# Patient Record
Sex: FEMALE | Race: WHITE | NOT HISPANIC OR LATINO
[De-identification: names, ages, dates, MRNs, and addresses within clinical notes are randomized per-mention and may not be internally consistent; named-entity substitution may affect disease eponyms.]

---

## 2018-07-02 ENCOUNTER — APPOINTMENT (OUTPATIENT)
Dept: PULMONOLOGY | Facility: CLINIC | Age: 83
End: 2018-07-02
Payer: MEDICARE

## 2018-07-02 VITALS
SYSTOLIC BLOOD PRESSURE: 115 MMHG | OXYGEN SATURATION: 100 % | BODY MASS INDEX: 24.84 KG/M2 | DIASTOLIC BLOOD PRESSURE: 66 MMHG | RESPIRATION RATE: 16 BRPM | WEIGHT: 135 LBS | TEMPERATURE: 98.5 F | HEART RATE: 85 BPM | HEIGHT: 62 IN

## 2018-07-02 PROCEDURE — 99213 OFFICE O/P EST LOW 20 MIN: CPT | Mod: 25

## 2018-07-02 PROCEDURE — 71046 X-RAY EXAM CHEST 2 VIEWS: CPT

## 2018-07-02 RX ORDER — SULFAMETHOXAZOLE AND TRIMETHOPRIM 800; 160 MG/1; MG/1
800-160 TABLET ORAL
Qty: 6 | Refills: 0 | Status: DISCONTINUED | COMMUNITY
Start: 2018-04-03

## 2018-07-02 RX ORDER — ROSUVASTATIN CALCIUM 5 MG/1
5 TABLET, FILM COATED ORAL
Refills: 0 | Status: ACTIVE | COMMUNITY

## 2018-07-02 RX ORDER — AZITHROMYCIN 250 MG/1
250 TABLET, FILM COATED ORAL
Qty: 6 | Refills: 0 | Status: DISCONTINUED | COMMUNITY
Start: 2018-06-02

## 2018-07-02 RX ORDER — CHROMIUM 200 MCG
1000 TABLET ORAL
Refills: 0 | Status: ACTIVE | COMMUNITY

## 2018-07-02 RX ORDER — CEFUROXIME AXETIL 500 MG/1
500 TABLET ORAL
Qty: 20 | Refills: 0 | Status: DISCONTINUED | COMMUNITY
Start: 2018-06-04

## 2018-07-02 RX ORDER — ECONAZOLE NITRATE 10 MG/G
1 CREAM TOPICAL
Qty: 85 | Refills: 0 | Status: ACTIVE | COMMUNITY
Start: 2018-02-14

## 2018-07-02 RX ORDER — ASPIRIN 81 MG/1
81 TABLET, COATED ORAL
Refills: 0 | Status: ACTIVE | COMMUNITY

## 2018-07-31 ENCOUNTER — MOBILE ON CALL (OUTPATIENT)
Age: 83
End: 2018-07-31

## 2019-05-08 ENCOUNTER — TRANSCRIPTION ENCOUNTER (OUTPATIENT)
Age: 84
End: 2019-05-08

## 2019-05-09 ENCOUNTER — OUTPATIENT (OUTPATIENT)
Dept: OUTPATIENT SERVICES | Facility: HOSPITAL | Age: 84
LOS: 1 days | End: 2019-05-09
Payer: MEDICARE

## 2019-05-09 ENCOUNTER — RESULT REVIEW (OUTPATIENT)
Age: 84
End: 2019-05-09

## 2019-05-09 VITALS
RESPIRATION RATE: 14 BRPM | HEART RATE: 60 BPM | HEIGHT: 62 IN | WEIGHT: 136.25 LBS | DIASTOLIC BLOOD PRESSURE: 60 MMHG | TEMPERATURE: 98 F | SYSTOLIC BLOOD PRESSURE: 148 MMHG | OXYGEN SATURATION: 100 %

## 2019-05-09 VITALS
RESPIRATION RATE: 16 BRPM | OXYGEN SATURATION: 99 % | SYSTOLIC BLOOD PRESSURE: 119 MMHG | HEART RATE: 68 BPM | DIASTOLIC BLOOD PRESSURE: 56 MMHG

## 2019-05-09 DIAGNOSIS — Z98.49 CATARACT EXTRACTION STATUS, UNSPECIFIED EYE: Chronic | ICD-10-CM

## 2019-05-09 DIAGNOSIS — T85.22XA DISPLACEMENT OF INTRAOCULAR LENS, INITIAL ENCOUNTER: ICD-10-CM

## 2019-05-09 PROCEDURE — V2632: CPT

## 2019-05-09 PROCEDURE — 67039 LASER TREATMENT OF RETINA: CPT | Mod: LT

## 2019-05-09 PROCEDURE — C1889: CPT

## 2019-05-09 PROCEDURE — 88300 SURGICAL PATH GROSS: CPT

## 2019-05-09 PROCEDURE — 88300 SURGICAL PATH GROSS: CPT | Mod: 26

## 2019-05-09 PROCEDURE — 66986 EXCHANGE LENS PROSTHESIS: CPT | Mod: LT

## 2019-05-09 NOTE — ASU DISCHARGE PLAN (ADULT/PEDIATRIC) - CARE PROVIDER_API CALL
Kojo Gifford)  Ophthalmology  [address] [place] [address], [facility] [address]  [place] [address]  Phone: [phone]  Fax: [phone]  Follow Up Time:

## 2019-05-09 NOTE — ASU DISCHARGE PLAN (ADULT/PEDIATRIC) - NURSING INSTRUCTIONS
Do not rub the eye. Tylenol or extra strength tylenol if needed for discomfort, avoid   Advil, Motrin, Aleve and aspirin to minimize bleeding. Appointment on 5/10/19 @ 9am

## 2019-05-09 NOTE — ASU DISCHARGE PLAN (ADULT/PEDIATRIC) - CALL YOUR DOCTOR IF YOU HAVE ANY OF THE FOLLOWING:
Nausea and vomiting that does not stop/Fever greater than (need to indicate Fahrenheit or Celsius)/Swelling that gets worse/Bleeding that does not stop/Pain not relieved by Medications

## 2019-05-09 NOTE — ASU DISCHARGE PLAN (ADULT/PEDIATRIC) - PATIENT EDUCATION MATERIALS PROVIED
Other (specify)/Implant card (specify)/Intraocular lens implant (IOL) , Eye shield instructions and eye kit given to patient

## 2019-05-09 NOTE — ASU DISCHARGE PLAN (ADULT/PEDIATRIC) - PROCEDURE
left eye pars plana vitrectomy, removal of posterior intraocular lens, endolaser, insertion of sutured IOL

## 2020-05-12 ENCOUNTER — APPOINTMENT (OUTPATIENT)
Dept: PULMONOLOGY | Facility: CLINIC | Age: 85
End: 2020-05-12
Payer: MEDICARE

## 2020-05-12 PROBLEM — K21.9 GASTRO-ESOPHAGEAL REFLUX DISEASE WITHOUT ESOPHAGITIS: Chronic | Status: ACTIVE | Noted: 2019-05-09

## 2020-05-12 PROBLEM — R00.2 PALPITATIONS: Chronic | Status: ACTIVE | Noted: 2019-05-09

## 2020-05-12 PROCEDURE — 99441: CPT | Mod: CR

## 2020-05-15 ENCOUNTER — APPOINTMENT (OUTPATIENT)
Dept: PULMONOLOGY | Facility: CLINIC | Age: 85
End: 2020-05-15
Payer: MEDICARE

## 2020-05-15 ENCOUNTER — LABORATORY RESULT (OUTPATIENT)
Age: 85
End: 2020-05-15

## 2020-05-15 VITALS
OXYGEN SATURATION: 99 % | TEMPERATURE: 97.9 F | HEART RATE: 73 BPM | DIASTOLIC BLOOD PRESSURE: 65 MMHG | WEIGHT: 140 LBS | SYSTOLIC BLOOD PRESSURE: 132 MMHG | HEIGHT: 62 IN | BODY MASS INDEX: 25.76 KG/M2

## 2020-05-15 PROCEDURE — 36415 COLL VENOUS BLD VENIPUNCTURE: CPT

## 2020-05-15 PROCEDURE — 94010 BREATHING CAPACITY TEST: CPT

## 2020-05-15 PROCEDURE — 99214 OFFICE O/P EST MOD 30 MIN: CPT | Mod: 25

## 2020-05-15 RX ORDER — AZITHROMYCIN 250 MG/1
250 TABLET, FILM COATED ORAL DAILY
Qty: 10 | Refills: 0 | Status: DISCONTINUED | COMMUNITY
Start: 2018-07-02 | End: 2020-05-15

## 2020-05-15 RX ORDER — CEFUROXIME AXETIL 250 MG/1
250 TABLET ORAL
Qty: 14 | Refills: 0 | Status: DISCONTINUED | COMMUNITY
Start: 2019-12-16

## 2020-05-15 NOTE — HISTORY OF PRESENT ILLNESS
[TextBox_4] : Notes BARNES has had in past.\par Has recurred. Had improved now recurred with BARNES 25 feet.\par \par Some nasal congestion.\par No cough\par No wheezing\par No CP \par Does feel tachycardia with exertion.

## 2020-05-15 NOTE — DISCUSSION/SUMMARY
[FreeTextEntry1] : Exertional dyspnea of unclear etiology.  She has had this symptom in the past which has resolved.\par Has seen cardiology and will follow-up.\par Cannot exclude component of airways disease in light of decrement in flow rates with mild airflow limitation.\par Abnormal chest radiograph without significant change.

## 2020-05-15 NOTE — ASSESSMENT
[FreeTextEntry1] : Trial of Brio Ellipta and instructed in proper use.\par \par Patient will call or follow-up in 1 to 2 weeks time to ascertain response to therapy.\par \par We will follow-up with cardiology.\par \par Labs drawn in office today\par

## 2020-05-15 NOTE — PROCEDURE
[FreeTextEntry1] : 05/15/2020\par Pulmonary function testing\par These data demonstrate a mild obstructive ventilatory deficit. \par Mild decrease in flow rates compared to March 23, 2018

## 2020-05-18 LAB
25(OH)D3 SERPL-MCNC: 35.6 NG/ML
ALBUMIN SERPL ELPH-MCNC: 4.6 G/DL
ALP BLD-CCNC: 52 U/L
ALT SERPL-CCNC: 17 U/L
ANION GAP SERPL CALC-SCNC: 13 MMOL/L
AST SERPL-CCNC: 18 U/L
BASOPHILS # BLD AUTO: 0.02 K/UL
BASOPHILS NFR BLD AUTO: 0.4 %
BILIRUB DIRECT SERPL-MCNC: 0.1 MG/DL
BILIRUB INDIRECT SERPL-MCNC: 0.3 MG/DL
BILIRUB SERPL-MCNC: 0.4 MG/DL
BUN SERPL-MCNC: 22 MG/DL
CALCIUM SERPL-MCNC: 9.2 MG/DL
CHLORIDE SERPL-SCNC: 97 MMOL/L
CHOLEST SERPL-MCNC: 172 MG/DL
CHOLEST/HDLC SERPL: 2.8 RATIO
CO2 SERPL-SCNC: 25 MMOL/L
CREAT SERPL-MCNC: 0.74 MG/DL
DEPRECATED D DIMER PPP IA-ACNC: 183 NG/ML DDU
EOSINOPHIL # BLD AUTO: 0.04 K/UL
EOSINOPHIL NFR BLD AUTO: 0.8 %
GLUCOSE SERPL-MCNC: 99 MG/DL
HCT VFR BLD CALC: 34.5 %
HDLC SERPL-MCNC: 61 MG/DL
HGB BLD-MCNC: 11.2 G/DL
IMM GRANULOCYTES NFR BLD AUTO: 1.1 %
LDLC SERPL CALC-MCNC: 52 MG/DL
LYMPHOCYTES # BLD AUTO: 1.53 K/UL
LYMPHOCYTES NFR BLD AUTO: 28.7 %
MAN DIFF?: NORMAL
MCHC RBC-ENTMCNC: 31.6 PG
MCHC RBC-ENTMCNC: 32.5 GM/DL
MCV RBC AUTO: 97.5 FL
MONOCYTES # BLD AUTO: 0.62 K/UL
MONOCYTES NFR BLD AUTO: 11.6 %
NEUTROPHILS # BLD AUTO: 3.06 K/UL
NEUTROPHILS NFR BLD AUTO: 57.4 %
PLATELET # BLD AUTO: 158 K/UL
POTASSIUM SERPL-SCNC: 4.5 MMOL/L
PROT SERPL-MCNC: 7 G/DL
RBC # BLD: 3.54 M/UL
RBC # FLD: 14.2 %
SODIUM SERPL-SCNC: 134 MMOL/L
T3 SERPL-MCNC: 78 NG/DL
T3RU NFR SERPL: 1 TBI
T4 FREE SERPL-MCNC: 1.2 NG/DL
T4 SERPL-MCNC: 5.3 UG/DL
TRIGL SERPL-MCNC: 293 MG/DL
TSH SERPL-ACNC: 1.99 UIU/ML
WBC # FLD AUTO: 5.33 K/UL

## 2020-07-24 ENCOUNTER — APPOINTMENT (OUTPATIENT)
Dept: OPHTHALMOLOGY | Facility: CLINIC | Age: 85
End: 2020-07-24
Payer: MEDICARE

## 2020-07-24 ENCOUNTER — NON-APPOINTMENT (OUTPATIENT)
Age: 85
End: 2020-07-24

## 2020-07-24 PROCEDURE — 92025 CPTRIZED CORNEAL TOPOGRAPHY: CPT

## 2020-07-24 PROCEDURE — 92004 COMPRE OPH EXAM NEW PT 1/>: CPT

## 2020-07-24 PROCEDURE — 92286 ANT SGM IMG I&R SPECLR MIC: CPT

## 2020-09-09 ENCOUNTER — APPOINTMENT (OUTPATIENT)
Dept: OPHTHALMOLOGY | Facility: CLINIC | Age: 85
End: 2020-09-09

## 2020-12-14 ENCOUNTER — NON-APPOINTMENT (OUTPATIENT)
Age: 85
End: 2020-12-14

## 2020-12-14 ENCOUNTER — APPOINTMENT (OUTPATIENT)
Dept: OPHTHALMOLOGY | Facility: CLINIC | Age: 85
End: 2020-12-14
Payer: MEDICARE

## 2020-12-14 PROCEDURE — 92014 COMPRE OPH EXAM EST PT 1/>: CPT

## 2021-01-20 ENCOUNTER — NON-APPOINTMENT (OUTPATIENT)
Age: 86
End: 2021-01-20

## 2021-01-20 ENCOUNTER — APPOINTMENT (OUTPATIENT)
Dept: OPHTHALMOLOGY | Facility: CLINIC | Age: 86
End: 2021-01-20
Payer: MEDICARE

## 2021-01-20 PROCEDURE — 92025 CPTRIZED CORNEAL TOPOGRAPHY: CPT

## 2021-01-20 PROCEDURE — 92012 INTRM OPH EXAM EST PATIENT: CPT

## 2021-03-02 ENCOUNTER — OUTPATIENT (OUTPATIENT)
Dept: OUTPATIENT SERVICES | Facility: HOSPITAL | Age: 86
LOS: 1 days | End: 2021-03-02
Payer: MEDICARE

## 2021-03-02 DIAGNOSIS — Z98.49 CATARACT EXTRACTION STATUS, UNSPECIFIED EYE: Chronic | ICD-10-CM

## 2021-03-02 DIAGNOSIS — Z20.828 CONTACT WITH AND (SUSPECTED) EXPOSURE TO OTHER VIRAL COMMUNICABLE DISEASES: ICD-10-CM

## 2021-03-02 LAB — SARS-COV-2 RNA SPEC QL NAA+PROBE: SIGNIFICANT CHANGE UP

## 2021-03-02 PROCEDURE — U0005: CPT

## 2021-03-02 PROCEDURE — U0003: CPT

## 2021-03-03 ENCOUNTER — TRANSCRIPTION ENCOUNTER (OUTPATIENT)
Age: 86
End: 2021-03-03

## 2021-03-04 ENCOUNTER — APPOINTMENT (OUTPATIENT)
Dept: OPHTHALMOLOGY | Facility: HOSPITAL | Age: 86
End: 2021-03-04
Payer: MEDICARE

## 2021-03-04 ENCOUNTER — OUTPATIENT (OUTPATIENT)
Dept: OUTPATIENT SERVICES | Facility: HOSPITAL | Age: 86
LOS: 1 days | End: 2021-03-04
Payer: MEDICARE

## 2021-03-04 ENCOUNTER — RESULT REVIEW (OUTPATIENT)
Age: 86
End: 2021-03-04

## 2021-03-04 VITALS
SYSTOLIC BLOOD PRESSURE: 138 MMHG | HEIGHT: 62 IN | RESPIRATION RATE: 13 BRPM | WEIGHT: 136.69 LBS | DIASTOLIC BLOOD PRESSURE: 66 MMHG | OXYGEN SATURATION: 99 % | HEART RATE: 75 BPM | TEMPERATURE: 98 F

## 2021-03-04 VITALS
OXYGEN SATURATION: 98 % | HEART RATE: 72 BPM | RESPIRATION RATE: 14 BRPM | SYSTOLIC BLOOD PRESSURE: 120 MMHG | DIASTOLIC BLOOD PRESSURE: 60 MMHG

## 2021-03-04 DIAGNOSIS — H18.11 BULLOUS KERATOPATHY, RIGHT EYE: ICD-10-CM

## 2021-03-04 DIAGNOSIS — Z98.49 CATARACT EXTRACTION STATUS, UNSPECIFIED EYE: Chronic | ICD-10-CM

## 2021-03-04 PROCEDURE — 88312 SPECIAL STAINS GROUP 1: CPT | Mod: 26

## 2021-03-04 PROCEDURE — 88312 SPECIAL STAINS GROUP 1: CPT

## 2021-03-04 PROCEDURE — 88304 TISSUE EXAM BY PATHOLOGIST: CPT | Mod: 26

## 2021-03-04 PROCEDURE — 65756 CORNEAL TRNSPL ENDOTHELIAL: CPT | Mod: RT

## 2021-03-04 PROCEDURE — 88304 TISSUE EXAM BY PATHOLOGIST: CPT

## 2021-03-04 PROCEDURE — 87070 CULTURE OTHR SPECIMN AEROBIC: CPT

## 2021-03-04 PROCEDURE — V2785: CPT

## 2021-03-04 NOTE — ASU DISCHARGE PLAN (ADULT/PEDIATRIC) - CARE PROVIDER_API CALL
Stacy France (MD)  Ophthalmology  [address] [place] [address] [address]  [place] [address]  Phone: [phone]  Fax: [phone]  Scheduled Appointment: 03/05/2021

## 2021-03-05 ENCOUNTER — NON-APPOINTMENT (OUTPATIENT)
Age: 86
End: 2021-03-05

## 2021-03-05 ENCOUNTER — APPOINTMENT (OUTPATIENT)
Dept: OPHTHALMOLOGY | Facility: CLINIC | Age: 86
End: 2021-03-05
Payer: MEDICARE

## 2021-03-05 PROCEDURE — 99024 POSTOP FOLLOW-UP VISIT: CPT

## 2021-03-08 ENCOUNTER — NON-APPOINTMENT (OUTPATIENT)
Age: 86
End: 2021-03-08

## 2021-03-08 ENCOUNTER — APPOINTMENT (OUTPATIENT)
Dept: OPHTHALMOLOGY | Facility: CLINIC | Age: 86
End: 2021-03-08
Payer: MEDICARE

## 2021-03-08 PROCEDURE — 99024 POSTOP FOLLOW-UP VISIT: CPT

## 2021-03-09 LAB — SURGICAL PATHOLOGY STUDY: SIGNIFICANT CHANGE UP

## 2021-03-17 ENCOUNTER — NON-APPOINTMENT (OUTPATIENT)
Age: 86
End: 2021-03-17

## 2021-03-17 ENCOUNTER — APPOINTMENT (OUTPATIENT)
Dept: OPHTHALMOLOGY | Facility: CLINIC | Age: 86
End: 2021-03-17
Payer: MEDICARE

## 2021-03-17 PROCEDURE — 99024 POSTOP FOLLOW-UP VISIT: CPT

## 2021-03-25 LAB
CULTURE RESULTS: SIGNIFICANT CHANGE UP
SPECIMEN SOURCE: SIGNIFICANT CHANGE UP

## 2021-04-07 ENCOUNTER — NON-APPOINTMENT (OUTPATIENT)
Age: 86
End: 2021-04-07

## 2021-04-07 ENCOUNTER — APPOINTMENT (OUTPATIENT)
Dept: OPHTHALMOLOGY | Facility: CLINIC | Age: 86
End: 2021-04-07
Payer: MEDICARE

## 2021-04-07 PROCEDURE — 99024 POSTOP FOLLOW-UP VISIT: CPT

## 2021-05-12 ENCOUNTER — APPOINTMENT (OUTPATIENT)
Dept: OPHTHALMOLOGY | Facility: CLINIC | Age: 86
End: 2021-05-12
Payer: MEDICARE

## 2021-05-12 ENCOUNTER — NON-APPOINTMENT (OUTPATIENT)
Age: 86
End: 2021-05-12

## 2021-05-12 PROCEDURE — 99024 POSTOP FOLLOW-UP VISIT: CPT

## 2021-05-25 ENCOUNTER — APPOINTMENT (OUTPATIENT)
Dept: PULMONOLOGY | Facility: CLINIC | Age: 86
End: 2021-05-25
Payer: MEDICARE

## 2021-05-25 VITALS
OXYGEN SATURATION: 97 % | DIASTOLIC BLOOD PRESSURE: 52 MMHG | TEMPERATURE: 97.5 F | HEART RATE: 82 BPM | SYSTOLIC BLOOD PRESSURE: 120 MMHG

## 2021-05-25 DIAGNOSIS — R93.89 ABNORMAL FINDINGS ON DIAGNOSTIC IMAGING OF OTHER SPECIFIED BODY STRUCTURES: ICD-10-CM

## 2021-05-25 PROCEDURE — 99214 OFFICE O/P EST MOD 30 MIN: CPT | Mod: 25

## 2021-05-25 PROCEDURE — 94010 BREATHING CAPACITY TEST: CPT

## 2021-05-25 PROCEDURE — 71046 X-RAY EXAM CHEST 2 VIEWS: CPT

## 2021-05-25 RX ORDER — METOPROLOL TARTRATE 50 MG/1
50 TABLET, FILM COATED ORAL
Refills: 0 | Status: DISCONTINUED | COMMUNITY
End: 2021-05-25

## 2021-05-25 RX ORDER — FLUTICASONE FUROATE AND VILANTEROL TRIFENATATE 200; 25 UG/1; UG/1
200-25 POWDER RESPIRATORY (INHALATION) DAILY
Qty: 1 | Refills: 5 | Status: DISCONTINUED | COMMUNITY
Start: 2020-05-15 | End: 2021-05-25

## 2021-05-25 RX ORDER — METOPROLOL TARTRATE 25 MG/1
25 TABLET, FILM COATED ORAL
Refills: 0 | Status: ACTIVE | COMMUNITY

## 2021-05-25 NOTE — HISTORY OF PRESENT ILLNESS
[TextBox_4] : Feels some increase in BARNES.\par Occasional cough feels chokes on saliva. Dry cough.\par No wheezing\par Sometimes coughs after drinking. \par Gets a little tickle after drinking\par Positive occ. GERD. Off meds for reflux for few years.\par \par BARNES 1/2 flight stairs. \par \par Vaccinated COVID.\par \par Issues with sight.

## 2021-05-25 NOTE — PROCEDURE
[FreeTextEntry1] : Last CT 2018\par \par 05/25/2021\par Pulmonary function testing\par Borderline mild ventilatory impairment in a obstructive pattern.\par PFT attached relatively stable function.\par

## 2021-05-25 NOTE — PHYSICAL EXAM
[No Acute Distress] : no acute distress [Supple] : supple [No JVD] : no jvd [Normal S1, S2] : normal s1, s2 [No Murmurs] : no murmurs [Clear to Auscultation Bilaterally] : clear to auscultation bilaterally [Normal to Percussion] : normal to percussion [No Abnormalities] : no abnormalities [Benign] : benign [No HSM] : no hsm [No Clubbing] : no clubbing [No Cyanosis] : no cyanosis [No Edema] : no edema [No Focal Deficits] : no focal deficits [Oriented x3] : oriented x3 [Normal Oropharynx] : normal oropharynx

## 2021-05-25 NOTE — CONSULT LETTER
[Dear  ___] : Dear ~YAEL, [Courtesy Letter:] : I had the pleasure of seeing your patient, [unfilled], in my office today. [Consult Closing:] : Thank you very much for allowing me to participate in the care of this patient.  If you have any questions, please do not hesitate to contact me. [Sincerely,] : Sincerely, [FreeTextEntry2] : Eugene Quinteros MD [FreeTextEntry3] : Eugene Ramos MD FCCP\par

## 2021-05-25 NOTE — DISCUSSION/SUMMARY
[FreeTextEntry1] : Radiographic abnormalities highly likely infectious inflammatory in origin.\par New density in the left lower lobe may be superimposed shadows however underlying abnormality should be excluded.\par Exertional dyspnea.  Likely related to age musculoskeletal problems and conditioning.  No evidence of significant intrinsic pulmonary disease.

## 2021-06-23 ENCOUNTER — APPOINTMENT (OUTPATIENT)
Dept: OPHTHALMOLOGY | Facility: CLINIC | Age: 86
End: 2021-06-23
Payer: MEDICARE

## 2021-06-23 ENCOUNTER — NON-APPOINTMENT (OUTPATIENT)
Age: 86
End: 2021-06-23

## 2021-06-23 PROCEDURE — 92012 INTRM OPH EXAM EST PATIENT: CPT

## 2021-07-26 ENCOUNTER — APPOINTMENT (OUTPATIENT)
Dept: OPHTHALMOLOGY | Facility: CLINIC | Age: 86
End: 2021-07-26
Payer: MEDICARE

## 2021-07-26 ENCOUNTER — NON-APPOINTMENT (OUTPATIENT)
Age: 86
End: 2021-07-26

## 2021-07-26 PROCEDURE — 92012 INTRM OPH EXAM EST PATIENT: CPT

## 2021-09-13 ENCOUNTER — APPOINTMENT (OUTPATIENT)
Dept: OPHTHALMOLOGY | Facility: CLINIC | Age: 86
End: 2021-09-13
Payer: MEDICARE

## 2021-09-13 ENCOUNTER — NON-APPOINTMENT (OUTPATIENT)
Age: 86
End: 2021-09-13

## 2021-09-13 PROCEDURE — 92012 INTRM OPH EXAM EST PATIENT: CPT

## 2021-12-15 ENCOUNTER — NON-APPOINTMENT (OUTPATIENT)
Age: 86
End: 2021-12-15

## 2021-12-15 ENCOUNTER — APPOINTMENT (OUTPATIENT)
Dept: OPHTHALMOLOGY | Facility: CLINIC | Age: 86
End: 2021-12-15
Payer: MEDICARE

## 2021-12-15 PROCEDURE — 92012 INTRM OPH EXAM EST PATIENT: CPT

## 2022-07-19 ENCOUNTER — NON-APPOINTMENT (OUTPATIENT)
Age: 87
End: 2022-07-19

## 2022-07-20 ENCOUNTER — APPOINTMENT (OUTPATIENT)
Dept: OPHTHALMOLOGY | Facility: CLINIC | Age: 87
End: 2022-07-20

## 2022-07-20 PROCEDURE — 92012 INTRM OPH EXAM EST PATIENT: CPT

## 2022-11-08 ENCOUNTER — APPOINTMENT (OUTPATIENT)
Dept: PULMONOLOGY | Facility: CLINIC | Age: 87
End: 2022-11-08

## 2022-11-17 ENCOUNTER — APPOINTMENT (OUTPATIENT)
Dept: PODIATRY | Facility: CLINIC | Age: 87
End: 2022-11-17

## 2022-11-17 DIAGNOSIS — Z82.49 FAMILY HISTORY OF ISCHEMIC HEART DISEASE AND OTHER DISEASES OF THE CIRCULATORY SYSTEM: ICD-10-CM

## 2022-11-17 DIAGNOSIS — Z83.3 FAMILY HISTORY OF DIABETES MELLITUS: ICD-10-CM

## 2022-11-17 DIAGNOSIS — Z87.39 PERSONAL HISTORY OF OTHER DISEASES OF THE MUSCULOSKELETAL SYSTEM AND CONNECTIVE TISSUE: ICD-10-CM

## 2022-11-17 DIAGNOSIS — L60.0 INGROWING NAIL: ICD-10-CM

## 2022-11-17 DIAGNOSIS — Z87.891 PERSONAL HISTORY OF NICOTINE DEPENDENCE: ICD-10-CM

## 2022-11-17 DIAGNOSIS — B35.1 TINEA UNGUIUM: ICD-10-CM

## 2022-11-17 DIAGNOSIS — Z86.69 PERSONAL HISTORY OF OTHER DISEASES OF THE NERVOUS SYSTEM AND SENSE ORGANS: ICD-10-CM

## 2022-11-17 DIAGNOSIS — K31.9 DISEASE OF STOMACH AND DUODENUM, UNSPECIFIED: ICD-10-CM

## 2022-11-17 RX ORDER — METHENAMINE HIPPURATE 1 G/1
1 TABLET ORAL
Qty: 180 | Refills: 0 | Status: ACTIVE | COMMUNITY
Start: 2022-05-03

## 2022-11-17 RX ORDER — IBUPROFEN 600 MG/1
600 TABLET, FILM COATED ORAL
Qty: 35 | Refills: 0 | Status: ACTIVE | COMMUNITY
Start: 2022-11-14

## 2022-11-17 RX ORDER — TRAMADOL HYDROCHLORIDE 50 MG/1
50 TABLET, COATED ORAL
Qty: 60 | Refills: 0 | Status: ACTIVE | COMMUNITY
Start: 2022-08-05

## 2022-11-17 RX ORDER — BRIMONIDINE TARTRATE, TIMOLOL MALEATE 2; 5 MG/ML; MG/ML
0.2-0.5 SOLUTION/ DROPS OPHTHALMIC
Qty: 10 | Refills: 0 | Status: ACTIVE | COMMUNITY
Start: 2022-02-04

## 2022-11-17 RX ORDER — PANTOPRAZOLE SODIUM 40 MG/10ML
40 INJECTION, POWDER, FOR SOLUTION INTRAVENOUS
Refills: 0 | Status: ACTIVE | COMMUNITY

## 2022-11-17 RX ORDER — METOPROLOL SUCCINATE AND HYDROCHLOROTHIAZIDE 12.5; 1 MG/1; MG/1
100-12.5 TABLET ORAL
Refills: 0 | Status: ACTIVE | COMMUNITY

## 2022-11-17 RX ORDER — TIMOLOL MALEATE 5 MG/ML
0.5 SOLUTION OPHTHALMIC
Refills: 0 | Status: ACTIVE | COMMUNITY

## 2023-02-08 ENCOUNTER — APPOINTMENT (OUTPATIENT)
Dept: OPHTHALMOLOGY | Facility: CLINIC | Age: 88
End: 2023-02-08
Payer: MEDICARE

## 2023-02-08 ENCOUNTER — NON-APPOINTMENT (OUTPATIENT)
Age: 88
End: 2023-02-08

## 2023-02-08 PROCEDURE — 92014 COMPRE OPH EXAM EST PT 1/>: CPT

## 2023-02-13 ENCOUNTER — APPOINTMENT (OUTPATIENT)
Dept: OPHTHALMOLOGY | Facility: CLINIC | Age: 88
End: 2023-02-13

## 2023-04-17 ENCOUNTER — APPOINTMENT (OUTPATIENT)
Dept: OPHTHALMOLOGY | Facility: CLINIC | Age: 88
End: 2023-04-17

## 2023-08-09 ENCOUNTER — NON-APPOINTMENT (OUTPATIENT)
Age: 88
End: 2023-08-09

## 2023-08-09 ENCOUNTER — APPOINTMENT (OUTPATIENT)
Dept: OPHTHALMOLOGY | Facility: CLINIC | Age: 88
End: 2023-08-09
Payer: MEDICARE

## 2023-08-09 PROCEDURE — 92012 INTRM OPH EXAM EST PATIENT: CPT

## 2023-11-13 ENCOUNTER — APPOINTMENT (OUTPATIENT)
Dept: PULMONOLOGY | Facility: CLINIC | Age: 88
End: 2023-11-13
Payer: MEDICARE

## 2023-11-13 VITALS
SYSTOLIC BLOOD PRESSURE: 164 MMHG | HEART RATE: 126 BPM | BODY MASS INDEX: 24.69 KG/M2 | RESPIRATION RATE: 16 BRPM | WEIGHT: 135 LBS | OXYGEN SATURATION: 99 % | DIASTOLIC BLOOD PRESSURE: 84 MMHG

## 2023-11-13 VITALS — SYSTOLIC BLOOD PRESSURE: 138 MMHG | DIASTOLIC BLOOD PRESSURE: 70 MMHG

## 2023-11-13 DIAGNOSIS — R05.9 COUGH, UNSPECIFIED: ICD-10-CM

## 2023-11-13 LAB — POCT - HEMOGLOBIN (HGB), QUANTITATIVE, TRANSCUTANEOUS: 10.1

## 2023-11-13 PROCEDURE — 88738 HGB QUANT TRANSCUTANEOUS: CPT

## 2023-11-13 PROCEDURE — ZZZZZ: CPT

## 2023-11-13 PROCEDURE — 94727 GAS DIL/WSHOT DETER LNG VOL: CPT

## 2023-11-13 PROCEDURE — 94618 PULMONARY STRESS TESTING: CPT

## 2023-11-13 PROCEDURE — 94010 BREATHING CAPACITY TEST: CPT

## 2023-11-13 PROCEDURE — 99214 OFFICE O/P EST MOD 30 MIN: CPT | Mod: 25

## 2023-11-13 PROCEDURE — 94729 DIFFUSING CAPACITY: CPT

## 2023-11-13 RX ORDER — ALPRAZOLAM 0.25 MG/1
0.25 TABLET ORAL
Qty: 30 | Refills: 0 | Status: ACTIVE | COMMUNITY
Start: 2023-10-31

## 2023-11-27 ENCOUNTER — INPATIENT (INPATIENT)
Facility: HOSPITAL | Age: 88
LOS: 8 days | Discharge: HOME HEALTH | DRG: 838 | End: 2023-12-06
Attending: INTERNAL MEDICINE | Admitting: INTERNAL MEDICINE
Payer: MEDICARE

## 2023-11-27 VITALS
HEART RATE: 116 BPM | WEIGHT: 133.82 LBS | RESPIRATION RATE: 17 BRPM | SYSTOLIC BLOOD PRESSURE: 166 MMHG | DIASTOLIC BLOOD PRESSURE: 85 MMHG | TEMPERATURE: 98 F | HEIGHT: 62.2 IN | OXYGEN SATURATION: 99 %

## 2023-11-27 DIAGNOSIS — D46.9 MYELODYSPLASTIC SYNDROME, UNSPECIFIED: ICD-10-CM

## 2023-11-27 DIAGNOSIS — Z94.7 CORNEAL TRANSPLANT STATUS: ICD-10-CM

## 2023-11-27 DIAGNOSIS — E78.5 HYPERLIPIDEMIA, UNSPECIFIED: ICD-10-CM

## 2023-11-27 DIAGNOSIS — Z98.49 CATARACT EXTRACTION STATUS, UNSPECIFIED EYE: Chronic | ICD-10-CM

## 2023-11-27 DIAGNOSIS — I10 ESSENTIAL (PRIMARY) HYPERTENSION: ICD-10-CM

## 2023-11-27 DIAGNOSIS — N39.0 URINARY TRACT INFECTION, SITE NOT SPECIFIED: ICD-10-CM

## 2023-11-27 LAB
ALBUMIN SERPL ELPH-MCNC: 4.4 G/DL — SIGNIFICANT CHANGE UP (ref 3.3–5)
ALBUMIN SERPL ELPH-MCNC: 4.4 G/DL — SIGNIFICANT CHANGE UP (ref 3.3–5)
ALP SERPL-CCNC: 58 U/L — SIGNIFICANT CHANGE UP (ref 40–120)
ALP SERPL-CCNC: 58 U/L — SIGNIFICANT CHANGE UP (ref 40–120)
ALT FLD-CCNC: 17 U/L — SIGNIFICANT CHANGE UP (ref 10–45)
ALT FLD-CCNC: 17 U/L — SIGNIFICANT CHANGE UP (ref 10–45)
ANION GAP SERPL CALC-SCNC: 13 MMOL/L — SIGNIFICANT CHANGE UP (ref 5–17)
ANION GAP SERPL CALC-SCNC: 13 MMOL/L — SIGNIFICANT CHANGE UP (ref 5–17)
APTT BLD: 29.5 SEC — SIGNIFICANT CHANGE UP (ref 24.5–35.6)
APTT BLD: 29.5 SEC — SIGNIFICANT CHANGE UP (ref 24.5–35.6)
AST SERPL-CCNC: 21 U/L — SIGNIFICANT CHANGE UP (ref 10–40)
AST SERPL-CCNC: 21 U/L — SIGNIFICANT CHANGE UP (ref 10–40)
BILIRUB SERPL-MCNC: 0.6 MG/DL — SIGNIFICANT CHANGE UP (ref 0.2–1.2)
BILIRUB SERPL-MCNC: 0.6 MG/DL — SIGNIFICANT CHANGE UP (ref 0.2–1.2)
BUN SERPL-MCNC: 20 MG/DL — SIGNIFICANT CHANGE UP (ref 7–23)
BUN SERPL-MCNC: 20 MG/DL — SIGNIFICANT CHANGE UP (ref 7–23)
CALCIUM SERPL-MCNC: 10 MG/DL — SIGNIFICANT CHANGE UP (ref 8.4–10.5)
CALCIUM SERPL-MCNC: 10 MG/DL — SIGNIFICANT CHANGE UP (ref 8.4–10.5)
CHLORIDE SERPL-SCNC: 99 MMOL/L — SIGNIFICANT CHANGE UP (ref 96–108)
CHLORIDE SERPL-SCNC: 99 MMOL/L — SIGNIFICANT CHANGE UP (ref 96–108)
CO2 SERPL-SCNC: 25 MMOL/L — SIGNIFICANT CHANGE UP (ref 22–31)
CO2 SERPL-SCNC: 25 MMOL/L — SIGNIFICANT CHANGE UP (ref 22–31)
CREAT SERPL-MCNC: 0.97 MG/DL — SIGNIFICANT CHANGE UP (ref 0.5–1.3)
CREAT SERPL-MCNC: 0.97 MG/DL — SIGNIFICANT CHANGE UP (ref 0.5–1.3)
EGFR: 55 ML/MIN/1.73M2 — LOW
EGFR: 55 ML/MIN/1.73M2 — LOW
GLUCOSE SERPL-MCNC: 98 MG/DL — SIGNIFICANT CHANGE UP (ref 70–99)
GLUCOSE SERPL-MCNC: 98 MG/DL — SIGNIFICANT CHANGE UP (ref 70–99)
HCT VFR BLD CALC: 35.2 % — SIGNIFICANT CHANGE UP (ref 34.5–45)
HCT VFR BLD CALC: 35.2 % — SIGNIFICANT CHANGE UP (ref 34.5–45)
HGB BLD-MCNC: 9.9 G/DL — LOW (ref 11.5–15.5)
HGB BLD-MCNC: 9.9 G/DL — LOW (ref 11.5–15.5)
INR BLD: 1.02 RATIO — SIGNIFICANT CHANGE UP (ref 0.85–1.18)
INR BLD: 1.02 RATIO — SIGNIFICANT CHANGE UP (ref 0.85–1.18)
LDH SERPL L TO P-CCNC: 358 U/L — HIGH (ref 50–242)
LDH SERPL L TO P-CCNC: 358 U/L — HIGH (ref 50–242)
MAGNESIUM SERPL-MCNC: 2.1 MG/DL — SIGNIFICANT CHANGE UP (ref 1.6–2.6)
MAGNESIUM SERPL-MCNC: 2.1 MG/DL — SIGNIFICANT CHANGE UP (ref 1.6–2.6)
MCHC RBC-ENTMCNC: 28.1 GM/DL — LOW (ref 32–36)
MCHC RBC-ENTMCNC: 28.1 GM/DL — LOW (ref 32–36)
MCHC RBC-ENTMCNC: 30.1 PG — SIGNIFICANT CHANGE UP (ref 27–34)
MCHC RBC-ENTMCNC: 30.1 PG — SIGNIFICANT CHANGE UP (ref 27–34)
MCV RBC AUTO: 107 FL — HIGH (ref 80–100)
MCV RBC AUTO: 107 FL — HIGH (ref 80–100)
NRBC # BLD: 1 /100 WBCS — HIGH (ref 0–0)
NRBC # BLD: 1 /100 WBCS — HIGH (ref 0–0)
PHOSPHATE SERPL-MCNC: 4.1 MG/DL — SIGNIFICANT CHANGE UP (ref 2.5–4.5)
PHOSPHATE SERPL-MCNC: 4.1 MG/DL — SIGNIFICANT CHANGE UP (ref 2.5–4.5)
PLATELET # BLD AUTO: 106 K/UL — LOW (ref 150–400)
PLATELET # BLD AUTO: 106 K/UL — LOW (ref 150–400)
POTASSIUM SERPL-MCNC: 3.8 MMOL/L — SIGNIFICANT CHANGE UP (ref 3.5–5.3)
POTASSIUM SERPL-MCNC: 3.8 MMOL/L — SIGNIFICANT CHANGE UP (ref 3.5–5.3)
POTASSIUM SERPL-SCNC: 3.8 MMOL/L — SIGNIFICANT CHANGE UP (ref 3.5–5.3)
POTASSIUM SERPL-SCNC: 3.8 MMOL/L — SIGNIFICANT CHANGE UP (ref 3.5–5.3)
PROT SERPL-MCNC: 8.5 G/DL — HIGH (ref 6–8.3)
PROT SERPL-MCNC: 8.5 G/DL — HIGH (ref 6–8.3)
PROTHROM AB SERPL-ACNC: 11.2 SEC — SIGNIFICANT CHANGE UP (ref 9.5–13)
PROTHROM AB SERPL-ACNC: 11.2 SEC — SIGNIFICANT CHANGE UP (ref 9.5–13)
RBC # BLD: 3.29 M/UL — LOW (ref 3.8–5.2)
RBC # BLD: 3.29 M/UL — LOW (ref 3.8–5.2)
RBC # FLD: 18.6 % — HIGH (ref 10.3–14.5)
RBC # FLD: 18.6 % — HIGH (ref 10.3–14.5)
SODIUM SERPL-SCNC: 137 MMOL/L — SIGNIFICANT CHANGE UP (ref 135–145)
SODIUM SERPL-SCNC: 137 MMOL/L — SIGNIFICANT CHANGE UP (ref 135–145)
URATE SERPL-MCNC: 3.8 MG/DL — SIGNIFICANT CHANGE UP (ref 2.5–7)
URATE SERPL-MCNC: 3.8 MG/DL — SIGNIFICANT CHANGE UP (ref 2.5–7)
WBC # BLD: 2.09 K/UL — LOW (ref 3.8–10.5)
WBC # BLD: 2.09 K/UL — LOW (ref 3.8–10.5)
WBC # FLD AUTO: 2.09 K/UL — LOW (ref 3.8–10.5)
WBC # FLD AUTO: 2.09 K/UL — LOW (ref 3.8–10.5)

## 2023-11-27 PROCEDURE — 99222 1ST HOSP IP/OBS MODERATE 55: CPT

## 2023-11-27 RX ORDER — FLUCONAZOLE 150 MG/1
200 TABLET ORAL DAILY
Refills: 0 | Status: DISCONTINUED | OUTPATIENT
Start: 2023-11-27 | End: 2023-12-06

## 2023-11-27 RX ORDER — ONDANSETRON 8 MG/1
16 TABLET, FILM COATED ORAL DAILY
Refills: 0 | Status: COMPLETED | OUTPATIENT
Start: 2023-11-27 | End: 2023-12-03

## 2023-11-27 RX ORDER — LOSARTAN POTASSIUM 100 MG/1
50 TABLET, FILM COATED ORAL DAILY
Refills: 0 | Status: DISCONTINUED | OUTPATIENT
Start: 2023-11-27 | End: 2023-12-06

## 2023-11-27 RX ORDER — ACYCLOVIR SODIUM 500 MG
200 VIAL (EA) INTRAVENOUS
Refills: 0 | Status: DISCONTINUED | OUTPATIENT
Start: 2023-11-27 | End: 2023-12-06

## 2023-11-27 RX ORDER — ATORVASTATIN CALCIUM 80 MG/1
40 TABLET, FILM COATED ORAL AT BEDTIME
Refills: 0 | Status: DISCONTINUED | OUTPATIENT
Start: 2023-11-27 | End: 2023-12-06

## 2023-11-27 RX ORDER — LOTEPREDNOL ETABONATE 2 MG/ML
1 SUSPENSION/ DROPS OPHTHALMIC AT BEDTIME
Refills: 0 | Status: DISCONTINUED | OUTPATIENT
Start: 2023-11-27 | End: 2023-12-06

## 2023-11-27 RX ORDER — ROSUVASTATIN CALCIUM 5 MG/1
1 TABLET ORAL
Refills: 0 | DISCHARGE

## 2023-11-27 RX ORDER — SODIUM CHLORIDE 9 MG/ML
1000 INJECTION INTRAMUSCULAR; INTRAVENOUS; SUBCUTANEOUS
Refills: 0 | Status: DISCONTINUED | OUTPATIENT
Start: 2023-11-27 | End: 2023-12-06

## 2023-11-27 RX ORDER — VENETOCLAX 100 MG/1
50 TABLET, FILM COATED ORAL ONCE
Refills: 0 | Status: COMPLETED | OUTPATIENT
Start: 2023-11-29 | End: 2023-11-30

## 2023-11-27 RX ORDER — HEPARIN SODIUM 5000 [USP'U]/ML
5000 INJECTION INTRAVENOUS; SUBCUTANEOUS EVERY 12 HOURS
Refills: 0 | Status: DISCONTINUED | OUTPATIENT
Start: 2023-11-27 | End: 2023-11-27

## 2023-11-27 RX ORDER — LOTEPREDNOL ETABONATE 2 MG/ML
1 SUSPENSION/ DROPS OPHTHALMIC
Refills: 0 | DISCHARGE

## 2023-11-27 RX ORDER — PANTOPRAZOLE SODIUM 20 MG/1
1 TABLET, DELAYED RELEASE ORAL
Refills: 0 | DISCHARGE

## 2023-11-27 RX ORDER — ALPRAZOLAM 0.25 MG
0.12 TABLET ORAL DAILY
Refills: 0 | Status: DISCONTINUED | OUTPATIENT
Start: 2023-11-27 | End: 2023-12-03

## 2023-11-27 RX ORDER — ALLOPURINOL 300 MG
300 TABLET ORAL DAILY
Refills: 0 | Status: DISCONTINUED | OUTPATIENT
Start: 2023-11-27 | End: 2023-12-06

## 2023-11-27 RX ORDER — SODIUM CHLORIDE 9 MG/ML
1000 INJECTION, SOLUTION INTRAVENOUS
Refills: 0 | Status: DISCONTINUED | OUTPATIENT
Start: 2023-11-27 | End: 2023-11-27

## 2023-11-27 RX ORDER — AZACITIDINE FOR 100 MG/1
120 INJECTION, POWDER, LYOPHILIZED, FOR SOLUTION INTRAVENOUS; SUBCUTANEOUS DAILY
Refills: 0 | Status: COMPLETED | OUTPATIENT
Start: 2023-11-27 | End: 2023-12-03

## 2023-11-27 RX ORDER — VENETOCLAX 100 MG/1
10 TABLET, FILM COATED ORAL ONCE
Refills: 0 | Status: COMPLETED | OUTPATIENT
Start: 2023-11-27 | End: 2023-11-27

## 2023-11-27 RX ORDER — VENETOCLAX 100 MG/1
20 TABLET, FILM COATED ORAL ONCE
Refills: 0 | Status: COMPLETED | OUTPATIENT
Start: 2023-11-28 | End: 2023-11-28

## 2023-11-27 RX ADMIN — Medication 1 TABLET(S): at 21:13

## 2023-11-27 RX ADMIN — ATORVASTATIN CALCIUM 40 MILLIGRAM(S): 80 TABLET, FILM COATED ORAL at 21:19

## 2023-11-27 RX ADMIN — VENETOCLAX 10 MILLIGRAM(S): 100 TABLET, FILM COATED ORAL at 17:38

## 2023-11-27 RX ADMIN — FLUCONAZOLE 200 MILLIGRAM(S): 150 TABLET ORAL at 17:38

## 2023-11-27 RX ADMIN — AZACITIDINE FOR 120 MILLIGRAM(S): 100 INJECTION, POWDER, LYOPHILIZED, FOR SOLUTION INTRAVENOUS; SUBCUTANEOUS at 15:29

## 2023-11-27 RX ADMIN — Medication 200 MILLIGRAM(S): at 17:38

## 2023-11-27 RX ADMIN — ONDANSETRON 116 MILLIGRAM(S): 8 TABLET, FILM COATED ORAL at 14:36

## 2023-11-27 RX ADMIN — HEPARIN SODIUM 5000 UNIT(S): 5000 INJECTION INTRAVENOUS; SUBCUTANEOUS at 17:40

## 2023-11-27 RX ADMIN — Medication 300 MILLIGRAM(S): at 17:38

## 2023-11-27 NOTE — H&P ADULT - HISTORY OF PRESENT ILLNESS
89 yr pleasant female with PMH of MDS recently diagnosed, right corneal transplant, labile HTN, HLD admitted electively for scheduled inpatient chemo today. The patient is asymptomatic and denies any nausea, vomiting or any systemic symptoms. Ne recent colds or viral infections. No dysuria no hematuria. Of note the patient does have h/o chronic UTIs and tends to get septic off prophylactic antibiotics.

## 2023-11-27 NOTE — PHYSICAL THERAPY INITIAL EVALUATION ADULT - ADL SKILLS, REHAB EVAL
Topical Retinoid counseling:  Patient advised to apply a pea-sized amount only at bedtime and wait 30 minutes after washing their face before applying.  If too drying, patient may add a non-comedogenic moisturizer. The patient verbalized understanding of the proper use and possible adverse effects of retinoids.  All of the patient's questions and concerns were addressed. Doxycycline Counseling:  Patient counseled regarding possible photosensitivity and increased risk for sunburn.  Patient instructed to avoid sunlight, if possible.  When exposed to sunlight, patients should wear protective clothing, sunglasses, and sunscreen.  The patient was instructed to call the office immediately if the following severe adverse effects occur:  hearing changes, easy bruising/bleeding, severe headache, or vision changes.  The patient verbalized understanding of the proper use and possible adverse effects of doxycycline.  All of the patient's questions and concerns were addressed. Use Enhanced Medication Counseling?: No Minocycline Pregnancy And Lactation Text: This medication is Pregnancy Category D and not consider safe during pregnancy. It is also excreted in breast milk. Birth Control Pills Counseling: Birth Control Pill Counseling: I discussed with the patient the potential side effects of OCPs including but not limited to increased risk of stroke, heart attack, thrombophlebitis, deep venous thrombosis, hepatic adenomas, breast changes, GI upset, headaches, and depression.  The patient verbalized understanding of the proper use and possible adverse effects of OCPs. All of the patient's questions and concerns were addressed. Topical Clindamycin Pregnancy And Lactation Text: This medication is Pregnancy Category B and is considered safe during pregnancy. It is unknown if it is excreted in breast milk. Isotretinoin Pregnancy And Lactation Text: This medication is Pregnancy Category X and is considered extremely dangerous during pregnancy. It is unknown if it is excreted in breast milk. Topical Retinoid Pregnancy And Lactation Text: This medication is Pregnancy Category C. It is unknown if this medication is excreted in breast milk. Tetracycline Counseling: Patient counseled regarding possible photosensitivity and increased risk for sunburn.  Patient instructed to avoid sunlight, if possible.  When exposed to sunlight, patients should wear protective clothing, sunglasses, and sunscreen.  The patient was instructed to call the office immediately if the following severe adverse effects occur:  hearing changes, easy bruising/bleeding, severe headache, or vision changes.  The patient verbalized understanding of the proper use and possible adverse effects of tetracycline.  All of the patient's questions and concerns were addressed. Patient understands to avoid pregnancy while on therapy due to potential birth defects. Sarecycline Counseling: Patient advised regarding possible photosensitivity and discoloration of the teeth, skin, lips, tongue and gums.  Patient instructed to avoid sunlight, if possible.  When exposed to sunlight, patients should wear protective clothing, sunglasses, and sunscreen.  The patient was instructed to call the office immediately if the following severe adverse effects occur:  hearing changes, easy bruising/bleeding, severe headache, or vision changes.  The patient verbalized understanding of the proper use and possible adverse effects of sarecycline.  All of the patient's questions and concerns were addressed. Topical Sulfur Applications Counseling: Topical Sulfur Counseling: Patient counseled that this medication may cause skin irritation or allergic reactions.  In the event of skin irritation, the patient was advised to reduce the amount of the drug applied or use it less frequently.   The patient verbalized understanding of the proper use and possible adverse effects of topical sulfur application.  All of the patient's questions and concerns were addressed. Azithromycin Counseling:  I discussed with the patient the risks of azithromycin including but not limited to GI upset, allergic reaction, drug rash, diarrhea, and yeast infections. Doxycycline Pregnancy And Lactation Text: This medication is Pregnancy Category D and not consider safe during pregnancy. It is also excreted in breast milk but is considered safe for shorter treatment courses. Birth Control Pills Pregnancy And Lactation Text: This medication should be avoided if pregnant and for the first 30 days post-partum. Detail Level: Zone High Dose Vitamin A Counseling: Side effects reviewed, pt to contact office should one occur. Tazorac Counseling:  Patient advised that medication is irritating and drying.  Patient may need to apply sparingly and wash off after an hour before eventually leaving it on overnight.  The patient verbalized understanding of the proper use and possible adverse effects of tazorac.  All of the patient's questions and concerns were addressed. Erythromycin Counseling:  I discussed with the patient the risks of erythromycin including but not limited to GI upset, allergic reaction, drug rash, diarrhea, increase in liver enzymes, and yeast infections. Benzoyl Peroxide Counseling: Patient counseled that medicine may cause skin irritation and bleach clothing.  In the event of skin irritation, the patient was advised to reduce the amount of the drug applied or use it less frequently.   The patient verbalized understanding of the proper use and possible adverse effects of benzoyl peroxide.  All of the patient's questions and concerns were addressed. Topical Sulfur Applications Pregnancy And Lactation Text: This medication is Pregnancy Category C and has an unknown safety profile during pregnancy. It is unknown if this topical medication is excreted in breast milk. Azithromycin Pregnancy And Lactation Text: This medication is considered safe during pregnancy and is also secreted in breast milk. High Dose Vitamin A Pregnancy And Lactation Text: High dose vitamin A therapy is contraindicated during pregnancy and breast feeding. Erythromycin Pregnancy And Lactation Text: This medication is Pregnancy Category B and is considered safe during pregnancy. It is also excreted in breast milk. Dapsone Counseling: I discussed with the patient the risks of dapsone including but not limited to hemolytic anemia, agranulocytosis, rashes, methemoglobinemia, kidney failure, peripheral neuropathy, headaches, GI upset, and liver toxicity.  Patients who start dapsone require monitoring including baseline LFTs and weekly CBCs for the first month, then every month thereafter.  The patient verbalized understanding of the proper use and possible adverse effects of dapsone.  All of the patient's questions and concerns were addressed. independent Spironolactone Counseling: Patient advised regarding risks of diarrhea, abdominal pain, hyperkalemia, birth defects (for female patients), liver toxicity and renal toxicity. The patient may need blood work to monitor liver and kidney function and potassium levels while on therapy. The patient verbalized understanding of the proper use and possible adverse effects of spironolactone.  All of the patient's questions and concerns were addressed. Bactrim Counseling:  I discussed with the patient the risks of sulfa antibiotics including but not limited to GI upset, allergic reaction, drug rash, diarrhea, dizziness, photosensitivity, and yeast infections.  Rarely, more serious reactions can occur including but not limited to aplastic anemia, agranulocytosis, methemoglobinemia, blood dyscrasias, liver or kidney failure, lung infiltrates or desquamative/blistering drug rashes. Tazorac Pregnancy And Lactation Text: This medication is not safe during pregnancy. It is unknown if this medication is excreted in breast milk. Minocycline Counseling: Patient advised regarding possible photosensitivity and discoloration of the teeth, skin, lips, tongue and gums.  Patient instructed to avoid sunlight, if possible.  When exposed to sunlight, patients should wear protective clothing, sunglasses, and sunscreen.  The patient was instructed to call the office immediately if the following severe adverse effects occur:  hearing changes, easy bruising/bleeding, severe headache, or vision changes.  The patient verbalized understanding of the proper use and possible adverse effects of minocycline.  All of the patient's questions and concerns were addressed. Benzoyl Peroxide Pregnancy And Lactation Text: This medication is Pregnancy Category C. It is unknown if benzoyl peroxide is excreted in breast milk. Dapsone Pregnancy And Lactation Text: This medication is Pregnancy Category C and is not considered safe during pregnancy or breast feeding. Spironolactone Pregnancy And Lactation Text: This medication can cause feminization of the male fetus and should be avoided during pregnancy. The active metabolite is also found in breast milk. Bactrim Pregnancy And Lactation Text: This medication is Pregnancy Category D and is known to cause fetal risk.  It is also excreted in breast milk. Topical Clindamycin Counseling: Patient counseled that this medication may cause skin irritation or allergic reactions.  In the event of skin irritation, the patient was advised to reduce the amount of the drug applied or use it less frequently.   The patient verbalized understanding of the proper use and possible adverse effects of clindamycin.  All of the patient's questions and concerns were addressed. Isotretinoin Counseling: Patient should get monthly blood tests, not donate blood, not drive at night if vision affected, not share medication, and not undergo elective surgery for 6 months after tx completed. Side effects reviewed, pt to contact office should one occur.

## 2023-11-27 NOTE — H&P ADULT - NSHPADDITIONALINFOADULT_GEN_ALL_CORE
discussed with patient in detail, expresses understanding of treatment plans.  discussed with patient's family at bedside in detail  discussed with son over the phone in detail  76 minutes spent on total encounter. The necessity of the time spent during the encounter on this date of service was due to:     detailed physical exam, obtaining and reviewing history, physical examination, explaining the diagnosis, prognosis and treatment plan with the patient/family/caregiver. I also have spent the time ordering studies and testing, interpreting results, medicine reconciliation, and documentation as above

## 2023-11-27 NOTE — H&P ADULT - NSHPPHYSICALEXAM_GEN_ALL_CORE
PHYSICAL EXAM: vital signs noted on Sunrise  in no apparent distress  HEENT: RADHA EOMI  Neck: Supple, no JVD, no thyromegaly  Lungs: no wheeze, no crackles  CVS: S1 S2 no M/R/G  Abdomen: no tenderness, no organomegaly, BS present  Neuro: AO x 3 no focal weakness, no sensory abnormalities  Psych: appropriate affect  Skin: warm, dry  Ext: no cyanosis or clubbing, no edema  Msk: no joint swelling or deformities  Back: no CVA tenderness, no kyphosis/scoliosis

## 2023-11-27 NOTE — CONSULT NOTE ADULT - SUBJECTIVE AND OBJECTIVE BOX
Full note to follow  Patient with frequent UTI, with improvement over the last 2 months but not entirely clear how much of the improvement is related to topical estrogen therapy, pessary being mpre effective in addressing functiona/anatomic abnormalities, or antibiotics.  Patient presently asymptomatic.  I see no role to alter Augementin 250-125 QD at this point in time  Will need to remain congnizant of increased risk of resistant mario if she were to develop fever/infection in the setting of chemotherapy.  If empiric therapy needed vancomycin and cefepime would be reasonable.  Thank you for the courtesy of this referral.  Thaddeus Geronimo MD  Attending Physician  [facility]  Division of Infectious Diseases  [phone]  ==========  [facility]  Division of Infectious Diseases  [phone]    FUNMI ROMERO  89y, Female  [id number]    HPI--      PMH/PSH--  Hypertension    Hyperlipidemia    Palpitations    GERD (gastroesophageal reflux disease)    Chronic UTI    Post corneal transplant    No significant past surgical history    S/P cataract surgery        Allergies--  No Known Allergies      Medications--  Antibiotics: amoxicillin  250 milliGRAM(s)/clavulanate 1 Tablet(s) Oral at bedtime    Immunologic:   Other: allopurinol  ALPRAZolam PRN  atorvastatin  heparin   Injectable  losartan  loteprednol 0.5% Ophthalmic Suspension  sodium chloride 0.9%.    Antimicrobials last 90 days per EMR: MEDICATIONS  (STANDING):        Social History--  EtOH: denies   Tobacco: denies   Drug Use: denies     Family/Marital History--  No pertinent family history in first degree relatives          Travel/Environmental/Occupational History:      Review of Systems:  A >=10-point review of systems was obtained.     Pertinent positives and negatives--  Constitutional: No fevers. No Chills. No Rigors.   Eyes:  ENMT:  Cardiovascular: No chest pain. No palpitations.  Respiratory: No shortness of breath. No cough.  Gastrointestinal: No nausea or vomiting. No diarrhea or constipation.   Genitourinary:  Musculoskeletal:  Skin:  Neurologic:  Psychiatric: Pleasant. Appropriate affect.  Endocrine:  Heme/Lymphatic:  Allergy/Immunologic:    Review of systems otherwise negative except as previously noted.    Physical Exam--  Vital Signs: T(F): 97.5 (11-27-23 @ 09:21), Max: 97.5 (11-27-23 @ 09:21)  HR: 116 (11-27-23 @ 09:21)  BP: 166/85 (11-27-23 @ 09:21)  RR: 17 (11-27-23 @ 09:21)  SpO2: 99% (11-27-23 @ 09:21)  Wt(kg): --  General: Nontoxic-appearing Female in no acute distress.  HEENT: AT/NC. PERRL. EOMI. Anicteric. Conjunctiva pink and moist. Oropharynx clear. Dentition fair.  Neck: Not rigid. No sense of mass.  Nodes: None palpable.  Lungs: Clear bilaterally without rales, wheezing or rhonchi  Heart: Regular rate and rhythm. No Murmur. No rub. No gallop. No palpable thrill.  Abdomen: Bowel sounds present and normoactive. Soft. Nondistended. Nontender. No sense of mass. No organomegaly.  Back: No spinal tenderness. No costovertebral angle tenderness.   Extremities: No cyanosis or clubbing. No edema.   Skin: Warm. Dry. Good turgor. No rash. No vasculitic stigmata.  Psychiatric: Appropriate affect and mood for situation.         Laboratory & Imaging Data--  CBC                        9.9    2.09  )-----------( 106      ( 27 Nov 2023 10:17 )             35.2       Chemistries  11-27    137  |  99  |  20  ----------------------------<  98  3.8   |  25  |  0.97    Ca    10.0      27 Nov 2023 10:17    TPro  8.5<H>  /  Alb  4.4  /  TBili  0.6  /  DBili  x   /  AST  21  /  ALT  17  /  AlkPhos  58  11-27      Culture Data       Full note to follow  Patient with frequent UTI, with improvement over the last 2 months but not entirely clear how much of the improvement is related to topical estrogen therapy, pessary being mpre effective in addressing functional/anatomic abnormalities, or antibiotics.  Patient presently asymptomatic.  I see no role to alter Augmentin 250-125 QD at this point in time  Will need to remain cognizant of increased risk of resistant mario if she were to develop fever/infection in the setting of chemotherapy.  If empiric therapy needed vancomycin and cefepime would be reasonable.  Thank you for the courtesy of this referral.  Thaddeus Geronimo MD  Attending Physician  [facility]  Division of Infectious Diseases  [phone]  ==========  [facility]  Division of Infectious Diseases  [phone]    FUNMI ROMERO  89y, Female  [id number]    HPI--  89F with PMH HTN, GERD, corneal transplant, frequent UTI is now admitted for chemotherapy for recently diagnosed MDS.    Patient is on chronic suppressive Augmentin for several months. Patient has also been placed on topical estrogen therapy and had her pessary replaced. For the past 2 months she has not had any UTI's.    Patient without any urinary symptoms whatsoever at this point in time.       PMH/PSH--  Hypertension    Hyperlipidemia    Palpitations    GERD (gastroesophageal reflux disease)    Chronic UTI    Post corneal transplant    No significant past surgical history    S/P cataract surgery        Allergies--  No Known Allergies      Medications--  Antibiotics: amoxicillin  250 milliGRAM(s)/clavulanate 1 Tablet(s) Oral at bedtime    Immunologic:   Other: allopurinol  ALPRAZolam PRN  atorvastatin  heparin   Injectable  losartan  loteprednol 0.5% Ophthalmic Suspension  sodium chloride 0.9%.    Antimicrobials last 90 days per EMR: MEDICATIONS  (STANDING):        Social History--  EtOH: denies   Tobacco: denies   Drug Use: denies     Family/Marital History--  No pertinent family history in first degree relatives          Review of Systems:  A >=10-point review of systems was obtained.   Review of systems otherwise negative except as previously noted.    Physical Exam--  Vital Signs: T(F): 97.5 (11-27-23 @ 09:21), Max: 97.5 (11-27-23 @ 09:21)  HR: 116 (11-27-23 @ 09:21)  BP: 166/85 (11-27-23 @ 09:21)  RR: 17 (11-27-23 @ 09:21)  SpO2: 99% (11-27-23 @ 09:21)  Wt(kg): --  General: Nontoxic-appearing Female in no acute distress.  HEENT: AT/NC. Anicteric. Conjunctiva pink and moist. Oropharynx clear. Dentition fair.  Neck: Not rigid. No sense of mass.  Nodes: None palpable.  Lungs: Clear bilaterally without rales, wheezing or rhonchi  Heart: Regular rate and rhythm.  Abdomen: Bowel sounds present and normoactive. Soft. Nondistended. Nontender.   Back: No spinal tenderness. No costovertebral angle tenderness.   Extremities: No cyanosis or clubbing. No edema.   Skin: Warm. Dry. Good turgor. No rash. No vasculitic stigmata.  Psychiatric: Appropriate affect and mood for situation.         Laboratory & Imaging Data--  CBC                        9.9    2.09  )-----------( 106      ( 27 Nov 2023 10:17 )             35.2       Chemistries  11-27    137  |  99  |  20  ----------------------------<  98  3.8   |  25  |  0.97    Ca    10.0      27 Nov 2023 10:17    TPro  8.5<H>  /  Alb  4.4  /  TBili  0.6  /  DBili  x   /  AST  21  /  ALT  17  /  AlkPhos  58  11-27      Culture Data  None

## 2023-11-27 NOTE — PATIENT PROFILE ADULT - OVER THE PAST TWO WEEKS, HAVE YOU FELT LITTLE INTEREST OR PLEASURE IN DOING THINGS?
Patient to ER for c/o L leg pain. Denies injury. Pain is from ankle, radiate to knee. Was seen this morning for same complaint. No pain meds today.    no

## 2023-11-27 NOTE — PHYSICAL THERAPY INITIAL EVALUATION ADULT - STANDING BALANCE: STATIC
Virtual Telephone Check-In    Jerrell Marcano verbally consents to a Virtual/Telephone Check-In visit on 06/30/20. Patient has been referred to the [facility] website at [email or web address] to review the yearly Consent to Treat document.     Patient und
normal balance

## 2023-11-27 NOTE — H&P ADULT - NSICDXPASTMEDICALHX_GEN_ALL_CORE_FT
PAST MEDICAL HISTORY:  Chronic UTI     GERD (gastroesophageal reflux disease)     Hyperlipidemia     Palpitations     Post corneal transplant

## 2023-11-27 NOTE — CONSULT NOTE ADULT - SUBJECTIVE AND OBJECTIVE BOX
Patient is a 89y old  Female who presents with a chief complaint of elective chemotherapy (27 Nov 2023 13:23)      HPI:  89 yr pleasant female with PMH of MDS recently diagnosed, right corneal transplant, labile HTN, HLD admitted electively for scheduled inpatient chemo today. The patient is asymptomatic and denies any nausea, vomiting or any systemic symptoms. Ne recent colds or viral infections. No dysuria no hematuria. Of note the patient does have h/o chronic UTIs and tends to get septic off prophylactic antibiotics.  (27 Nov 2023 09:19)    Pt follows with Dr. 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 started allopurinol 3 days ago.    Feels well. no f/c, no uri sx.  has had fatigue and some dyspnea with extended activity which has been stable. 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    Hyperlipidemia      Palpitations      GERD (gastroesophageal reflux disease)      Chronic UTI      Post corneal transplant      S/P cataract surgery          SOCIAL HISTORY:  Smoking - Non smoker   Alcohol -no  Drugs - No drug use    FAMILY HISTORY:  No pertinent family history in first degree relatives        MEDICATIONS  (STANDING):  allopurinol 300 milliGRAM(s) Oral daily  amoxicillin  250 milliGRAM(s)/clavulanate 1 Tablet(s) Oral at bedtime  atorvastatin 40 milliGRAM(s) Oral at bedtime  azaCITIDine IVPB (eMAR) 120 milliGRAM(s) IV Intermittent daily  heparin   Injectable 5000 Unit(s) SubCutaneous every 12 hours  losartan 50 milliGRAM(s) Oral daily  loteprednol 0.5% Ophthalmic Suspension 1 Drop(s) Right EYE at bedtime  ondansetron  IVPB 16 milliGRAM(s) IV Intermittent daily  sodium chloride 0.9%. 1000 milliLiter(s) (50 mL/Hr) IV Continuous <Continuous>  venetoclax 10 milliGRAM(s) Oral once    MEDICATIONS  (PRN):  ALPRAZolam 0.125 milliGRAM(s) Oral daily PRN for anxiety      Allergies    No Known Allergies    Intolerances    ROS  gen- no f/c, appetite/energy stable  heent- no sore throat, no epistaxis/gingival bleed  cv- no chest pain  resp- no dyspnea, no cough  gi- no n/v/abd pain, no melena/brbpr  gu- no hematuria, has pessary  musculosk- no back pain  skin- no rash  neuro- chronic neuropathy  ROS otherwise reviewed and stable    Vital Signs Last 24 Hrs  T(C): 36.4 (27 Nov 2023 13:10), Max: 36.4 (27 Nov 2023 09:21)  T(F): 97.5 (27 Nov 2023 13:10), Max: 97.5 (27 Nov 2023 09:21)  HR: 97 (27 Nov 2023 13:10) (97 - 116)  BP: 150/91 (27 Nov 2023 13:10) (150/91 - 166/85)  BP(mean): --  RR: 18 (27 Nov 2023 13:10) (17 - 18)  SpO2: 99% (27 Nov 2023 13:10) (99% - 99%)    Parameters below as of 27 Nov 2023 13:10  Patient On (Oxygen Delivery Method): room air        PHYSICAL EXAM  General: adult in NAD  HEENT: clear oropharynx, anicteric sclera, pink conjunctiva  Neck: supple  CV: normal S1/S2  Lungs: clear to auscultation, no wheezes, no rales  Abdomen: soft non-tender non-distended, no hepatosplenomegaly, positive bowel sounds  Ext: no clubbing cyanosis or edema  Skin: no rashes and no petechiae  Lymph Nodes: No LAD in neck  Neuro: alert and oriented X 3, no focal deficits    LABS:                          9.9    2.09  )-----------( 106      ( 27 Nov 2023 10:17 )             35.2         Mean Cell Volume : 107.0 fl  Mean Cell Hemoglobin : 30.1 pg  Mean Cell Hemoglobin Concentration : 28.1 gm/dL  Auto Neutrophil # : x  Auto Lymphocyte # : x  Auto Monocyte # : x  Auto Eosinophil # : x  Auto Basophil # : x  Auto Neutrophil % : x  Auto Lymphocyte % : x  Auto Monocyte % : x  Auto Eosinophil % : x  Auto Basophil % : x      Serial CBC's  11-27 @ 10:17  Hct-35.2 / Hgb-9.9 / Plat-106 / RBC-3.29 / WBC-2.09      11-27    137  |  99  |  20  ----------------------------<  98  3.8   |  25  |  0.97    Ca    10.0      27 Nov 2023 10:17  Phos  4.1     11-27  Mg     2.1     11-27    TPro  8.5<H>  /  Alb  4.4  /  TBili  0.6  /  DBili  x   /  AST  21  /  ALT  17  /  AlkPhos  58  11-27      PT/INR - ( 27 Nov 2023 10:17 )   PT: 11.2 sec;   INR: 1.02 ratio         PTT - ( 27 Nov 2023 10:17 )  PTT:29.5 sec            11-27 @ 10:17    ldh1 --  haptoglobin --  CHARY--  uric acid3.8        Radiology:

## 2023-11-27 NOTE — CONSULT NOTE ADULT - SUBJECTIVE AND OBJECTIVE BOX
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 Patient requiring access for chemotherapy, IR consulted for DL SOLO PICC placement.     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 Patient requiring access for chemotherapy, IR consulted for DL SOLO PICC placement.     -Will plan for DL SOLO PICC placement tomorrow 11/28  -Please place IR procedure order under LAINA Castaneda  -STAT am labs  -Hold a/c x 24-48hrs  -Maintain active T&S  -Above d/w primary team  -Please contact IR at [phone] with any questions/ concerns regarding above.

## 2023-11-27 NOTE — PATIENT PROFILE ADULT - FALL HARM RISK - HARM RISK INTERVENTIONS

## 2023-11-27 NOTE — PHYSICAL THERAPY INITIAL EVALUATION ADULT - PERTINENT HX OF CURRENT PROBLEM, REHAB EVAL
PMH of MDS recently diagnosed, right corneal transplant, labile HTN, HLD admitted electively for scheduled inpatient chemo today. Frequent UTI, with improvement over the last 2 months but not entirely clear how much of the improvement is related to topical estrogen therapy. TESTS: no tests recorded

## 2023-11-27 NOTE — H&P ADULT - NSHPSOCIALHISTORY_GEN_ALL_CORE
non smoker  no IVDA  no ETOH abuse   lives with family non smoker  no IVDA  no ETOH abuse   lives with helper/aide

## 2023-11-27 NOTE — H&P ADULT - ASSESSMENT
89 yr pleasant female with PMH of MDS recently diagnosed, right corneal transplant, labile HTN, HLD admitted electively for scheduled inpatient chemo today

## 2023-11-28 DIAGNOSIS — Z79.2 LONG TERM (CURRENT) USE OF ANTIBIOTICS: ICD-10-CM

## 2023-11-28 LAB
A1C WITH ESTIMATED AVERAGE GLUCOSE RESULT: 5.3 % — SIGNIFICANT CHANGE UP (ref 4–5.6)
A1C WITH ESTIMATED AVERAGE GLUCOSE RESULT: 5.3 % — SIGNIFICANT CHANGE UP (ref 4–5.6)
ALBUMIN SERPL ELPH-MCNC: 3.8 G/DL — SIGNIFICANT CHANGE UP (ref 3.3–5)
ALBUMIN SERPL ELPH-MCNC: 3.8 G/DL — SIGNIFICANT CHANGE UP (ref 3.3–5)
ALP SERPL-CCNC: 56 U/L — SIGNIFICANT CHANGE UP (ref 40–120)
ALP SERPL-CCNC: 56 U/L — SIGNIFICANT CHANGE UP (ref 40–120)
ALT FLD-CCNC: 14 U/L — SIGNIFICANT CHANGE UP (ref 10–45)
ALT FLD-CCNC: 14 U/L — SIGNIFICANT CHANGE UP (ref 10–45)
ANION GAP SERPL CALC-SCNC: 12 MMOL/L — SIGNIFICANT CHANGE UP (ref 5–17)
ANION GAP SERPL CALC-SCNC: 12 MMOL/L — SIGNIFICANT CHANGE UP (ref 5–17)
ANISOCYTOSIS BLD QL: SIGNIFICANT CHANGE UP
ANISOCYTOSIS BLD QL: SIGNIFICANT CHANGE UP
AST SERPL-CCNC: 20 U/L — SIGNIFICANT CHANGE UP (ref 10–40)
AST SERPL-CCNC: 20 U/L — SIGNIFICANT CHANGE UP (ref 10–40)
BASOPHILS # BLD AUTO: 0.03 K/UL — SIGNIFICANT CHANGE UP (ref 0–0.2)
BASOPHILS # BLD AUTO: 0.03 K/UL — SIGNIFICANT CHANGE UP (ref 0–0.2)
BASOPHILS NFR BLD AUTO: 1.7 % — SIGNIFICANT CHANGE UP (ref 0–2)
BASOPHILS NFR BLD AUTO: 1.7 % — SIGNIFICANT CHANGE UP (ref 0–2)
BILIRUB SERPL-MCNC: 0.6 MG/DL — SIGNIFICANT CHANGE UP (ref 0.2–1.2)
BILIRUB SERPL-MCNC: 0.6 MG/DL — SIGNIFICANT CHANGE UP (ref 0.2–1.2)
BLD GP AB SCN SERPL QL: NEGATIVE — SIGNIFICANT CHANGE UP
BLD GP AB SCN SERPL QL: NEGATIVE — SIGNIFICANT CHANGE UP
BUN SERPL-MCNC: 15 MG/DL — SIGNIFICANT CHANGE UP (ref 7–23)
BUN SERPL-MCNC: 15 MG/DL — SIGNIFICANT CHANGE UP (ref 7–23)
CALCIUM SERPL-MCNC: 9.4 MG/DL — SIGNIFICANT CHANGE UP (ref 8.4–10.5)
CALCIUM SERPL-MCNC: 9.4 MG/DL — SIGNIFICANT CHANGE UP (ref 8.4–10.5)
CHLORIDE SERPL-SCNC: 105 MMOL/L — SIGNIFICANT CHANGE UP (ref 96–108)
CHLORIDE SERPL-SCNC: 105 MMOL/L — SIGNIFICANT CHANGE UP (ref 96–108)
CO2 SERPL-SCNC: 23 MMOL/L — SIGNIFICANT CHANGE UP (ref 22–31)
CO2 SERPL-SCNC: 23 MMOL/L — SIGNIFICANT CHANGE UP (ref 22–31)
CREAT SERPL-MCNC: 0.9 MG/DL — SIGNIFICANT CHANGE UP (ref 0.5–1.3)
CREAT SERPL-MCNC: 0.9 MG/DL — SIGNIFICANT CHANGE UP (ref 0.5–1.3)
EGFR: 60 ML/MIN/1.73M2 — SIGNIFICANT CHANGE UP
EGFR: 60 ML/MIN/1.73M2 — SIGNIFICANT CHANGE UP
EOSINOPHIL # BLD AUTO: 0.01 K/UL — SIGNIFICANT CHANGE UP (ref 0–0.5)
EOSINOPHIL # BLD AUTO: 0.01 K/UL — SIGNIFICANT CHANGE UP (ref 0–0.5)
EOSINOPHIL NFR BLD AUTO: 0.9 % — SIGNIFICANT CHANGE UP (ref 0–6)
EOSINOPHIL NFR BLD AUTO: 0.9 % — SIGNIFICANT CHANGE UP (ref 0–6)
ESTIMATED AVERAGE GLUCOSE: 105 MG/DL — SIGNIFICANT CHANGE UP (ref 68–114)
ESTIMATED AVERAGE GLUCOSE: 105 MG/DL — SIGNIFICANT CHANGE UP (ref 68–114)
GIANT PLATELETS BLD QL SMEAR: PRESENT — SIGNIFICANT CHANGE UP
GIANT PLATELETS BLD QL SMEAR: PRESENT — SIGNIFICANT CHANGE UP
GLUCOSE SERPL-MCNC: 102 MG/DL — HIGH (ref 70–99)
GLUCOSE SERPL-MCNC: 102 MG/DL — HIGH (ref 70–99)
HCT VFR BLD CALC: 32.3 % — LOW (ref 34.5–45)
HCT VFR BLD CALC: 32.3 % — LOW (ref 34.5–45)
HGB BLD-MCNC: 9.3 G/DL — LOW (ref 11.5–15.5)
HGB BLD-MCNC: 9.3 G/DL — LOW (ref 11.5–15.5)
LDH SERPL L TO P-CCNC: 269 U/L — HIGH (ref 50–242)
LDH SERPL L TO P-CCNC: 269 U/L — HIGH (ref 50–242)
LYMPHOCYTES # BLD AUTO: 0.69 K/UL — LOW (ref 1–3.3)
LYMPHOCYTES # BLD AUTO: 0.69 K/UL — LOW (ref 1–3.3)
LYMPHOCYTES # BLD AUTO: 43 % — SIGNIFICANT CHANGE UP (ref 13–44)
LYMPHOCYTES # BLD AUTO: 43 % — SIGNIFICANT CHANGE UP (ref 13–44)
MACROCYTES BLD QL: SIGNIFICANT CHANGE UP
MACROCYTES BLD QL: SIGNIFICANT CHANGE UP
MAGNESIUM SERPL-MCNC: 1.9 MG/DL — SIGNIFICANT CHANGE UP (ref 1.6–2.6)
MAGNESIUM SERPL-MCNC: 1.9 MG/DL — SIGNIFICANT CHANGE UP (ref 1.6–2.6)
MANUAL SMEAR VERIFICATION: SIGNIFICANT CHANGE UP
MANUAL SMEAR VERIFICATION: SIGNIFICANT CHANGE UP
MCHC RBC-ENTMCNC: 28.8 GM/DL — LOW (ref 32–36)
MCHC RBC-ENTMCNC: 28.8 GM/DL — LOW (ref 32–36)
MCHC RBC-ENTMCNC: 30.8 PG — SIGNIFICANT CHANGE UP (ref 27–34)
MCHC RBC-ENTMCNC: 30.8 PG — SIGNIFICANT CHANGE UP (ref 27–34)
MCV RBC AUTO: 107 FL — HIGH (ref 80–100)
MCV RBC AUTO: 107 FL — HIGH (ref 80–100)
MONOCYTES # BLD AUTO: 0 K/UL — SIGNIFICANT CHANGE UP (ref 0–0.9)
MONOCYTES # BLD AUTO: 0 K/UL — SIGNIFICANT CHANGE UP (ref 0–0.9)
MONOCYTES NFR BLD AUTO: 0 % — LOW (ref 2–14)
MONOCYTES NFR BLD AUTO: 0 % — LOW (ref 2–14)
NEUTROPHILS # BLD AUTO: 0.84 K/UL — LOW (ref 1.8–7.4)
NEUTROPHILS # BLD AUTO: 0.84 K/UL — LOW (ref 1.8–7.4)
NEUTROPHILS NFR BLD AUTO: 52.6 % — SIGNIFICANT CHANGE UP (ref 43–77)
NEUTROPHILS NFR BLD AUTO: 52.6 % — SIGNIFICANT CHANGE UP (ref 43–77)
NRBC # BLD: 1 /100 — HIGH (ref 0–0)
NRBC # BLD: 1 /100 — HIGH (ref 0–0)
PHOSPHATE SERPL-MCNC: 3.8 MG/DL — SIGNIFICANT CHANGE UP (ref 2.5–4.5)
PHOSPHATE SERPL-MCNC: 3.8 MG/DL — SIGNIFICANT CHANGE UP (ref 2.5–4.5)
PLAT MORPH BLD: ABNORMAL
PLAT MORPH BLD: ABNORMAL
PLATELET # BLD AUTO: 98 K/UL — LOW (ref 150–400)
PLATELET # BLD AUTO: 98 K/UL — LOW (ref 150–400)
POIKILOCYTOSIS BLD QL AUTO: SIGNIFICANT CHANGE UP
POIKILOCYTOSIS BLD QL AUTO: SIGNIFICANT CHANGE UP
POLYCHROMASIA BLD QL SMEAR: SLIGHT — SIGNIFICANT CHANGE UP
POLYCHROMASIA BLD QL SMEAR: SLIGHT — SIGNIFICANT CHANGE UP
POTASSIUM SERPL-MCNC: 4.1 MMOL/L — SIGNIFICANT CHANGE UP (ref 3.5–5.3)
POTASSIUM SERPL-MCNC: 4.1 MMOL/L — SIGNIFICANT CHANGE UP (ref 3.5–5.3)
POTASSIUM SERPL-SCNC: 4.1 MMOL/L — SIGNIFICANT CHANGE UP (ref 3.5–5.3)
POTASSIUM SERPL-SCNC: 4.1 MMOL/L — SIGNIFICANT CHANGE UP (ref 3.5–5.3)
PROT SERPL-MCNC: 7.1 G/DL — SIGNIFICANT CHANGE UP (ref 6–8.3)
PROT SERPL-MCNC: 7.1 G/DL — SIGNIFICANT CHANGE UP (ref 6–8.3)
RBC # BLD: 3.02 M/UL — LOW (ref 3.8–5.2)
RBC # BLD: 3.02 M/UL — LOW (ref 3.8–5.2)
RBC # FLD: 18.4 % — HIGH (ref 10.3–14.5)
RBC # FLD: 18.4 % — HIGH (ref 10.3–14.5)
RBC BLD AUTO: SIGNIFICANT CHANGE UP
RBC BLD AUTO: SIGNIFICANT CHANGE UP
RH IG SCN BLD-IMP: POSITIVE — SIGNIFICANT CHANGE UP
RH IG SCN BLD-IMP: POSITIVE — SIGNIFICANT CHANGE UP
SCHISTOCYTES BLD QL AUTO: SLIGHT — SIGNIFICANT CHANGE UP
SCHISTOCYTES BLD QL AUTO: SLIGHT — SIGNIFICANT CHANGE UP
SODIUM SERPL-SCNC: 140 MMOL/L — SIGNIFICANT CHANGE UP (ref 135–145)
SODIUM SERPL-SCNC: 140 MMOL/L — SIGNIFICANT CHANGE UP (ref 135–145)
SPHEROCYTES BLD QL SMEAR: SLIGHT — SIGNIFICANT CHANGE UP
SPHEROCYTES BLD QL SMEAR: SLIGHT — SIGNIFICANT CHANGE UP
STOMATOCYTES BLD QL SMEAR: SIGNIFICANT CHANGE UP
STOMATOCYTES BLD QL SMEAR: SIGNIFICANT CHANGE UP
TSH SERPL-MCNC: 2.05 UIU/ML — SIGNIFICANT CHANGE UP (ref 0.27–4.2)
TSH SERPL-MCNC: 2.05 UIU/ML — SIGNIFICANT CHANGE UP (ref 0.27–4.2)
URATE SERPL-MCNC: 3.8 MG/DL — SIGNIFICANT CHANGE UP (ref 2.5–7)
URATE SERPL-MCNC: 3.8 MG/DL — SIGNIFICANT CHANGE UP (ref 2.5–7)
VARIANT LYMPHS # BLD: 1.8 % — SIGNIFICANT CHANGE UP (ref 0–6)
VARIANT LYMPHS # BLD: 1.8 % — SIGNIFICANT CHANGE UP (ref 0–6)
VIT B12 SERPL-MCNC: 857 PG/ML — SIGNIFICANT CHANGE UP (ref 232–1245)
VIT B12 SERPL-MCNC: 857 PG/ML — SIGNIFICANT CHANGE UP (ref 232–1245)
WBC # BLD: 1.6 K/UL — LOW (ref 3.8–10.5)
WBC # BLD: 1.6 K/UL — LOW (ref 3.8–10.5)
WBC # FLD AUTO: 1.6 K/UL — LOW (ref 3.8–10.5)
WBC # FLD AUTO: 1.6 K/UL — LOW (ref 3.8–10.5)

## 2023-11-28 PROCEDURE — 36573 INSJ PICC RS&I 5 YR+: CPT | Mod: RT

## 2023-11-28 PROCEDURE — 99232 SBSQ HOSP IP/OBS MODERATE 35: CPT

## 2023-11-28 PROCEDURE — 36573 INSJ PICC RS&I 5 YR+: CPT

## 2023-11-28 RX ORDER — SODIUM CHLORIDE 9 MG/ML
10 INJECTION INTRAMUSCULAR; INTRAVENOUS; SUBCUTANEOUS
Refills: 0 | Status: DISCONTINUED | OUTPATIENT
Start: 2023-11-28 | End: 2023-12-06

## 2023-11-28 RX ORDER — SENNA PLUS 8.6 MG/1
2 TABLET ORAL AT BEDTIME
Refills: 0 | Status: DISCONTINUED | OUTPATIENT
Start: 2023-11-28 | End: 2023-12-06

## 2023-11-28 RX ORDER — CHLORHEXIDINE GLUCONATE 213 G/1000ML
1 SOLUTION TOPICAL
Refills: 0 | Status: DISCONTINUED | OUTPATIENT
Start: 2023-11-28 | End: 2023-12-06

## 2023-11-28 RX ORDER — LOTEPREDNOL ETABONATE 2 MG/ML
1 SUSPENSION/ DROPS OPHTHALMIC DAILY
Refills: 0 | Status: DISCONTINUED | OUTPATIENT
Start: 2023-11-28 | End: 2023-12-06

## 2023-11-28 RX ADMIN — VENETOCLAX 20 MILLIGRAM(S): 100 TABLET, FILM COATED ORAL at 18:09

## 2023-11-28 RX ADMIN — Medication 200 MILLIGRAM(S): at 05:48

## 2023-11-28 RX ADMIN — Medication 0.12 MILLIGRAM(S): at 21:07

## 2023-11-28 RX ADMIN — AZACITIDINE FOR 120 MILLIGRAM(S): 100 INJECTION, POWDER, LYOPHILIZED, FOR SOLUTION INTRAVENOUS; SUBCUTANEOUS at 15:46

## 2023-11-28 RX ADMIN — Medication 1 TABLET(S): at 21:08

## 2023-11-28 RX ADMIN — ATORVASTATIN CALCIUM 40 MILLIGRAM(S): 80 TABLET, FILM COATED ORAL at 21:08

## 2023-11-28 RX ADMIN — ONDANSETRON 116 MILLIGRAM(S): 8 TABLET, FILM COATED ORAL at 14:47

## 2023-11-28 RX ADMIN — Medication 300 MILLIGRAM(S): at 11:27

## 2023-11-28 RX ADMIN — SODIUM CHLORIDE 50 MILLILITER(S): 9 INJECTION INTRAMUSCULAR; INTRAVENOUS; SUBCUTANEOUS at 20:08

## 2023-11-28 RX ADMIN — SENNA PLUS 2 TABLET(S): 8.6 TABLET ORAL at 23:23

## 2023-11-28 RX ADMIN — Medication 200 MILLIGRAM(S): at 18:09

## 2023-11-28 RX ADMIN — CHLORHEXIDINE GLUCONATE 1 APPLICATION(S): 213 SOLUTION TOPICAL at 11:28

## 2023-11-28 RX ADMIN — LOTEPREDNOL ETABONATE 1 DROP(S): 2 SUSPENSION/ DROPS OPHTHALMIC at 23:00

## 2023-11-28 RX ADMIN — LOTEPREDNOL ETABONATE 1 DROP(S): 2 SUSPENSION/ DROPS OPHTHALMIC at 12:35

## 2023-11-28 RX ADMIN — LOSARTAN POTASSIUM 50 MILLIGRAM(S): 100 TABLET, FILM COATED ORAL at 05:48

## 2023-11-28 RX ADMIN — FLUCONAZOLE 200 MILLIGRAM(S): 150 TABLET ORAL at 11:27

## 2023-11-28 NOTE — PROCEDURE NOTE - NSPROCDETAILS_GEN_ALL_CORE
Fluoroscopy/location identified, draped/prepped, sterile technique used/sterile dressing applied/sterile technique, catheter placed/supine position/ultrasound guidance

## 2023-11-28 NOTE — PROGRESS NOTE ADULT - SUBJECTIVE AND OBJECTIVE BOX
HPI:  89 yr pleasant female with PMH of MDS recently diagnosed, right corneal transplant, labile HTN, HLD admitted electively for scheduled inpatient chemo . The patient is asymptomatic and denies any nausea, vomiting or any systemic symptoms. Ne recent colds or viral infections. No dysuria no hematuria. Of note the patient does have h/o chronic UTIs and tends to get septic off prophylactic antibiotics.    PICC line for chemo was inserted 11/28/23  PAST MEDICAL & SURGICAL HISTORY:  Hyperlipidemia      Palpitations      GERD (gastroesophageal reflux disease)      Chronic UTI      Post corneal transplant      S/P cataract surgery        Allergies    No Known Allergies    Intolerances      Social History:  non smoker  no IVDA  no ETOH abuse   lives with helper/aide (27 Nov 2023 09:19)    Medications:  acyclovir   Oral Tab/Cap 200 milliGRAM(s) Oral two times a day  allopurinol 300 milliGRAM(s) Oral daily  ALPRAZolam 0.125 milliGRAM(s) Oral daily PRN for anxiety  amoxicillin  250 milliGRAM(s)/clavulanate 1 Tablet(s) Oral at bedtime  atorvastatin 40 milliGRAM(s) Oral at bedtime  azaCITIDine IVPB (eMAR) 120 milliGRAM(s) IV Intermittent daily  chlorhexidine 4% Liquid 1 Application(s) Topical <User Schedule>  fluconAZOLE   Tablet 200 milliGRAM(s) Oral daily  losartan 50 milliGRAM(s) Oral daily  loteprednol 0.5% Ophthalmic Suspension 1 Drop(s) Right EYE at bedtime  loteprednol 0.5% Ophthalmic Suspension 1 Drop(s) Right EYE daily  ondansetron  IVPB 16 milliGRAM(s) IV Intermittent daily  sodium chloride 0.9% lock flush 10 milliLiter(s) IV Push every 1 hour PRN Pre/post blood products, medications, blood draw, and to maintain line patency  sodium chloride 0.9%. 1000 milliLiter(s) IV Continuous <Continuous>  venetoclax 20 milliGRAM(s) Oral once    Labs:  CBC Full  -  ( 28 Nov 2023 06:56 )  WBC Count : 1.60 K/uL  RBC Count : 3.02 M/uL  Hemoglobin : 9.3 g/dL  Hematocrit : 32.3 %  Platelet Count - Automated : 98 K/uL  Mean Cell Volume : 107.0 fl  Mean Cell Hemoglobin : 30.8 pg  Mean Cell Hemoglobin Concentration : 28.8 gm/dL  Auto Neutrophil # : 0.84 K/uL  Auto Lymphocyte # : 0.69 K/uL  Auto Monocyte # : 0.00 K/uL  Auto Eosinophil # : 0.01 K/uL  Auto Basophil # : 0.03 K/uL  Auto Neutrophil % : 52.6 %  Auto Lymphocyte % : 43.0 %  Auto Monocyte % : 0.0 %  Auto Eosinophil % : 0.9 %  Auto Basophil % : 1.7 %    11-28    140  |  105  |  15  ----------------------------<  102<H>  4.1   |  23  |  0.90    Ca    9.4      28 Nov 2023 06:56  Phos  3.8     11-28  Mg     1.9     11-28    TPro  7.1  /  Alb  3.8  /  TBili  0.6  /  DBili  x   /  AST  20  /  ALT  14  /  AlkPhos  56  11-28      Radiology:             ROS:  Patient comfortable without distress  No SOB or chest pain  No palpitation  No abdominal pain, diarrhaea or constipation  No weakness of extremities  No skin changes or swelling of legs  Rest of the comprehensive ROS was negative  Vital Signs Last 24 Hrs  T(C): 37.2 (28 Nov 2023 07:57), Max: 37.2 (28 Nov 2023 05:31)  T(F): 98.9 (28 Nov 2023 07:57), Max: 98.9 (28 Nov 2023 05:31)  HR: 109 (28 Nov 2023 07:57) (97 - 109)  BP: 157/74 (28 Nov 2023 07:57) (150/91 - 166/81)  BP(mean): --  RR: 18 (28 Nov 2023 07:57) (18 - 18)  SpO2: 95% (28 Nov 2023 07:57) (95% - 99%)    Parameters below as of 27 Nov 2023 21:52  Patient On (Oxygen Delivery Method): room air        Physical exam:  Patient alert and oriented  No distress  CVS: S1, S2 regular or murmur  Chest: bilateral breath sound without rales  Abdomen: soft, not tender, no organomegaly or masses  CNS: No focal neuro deficit  Musculoskeletal:  Normal range of motion  Skin: No rash    Assessment and Plan: HPI:  89 yr pleasant female with PMH of MDS recently diagnosed, right corneal transplant, labile HTN, HLD admitted electively for scheduled inpatient chemo . The patient is asymptomatic and denies any nausea, vomiting or any systemic symptoms. Ne recent colds or viral infections. No dysuria no hematuria. Of note the patient does have h/o chronic UTIs and tends to get septic off prophylactic antibiotics.    Started planned chemo 11/27/23  PICC line  was inserted 11/28/23  PAST MEDICAL & SURGICAL HISTORY:  Hyperlipidemia      Palpitations      GERD (gastroesophageal reflux disease)      Chronic UTI      Post corneal transplant      S/P cataract surgery        Allergies    No Known Allergies    Intolerances      Social History:  non smoker  no IVDA  no ETOH abuse   lives with helper/aide (27 Nov 2023 09:19)    Medications:  acyclovir   Oral Tab/Cap 200 milliGRAM(s) Oral two times a day  allopurinol 300 milliGRAM(s) Oral daily  ALPRAZolam 0.125 milliGRAM(s) Oral daily PRN for anxiety  amoxicillin  250 milliGRAM(s)/clavulanate 1 Tablet(s) Oral at bedtime  atorvastatin 40 milliGRAM(s) Oral at bedtime  azaCITIDine IVPB (eMAR) 120 milliGRAM(s) IV Intermittent daily  chlorhexidine 4% Liquid 1 Application(s) Topical <User Schedule>  fluconAZOLE   Tablet 200 milliGRAM(s) Oral daily  losartan 50 milliGRAM(s) Oral daily  loteprednol 0.5% Ophthalmic Suspension 1 Drop(s) Right EYE at bedtime  loteprednol 0.5% Ophthalmic Suspension 1 Drop(s) Right EYE daily  ondansetron  IVPB 16 milliGRAM(s) IV Intermittent daily  sodium chloride 0.9% lock flush 10 milliLiter(s) IV Push every 1 hour PRN Pre/post blood products, medications, blood draw, and to maintain line patency  sodium chloride 0.9%. 1000 milliLiter(s) IV Continuous <Continuous>  venetoclax 20 milliGRAM(s) Oral once    Labs:  CBC Full  -  ( 28 Nov 2023 06:56 )  WBC Count : 1.60 K/uL  RBC Count : 3.02 M/uL  Hemoglobin : 9.3 g/dL  Hematocrit : 32.3 %  Platelet Count - Automated : 98 K/uL  Mean Cell Volume : 107.0 fl  Mean Cell Hemoglobin : 30.8 pg  Mean Cell Hemoglobin Concentration : 28.8 gm/dL  Auto Neutrophil # : 0.84 K/uL  Auto Lymphocyte # : 0.69 K/uL  Auto Monocyte # : 0.00 K/uL  Auto Eosinophil # : 0.01 K/uL  Auto Basophil # : 0.03 K/uL  Auto Neutrophil % : 52.6 %  Auto Lymphocyte % : 43.0 %  Auto Monocyte % : 0.0 %  Auto Eosinophil % : 0.9 %  Auto Basophil % : 1.7 %    11-28    140  |  105  |  15  ----------------------------<  102<H>  4.1   |  23  |  0.90    Ca    9.4      28 Nov 2023 06:56  Phos  3.8     11-28  Mg     1.9     11-28    TPro  7.1  /  Alb  3.8  /  TBili  0.6  /  DBili  x   /  AST  20  /  ALT  14  /  AlkPhos  56  11-28      Radiology:             ROS:  Patient comfortable without distress  No SOB or chest pain  No palpitation  No abdominal pain, diarrhaea or constipation  No weakness of extremities  No skin changes or swelling of legs  Rest of the comprehensive ROS was negative  Vital Signs Last 24 Hrs  T(C): 37.2 (28 Nov 2023 07:57), Max: 37.2 (28 Nov 2023 05:31)  T(F): 98.9 (28 Nov 2023 07:57), Max: 98.9 (28 Nov 2023 05:31)  HR: 109 (28 Nov 2023 07:57) (97 - 109)  BP: 157/74 (28 Nov 2023 07:57) (150/91 - 166/81)  BP(mean): --  RR: 18 (28 Nov 2023 07:57) (18 - 18)  SpO2: 95% (28 Nov 2023 07:57) (95% - 99%)    Parameters below as of 27 Nov 2023 21:52  Patient On (Oxygen Delivery Method): room air        Physical exam:  Patient alert and oriented  No distress  CVS: S1, S2   Chest: bilateral breath sound without rales  Abdomen: soft, not tender, no organomegaly or masses  CNS: No focal neuro deficit  Musculoskeletal:  Normal range of motion  Skin: No rash    Assessment and Plan:

## 2023-11-28 NOTE — PROGRESS NOTE ADULT - ASSESSMENT
Patient with frequent UTI, with improvement over the last 2 months but not entirely clear how much of the improvement is related to topical estrogen therapy, pessary being mpre effective in addressing functionalanatomic abnormalities, or antibiotics.  Patient presently asymptomatic.  I see no role to alter Augmentin 250-125 QD at this point in time    11/28: No concern of active infection on exam. Specifically, no urinary symptoms. Tolerating Augmentin without issues.     Suggestions  Continue Augmentin  Will need to remain cognizant of increased risk of resistant mario if she were to develop fever/infection in the setting of chemotherapy.  If empiric therapy needed vancomycin and cefepime would be reasonable.    Thaddeus Geronimo MD  Attending Physician  [facility]  Division of Infectious Diseases  [phone]

## 2023-11-28 NOTE — PROGRESS NOTE ADULT - SUBJECTIVE AND OBJECTIVE BOX
Patient is a 89y old  Female who presents with a chief complaint of elective chemotherapy (28 Nov 2023 11:20)      DATE OF SERVICE: 11-28-23 @ 13:55    SUBJECTIVE / OVERNIGHT EVENTS: overnight events noted    ROS:  Resp: No cough no sputum production  CVS: No chest pain no palpitations no orthopnea  GI: no N/V/D  "I feel fine'         MEDICATIONS  (STANDING):  acyclovir   Oral Tab/Cap 200 milliGRAM(s) Oral two times a day  allopurinol 300 milliGRAM(s) Oral daily  amoxicillin  250 milliGRAM(s)/clavulanate 1 Tablet(s) Oral at bedtime  atorvastatin 40 milliGRAM(s) Oral at bedtime  azaCITIDine IVPB (eMAR) 120 milliGRAM(s) IV Intermittent daily  chlorhexidine 4% Liquid 1 Application(s) Topical <User Schedule>  fluconAZOLE   Tablet 200 milliGRAM(s) Oral daily  losartan 50 milliGRAM(s) Oral daily  loteprednol 0.5% Ophthalmic Suspension 1 Drop(s) Right EYE at bedtime  loteprednol 0.5% Ophthalmic Suspension 1 Drop(s) Right EYE daily  ondansetron  IVPB 16 milliGRAM(s) IV Intermittent daily  sodium chloride 0.9%. 1000 milliLiter(s) (50 mL/Hr) IV Continuous <Continuous>  venetoclax 20 milliGRAM(s) Oral once    MEDICATIONS  (PRN):  ALPRAZolam 0.125 milliGRAM(s) Oral daily PRN for anxiety  sodium chloride 0.9% lock flush 10 milliLiter(s) IV Push every 1 hour PRN Pre/post blood products, medications, blood draw, and to maintain line patency        CAPILLARY BLOOD GLUCOSE        I&O's Summary    27 Nov 2023 07:01  -  28 Nov 2023 07:00  --------------------------------------------------------  IN: 1253 mL / OUT: 1050 mL / NET: 203 mL        Vital Signs Last 24 Hrs  T(C): 37.2 (28 Nov 2023 07:57), Max: 37.2 (28 Nov 2023 05:31)  T(F): 98.9 (28 Nov 2023 07:57), Max: 98.9 (28 Nov 2023 05:31)  HR: 109 (28 Nov 2023 07:57) (101 - 109)  BP: 157/74 (28 Nov 2023 07:57) (157/74 - 166/81)  BP(mean): --  RR: 18 (28 Nov 2023 07:57) (18 - 18)  SpO2: 95% (28 Nov 2023 07:57) (95% - 99%)    PHYSICAL EXAM:   Neck: Supple  Lungs: no wheeze  CVS: S1 S2 no M/R/G  Abdomen: no tenderness  Neuro: AO x 3 nonfocal   Ext: no edema      LABS:                        9.3    1.60  )-----------( 98       ( 28 Nov 2023 06:56 )             32.3     11-28    140  |  105  |  15  ----------------------------<  102<H>  4.1   |  23  |  0.90    Ca    9.4      28 Nov 2023 06:56  Phos  3.8     11-28  Mg     1.9     11-28    TPro  7.1  /  Alb  3.8  /  TBili  0.6  /  DBili  x   /  AST  20  /  ALT  14  /  AlkPhos  56  11-28    PT/INR - ( 27 Nov 2023 10:17 )   PT: 11.2 sec;   INR: 1.02 ratio         PTT - ( 27 Nov 2023 10:17 )  PTT:29.5 sec      Urinalysis Basic - ( 28 Nov 2023 06:56 )    Color: x / Appearance: x / SG: x / pH: x  Gluc: 102 mg/dL / Ketone: x  / Bili: x / Urobili: x   Blood: x / Protein: x / Nitrite: x   Leuk Esterase: x / RBC: x / WBC x   Sq Epi: x / Non Sq Epi: x / Bacteria: x          All consultant(s) notes reviewed and care discussed with other providers        Contact Number, Dr Yen [phone]

## 2023-11-28 NOTE — PROGRESS NOTE ADULT - SUBJECTIVE AND OBJECTIVE BOX
[facility]  Division of Infectious Diseases  [phone]    Name: FUNMI ROMERO  Age: 89y  Gender: Female  MRN: [id number]    Interval History--  Notes reviewed. Seen earlier today. S/P PICC. Denies fevers, chills, or rigors.. Walking the halls. No new complaints.       Past Medical History--  Hypertension    Hyperlipidemia    Palpitations    GERD (gastroesophageal reflux disease)    Chronic UTI    Post corneal transplant    No significant past surgical history    S/P cataract surgery        For details regarding the patient's social history, family history, and other miscellaneous elements, please refer the initial infectious diseases consultation and/or the admitting history and physical examination for this admission.    Allergies    No Known Allergies    Intolerances        Medications--  Antibiotics:  acyclovir   Oral Tab/Cap 200 milliGRAM(s) Oral two times a day  amoxicillin  250 milliGRAM(s)/clavulanate 1 Tablet(s) Oral at bedtime  fluconAZOLE   Tablet 200 milliGRAM(s) Oral daily    Immunologic:    Other:  allopurinol  ALPRAZolam PRN  atorvastatin  azaCITIDine IVPB (eMAR)  chlorhexidine 4% Liquid  losartan  loteprednol 0.5% Ophthalmic Suspension  loteprednol 0.5% Ophthalmic Suspension  ondansetron  IVPB  sodium chloride 0.9% lock flush PRN  sodium chloride 0.9%.  venetoclax      Review of Systems--  A 10-point review of systems was obtained.   Review of systems otherwise negative except as previously noted.    Physical Examination--  Vital Signs: T(F): 97.7 (11-28-23 @ 13:01), Max: 98.9 (11-28-23 @ 05:31)  HR: 115 (11-28-23 @ 13:01)  BP: 147/77 (11-28-23 @ 13:01)  RR: 18 (11-28-23 @ 13:01)  SpO2: 98% (11-28-23 @ 13:01)  Wt(kg): --  General: Nontoxic-appearing Female in no acute distress.  HEENT: AT/NC. Anicteric. Conjunctiva pink and moist. Oropharynx clear.   Neck: Not rigid. No sense of mass.  Nodes: None palpable.  Lungs: Clear bilaterally without rales, wheezing or rhonchi  Heart: Regular rate and rhythm.  Abdomen: Bowel sounds present and normoactive. Soft. Mildly distended. Nontender.   Extremities: No cyanosis or clubbing. No edema.   Skin: Warm. Dry. Good turgor. No rash. No vasculitic stigmata.  Psychiatric: Appropriate affect and mood for situation.       Laboratory Studies--  CBC                        9.3    1.60  )-----------( 98       ( 28 Nov 2023 06:56 )             32.3       Chemistries  11-28    140  |  105  |  15  ----------------------------<  102<H>  4.1   |  23  |  0.90    Ca    9.4      28 Nov 2023 06:56  Phos  3.8     11-28  Mg     1.9     11-28    TPro  7.1  /  Alb  3.8  /  TBili  0.6  /  DBili  x   /  AST  20  /  ALT  14  /  AlkPhos  56  11-28      Culture Data

## 2023-11-28 NOTE — PROGRESS NOTE ADULT - ASSESSMENT
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  Had PICC line placed 11/28/23    For AML, transformed from MDS to begin cycle 1 of azacitadine 75mg/m2 Day 1-7, Venetoclax dose modified and escalated, Day 1 10mg, Day 2 20mg, Day 3 50mg, day 4 and ongoing 100mg.  Consent was obtained and placed in chart  Monitor CBC daily, transfuse as needed.  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  Had PICC line placed 11/28/23    For AML, transformed from MDS to begin cycle 1 of azacitadine 75mg/m2 Day 1-7, Venetoclax dose modified and escalated, Day 1 10mg, Day 2 20mg, Day 3 50mg, day 4 and ongoing 100mg.  Consent was obtained and placed in chart  Monitor CBC daily, transfuse as needed.  Monitor CMP, LDH, UA and continue allopurinol  Hydration with monitor for overload  She is on  prophylactic antiviral acyclovir 200 bid, prophylactic antifungal diflucan 200mg daily  Will continue prophylactic Augmentin   Tolerated day one chemo well, will get day 2 today

## 2023-11-28 NOTE — PRE PROCEDURE NOTE - PRE PROCEDURE EVALUATION
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 Patient requiring access for chemotherapy, Patient presenting to IR today for DL SOLO PICC placement.     Allergies: No Known Allergies    Medications (Abx/Cardiac/Anticoagulation/Blood Products)  acyclovir   Oral Tab/Cap: 200 milliGRAM(s) Oral (11-28 @ 05:48)  amoxicillin  250 milliGRAM(s)/clavulanate: 1 Tablet(s) Oral (11-27 @ 21:13)  fluconAZOLE   Tablet: 200 milliGRAM(s) Oral (11-27 @ 17:38)  heparin   Injectable: 5000 Unit(s) SubCutaneous (11-27 @ 17:40)  losartan: 50 milliGRAM(s) Oral (11-28 @ 05:48)    Data:  158  60.7  T(C): 37.2  HR: 109  BP: 157/74  RR: 18  SpO2: 95%    Exam  General: No acute distress  Chest: Non labored breathing  Abdomen: Non-distended  Extremities: No swelling, warm    -WBC 1.60 / HgB 9.3 / Hct 32.3 / Plt 98  -Na 137 / Cl 99 / BUN 20 / Glucose 98  -K 3.8 / CO2 25 / Cr 0.97  -ALT 17 / Alk Phos 58 / T.Bili 0.6  -INR1.02      Plan: 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 Patient requiring access for chemotherapy, Patient presenting to IR today for DL SOLO PICC placement.     -Risks/Benefits/alternatives explained with the patient and/or healthcare proxy and witnessed informed consent obtained.

## 2023-11-29 DIAGNOSIS — C92.00 ACUTE MYELOBLASTIC LEUKEMIA, NOT HAVING ACHIEVED REMISSION: ICD-10-CM

## 2023-11-29 DIAGNOSIS — K59.00 CONSTIPATION, UNSPECIFIED: ICD-10-CM

## 2023-11-29 LAB
ALBUMIN SERPL ELPH-MCNC: 3.3 G/DL — SIGNIFICANT CHANGE UP (ref 3.3–5)
ALBUMIN SERPL ELPH-MCNC: 3.3 G/DL — SIGNIFICANT CHANGE UP (ref 3.3–5)
ALP SERPL-CCNC: 49 U/L — SIGNIFICANT CHANGE UP (ref 40–120)
ALP SERPL-CCNC: 49 U/L — SIGNIFICANT CHANGE UP (ref 40–120)
ALT FLD-CCNC: 13 U/L — SIGNIFICANT CHANGE UP (ref 10–45)
ALT FLD-CCNC: 13 U/L — SIGNIFICANT CHANGE UP (ref 10–45)
ANION GAP SERPL CALC-SCNC: 10 MMOL/L — SIGNIFICANT CHANGE UP (ref 5–17)
ANION GAP SERPL CALC-SCNC: 10 MMOL/L — SIGNIFICANT CHANGE UP (ref 5–17)
AST SERPL-CCNC: 18 U/L — SIGNIFICANT CHANGE UP (ref 10–40)
AST SERPL-CCNC: 18 U/L — SIGNIFICANT CHANGE UP (ref 10–40)
BASOPHILS # BLD AUTO: 0.01 K/UL — SIGNIFICANT CHANGE UP (ref 0–0.2)
BASOPHILS # BLD AUTO: 0.01 K/UL — SIGNIFICANT CHANGE UP (ref 0–0.2)
BASOPHILS NFR BLD AUTO: 0.9 % — SIGNIFICANT CHANGE UP (ref 0–2)
BASOPHILS NFR BLD AUTO: 0.9 % — SIGNIFICANT CHANGE UP (ref 0–2)
BILIRUB SERPL-MCNC: 0.7 MG/DL — SIGNIFICANT CHANGE UP (ref 0.2–1.2)
BILIRUB SERPL-MCNC: 0.7 MG/DL — SIGNIFICANT CHANGE UP (ref 0.2–1.2)
BUN SERPL-MCNC: 14 MG/DL — SIGNIFICANT CHANGE UP (ref 7–23)
BUN SERPL-MCNC: 14 MG/DL — SIGNIFICANT CHANGE UP (ref 7–23)
CALCIUM SERPL-MCNC: 8.8 MG/DL — SIGNIFICANT CHANGE UP (ref 8.4–10.5)
CALCIUM SERPL-MCNC: 8.8 MG/DL — SIGNIFICANT CHANGE UP (ref 8.4–10.5)
CHLORIDE SERPL-SCNC: 103 MMOL/L — SIGNIFICANT CHANGE UP (ref 96–108)
CHLORIDE SERPL-SCNC: 103 MMOL/L — SIGNIFICANT CHANGE UP (ref 96–108)
CO2 SERPL-SCNC: 23 MMOL/L — SIGNIFICANT CHANGE UP (ref 22–31)
CO2 SERPL-SCNC: 23 MMOL/L — SIGNIFICANT CHANGE UP (ref 22–31)
CREAT SERPL-MCNC: 0.96 MG/DL — SIGNIFICANT CHANGE UP (ref 0.5–1.3)
CREAT SERPL-MCNC: 0.96 MG/DL — SIGNIFICANT CHANGE UP (ref 0.5–1.3)
EGFR: 56 ML/MIN/1.73M2 — LOW
EGFR: 56 ML/MIN/1.73M2 — LOW
EOSINOPHIL # BLD AUTO: 0.02 K/UL — SIGNIFICANT CHANGE UP (ref 0–0.5)
EOSINOPHIL # BLD AUTO: 0.02 K/UL — SIGNIFICANT CHANGE UP (ref 0–0.5)
EOSINOPHIL NFR BLD AUTO: 1.7 % — SIGNIFICANT CHANGE UP (ref 0–6)
EOSINOPHIL NFR BLD AUTO: 1.7 % — SIGNIFICANT CHANGE UP (ref 0–6)
GLUCOSE SERPL-MCNC: 93 MG/DL — SIGNIFICANT CHANGE UP (ref 70–99)
GLUCOSE SERPL-MCNC: 93 MG/DL — SIGNIFICANT CHANGE UP (ref 70–99)
HCT VFR BLD CALC: 28.5 % — LOW (ref 34.5–45)
HCT VFR BLD CALC: 28.5 % — LOW (ref 34.5–45)
HGB BLD-MCNC: 8.1 G/DL — LOW (ref 11.5–15.5)
HGB BLD-MCNC: 8.1 G/DL — LOW (ref 11.5–15.5)
IMM GRANULOCYTES NFR BLD AUTO: 0.9 % — SIGNIFICANT CHANGE UP (ref 0–0.9)
IMM GRANULOCYTES NFR BLD AUTO: 0.9 % — SIGNIFICANT CHANGE UP (ref 0–0.9)
LDH SERPL L TO P-CCNC: 263 U/L — HIGH (ref 50–242)
LDH SERPL L TO P-CCNC: 263 U/L — HIGH (ref 50–242)
LYMPHOCYTES # BLD AUTO: 0.59 K/UL — LOW (ref 1–3.3)
LYMPHOCYTES # BLD AUTO: 0.59 K/UL — LOW (ref 1–3.3)
LYMPHOCYTES # BLD AUTO: 50.9 % — HIGH (ref 13–44)
LYMPHOCYTES # BLD AUTO: 50.9 % — HIGH (ref 13–44)
MAGNESIUM SERPL-MCNC: 1.9 MG/DL — SIGNIFICANT CHANGE UP (ref 1.6–2.6)
MAGNESIUM SERPL-MCNC: 1.9 MG/DL — SIGNIFICANT CHANGE UP (ref 1.6–2.6)
MCHC RBC-ENTMCNC: 28.4 GM/DL — LOW (ref 32–36)
MCHC RBC-ENTMCNC: 28.4 GM/DL — LOW (ref 32–36)
MCHC RBC-ENTMCNC: 30.6 PG — SIGNIFICANT CHANGE UP (ref 27–34)
MCHC RBC-ENTMCNC: 30.6 PG — SIGNIFICANT CHANGE UP (ref 27–34)
MCV RBC AUTO: 107.5 FL — HIGH (ref 80–100)
MCV RBC AUTO: 107.5 FL — HIGH (ref 80–100)
MONOCYTES # BLD AUTO: 0.02 K/UL — SIGNIFICANT CHANGE UP (ref 0–0.9)
MONOCYTES # BLD AUTO: 0.02 K/UL — SIGNIFICANT CHANGE UP (ref 0–0.9)
MONOCYTES NFR BLD AUTO: 1.7 % — LOW (ref 2–14)
MONOCYTES NFR BLD AUTO: 1.7 % — LOW (ref 2–14)
NEUTROPHILS # BLD AUTO: 0.51 K/UL — LOW (ref 1.8–7.4)
NEUTROPHILS # BLD AUTO: 0.51 K/UL — LOW (ref 1.8–7.4)
NEUTROPHILS NFR BLD AUTO: 43.9 % — SIGNIFICANT CHANGE UP (ref 43–77)
NEUTROPHILS NFR BLD AUTO: 43.9 % — SIGNIFICANT CHANGE UP (ref 43–77)
NRBC # BLD: 0 /100 WBCS — SIGNIFICANT CHANGE UP (ref 0–0)
NRBC # BLD: 0 /100 WBCS — SIGNIFICANT CHANGE UP (ref 0–0)
PHOSPHATE SERPL-MCNC: 3 MG/DL — SIGNIFICANT CHANGE UP (ref 2.5–4.5)
PHOSPHATE SERPL-MCNC: 3 MG/DL — SIGNIFICANT CHANGE UP (ref 2.5–4.5)
PLATELET # BLD AUTO: 66 K/UL — LOW (ref 150–400)
PLATELET # BLD AUTO: 66 K/UL — LOW (ref 150–400)
POTASSIUM SERPL-MCNC: 4 MMOL/L — SIGNIFICANT CHANGE UP (ref 3.5–5.3)
POTASSIUM SERPL-MCNC: 4 MMOL/L — SIGNIFICANT CHANGE UP (ref 3.5–5.3)
POTASSIUM SERPL-SCNC: 4 MMOL/L — SIGNIFICANT CHANGE UP (ref 3.5–5.3)
POTASSIUM SERPL-SCNC: 4 MMOL/L — SIGNIFICANT CHANGE UP (ref 3.5–5.3)
PROT SERPL-MCNC: 6.4 G/DL — SIGNIFICANT CHANGE UP (ref 6–8.3)
PROT SERPL-MCNC: 6.4 G/DL — SIGNIFICANT CHANGE UP (ref 6–8.3)
RBC # BLD: 2.65 M/UL — LOW (ref 3.8–5.2)
RBC # BLD: 2.65 M/UL — LOW (ref 3.8–5.2)
RBC # FLD: 18 % — HIGH (ref 10.3–14.5)
RBC # FLD: 18 % — HIGH (ref 10.3–14.5)
SODIUM SERPL-SCNC: 136 MMOL/L — SIGNIFICANT CHANGE UP (ref 135–145)
SODIUM SERPL-SCNC: 136 MMOL/L — SIGNIFICANT CHANGE UP (ref 135–145)
URATE SERPL-MCNC: 4 MG/DL — SIGNIFICANT CHANGE UP (ref 2.5–7)
URATE SERPL-MCNC: 4 MG/DL — SIGNIFICANT CHANGE UP (ref 2.5–7)
WBC # BLD: 1.16 K/UL — LOW (ref 3.8–10.5)
WBC # BLD: 1.16 K/UL — LOW (ref 3.8–10.5)
WBC # FLD AUTO: 1.16 K/UL — LOW (ref 3.8–10.5)
WBC # FLD AUTO: 1.16 K/UL — LOW (ref 3.8–10.5)

## 2023-11-29 PROCEDURE — 99232 SBSQ HOSP IP/OBS MODERATE 35: CPT

## 2023-11-29 RX ORDER — LACTULOSE 10 G/15ML
10 SOLUTION ORAL ONCE
Refills: 0 | Status: COMPLETED | OUTPATIENT
Start: 2023-11-29 | End: 2023-11-29

## 2023-11-29 RX ORDER — AZACITIDINE FOR 100 MG/1
91 INJECTION, POWDER, LYOPHILIZED, FOR SOLUTION INTRAVENOUS; SUBCUTANEOUS ONCE
Refills: 0 | Status: COMPLETED | OUTPATIENT
Start: 2023-11-29 | End: 2023-11-29

## 2023-11-29 RX ORDER — METOPROLOL TARTRATE 50 MG
50 TABLET ORAL DAILY
Refills: 0 | Status: DISCONTINUED | OUTPATIENT
Start: 2023-11-29 | End: 2023-12-06

## 2023-11-29 RX ADMIN — SODIUM CHLORIDE 50 MILLILITER(S): 9 INJECTION INTRAMUSCULAR; INTRAVENOUS; SUBCUTANEOUS at 20:08

## 2023-11-29 RX ADMIN — Medication 200 MILLIGRAM(S): at 18:00

## 2023-11-29 RX ADMIN — ATORVASTATIN CALCIUM 40 MILLIGRAM(S): 80 TABLET, FILM COATED ORAL at 21:36

## 2023-11-29 RX ADMIN — LOTEPREDNOL ETABONATE 1 DROP(S): 2 SUSPENSION/ DROPS OPHTHALMIC at 12:33

## 2023-11-29 RX ADMIN — FLUCONAZOLE 200 MILLIGRAM(S): 150 TABLET ORAL at 12:43

## 2023-11-29 RX ADMIN — AZACITIDINE FOR 120 MILLIGRAM(S): 100 INJECTION, POWDER, LYOPHILIZED, FOR SOLUTION INTRAVENOUS; SUBCUTANEOUS at 16:28

## 2023-11-29 RX ADMIN — AZACITIDINE FOR 91 MILLIGRAM(S): 100 INJECTION, POWDER, LYOPHILIZED, FOR SOLUTION INTRAVENOUS; SUBCUTANEOUS at 17:24

## 2023-11-29 RX ADMIN — Medication 0.12 MILLIGRAM(S): at 21:37

## 2023-11-29 RX ADMIN — SENNA PLUS 2 TABLET(S): 8.6 TABLET ORAL at 21:36

## 2023-11-29 RX ADMIN — Medication 200 MILLIGRAM(S): at 06:04

## 2023-11-29 RX ADMIN — ONDANSETRON 116 MILLIGRAM(S): 8 TABLET, FILM COATED ORAL at 12:33

## 2023-11-29 RX ADMIN — LOSARTAN POTASSIUM 50 MILLIGRAM(S): 100 TABLET, FILM COATED ORAL at 06:04

## 2023-11-29 RX ADMIN — Medication 1 TABLET(S): at 21:36

## 2023-11-29 RX ADMIN — LACTULOSE 10 GRAM(S): 10 SOLUTION ORAL at 18:00

## 2023-11-29 RX ADMIN — Medication 300 MILLIGRAM(S): at 12:43

## 2023-11-29 RX ADMIN — CHLORHEXIDINE GLUCONATE 1 APPLICATION(S): 213 SOLUTION TOPICAL at 06:04

## 2023-11-29 RX ADMIN — SODIUM CHLORIDE 50 MILLILITER(S): 9 INJECTION INTRAMUSCULAR; INTRAVENOUS; SUBCUTANEOUS at 06:04

## 2023-11-29 RX ADMIN — LOTEPREDNOL ETABONATE 1 DROP(S): 2 SUSPENSION/ DROPS OPHTHALMIC at 21:41

## 2023-11-29 NOTE — PROGRESS NOTE ADULT - ASSESSMENT
Patient with frequent UTI, with improvement over the last 2 months but not entirely clear how much of the improvement is related to topical estrogen therapy, pessary being mpre effective in addressing functionalanatomic abnormalities, or antibiotics.  Patient presently asymptomatic.  I see no role to alter Augmentin 250-125 QD at this point in time    11/28: No concern of active infection on exam. Specifically, no urinary symptoms. Tolerating Augmentin without issues.   11/29: Still no urinary symptoms.     Suggestions  Continue Augmentin  Will need to remain cognizant of increased risk of resistant mario if she were to develop fever/infection in the setting of chemotherapy.  If empiric therapy needed vancomycin and cefepime would be reasonable.    Thaddeus Geronimo MD  Attending Physician  [facility]  Division of Infectious Diseases  [phone]

## 2023-11-29 NOTE — PHARMACOTHERAPY INTERVENTION NOTE - COMMENTS
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 with azacitadine IVPB + Venetoclax    Antimicrobial Course:  -Acyclovir- 11/27  -Augmentin- 11/27  -Fluconazole- 11/27    Last Neutropenic (ANC<1000): 11/30; ANC= 0.79  Last Febrile: no occurrence  Days no longer Neutropenic: 0  Days afebrile: 3    Chemotherapy Course  -Regimen: C1: Azacitadine 75mg/m2 IVPB D1-7 + Venetoclax PO 10mg --> 20mg--> 50mg--> 100mg  Day: 4 (11/30)  BmBx:  Access:     Relevant clinical information used in assessment:  -Pt referred here by private oncologist Betsy Estevez  -11/27- Crcl= 36ml/min; Scr=0.97; Wt= 60.7kg; Actual BW used since BMI<30     Assessment/Plan/Recommendation:  - Azacitadine- full dose almost given outside of expiration window on 11/29  --> Drug prepared ~3:30p after confirmation with RN. Azacitadine as a short 1-hour expiration  --> Pharmacist noticed drug was still not hung after 4:30 and notified RN  --> Infusion has started (less than 75% of drug was given), but immediately stopped  --> Provider contacted, remainder of medication was calculated, newly prepared & administered immediately   -Venetoclax ordered but not given on 11/29  --> Pharmacist went to enter new ramp up dose of 100mg Venetoclax and noticed the previous day's 50mg dose was not given  --> Provider notified and instructed to give 50mg today, and proceed with 100mg tomorrow  -Leadership made aware    Additional Monitoring Needed?   -Yes- Continue to monitor renal function & daily counts for abx escalation/de-escalation     Case discussed with attending/primary team    Ashley Suazo, PharmD, BCPS  Clinical Pharmacy Specialist | Hematology/Oncology  [facility]  Email: [email or web address] or available on [email or web address] 
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 with azacitadine IVPB + Venetoclax    Antimicrobial Course:  -Acyclovir- 11/27  -Augmentin- 11/27  -Fluconazole- 11/27    Last Neutropenic (ANC<1000): no occurrence  Last Febrile: no occurrence  Days no longer Neutropenic:   Days afebrile:    Chemotherapy Course  -Regimen: C1: Azacitadine 75mg/m2 IVPB D1-7 + Venetoclax PO 10mg --> 20mg--> 50mg--> 100mg  Day: 1 (11/27)  BmBx:  Access:     Relevant clinical information used in assessment:  -Pt referred here by private oncologist Betsy Estevez  -11/27- Crcl= 36ml/min; Scr=0.97; Wt= 60.7kg; Actual BW used since BMI<30     Assessment/Plan/Recommendation:  Counseled patient & patient’s caregiver on new regimen azacitadine + venetoclax.   Discussed common side effects:  low counts, fatigue  Explained measures we take to mitigate these side effects: antiemetics, labwork to monitor, antifungal, antibiotics, antivirals  Emetogenicity risk: Minimal to Low  Special Instructions: Avoid grapefruit juice, Wideman oranges (found in jams), and starfruit; Regular citrus (oranges, jovanna, limes are ok)  Patient & pt caregiver expresses understanding of all indications, common side effects, and any special instructions. All medication related questions have been answered at this time.  -Provided Medication Course Calendar  -Provided printed teaching material from [employer]    Additional Monitoring Needed?   -Yes- Continue to monitor renal function & daily counts for abx escalation/de-escalation     Case discussed with attending/primary team    Ashley Suazo, Vilma, BCPS  Clinical Pharmacy Specialist | Hematology/Oncology  [facility]  Email: [email or web address] or available on [employer]

## 2023-11-29 NOTE — PROGRESS NOTE ADULT - SUBJECTIVE AND OBJECTIVE BOX
Patient is a 89y old  Female who presents with a chief complaint of elective chemotherapy (29 Nov 2023 12:09)      DATE OF SERVICE: 11-29-23 @ 14:29    SUBJECTIVE / OVERNIGHT EVENTS: overnight events noted    ROS:  Resp: No cough no sputum production  CVS: No chest pain no palpitations no orthopnea  GI: no N/V/D  : no dysuria, no hematuria  "I feel fine'         MEDICATIONS  (STANDING):  acyclovir   Oral Tab/Cap 200 milliGRAM(s) Oral two times a day  allopurinol 300 milliGRAM(s) Oral daily  amoxicillin  250 milliGRAM(s)/clavulanate 1 Tablet(s) Oral at bedtime  atorvastatin 40 milliGRAM(s) Oral at bedtime  azaCITIDine IVPB (eMAR) 120 milliGRAM(s) IV Intermittent daily  chlorhexidine 4% Liquid 1 Application(s) Topical <User Schedule>  fluconAZOLE   Tablet 200 milliGRAM(s) Oral daily  losartan 50 milliGRAM(s) Oral daily  loteprednol 0.5% Ophthalmic Suspension 1 Drop(s) Right EYE at bedtime  loteprednol 0.5% Ophthalmic Suspension 1 Drop(s) Right EYE daily  ondansetron  IVPB 16 milliGRAM(s) IV Intermittent daily  sodium chloride 0.9%. 1000 milliLiter(s) (50 mL/Hr) IV Continuous <Continuous>  venetoclax 50 milliGRAM(s) Oral once    MEDICATIONS  (PRN):  ALPRAZolam 0.125 milliGRAM(s) Oral daily PRN for anxiety  senna 2 Tablet(s) Oral at bedtime PRN Constipation  sodium chloride 0.9% lock flush 10 milliLiter(s) IV Push every 1 hour PRN Pre/post blood products, medications, blood draw, and to maintain line patency        CAPILLARY BLOOD GLUCOSE        I&O's Summary    28 Nov 2023 07:01  -  29 Nov 2023 07:00  --------------------------------------------------------  IN: 1628 mL / OUT: 1700 mL / NET: -72 mL        Vital Signs Last 24 Hrs  T(C): 36.9 (29 Nov 2023 09:35), Max: 37.1 (29 Nov 2023 05:55)  T(F): 98.4 (29 Nov 2023 09:35), Max: 98.7 (29 Nov 2023 05:55)  HR: 116 (29 Nov 2023 09:35) (105 - 116)  BP: 155/69 (29 Nov 2023 09:35) (132/74 - 160/87)  BP(mean): --  RR: 18 (29 Nov 2023 09:35) (18 - 18)  SpO2: 97% (29 Nov 2023 09:35) (94% - 98%)    PHYSICAL EXAM:   Neck: Supple  Lungs: no wheeze  CVS: S1 S2 no M/R/G  Abdomen: no tenderness  Neuro: AO x 3 nonfocal   Ext: no edema    LABS:                        8.1    1.16  )-----------( 66       ( 29 Nov 2023 07:15 )             28.5     11-29    136  |  103  |  14  ----------------------------<  93  4.0   |  23  |  0.96    Ca    8.8      29 Nov 2023 07:15  Phos  3.0     11-29  Mg     1.9     11-29    TPro  6.4  /  Alb  3.3  /  TBili  0.7  /  DBili  x   /  AST  18  /  ALT  13  /  AlkPhos  49  11-29          Urinalysis Basic - ( 29 Nov 2023 07:15 )    Color: x / Appearance: x / SG: x / pH: x  Gluc: 93 mg/dL / Ketone: x  / Bili: x / Urobili: x   Blood: x / Protein: x / Nitrite: x   Leuk Esterase: x / RBC: x / WBC x   Sq Epi: x / Non Sq Epi: x / Bacteria: x          All consultant(s) notes reviewed and care discussed with other providers        Contact Number, Dr Yen [phone]

## 2023-11-29 NOTE — PROGRESS NOTE ADULT - NSPROGADDITIONALINFOA_GEN_ALL_CORE
discussed with patient in detail, expresses understanding of treatment plans.  discussed with patient's son at the bedside in detail  discussed with son over the phone in detail

## 2023-11-29 NOTE — PROGRESS NOTE ADULT - SUBJECTIVE AND OBJECTIVE BOX
[facility]  Division of Infectious Diseases  [phone]    Name: FUNMI ROMERO  Age: 89y  Gender: Female  MRN: [id number]    Interval History--  Notes reviewed.     Past Medical History--  Hypertension    Hyperlipidemia    Palpitations    GERD (gastroesophageal reflux disease)    Chronic UTI    Post corneal transplant    No significant past surgical history    S/P cataract surgery        For details regarding the patient's social history, family history, and other miscellaneous elements, please refer the initial infectious diseases consultation and/or the admitting history and physical examination for this admission.    Allergies    No Known Allergies    Intolerances        Medications--  Antibiotics:  acyclovir   Oral Tab/Cap 200 milliGRAM(s) Oral two times a day  amoxicillin  250 milliGRAM(s)/clavulanate 1 Tablet(s) Oral at bedtime  fluconAZOLE   Tablet 200 milliGRAM(s) Oral daily    Immunologic:    Other:  allopurinol  ALPRAZolam PRN  atorvastatin  azaCITIDine IVPB (eMAR)  chlorhexidine 4% Liquid  losartan  loteprednol 0.5% Ophthalmic Suspension  loteprednol 0.5% Ophthalmic Suspension  ondansetron  IVPB  senna PRN  sodium chloride 0.9% lock flush PRN  sodium chloride 0.9%.  venetoclax      Review of Systems--  A 10-point review of systems was obtained.     Pertinent positives and negatives--  Constitutional: No fevers. No Chills. No Rigors.   Cardiovascular: No chest pain. No palpitations.  Respiratory: No shortness of breath. No cough.  Gastrointestinal: No nausea or vomiting. No diarrhea or constipation.   Psychiatric: Pleasant. Appropriate affect.    Review of systems otherwise negative except as previously noted.    Physical Examination--  Vital Signs: T(F): 98.7 (11-29-23 @ 05:55), Max: 98.7 (11-29-23 @ 05:55)  HR: 105 (11-29-23 @ 05:55)  BP: 142/79 (11-29-23 @ 05:55)  RR: 18 (11-29-23 @ 05:55)  SpO2: 97% (11-29-23 @ 05:55)  Wt(kg): --  General: Nontoxic-appearing Female in no acute distress.  HEENT: AT/NC. PERRL. EOMI. Anicteric. Conjunctiva pink and moist. Oropharynx clear. Dentition fair.  Neck: Not rigid. No sense of mass.  Nodes: None palpable.  Lungs: Clear bilaterally without rales, wheezing or rhonchi  Heart: Regular rate and rhythm. No Murmur. No rub. No gallop. No palpable thrill.  Abdomen: Bowel sounds present and normoactive. Soft. Nondistended. Nontender. No sense of mass. No organomegaly.  Back: No spinal tenderness. No costovertebral angle tenderness.   Extremities: No cyanosis or clubbing. No edema.   Skin: Warm. Dry. Good turgor. No rash. No vasculitic stigmata.  Psychiatric: Appropriate affect and mood for situation.         Laboratory Studies--  CBC                        8.1    1.16  )-----------( 66       ( 29 Nov 2023 07:15 )             28.5       Chemistries  11-29    136  |  103  |  14  ----------------------------<  93  4.0   |  23  |  0.96    Ca    8.8      29 Nov 2023 07:15  Phos  3.0     11-29  Mg     1.9     11-29    TPro  6.4  /  Alb  3.3  /  TBili  0.7  /  DBili  x   /  AST  18  /  ALT  13  /  AlkPhos  49  11-29      Culture Data           [facility]  Division of Infectious Diseases  [phone]    Name: FUNMI ROMERO  Age: 89y  Gender: Female  MRN: [id number]    Interval History--  No interval notes.  Feels fine.  Appetite okay.  Walking around the halls.  No fevers chills or rigors.  No urinary symptoms.    Past Medical History--  Hypertension    Hyperlipidemia    Palpitations    GERD (gastroesophageal reflux disease)    Chronic UTI    Post corneal transplant    No significant past surgical history    S/P cataract surgery        For details regarding the patient's social history, family history, and other miscellaneous elements, please refer the initial infectious diseases consultation and/or the admitting history and physical examination for this admission.    Allergies    No Known Allergies    Intolerances        Medications--  Antibiotics:  acyclovir   Oral Tab/Cap 200 milliGRAM(s) Oral two times a day  amoxicillin  250 milliGRAM(s)/clavulanate 1 Tablet(s) Oral at bedtime  fluconAZOLE   Tablet 200 milliGRAM(s) Oral daily    Immunologic:    Other:  allopurinol  ALPRAZolam PRN  atorvastatin  azaCITIDine IVPB (eMAR)  chlorhexidine 4% Liquid  losartan  loteprednol 0.5% Ophthalmic Suspension  loteprednol 0.5% Ophthalmic Suspension  ondansetron  IVPB  senna PRN  sodium chloride 0.9% lock flush PRN  sodium chloride 0.9%.  venetoclax      Review of Systems--  A 10-point review of systems was obtained.   Review of systems otherwise negative except as previously noted.    Physical Examination--  Vital Signs: T(F): 98.7 (11-29-23 @ 05:55), Max: 98.7 (11-29-23 @ 05:55)  HR: 105 (11-29-23 @ 05:55)  BP: 142/79 (11-29-23 @ 05:55)  RR: 18 (11-29-23 @ 05:55)  SpO2: 97% (11-29-23 @ 05:55)  Wt(kg): --  General: Nontoxic-appearing Female in no acute distress.  HEENT: AT/NC. Anicteric. Conjunctiva pink and moist. Oropharynx clear.   Neck: Not rigid. No sense of mass.  Nodes: None palpable.  Lungs: Clear bilaterally without rales, wheezing or rhonchi  Heart: Regular rate and rhythm.  Abdomen: Bowel sounds present and normoactive. Soft. Mildly distended. Nontender.   Extremities: No cyanosis or clubbing. No edema.   Skin: Warm. Dry. Good turgor. No rash. No vasculitic stigmata.  Psychiatric: Appropriate affect and mood for situation.           Laboratory Studies--  CBC                        8.1    1.16  )-----------( 66       ( 29 Nov 2023 07:15 )             28.5       Chemistries  11-29    136  |  103  |  14  ----------------------------<  93  4.0   |  23  |  0.96    Ca    8.8      29 Nov 2023 07:15  Phos  3.0     11-29  Mg     1.9     11-29    TPro  6.4  /  Alb  3.3  /  TBili  0.7  /  DBili  x   /  AST  18  /  ALT  13  /  AlkPhos  49  11-29      Culture Data

## 2023-11-29 NOTE — PROGRESS NOTE ADULT - SUBJECTIVE AND OBJECTIVE BOX
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    HPI:  H/o Pancytopenia with recent progressive cytopenias;   BM-- 27% Myeloblasts, absent fibrosis, Trisomy 8, ASXL1, SRSF2 and STAG2 mutations detected  Pt admitted on 11/27/2023 for treatment with Azacytadine/Venetoclax       Pt is seen and examined  pt is awake and lying in bed  pt seems comfortable and denies any complaints at this time: no n/v, diarrhea (+) constipation  Day #3 aza, venetoclax      REVIEW OF SYSTEMS:    CONSTITUTIONAL: No weakness, fevers or chills  EYES/ENT: No visual changes;  No vertigo or throat pain   NECK: No pain or stiffness  RESPIRATORY: No cough, wheezing, hemoptysis; No shortness of breath  CARDIOVASCULAR: No chest pain or palpitations  GASTROINTESTINAL: No abdominal or epigastric pain. No nausea, vomiting, or hematemesis; No diarrhea;(+) constipation. No melena or hematochezia.  GENITOURINARY: No dysuria, frequency or hematuria  NEUROLOGICAL: No numbness or weakness  SKIN: No itching, burning, rashes, or lesions     MEDICATIONS  (STANDING):  acyclovir   Oral Tab/Cap 200 milliGRAM(s) Oral two times a day  allopurinol 300 milliGRAM(s) Oral daily  amoxicillin  250 milliGRAM(s)/clavulanate 1 Tablet(s) Oral at bedtime  atorvastatin 40 milliGRAM(s) Oral at bedtime  azaCITIDine IVPB (eMAR) 120 milliGRAM(s) IV Intermittent daily  chlorhexidine 4% Liquid 1 Application(s) Topical <User Schedule>  fluconAZOLE   Tablet 200 milliGRAM(s) Oral daily  losartan 50 milliGRAM(s) Oral daily  loteprednol 0.5% Ophthalmic Suspension 1 Drop(s) Right EYE at bedtime  loteprednol 0.5% Ophthalmic Suspension 1 Drop(s) Right EYE daily  ondansetron  IVPB 16 milliGRAM(s) IV Intermittent daily  sodium chloride 0.9%. 1000 milliLiter(s) (50 mL/Hr) IV Continuous <Continuous>  venetoclax 50 milliGRAM(s) Oral once    MEDICATIONS  (PRN):  ALPRAZolam 0.125 milliGRAM(s) Oral daily PRN for anxiety  senna 2 Tablet(s) Oral at bedtime PRN Constipation  sodium chloride 0.9% lock flush 10 milliLiter(s) IV Push every 1 hour PRN Pre/post blood products, medications, blood draw, and to maintain line patency      Allergies    No Known Allergies    Intolerances        Vital Signs Last 24 Hrs  T(C): 36.9 (29 Nov 2023 09:35), Max: 37.1 (29 Nov 2023 05:55)  T(F): 98.4 (29 Nov 2023 09:35), Max: 98.7 (29 Nov 2023 05:55)  HR: 116 (29 Nov 2023 09:35) (105 - 116)  BP: 155/69 (29 Nov 2023 09:35) (132/74 - 160/87)  BP(mean): --  RR: 18 (29 Nov 2023 09:35) (18 - 18)  SpO2: 97% (29 Nov 2023 09:35) (94% - 98%)    Parameters below as of 29 Nov 2023 09:35  Patient On (Oxygen Delivery Method): room air          PHYSICAL EXAM  General: adult in NAD  HEENT: clear oropharynx, anicteric sclera, pink conjunctiva  Neck: supple  CV: normal S1/S2 with no murmur rubs or gallops  Lungs: positive air movement b/l ant lungs,clear to auscultation, no wheezes, no rales  Abdomen: soft non-tender non-distended, no hepatosplenomegaly  Ext: no clubbing cyanosis or edema  Skin: no rashes and no petechiae  Neuro: alert and oriented X 4, no focal deficits    LABS:                          8.1    1.16  )-----------( 66       ( 29 Nov 2023 07:15 )             28.5         Mean Cell Volume : 107.5 fl  Mean Cell Hemoglobin : 30.6 pg  Mean Cell Hemoglobin Concentration : 28.4 gm/dL  Auto Neutrophil # : 0.51 K/uL  Auto Lymphocyte # : 0.59 K/uL  Auto Monocyte # : 0.02 K/uL  Auto Eosinophil # : 0.02 K/uL  Auto Basophil # : 0.01 K/uL  Auto Neutrophil % : 43.9 %  Auto Lymphocyte % : 50.9 %  Auto Monocyte % : 1.7 %  Auto Eosinophil % : 1.7 %  Auto Basophil % : 0.9 %    Serial CBC's  11-29 @ 07:15  Hct-28.5 / Hgb-8.1 / Plat-66 / RBC-2.65 / WBC-1.16    Serial CBC's  11-28 @ 06:56  Hct-32.3 / Hgb-9.3 / Plat-98 / RBC-3.02 / WBC-1.60      Serial CBC's  11-27 @ 10:17  Hct-35.2 / Hgb-9.9 / Plat-106 / RBC-3.29 / WBC-2.09    11-29    136  |  103  |  14  ----------------------------<  93  4.0   |  23  |  0.96    Ca    8.8      29 Nov 2023 07:15  Phos  3.0     11-29  Mg     1.9     11-29    TPro  6.4  /  Alb  3.3  /  TBili  0.7  /  DBili  x   /  AST  18  /  ALT  13  /  AlkPhos  49  11-29      Lactate Dehydrogenase, Serum: 263 U/L (11-29 @ 07:15)  Vitamin B12, Serum: 857 pg/mL (11-28 @ 06:56)    Lactate Dehydrogenase, Serum: 269 U/L (11-28 @ 06:56)    Lactate Dehydrogenase, Serum: 358 U/L (11-27 @ 10:17)        RADIOLOGY & ADDITIONAL STUDIES:    s/p PICC line  Assessment

## 2023-11-29 NOTE — PROGRESS NOTE ADULT - SUBJECTIVE AND OBJECTIVE BOX
Notified by CHIDI Monroy that pt's Azacytadine was started and nurse then noted that the medication had .  Pt had received approx 15 cc of infusion.    Based on 120 mg in 62 cc, pt received 29 mg of medication. Spoke with CHIDI Monroy and Lala Pharmacist and a new bag will be made with 91 mg of Azacitadine.     Reviewed written orders. As i do not have access to a scanner at this time and i am not in the hospital physically, I am approving this change to expedite pt receiving medication and will co-sign orders tomorrow morning.    Betsy Huston MD  [facility]  [phone]

## 2023-11-30 ENCOUNTER — TRANSCRIPTION ENCOUNTER (OUTPATIENT)
Age: 88
End: 2023-11-30

## 2023-11-30 DIAGNOSIS — R19.7 DIARRHEA, UNSPECIFIED: ICD-10-CM

## 2023-11-30 DIAGNOSIS — R09.A2 FOREIGN BODY SENSATION, THROAT: ICD-10-CM

## 2023-11-30 LAB
ALBUMIN SERPL ELPH-MCNC: 3.3 G/DL — SIGNIFICANT CHANGE UP (ref 3.3–5)
ALBUMIN SERPL ELPH-MCNC: 3.3 G/DL — SIGNIFICANT CHANGE UP (ref 3.3–5)
ALP SERPL-CCNC: 49 U/L — SIGNIFICANT CHANGE UP (ref 40–120)
ALP SERPL-CCNC: 49 U/L — SIGNIFICANT CHANGE UP (ref 40–120)
ALT FLD-CCNC: 11 U/L — SIGNIFICANT CHANGE UP (ref 10–45)
ALT FLD-CCNC: 11 U/L — SIGNIFICANT CHANGE UP (ref 10–45)
ANION GAP SERPL CALC-SCNC: 12 MMOL/L — SIGNIFICANT CHANGE UP (ref 5–17)
ANION GAP SERPL CALC-SCNC: 12 MMOL/L — SIGNIFICANT CHANGE UP (ref 5–17)
AST SERPL-CCNC: 12 U/L — SIGNIFICANT CHANGE UP (ref 10–40)
AST SERPL-CCNC: 12 U/L — SIGNIFICANT CHANGE UP (ref 10–40)
BASOPHILS # BLD AUTO: 0.03 K/UL — SIGNIFICANT CHANGE UP (ref 0–0.2)
BASOPHILS # BLD AUTO: 0.03 K/UL — SIGNIFICANT CHANGE UP (ref 0–0.2)
BASOPHILS NFR BLD AUTO: 1.7 % — SIGNIFICANT CHANGE UP (ref 0–2)
BASOPHILS NFR BLD AUTO: 1.7 % — SIGNIFICANT CHANGE UP (ref 0–2)
BILIRUB SERPL-MCNC: 0.6 MG/DL — SIGNIFICANT CHANGE UP (ref 0.2–1.2)
BILIRUB SERPL-MCNC: 0.6 MG/DL — SIGNIFICANT CHANGE UP (ref 0.2–1.2)
BUN SERPL-MCNC: 16 MG/DL — SIGNIFICANT CHANGE UP (ref 7–23)
BUN SERPL-MCNC: 16 MG/DL — SIGNIFICANT CHANGE UP (ref 7–23)
CALCIUM SERPL-MCNC: 8.7 MG/DL — SIGNIFICANT CHANGE UP (ref 8.4–10.5)
CALCIUM SERPL-MCNC: 8.7 MG/DL — SIGNIFICANT CHANGE UP (ref 8.4–10.5)
CHLORIDE SERPL-SCNC: 106 MMOL/L — SIGNIFICANT CHANGE UP (ref 96–108)
CHLORIDE SERPL-SCNC: 106 MMOL/L — SIGNIFICANT CHANGE UP (ref 96–108)
CO2 SERPL-SCNC: 21 MMOL/L — LOW (ref 22–31)
CO2 SERPL-SCNC: 21 MMOL/L — LOW (ref 22–31)
CREAT SERPL-MCNC: 0.84 MG/DL — SIGNIFICANT CHANGE UP (ref 0.5–1.3)
CREAT SERPL-MCNC: 0.84 MG/DL — SIGNIFICANT CHANGE UP (ref 0.5–1.3)
EGFR: 66 ML/MIN/1.73M2 — SIGNIFICANT CHANGE UP
EGFR: 66 ML/MIN/1.73M2 — SIGNIFICANT CHANGE UP
EOSINOPHIL # BLD AUTO: 0 K/UL — SIGNIFICANT CHANGE UP (ref 0–0.5)
EOSINOPHIL # BLD AUTO: 0 K/UL — SIGNIFICANT CHANGE UP (ref 0–0.5)
EOSINOPHIL NFR BLD AUTO: 0 % — SIGNIFICANT CHANGE UP (ref 0–6)
EOSINOPHIL NFR BLD AUTO: 0 % — SIGNIFICANT CHANGE UP (ref 0–6)
GLUCOSE SERPL-MCNC: 93 MG/DL — SIGNIFICANT CHANGE UP (ref 70–99)
GLUCOSE SERPL-MCNC: 93 MG/DL — SIGNIFICANT CHANGE UP (ref 70–99)
HCT VFR BLD CALC: 27.9 % — LOW (ref 34.5–45)
HCT VFR BLD CALC: 27.9 % — LOW (ref 34.5–45)
HGB BLD-MCNC: 7.8 G/DL — LOW (ref 11.5–15.5)
HGB BLD-MCNC: 7.8 G/DL — LOW (ref 11.5–15.5)
LDH SERPL L TO P-CCNC: 261 U/L — HIGH (ref 50–242)
LDH SERPL L TO P-CCNC: 261 U/L — HIGH (ref 50–242)
LDH SERPL L TO P-CCNC: 283 U/L — HIGH (ref 50–242)
LDH SERPL L TO P-CCNC: 283 U/L — HIGH (ref 50–242)
LYMPHOCYTES # BLD AUTO: 0.67 K/UL — LOW (ref 1–3.3)
LYMPHOCYTES # BLD AUTO: 0.67 K/UL — LOW (ref 1–3.3)
LYMPHOCYTES # BLD AUTO: 45.2 % — HIGH (ref 13–44)
LYMPHOCYTES # BLD AUTO: 45.2 % — HIGH (ref 13–44)
MAGNESIUM SERPL-MCNC: 1.8 MG/DL — SIGNIFICANT CHANGE UP (ref 1.6–2.6)
MAGNESIUM SERPL-MCNC: 1.8 MG/DL — SIGNIFICANT CHANGE UP (ref 1.6–2.6)
MANUAL SMEAR VERIFICATION: SIGNIFICANT CHANGE UP
MANUAL SMEAR VERIFICATION: SIGNIFICANT CHANGE UP
MCHC RBC-ENTMCNC: 28 GM/DL — LOW (ref 32–36)
MCHC RBC-ENTMCNC: 28 GM/DL — LOW (ref 32–36)
MCHC RBC-ENTMCNC: 30.5 PG — SIGNIFICANT CHANGE UP (ref 27–34)
MCHC RBC-ENTMCNC: 30.5 PG — SIGNIFICANT CHANGE UP (ref 27–34)
MCV RBC AUTO: 109 FL — HIGH (ref 80–100)
MCV RBC AUTO: 109 FL — HIGH (ref 80–100)
MONOCYTES # BLD AUTO: 0 K/UL — SIGNIFICANT CHANGE UP (ref 0–0.9)
MONOCYTES # BLD AUTO: 0 K/UL — SIGNIFICANT CHANGE UP (ref 0–0.9)
MONOCYTES NFR BLD AUTO: 0 % — LOW (ref 2–14)
MONOCYTES NFR BLD AUTO: 0 % — LOW (ref 2–14)
NEUTROPHILS # BLD AUTO: 0.79 K/UL — LOW (ref 1.8–7.4)
NEUTROPHILS # BLD AUTO: 0.79 K/UL — LOW (ref 1.8–7.4)
NEUTROPHILS NFR BLD AUTO: 53.1 % — SIGNIFICANT CHANGE UP (ref 43–77)
NEUTROPHILS NFR BLD AUTO: 53.1 % — SIGNIFICANT CHANGE UP (ref 43–77)
NRBC # BLD: 3 /100 — HIGH (ref 0–0)
NRBC # BLD: 3 /100 — HIGH (ref 0–0)
PHOSPHATE SERPL-MCNC: 2.6 MG/DL — SIGNIFICANT CHANGE UP (ref 2.5–4.5)
PHOSPHATE SERPL-MCNC: 2.6 MG/DL — SIGNIFICANT CHANGE UP (ref 2.5–4.5)
PLAT MORPH BLD: NORMAL — SIGNIFICANT CHANGE UP
PLAT MORPH BLD: NORMAL — SIGNIFICANT CHANGE UP
PLATELET # BLD AUTO: 58 K/UL — LOW (ref 150–400)
PLATELET # BLD AUTO: 58 K/UL — LOW (ref 150–400)
POTASSIUM SERPL-MCNC: 3.9 MMOL/L — SIGNIFICANT CHANGE UP (ref 3.5–5.3)
POTASSIUM SERPL-MCNC: 3.9 MMOL/L — SIGNIFICANT CHANGE UP (ref 3.5–5.3)
POTASSIUM SERPL-SCNC: 3.9 MMOL/L — SIGNIFICANT CHANGE UP (ref 3.5–5.3)
POTASSIUM SERPL-SCNC: 3.9 MMOL/L — SIGNIFICANT CHANGE UP (ref 3.5–5.3)
PROT SERPL-MCNC: 6.2 G/DL — SIGNIFICANT CHANGE UP (ref 6–8.3)
PROT SERPL-MCNC: 6.2 G/DL — SIGNIFICANT CHANGE UP (ref 6–8.3)
RBC # BLD: 2.56 M/UL — LOW (ref 3.8–5.2)
RBC # BLD: 2.56 M/UL — LOW (ref 3.8–5.2)
RBC # FLD: 18.2 % — HIGH (ref 10.3–14.5)
RBC # FLD: 18.2 % — HIGH (ref 10.3–14.5)
RBC BLD AUTO: SIGNIFICANT CHANGE UP
RBC BLD AUTO: SIGNIFICANT CHANGE UP
SODIUM SERPL-SCNC: 139 MMOL/L — SIGNIFICANT CHANGE UP (ref 135–145)
SODIUM SERPL-SCNC: 139 MMOL/L — SIGNIFICANT CHANGE UP (ref 135–145)
URATE SERPL-MCNC: 3.5 MG/DL — SIGNIFICANT CHANGE UP (ref 2.5–7)
WBC # BLD: 1.48 K/UL — LOW (ref 3.8–10.5)
WBC # BLD: 1.48 K/UL — LOW (ref 3.8–10.5)
WBC # FLD AUTO: 1.48 K/UL — LOW (ref 3.8–10.5)
WBC # FLD AUTO: 1.48 K/UL — LOW (ref 3.8–10.5)

## 2023-11-30 PROCEDURE — 31575 DIAGNOSTIC LARYNGOSCOPY: CPT

## 2023-11-30 PROCEDURE — 99223 1ST HOSP IP/OBS HIGH 75: CPT | Mod: FS,25

## 2023-11-30 PROCEDURE — 99232 SBSQ HOSP IP/OBS MODERATE 35: CPT

## 2023-11-30 RX ORDER — ONDANSETRON 8 MG/1
1 TABLET, FILM COATED ORAL
Qty: 45 | Refills: 0
Start: 2023-11-30 | End: 2023-12-14

## 2023-11-30 RX ORDER — METOCLOPRAMIDE HCL 10 MG
1 TABLET ORAL
Qty: 60 | Refills: 0
Start: 2023-11-30 | End: 2023-12-14

## 2023-11-30 RX ORDER — SALIVA SUBSTITUTE COMB NO.11 351 MG
5 POWDER IN PACKET (EA) MUCOUS MEMBRANE
Refills: 0 | Status: DISCONTINUED | OUTPATIENT
Start: 2023-11-30 | End: 2023-12-06

## 2023-11-30 RX ORDER — VENETOCLAX 100 MG/1
100 TABLET, FILM COATED ORAL
Refills: 0 | Status: DISCONTINUED | OUTPATIENT
Start: 2023-12-01 | End: 2023-12-06

## 2023-11-30 RX ADMIN — Medication 1 TABLET(S): at 21:27

## 2023-11-30 RX ADMIN — LOSARTAN POTASSIUM 50 MILLIGRAM(S): 100 TABLET, FILM COATED ORAL at 05:51

## 2023-11-30 RX ADMIN — VENETOCLAX 50 MILLIGRAM(S): 100 TABLET, FILM COATED ORAL at 11:25

## 2023-11-30 RX ADMIN — AZACITIDINE FOR 120 MILLIGRAM(S): 100 INJECTION, POWDER, LYOPHILIZED, FOR SOLUTION INTRAVENOUS; SUBCUTANEOUS at 15:56

## 2023-11-30 RX ADMIN — Medication 200 MILLIGRAM(S): at 17:28

## 2023-11-30 RX ADMIN — LOTEPREDNOL ETABONATE 1 DROP(S): 2 SUSPENSION/ DROPS OPHTHALMIC at 22:37

## 2023-11-30 RX ADMIN — Medication 200 MILLIGRAM(S): at 05:50

## 2023-11-30 RX ADMIN — ONDANSETRON 116 MILLIGRAM(S): 8 TABLET, FILM COATED ORAL at 15:53

## 2023-11-30 RX ADMIN — Medication 5 MILLILITER(S): at 17:28

## 2023-11-30 RX ADMIN — SODIUM CHLORIDE 50 MILLILITER(S): 9 INJECTION INTRAMUSCULAR; INTRAVENOUS; SUBCUTANEOUS at 05:50

## 2023-11-30 RX ADMIN — LOTEPREDNOL ETABONATE 1 DROP(S): 2 SUSPENSION/ DROPS OPHTHALMIC at 11:26

## 2023-11-30 RX ADMIN — CHLORHEXIDINE GLUCONATE 1 APPLICATION(S): 213 SOLUTION TOPICAL at 05:50

## 2023-11-30 RX ADMIN — Medication 5 MILLILITER(S): at 23:57

## 2023-11-30 RX ADMIN — Medication 300 MILLIGRAM(S): at 11:24

## 2023-11-30 RX ADMIN — Medication 50 MILLIGRAM(S): at 05:51

## 2023-11-30 RX ADMIN — ATORVASTATIN CALCIUM 40 MILLIGRAM(S): 80 TABLET, FILM COATED ORAL at 21:26

## 2023-11-30 RX ADMIN — Medication 0.12 MILLIGRAM(S): at 21:26

## 2023-11-30 RX ADMIN — FLUCONAZOLE 200 MILLIGRAM(S): 150 TABLET ORAL at 11:24

## 2023-11-30 NOTE — DISCHARGE NOTE PROVIDER - PROVIDER TOKENS
PROVIDER:[TOKEN:[[id number]]],PROVIDER:[TOKEN:[[id number]]] PROVIDER:[TOKEN:[[id number]]],PROVIDER:[TOKEN:[[id number]]] PROVIDER:[TOKEN:[[id number]]],PROVIDER:[TOKEN:[[id number]]]

## 2023-11-30 NOTE — DISCHARGE NOTE PROVIDER - NSDCFUSCHEDAPPT_GEN_ALL_CORE_FT
Stacy France  [facility]  [address] [facility] [address] [facility]  Scheduled Appointment: 02/14/2024     Stacy France  [facility]  [address] [facility]  Scheduled Appointment: 02/14/2024     Stacy France  [facility]  [address] [facility]  Scheduled Appointment: 02/14/2024

## 2023-11-30 NOTE — PROGRESS NOTE ADULT - SUBJECTIVE AND OBJECTIVE BOX
Patient is a 89y old  Female who presents with a chief complaint of elective chemotherapy (30 Nov 2023 10:51)      DATE OF SERVICE: 11-30-23 @ 14:10    SUBJECTIVE / OVERNIGHT EVENTS: overnight events noted    ROS:  Resp: No cough no sputum production  CVS: No chest pain no palpitations no orthopnea  GI: no N/V/D  "I feel fine'         MEDICATIONS  (STANDING):  acyclovir   Oral Tab/Cap 200 milliGRAM(s) Oral two times a day  allopurinol 300 milliGRAM(s) Oral daily  amoxicillin  250 milliGRAM(s)/clavulanate 1 Tablet(s) Oral at bedtime  atorvastatin 40 milliGRAM(s) Oral at bedtime  azaCITIDine IVPB (eMAR) 120 milliGRAM(s) IV Intermittent daily  chlorhexidine 4% Liquid 1 Application(s) Topical <User Schedule>  fluconAZOLE   Tablet 200 milliGRAM(s) Oral daily  losartan 50 milliGRAM(s) Oral daily  loteprednol 0.5% Ophthalmic Suspension 1 Drop(s) Right EYE daily  loteprednol 0.5% Ophthalmic Suspension 1 Drop(s) Right EYE at bedtime  metoprolol succinate ER 50 milliGRAM(s) Oral daily  ondansetron  IVPB 16 milliGRAM(s) IV Intermittent daily  sodium chloride 0.9%. 1000 milliLiter(s) (50 mL/Hr) IV Continuous <Continuous>    MEDICATIONS  (PRN):  ALPRAZolam 0.125 milliGRAM(s) Oral daily PRN for anxiety  senna 2 Tablet(s) Oral at bedtime PRN Constipation  sodium chloride 0.9% lock flush 10 milliLiter(s) IV Push every 1 hour PRN Pre/post blood products, medications, blood draw, and to maintain line patency        CAPILLARY BLOOD GLUCOSE        I&O's Summary    29 Nov 2023 07:01  -  30 Nov 2023 07:00  --------------------------------------------------------  IN: 1705 mL / OUT: 700 mL / NET: 1005 mL        Vital Signs Last 24 Hrs  T(C): 36.6 (30 Nov 2023 13:10), Max: 37.1 (30 Nov 2023 05:22)  T(F): 97.9 (30 Nov 2023 13:10), Max: 98.7 (30 Nov 2023 05:22)  HR: 93 (30 Nov 2023 13:10) (93 - 115)  BP: 145/68 (30 Nov 2023 13:10) (133/77 - 165/91)  BP(mean): --  RR: 18 (30 Nov 2023 13:10) (18 - 18)  SpO2: 100% (30 Nov 2023 13:10) (97% - 100%)      PHYSICAL EXAM:   Neck: Supple  Lungs: no wheeze  CVS: S1 S2 no M/R/G  Abdomen: no tenderness  Neuro: AO x 3 nonfocal   Ext: no edema    LABS:                        7.8    1.48  )-----------( 58       ( 30 Nov 2023 07:35 )             27.9     11-30    139  |  106  |  16  ----------------------------<  93  3.9   |  21<L>  |  0.84    Ca    8.7      30 Nov 2023 07:35  Phos  2.6     11-30  Mg     1.8     11-30    TPro  6.2  /  Alb  3.3  /  TBili  0.6  /  DBili  x   /  AST  12  /  ALT  11  /  AlkPhos  49  11-30          Urinalysis Basic - ( 30 Nov 2023 07:35 )    Color: x / Appearance: x / SG: x / pH: x  Gluc: 93 mg/dL / Ketone: x  / Bili: x / Urobili: x   Blood: x / Protein: x / Nitrite: x   Leuk Esterase: x / RBC: x / WBC x   Sq Epi: x / Non Sq Epi: x / Bacteria: x          All consultant(s) notes reviewed and care discussed with other providers        Contact Number, Dr Yen [phone]

## 2023-11-30 NOTE — CONSULT NOTE ADULT - ASSESSMENT
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 Yen, d/w chemo nurse  d/w family bedside  
Patient with frequent UTI, with improvement over the last 2 months but not entirely clear how much of the improvement is related to topical estrogen therapy, pessary being mpre effective in addressing functionalanatomic abnormalities, or antibiotics.  Patient presently asymptomatic.    I see no role to alter Augmentin 250-125 QD at this point in time    Will need to remain cognizant of increased risk of resistant mario if she were to develop fever/infection in the setting of chemotherapy.  If empiric therapy needed vancomycin and cefepime would be reasonable.    Thank you for the courtesy of this referral.    Thaddeus Geronimo MD  Attending Physician  [facility]  Division of Infectious Diseases  [phone]
89F with PMH of MDS recently diagnosed, right corneal transplant, labile HTN, HLD admitted electively for scheduled inpatient chemo. ENT consulted for globus sensation. FOE: without abnormalities and oral examination without abnormalities

## 2023-11-30 NOTE — CONSULT NOTE ADULT - SUBJECTIVE AND OBJECTIVE BOX
CC: globus sensation     HPI: 89 yr pleasant female with PMH of MDS recently diagnosed, right corneal transplant, labile HTN, HLD admitted electively for scheduled inpatient chemo. Now complaining of globus sensation in her throat starting a few days ago. Pt denies dysphagia, odynophagia, dysphonia, sob, dyspnea, changes in voice or inability to tolerate secretions.       PAST MEDICAL & SURGICAL HISTORY:  Hyperlipidemia      Palpitations      GERD (gastroesophageal reflux disease)      Chronic UTI      Post corneal transplant      S/P cataract surgery        Allergies    No Known Allergies    Intolerances      MEDICATIONS  (STANDING):  acyclovir   Oral Tab/Cap 200 milliGRAM(s) Oral two times a day  allopurinol 300 milliGRAM(s) Oral daily  amoxicillin  250 milliGRAM(s)/clavulanate 1 Tablet(s) Oral at bedtime  atorvastatin 40 milliGRAM(s) Oral at bedtime  azaCITIDine IVPB (eMAR) 120 milliGRAM(s) IV Intermittent daily  chlorhexidine 4% Liquid 1 Application(s) Topical <User Schedule>  fluconAZOLE   Tablet 200 milliGRAM(s) Oral daily  losartan 50 milliGRAM(s) Oral daily  loteprednol 0.5% Ophthalmic Suspension 1 Drop(s) Right EYE at bedtime  loteprednol 0.5% Ophthalmic Suspension 1 Drop(s) Right EYE daily  metoprolol succinate ER 50 milliGRAM(s) Oral daily  ondansetron  IVPB 16 milliGRAM(s) IV Intermittent daily  sodium chloride 0.9%. 1000 milliLiter(s) (50 mL/Hr) IV Continuous <Continuous>    MEDICATIONS  (PRN):  ALPRAZolam 0.125 milliGRAM(s) Oral daily PRN for anxiety  senna 2 Tablet(s) Oral at bedtime PRN Constipation  sodium chloride 0.9% lock flush 10 milliLiter(s) IV Push every 1 hour PRN Pre/post blood products, medications, blood draw, and to maintain line patency      FAMILY HISTORY:  No pertinent family history in first degree relatives. No pertinent family history of: cancer and mom breast vancer.     Social History:  · Substance use	No   · Social History (marital status, living situation, occupation, and sexual history)	non smoker  no IVDA  no ETOH abuse   lives with helper/aide      ROS:   ENT: all negative except as noted in HPI   CV: denies palpitations  Pulm: denies SOB, cough, hemoptysis  GI: denies change in appetite, indigestion, n/v  : denies pertinent urinary symptoms, urgency  Neuro: denies numbness/tingling, loss of sensation  Psych: denies anxiety  MS: denies muscle weakness, instability  Heme: denies easy bruising or bleeding  Endo: denies heat/cold intolerance, excessive sweating  Vascular: denies LE edema    Vital Signs Last 24 Hrs  T(C): 36.6 (30 Nov 2023 13:10), Max: 37.1 (30 Nov 2023 05:22)  T(F): 97.9 (30 Nov 2023 13:10), Max: 98.7 (30 Nov 2023 05:22)  HR: 93 (30 Nov 2023 13:10) (93 - 115)  BP: 145/68 (30 Nov 2023 13:10) (133/77 - 165/91)  BP(mean): --  RR: 18 (30 Nov 2023 13:10) (18 - 18)  SpO2: 100% (30 Nov 2023 13:10) (97% - 100%)    Parameters below as of 30 Nov 2023 13:10  Patient On (Oxygen Delivery Method): room air                              7.8    1.48  )-----------( 58       ( 30 Nov 2023 07:35 )             27.9    11-30    139  |  106  |  16  ----------------------------<  93  3.9   |  21<L>  |  0.84    Ca    8.7      30 Nov 2023 07:35  Phos  2.6     11-30  Mg     1.8     11-30    TPro  6.2  /  Alb  3.3  /  TBili  0.6  /  DBili  x   /  AST  12  /  ALT  11  /  AlkPhos  49  11-30       PHYSICAL EXAM:  Gen: NAD  Skin: No rashes, bruises, or lesions  Head: Normocephalic, Atraumatic  Face: no edema, erythema, or fluctuance. Parotid glands soft without mass  Eyes: no scleral injection  Nose: Nares bilaterally patent, no discharge  Mouth: No Stridor / Drooling / Trismus.  Mucosa moist, tongue/uvula midline, oropharynx clear  Neck: Flat, supple, no lymphadenopathy, trachea midline, no masses  Lymphatic: No lymphadenopathy  Resp: breathing easily, no stridor  CV: no peripheral edema/cyanosis  GI: nondistended   Peripheral vascular: no JVD or edema  Neuro: facial nerve intact, no facial droop        Diagnostic Nasal Endoscopy: (Scope #2 used)    Fiberoptic Indirect laryngoscopy:  (Scope #2 used)        IMAGING/ADDITIONAL STUDIES:  CC: globus sensation     HPI: 89F with PMH of MDS recently diagnosed, right corneal transplant, labile HTN, HLD admitted electively for scheduled inpatient chemo. Now complaining of globus sensation in her throat starting a few days ago. Pt denies dysphagia, odynophagia, dysphonia, sob, dyspnea, changes in voice or inability to tolerate secretions.       PAST MEDICAL & SURGICAL HISTORY:  Hyperlipidemia      Palpitations      GERD (gastroesophageal reflux disease)      Chronic UTI      Post corneal transplant      S/P cataract surgery        Allergies    No Known Allergies    Intolerances      MEDICATIONS  (STANDING):  acyclovir   Oral Tab/Cap 200 milliGRAM(s) Oral two times a day  allopurinol 300 milliGRAM(s) Oral daily  amoxicillin  250 milliGRAM(s)/clavulanate 1 Tablet(s) Oral at bedtime  atorvastatin 40 milliGRAM(s) Oral at bedtime  azaCITIDine IVPB (eMAR) 120 milliGRAM(s) IV Intermittent daily  chlorhexidine 4% Liquid 1 Application(s) Topical <User Schedule>  fluconAZOLE   Tablet 200 milliGRAM(s) Oral daily  losartan 50 milliGRAM(s) Oral daily  loteprednol 0.5% Ophthalmic Suspension 1 Drop(s) Right EYE at bedtime  loteprednol 0.5% Ophthalmic Suspension 1 Drop(s) Right EYE daily  metoprolol succinate ER 50 milliGRAM(s) Oral daily  ondansetron  IVPB 16 milliGRAM(s) IV Intermittent daily  sodium chloride 0.9%. 1000 milliLiter(s) (50 mL/Hr) IV Continuous <Continuous>    MEDICATIONS  (PRN):  ALPRAZolam 0.125 milliGRAM(s) Oral daily PRN for anxiety  senna 2 Tablet(s) Oral at bedtime PRN Constipation  sodium chloride 0.9% lock flush 10 milliLiter(s) IV Push every 1 hour PRN Pre/post blood products, medications, blood draw, and to maintain line patency      FAMILY HISTORY:  No pertinent family history in first degree relatives. No pertinent family history of: cancer and mom breast vancer.     Social History:  · Substance use	No   · Social History (marital status, living situation, occupation, and sexual history)	non smoker  no IVDA  no ETOH abuse   lives with helper/aide      ROS:   ENT: all negative except as noted in HPI   CV: denies palpitations  Pulm: denies SOB, cough, hemoptysis  GI: denies change in appetite, indigestion, n/v  : denies pertinent urinary symptoms, urgency  Neuro: denies numbness/tingling, loss of sensation  Psych: denies anxiety  MS: denies muscle weakness, instability  Heme: denies easy bruising or bleeding  Endo: denies heat/cold intolerance, excessive sweating  Vascular: denies LE edema    Vital Signs Last 24 Hrs  T(C): 36.6 (30 Nov 2023 13:10), Max: 37.1 (30 Nov 2023 05:22)  T(F): 97.9 (30 Nov 2023 13:10), Max: 98.7 (30 Nov 2023 05:22)  HR: 93 (30 Nov 2023 13:10) (93 - 115)  BP: 145/68 (30 Nov 2023 13:10) (133/77 - 165/91)  BP(mean): --  RR: 18 (30 Nov 2023 13:10) (18 - 18)  SpO2: 100% (30 Nov 2023 13:10) (97% - 100%)    Parameters below as of 30 Nov 2023 13:10  Patient On (Oxygen Delivery Method): room air                              7.8    1.48  )-----------( 58       ( 30 Nov 2023 07:35 )             27.9    11-30    139  |  106  |  16  ----------------------------<  93  3.9   |  21<L>  |  0.84    Ca    8.7      30 Nov 2023 07:35  Phos  2.6     11-30  Mg     1.8     11-30    TPro  6.2  /  Alb  3.3  /  TBili  0.6  /  DBili  x   /  AST  12  /  ALT  11  /  AlkPhos  49  11-30       PHYSICAL EXAM:  Gen: NAD  Skin: No rashes, bruises, or lesions  Head: Normocephalic, Atraumatic  Face: no edema, erythema, or fluctuance. Parotid glands soft without mass  Eyes: no scleral injection  Nose: Nares bilaterally patent, no discharge  Mouth: No Stridor / Drooling / Trismus.  Mucosa moist, tongue/uvula midline, oropharynx clear  Neck: Flat, supple, no lymphadenopathy, trachea midline, no masses  Lymphatic: No lymphadenopathy  Resp: breathing easily, no stridor  CV: no peripheral edema/cyanosis  GI: nondistended   Peripheral vascular: no JVD or edema  Neuro: facial nerve intact, no facial droop             Fiberoptic Indirect laryngoscopy:  Flexible laryngoscopy was performed and revealed the following:    -- Nasopharynx had no mass or exudate.    -- Posterior pharyngeal wall clear     -- Base of tongue was symmetric and not enlarged     -- Epiglottis, both aryepiglottic folds and both false vocal folds were normal    -- Arytenoids both without edema     -- True vocal folds were fully mobile and without lesions.     -- Post cricoid area clear     -- Airway patent        IMAGING/ADDITIONAL STUDIES:

## 2023-11-30 NOTE — DISCHARGE NOTE PROVIDER - HOSPITAL COURSE
· Assessment	  89 yr pleasant female with PMH of MDS recently diagnosed, right corneal transplant, labile HTN, HLD admitted electively for scheduled inpatient chemo with Vidaza and dose escalating Venetoclax with daily dose capped at 100mg daily. Patient is s/p  s/p SOLO PICC. Patient with Chronic UTI followed by ID and maintained on oral abx.   continue acyclovir and fluconazole for ppx.   s/p corneal transplant. Continue Lotemax eye drop QHS right eye daily.  HTN (hypertension).   moderate exacerbation and persistent tachycardia  add metoprolol ER 50 po qd. Patient stable   On 11/29, the chemo bag was wasted due to expiration and a new bag was made with the amount given deducted from the bag. On 11/29 the patient also missed a dose of Venetoclax and the dose was resumed the next day. The patient labs were monitored, blood and electrolytes relplaced PRN, pain control, IV hydration, monitor tumor lysis   allopurinol, mouth care. VTE ppx now stopped due to low platelets.    · Assessment	  89 yr pleasant female with PMH of MDS recently diagnosed, right corneal transplant, labile HTN, HLD admitted electively for scheduled inpatient chemo with Vidaza and dose escalating Venetoclax with daily dose capped at 100mg daily.   The patient labs were monitored, blood and electrolytes relplaced PRN, pain control, IV hydration, monitor tumor lysis   allopurinol, and  mouth care.    · Assessment	  89 yr pleasant female with PMH of MDS recently diagnosed, right corneal transplant, labile HTN, HLD admitted electively for scheduled inpatient chemo with Vidaza and dose escalating Venetoclax with daily dose capped at 100mg daily.   Pt developed fever on 12/3 ; CXR/cultures showed--; and switched Augmentin to Zosyn to  broaden the coverage   Patient received IVF and antiemetics, strict I/O  and monitoring of CBC and electrolytes. Tolerated chemotherapy without complications. Stable for discharge home and follow up as an outpatient.     89 yr pleasant female with PMH of MDS recently diagnosed, right corneal transplant, labile HTN, HLD admitted electively for scheduled inpatient chemo with Vidaza and dose escalating Venetoclax with daily dose capped at 100mg daily.   Pt developed fever on 12/3 ; CXR/cultures showed no growth to date; and switched Augmentin to Zosyn to broaden the coverage. Patient received IVF and antiemetics, strict I/O  and monitoring of CBC and electrolytes. Tolerated chemotherapy without complications. Stable for discharge home and follow up as an outpatient. 89 yr pleasant female with PMH of MDS recently diagnosed, right corneal transplant, labile HTN, HLD admitted electively for scheduled inpatient chemo with Vidaza and dose escalating Venetoclax with daily dose capped at 100mg daily.   Pt developed fever on 12/3 ; CXR/cultures showed no growth to date; and switched Augmentin to Zosyn to broaden the coverage. Patient received IVF and antiemetics, strict I/O  and monitoring of CBC and electrolytes. TLS labs were monitored. Patient remained stable. The patient was stated on metoprolol by medicine attending and will follow up as outpatient. Tolerated chemotherapy without complications. Stable for discharge home and follow up as an outpatient. Patient will be discharged on ppx with Levaquin, Valtrex and diflucan. She has venetoclax at the bedside from outpatient oncologist.

## 2023-11-30 NOTE — DISCHARGE NOTE PROVIDER - CARE PROVIDER_API CALL
Betsy Estevez  Hematology  [address] [place] [address], [address]  [place] [address]  Phone: [phone]  Fax: [phone]  Follow Up Time:     Thaddeus Geronimo  Infectious Disease  [address] [place] [address]  Phone: [phone]  Fax: ()-  Follow Up Time:    Betsy Estevez  Hematology  [address] [place] [address], [address]  [place] [address]  Phone: [phone]  Fax: [phone]  Follow Up Time:     Thaddeus Geronimo  Infectious Disease  [address] [place] [address]  Phone: [phone]  Fax: ()-  Follow Up Time:    Betsy Estevez  Hematology  [address] [place] [address], [address]  [place] [address]  Phone: [phone]  Fax: [phone]  Follow Up Time:     Thaddeus Geronimo  Infectious Disease  [address] [place] [address]  Phone: [phone]  Fax: ()-  Follow Up Time:

## 2023-11-30 NOTE — CONSULT NOTE ADULT - NS ATTEND AMEND GEN_ALL_CORE FT
ENT consulted for oral pain and globus sensation    Patient is 89F with PMH of MDS recently diagnosed, right corneal transplant, labile HTN, HLD admitted electively for scheduled inpatient chemo.     Patient states pain was on the soft palate for last 3 days. No change in medication, or recent food changes  Pt denies dysphagia, odynophagia, dysphonia, sob, dyspnea, changes in voice or inability to tolerate secretions.     Laryngoscopy shows no gross mass or lesion in the larynx. Vocal folds mobile and symmetrical    Physical exam shows no oral lesion or mucositis    Recommend:  - Start magic mouth wash PRN for mouth discomfort   - No further ENT recommendations  - Call ENT as needed

## 2023-11-30 NOTE — PROGRESS NOTE ADULT - PROBLEM SELECTOR PLAN 1
-- Day #4 Azacytadine/Venetoclax  -- tolerating well    Co-signed yesterday orders due to issues with stability of drug in order for pt to get appropriate therapy    -- Tumor lysis labs stable  -- monitor for temps as  now   -- if spikes temp then would panculture and consider broad spectrum abx    -- Hb 7.8 --- may need transfusion in next few days

## 2023-11-30 NOTE — PROGRESS NOTE ADULT - ASSESSMENT
Patient with frequent UTI, with improvement over the last 2 months but not entirely clear how much of the improvement is related to topical estrogen therapy, pessary being mpre effective in addressing functionalanatomic abnormalities, or antibiotics.  Patient presently asymptomatic.  I see no role to alter Augmentin 250-125 QD at this point in time    11/28: No concern of active infection on exam. Specifically, no urinary symptoms. Tolerating Augmentin without issues.   11/29: Still no urinary symptoms.   11/30: No concern for active or uncontrolled infection on the basis of examination.    Suggestions  Continue Augmentin  Will need to remain cognizant of increased risk of resistant mario if she were to develop fever/infection in the setting of chemotherapy.  If empiric therapy needed vancomycin and cefepime would be reasonable.    Thaddeus Geronimo MD  Attending Physician  [facility]  Division of Infectious Diseases  [phone]

## 2023-11-30 NOTE — DISCHARGE NOTE PROVIDER - NSDCMRMEDTOKEN_GEN_ALL_CORE_FT
allopurinol 300 mg oral tablet: 1 tab(s) orally once a day  amoxicillin-clavulanate 250 mg-125 mg oral tablet: 1 tab(s) orally once a day (at bedtime)  losartan 50 mg oral tablet: 1 tab(s) orally once a day  loteprednol 0.5% ophthalmic suspension: 1 drop(s) in the right eye once a day (at bedtime)  ondansetron 8 mg oral tablet: 1 tab(s) orally 3 times a day as needed for  nausea MDD: 3  PICC LINE: Please MDD: 1  Reglan 10 mg oral tablet: 1 tab(s) orally every 6 hours as needed for  nausea MDD: 4  rosuvastatin 10 mg oral capsule: 1 cap(s) orally  Xanax 0.25 mg oral tablet: 0.5 tab(s) orally once a day as needed for  anxiety   acyclovir 200 mg oral capsule: 1 cap(s) orally 2 times a day  fluconazole 200 mg oral tablet: 1 tab(s) orally once a day  levoFLOXacin 500 mg oral tablet: 1 tab(s) orally once a day  losartan 50 mg oral tablet: 1 tab(s) orally once a day  loteprednol 0.5% ophthalmic suspension: 1 drop(s) in the right eye once a day (at bedtime)  metoprolol succinate 50 mg oral tablet, extended release: 1 tab(s) orally once a day  ondansetron 8 mg oral tablet: 1 tab(s) orally 3 times a day as needed for  nausea MDD: 3  Reglan 10 mg oral tablet: 1 tab(s) orally every 6 hours as needed for  nausea MDD: 4  rosuvastatin 10 mg oral capsule: 1 cap(s) orally  venetoclax 100 mg oral tablet: 1 tab(s) orally once a day You will be advised by your DrRenard if this medicine will be stopped in the future.  Xanax 0.25 mg oral tablet: 0.5 tab(s) orally once a day as needed for  anxiety

## 2023-11-30 NOTE — PROGRESS NOTE ADULT - SUBJECTIVE AND OBJECTIVE BOX
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    HPI:  H/o Pancytopenia with recent progressive cytopenias;   BM-- 27% Myeloblasts, absent fibrosis, Trisomy 8, ASXL1, SRSF2 and STAG2 mutations detected  Pt admitted on 11/27/2023 for treatment with Azacytadine/Venetoclax       Pt is seen and examined  pt is awake and lying in bed  pt  received lactulose and senna last night and had 3 episodes of diarrhea; still some cramping   No n/v, no headache, fevers, chills        REVIEW OF SYSTEMS:    CONSTITUTIONAL: No weakness, fevers or chills  EYES/ENT: No visual changes;  No vertigo or throat pain   NECK: No pain or stiffness  RESPIRATORY: No cough, wheezing, hemoptysis; No shortness of breath  CARDIOVASCULAR: No chest pain or palpitations  GASTROINTESTINAL: No abdominal or epigastric pain. No nausea, vomiting, or hematemesis; (+) diarrhea; no constipation. No melena or hematochezia.  GENITOURINARY: No dysuria, frequency or hematuria  NEUROLOGICAL: No numbness or weakness  SKIN: No itching, burning, rashes, or lesions     MEDICATIONS  (STANDING):  acyclovir   Oral Tab/Cap 200 milliGRAM(s) Oral two times a day  allopurinol 300 milliGRAM(s) Oral daily  amoxicillin  250 milliGRAM(s)/clavulanate 1 Tablet(s) Oral at bedtime  atorvastatin 40 milliGRAM(s) Oral at bedtime  azaCITIDine IVPB (eMAR) 120 milliGRAM(s) IV Intermittent daily  chlorhexidine 4% Liquid 1 Application(s) Topical <User Schedule>  fluconAZOLE   Tablet 200 milliGRAM(s) Oral daily  losartan 50 milliGRAM(s) Oral daily  loteprednol 0.5% Ophthalmic Suspension 1 Drop(s) Right EYE at bedtime  loteprednol 0.5% Ophthalmic Suspension 1 Drop(s) Right EYE daily  ondansetron  IVPB 16 milliGRAM(s) IV Intermittent daily  sodium chloride 0.9%. 1000 milliLiter(s) (50 mL/Hr) IV Continuous <Continuous>  venetoclax 50 milliGRAM(s) Oral once    MEDICATIONS  (PRN):  ALPRAZolam 0.125 milliGRAM(s) Oral daily PRN for anxiety  senna 2 Tablet(s) Oral at bedtime PRN Constipation  sodium chloride 0.9% lock flush 10 milliLiter(s) IV Push every 1 hour PRN Pre/post blood products, medications, blood draw, and to maintain line patency      Allergies    No Known Allergies    Intolerances        Vital Signs Last 24 Hrs  ICU Vital Signs Last 24 Hrs  T(C): 37.1 (30 Nov 2023 05:22), Max: 37.1 (30 Nov 2023 05:22)  T(F): 98.7 (30 Nov 2023 05:22), Max: 98.7 (30 Nov 2023 05:22)  HR: 110 (30 Nov 2023 05:22) (106 - 116)  BP: 133/77 (30 Nov 2023 05:22) (133/77 - 165/91)    RR: 18 (30 Nov 2023 05:22) (18 - 18)  SpO2: 97% (30 Nov 2023 05:22) (97% - 99%)    I/O ---         PHYSICAL EXAM  General: adult in NAD  HEENT: clear oropharynx, anicteric sclera, pink conjunctiva  Neck: supple  CV: normal S1/S2 with no murmur rubs or gallops  Lungs: positive air movement b/l ant lungs,clear to auscultation, no wheezes, no rales  Abdomen: soft non-tender non-distended, no hepatosplenomegaly  Ext: no clubbing cyanosis or edema  Skin: no rashes and no petechiae; (+) bruise with small lump left deltoid  Neuro: alert and oriented X 4, no focal deficits    LABS:                        7.8    1.48  )-----------( 58       ( 30 Nov 2023 07:35 )             27.9                             8.1    1.16  )-----------( 66       ( 29 Nov 2023 07:15 )             28.5         Serial CBC's  11-28 @ 06:56  Hct-32.3 / Hgb-9.3 / Plat-98 / RBC-3.02 / WBC-1.60      Serial CBC's  11-27 @ 10:17  Hct-35.2 / Hgb-9.9 / Plat-106 / RBC-3.29 / WBC-2.09    11-30    139  |  106  |  16  ----------------------------<  93  3.9   |  21<L>  |  0.84    Ca    8.7      30 Nov 2023 07:35  Phos  2.6     11-30  Mg     1.8     11-30    TPro  6.2  /  Alb  3.3  /  TBili  0.6  /  DBili  x   /  AST  12  /  ALT  11  /  AlkPhos  49  11-30      11-29    136  |  103  |  14  ----------------------------<  93  4.0   |  23  |  0.96    Ca    8.8      29 Nov 2023 07:15  Phos  3.0     11-29  Mg     1.9     11-29    TPro  6.4  /  Alb  3.3  /  TBili  0.7  /  DBili  x   /  AST  18  /  ALT  13  /  AlkPhos  49  11-29      Lactate Dehydrogenase, Serum: 263 U/L (11-29 @ 07:15)  Vitamin B12, Serum: 857 pg/mL (11-28 @ 06:56)    Lactate Dehydrogenase, Serum: 269 U/L (11-28 @ 06:56)    Lactate Dehydrogenase, Serum: 358 U/L (11-27 @ 10:17)        RADIOLOGY & ADDITIONAL STUDIES:    s/p PICC line  Assessment

## 2023-11-30 NOTE — PROGRESS NOTE ADULT - SUBJECTIVE AND OBJECTIVE BOX
[facility]  Division of Infectious Diseases  [phone]    Name: FUNMI ROMERO  Age: 89y  Gender: Female  MRN: [id number]    Interval History--  Notes reviewed.     Past Medical History--  Hypertension    Hyperlipidemia    Palpitations    GERD (gastroesophageal reflux disease)    Chronic UTI    Post corneal transplant    No significant past surgical history    S/P cataract surgery        For details regarding the patient's social history, family history, and other miscellaneous elements, please refer the initial infectious diseases consultation and/or the admitting history and physical examination for this admission.    Allergies    No Known Allergies    Intolerances        Medications--  Antibiotics:  acyclovir   Oral Tab/Cap 200 milliGRAM(s) Oral two times a day  amoxicillin  250 milliGRAM(s)/clavulanate 1 Tablet(s) Oral at bedtime  fluconAZOLE   Tablet 200 milliGRAM(s) Oral daily    Immunologic:    Other:  allopurinol  ALPRAZolam PRN  atorvastatin  azaCITIDine IVPB (eMAR)  chlorhexidine 4% Liquid  losartan  loteprednol 0.5% Ophthalmic Suspension  loteprednol 0.5% Ophthalmic Suspension  metoprolol succinate ER  ondansetron  IVPB  senna PRN  sodium chloride 0.9% lock flush PRN  sodium chloride 0.9%.  venetoclax      Review of Systems--  A 10-point review of systems was obtained.     Pertinent positives and negatives--  Constitutional: No fevers. No Chills. No Rigors.   Cardiovascular: No chest pain. No palpitations.  Respiratory: No shortness of breath. No cough.  Gastrointestinal: No nausea or vomiting. No diarrhea or constipation.   Psychiatric: Pleasant. Appropriate affect.    Review of systems otherwise negative except as previously noted.    Physical Examination--  Vital Signs: T(F): 97.9 (11-30-23 @ 09:20), Max: 98.7 (11-30-23 @ 05:22)  HR: 109 (11-30-23 @ 09:20)  BP: 146/71 (11-30-23 @ 09:20)  RR: 18 (11-30-23 @ 09:20)  SpO2: 100% (11-30-23 @ 09:20)  Wt(kg): --  General: Nontoxic-appearing Female in no acute distress.  HEENT: AT/NC. PERRL. EOMI. Anicteric. Conjunctiva pink and moist. Oropharynx clear. Dentition fair.  Neck: Not rigid. No sense of mass.  Nodes: None palpable.  Lungs: Clear bilaterally without rales, wheezing or rhonchi  Heart: Regular rate and rhythm. No Murmur. No rub. No gallop. No palpable thrill.  Abdomen: Bowel sounds present and normoactive. Soft. Nondistended. Nontender. No sense of mass. No organomegaly.  Back: No spinal tenderness. No costovertebral angle tenderness.   Extremities: No cyanosis or clubbing. No edema.   Skin: Warm. Dry. Good turgor. No rash. No vasculitic stigmata.  Psychiatric: Appropriate affect and mood for situation.         Laboratory Studies--  CBC                        7.8    1.48  )-----------( 58       ( 30 Nov 2023 07:35 )             27.9       Chemistries  11-30    139  |  106  |  16  ----------------------------<  93  3.9   |  21<L>  |  0.84    Ca    8.7      30 Nov 2023 07:35  Phos  2.6     11-30  Mg     1.8     11-30    TPro  6.2  /  Alb  3.3  /  TBili  0.6  /  DBili  x   /  AST  12  /  ALT  11  /  AlkPhos  49  11-30      Culture Data           [facility]  Division of Infectious Diseases  [phone]    Name: FUNMI ROMERO  Age: 89y  Gender: Female  MRN: [id number]    Interval History--  Notes reviewed. Seen earlier this morning.  No new complaints.  Tolerated breakfast without issues.  Denies any fevers chills or rigors.  Still no urinary symptoms whatsoever.    Past Medical History--  Hypertension    Hyperlipidemia    Palpitations    GERD (gastroesophageal reflux disease)    Chronic UTI    Post corneal transplant    No significant past surgical history    S/P cataract surgery        For details regarding the patient's social history, family history, and other miscellaneous elements, please refer the initial infectious diseases consultation and/or the admitting history and physical examination for this admission.    Allergies    No Known Allergies    Intolerances        Medications--  Antibiotics:  acyclovir   Oral Tab/Cap 200 milliGRAM(s) Oral two times a day  amoxicillin  250 milliGRAM(s)/clavulanate 1 Tablet(s) Oral at bedtime  fluconAZOLE   Tablet 200 milliGRAM(s) Oral daily    Immunologic:    Other:  allopurinol  ALPRAZolam PRN  atorvastatin  azaCITIDine IVPB (eMAR)  chlorhexidine 4% Liquid  losartan  loteprednol 0.5% Ophthalmic Suspension  loteprednol 0.5% Ophthalmic Suspension  metoprolol succinate ER  ondansetron  IVPB  senna PRN  sodium chloride 0.9% lock flush PRN  sodium chloride 0.9%.  venetoclax      Review of Systems--  A 10-point review of systems was obtained.   Review of systems otherwise unchanged compared to prior visit except as previously noted.    Physical Examination--  Vital Signs: T(F): 97.9 (11-30-23 @ 09:20), Max: 98.7 (11-30-23 @ 05:22)  HR: 109 (11-30-23 @ 09:20)  BP: 146/71 (11-30-23 @ 09:20)  RR: 18 (11-30-23 @ 09:20)  SpO2: 100% (11-30-23 @ 09:20)  Wt(kg): --  General: Nontoxic-appearing Female in no acute distress.  HEENT: AT/NC. Anicteric. Conjunctiva pink and moist. Oropharynx clear.   Neck: Not rigid. No sense of mass.  Nodes: None palpable.  Lungs: Clear bilaterally without rales, wheezing or rhonchi  Heart: Regular rate and rhythm.  Abdomen: Bowel sounds present and normoactive. Soft. Mildly distended. Nontender.   Extremities: No cyanosis or clubbing. No edema.   Skin: Warm. Dry. Good turgor. No rash. No vasculitic stigmata.  Psychiatric: Appropriate affect and mood for situation.         Laboratory Studies--  CBC                        7.8    1.48  )-----------( 58       ( 30 Nov 2023 07:35 )             27.9       Chemistries  11-30    139  |  106  |  16  ----------------------------<  93  3.9   |  21<L>  |  0.84    Ca    8.7      30 Nov 2023 07:35  Phos  2.6     11-30  Mg     1.8     11-30    TPro  6.2  /  Alb  3.3  /  TBili  0.6  /  DBili  x   /  AST  12  /  ALT  11  /  AlkPhos  49  11-30      Culture Data  None

## 2023-11-30 NOTE — CONSULT NOTE ADULT - PROBLEM SELECTOR RECOMMENDATION 9
- FOE+ oral examination without abnormalities   - Recommend magic mouth wash PRN for mouth discomfort   - No further ENT recommendations - FOE+ oral examination without abnormalities   - Recommend magic mouth wash PRN for mouth discomfort   - No further ENT recommendations  - Call ENT as needed

## 2023-11-30 NOTE — DISCHARGE NOTE PROVIDER - NSDCCPCAREPLAN_GEN_ALL_CORE_FT
PRINCIPAL DISCHARGE DIAGNOSIS  Diagnosis: MDS (myelodysplastic syndrome)  Assessment and Plan of Treatment: Please call your Dr. or report to the ER if you develop a fever >100.4, severe persistent nausea, vomiting, diarrhea, chest pain, shortness of breath, weakness, headache. Please take the Venetoclax as prescribed and your Dr. will advise if and when it should be held based on your counts. Please take the antibiotics to protect you from infecton. The Dr. Will advise when to start and stop based on blood counts.     PRINCIPAL DISCHARGE DIAGNOSIS  Diagnosis: MDS (myelodysplastic syndrome)  Assessment and Plan of Treatment: Please call your Dr. or report to the ER if you develop a fever >100.4, severe persistent nausea, vomiting, diarrhea, chest pain, shortness of breath, weakness, headache. Please take the Venetoclax as prescribed and your Dr. will advise if and when it should be held based on your counts. Please take the antibiotics to protect you from infecton. The Dr. Will advise when to start and stop based on blood counts.      SECONDARY DISCHARGE DIAGNOSES  Diagnosis: HLD (hyperlipidemia)  Assessment and Plan of Treatment: continue to monitor as outpatient    Diagnosis: HTN (hypertension)  Assessment and Plan of Treatment: continue losartan start new medicine metoprolol    Diagnosis: Chronic UTI  Assessment and Plan of Treatment: continue augmentin

## 2023-12-01 LAB
ALBUMIN SERPL ELPH-MCNC: 3.3 G/DL — SIGNIFICANT CHANGE UP (ref 3.3–5)
ALBUMIN SERPL ELPH-MCNC: 3.3 G/DL — SIGNIFICANT CHANGE UP (ref 3.3–5)
ALP SERPL-CCNC: 48 U/L — SIGNIFICANT CHANGE UP (ref 40–120)
ALP SERPL-CCNC: 48 U/L — SIGNIFICANT CHANGE UP (ref 40–120)
ALT FLD-CCNC: 11 U/L — SIGNIFICANT CHANGE UP (ref 10–45)
ALT FLD-CCNC: 11 U/L — SIGNIFICANT CHANGE UP (ref 10–45)
ANION GAP SERPL CALC-SCNC: 11 MMOL/L — SIGNIFICANT CHANGE UP (ref 5–17)
ANION GAP SERPL CALC-SCNC: 11 MMOL/L — SIGNIFICANT CHANGE UP (ref 5–17)
ANISOCYTOSIS BLD QL: SLIGHT — SIGNIFICANT CHANGE UP
ANISOCYTOSIS BLD QL: SLIGHT — SIGNIFICANT CHANGE UP
AST SERPL-CCNC: 13 U/L — SIGNIFICANT CHANGE UP (ref 10–40)
AST SERPL-CCNC: 13 U/L — SIGNIFICANT CHANGE UP (ref 10–40)
BASOPHILS # BLD AUTO: 0.02 K/UL — SIGNIFICANT CHANGE UP (ref 0–0.2)
BASOPHILS # BLD AUTO: 0.02 K/UL — SIGNIFICANT CHANGE UP (ref 0–0.2)
BASOPHILS NFR BLD AUTO: 1.8 % — SIGNIFICANT CHANGE UP (ref 0–2)
BASOPHILS NFR BLD AUTO: 1.8 % — SIGNIFICANT CHANGE UP (ref 0–2)
BILIRUB SERPL-MCNC: 0.8 MG/DL — SIGNIFICANT CHANGE UP (ref 0.2–1.2)
BILIRUB SERPL-MCNC: 0.8 MG/DL — SIGNIFICANT CHANGE UP (ref 0.2–1.2)
BLD GP AB SCN SERPL QL: NEGATIVE — SIGNIFICANT CHANGE UP
BLD GP AB SCN SERPL QL: NEGATIVE — SIGNIFICANT CHANGE UP
BUN SERPL-MCNC: 15 MG/DL — SIGNIFICANT CHANGE UP (ref 7–23)
BUN SERPL-MCNC: 15 MG/DL — SIGNIFICANT CHANGE UP (ref 7–23)
CALCIUM SERPL-MCNC: 8.8 MG/DL — SIGNIFICANT CHANGE UP (ref 8.4–10.5)
CALCIUM SERPL-MCNC: 8.8 MG/DL — SIGNIFICANT CHANGE UP (ref 8.4–10.5)
CHLORIDE SERPL-SCNC: 104 MMOL/L — SIGNIFICANT CHANGE UP (ref 96–108)
CHLORIDE SERPL-SCNC: 104 MMOL/L — SIGNIFICANT CHANGE UP (ref 96–108)
CO2 SERPL-SCNC: 21 MMOL/L — LOW (ref 22–31)
CO2 SERPL-SCNC: 21 MMOL/L — LOW (ref 22–31)
CREAT SERPL-MCNC: 0.86 MG/DL — SIGNIFICANT CHANGE UP (ref 0.5–1.3)
CREAT SERPL-MCNC: 0.86 MG/DL — SIGNIFICANT CHANGE UP (ref 0.5–1.3)
DACRYOCYTES BLD QL SMEAR: SLIGHT — SIGNIFICANT CHANGE UP
DACRYOCYTES BLD QL SMEAR: SLIGHT — SIGNIFICANT CHANGE UP
EGFR: 64 ML/MIN/1.73M2 — SIGNIFICANT CHANGE UP
EGFR: 64 ML/MIN/1.73M2 — SIGNIFICANT CHANGE UP
EOSINOPHIL # BLD AUTO: 0.01 K/UL — SIGNIFICANT CHANGE UP (ref 0–0.5)
EOSINOPHIL # BLD AUTO: 0.01 K/UL — SIGNIFICANT CHANGE UP (ref 0–0.5)
EOSINOPHIL NFR BLD AUTO: 0.9 % — SIGNIFICANT CHANGE UP (ref 0–6)
EOSINOPHIL NFR BLD AUTO: 0.9 % — SIGNIFICANT CHANGE UP (ref 0–6)
GLUCOSE SERPL-MCNC: 90 MG/DL — SIGNIFICANT CHANGE UP (ref 70–99)
GLUCOSE SERPL-MCNC: 90 MG/DL — SIGNIFICANT CHANGE UP (ref 70–99)
HCT VFR BLD CALC: 26.1 % — LOW (ref 34.5–45)
HCT VFR BLD CALC: 26.1 % — LOW (ref 34.5–45)
HGB BLD-MCNC: 7.3 G/DL — LOW (ref 11.5–15.5)
HGB BLD-MCNC: 7.3 G/DL — LOW (ref 11.5–15.5)
LDH SERPL L TO P-CCNC: 256 U/L — HIGH (ref 50–242)
LDH SERPL L TO P-CCNC: 256 U/L — HIGH (ref 50–242)
LYMPHOCYTES # BLD AUTO: 0.59 K/UL — LOW (ref 1–3.3)
LYMPHOCYTES # BLD AUTO: 0.59 K/UL — LOW (ref 1–3.3)
LYMPHOCYTES # BLD AUTO: 43.3 % — SIGNIFICANT CHANGE UP (ref 13–44)
LYMPHOCYTES # BLD AUTO: 43.3 % — SIGNIFICANT CHANGE UP (ref 13–44)
MACROCYTES BLD QL: SLIGHT — SIGNIFICANT CHANGE UP
MACROCYTES BLD QL: SLIGHT — SIGNIFICANT CHANGE UP
MAGNESIUM SERPL-MCNC: 1.7 MG/DL — SIGNIFICANT CHANGE UP (ref 1.6–2.6)
MAGNESIUM SERPL-MCNC: 1.7 MG/DL — SIGNIFICANT CHANGE UP (ref 1.6–2.6)
MANUAL SMEAR VERIFICATION: SIGNIFICANT CHANGE UP
MANUAL SMEAR VERIFICATION: SIGNIFICANT CHANGE UP
MCHC RBC-ENTMCNC: 28 GM/DL — LOW (ref 32–36)
MCHC RBC-ENTMCNC: 28 GM/DL — LOW (ref 32–36)
MCHC RBC-ENTMCNC: 30 PG — SIGNIFICANT CHANGE UP (ref 27–34)
MCHC RBC-ENTMCNC: 30 PG — SIGNIFICANT CHANGE UP (ref 27–34)
MCV RBC AUTO: 107.4 FL — HIGH (ref 80–100)
MCV RBC AUTO: 107.4 FL — HIGH (ref 80–100)
MONOCYTES # BLD AUTO: 0.01 K/UL — SIGNIFICANT CHANGE UP (ref 0–0.9)
MONOCYTES # BLD AUTO: 0.01 K/UL — SIGNIFICANT CHANGE UP (ref 0–0.9)
MONOCYTES NFR BLD AUTO: 0.9 % — LOW (ref 2–14)
MONOCYTES NFR BLD AUTO: 0.9 % — LOW (ref 2–14)
MRSA PCR RESULT.: SIGNIFICANT CHANGE UP
MRSA PCR RESULT.: SIGNIFICANT CHANGE UP
NEUTROPHILS # BLD AUTO: 0.72 K/UL — LOW (ref 1.8–7.4)
NEUTROPHILS # BLD AUTO: 0.72 K/UL — LOW (ref 1.8–7.4)
NEUTROPHILS NFR BLD AUTO: 53.1 % — SIGNIFICANT CHANGE UP (ref 43–77)
NEUTROPHILS NFR BLD AUTO: 53.1 % — SIGNIFICANT CHANGE UP (ref 43–77)
NRBC # BLD: 1 /100 — HIGH (ref 0–0)
NRBC # BLD: 1 /100 — HIGH (ref 0–0)
OVALOCYTES BLD QL SMEAR: SLIGHT — SIGNIFICANT CHANGE UP
OVALOCYTES BLD QL SMEAR: SLIGHT — SIGNIFICANT CHANGE UP
PHOSPHATE SERPL-MCNC: 2.1 MG/DL — LOW (ref 2.5–4.5)
PHOSPHATE SERPL-MCNC: 2.1 MG/DL — LOW (ref 2.5–4.5)
PLAT MORPH BLD: NORMAL — SIGNIFICANT CHANGE UP
PLAT MORPH BLD: NORMAL — SIGNIFICANT CHANGE UP
PLATELET # BLD AUTO: 49 K/UL — LOW (ref 150–400)
PLATELET # BLD AUTO: 49 K/UL — LOW (ref 150–400)
POIKILOCYTOSIS BLD QL AUTO: SLIGHT — SIGNIFICANT CHANGE UP
POIKILOCYTOSIS BLD QL AUTO: SLIGHT — SIGNIFICANT CHANGE UP
POTASSIUM SERPL-MCNC: 3.7 MMOL/L — SIGNIFICANT CHANGE UP (ref 3.5–5.3)
POTASSIUM SERPL-MCNC: 3.7 MMOL/L — SIGNIFICANT CHANGE UP (ref 3.5–5.3)
POTASSIUM SERPL-SCNC: 3.7 MMOL/L — SIGNIFICANT CHANGE UP (ref 3.5–5.3)
POTASSIUM SERPL-SCNC: 3.7 MMOL/L — SIGNIFICANT CHANGE UP (ref 3.5–5.3)
PROT SERPL-MCNC: 6.1 G/DL — SIGNIFICANT CHANGE UP (ref 6–8.3)
PROT SERPL-MCNC: 6.1 G/DL — SIGNIFICANT CHANGE UP (ref 6–8.3)
RBC # BLD: 2.43 M/UL — LOW (ref 3.8–5.2)
RBC # BLD: 2.43 M/UL — LOW (ref 3.8–5.2)
RBC # FLD: 18.1 % — HIGH (ref 10.3–14.5)
RBC # FLD: 18.1 % — HIGH (ref 10.3–14.5)
RBC BLD AUTO: ABNORMAL
RBC BLD AUTO: ABNORMAL
RH IG SCN BLD-IMP: POSITIVE — SIGNIFICANT CHANGE UP
RH IG SCN BLD-IMP: POSITIVE — SIGNIFICANT CHANGE UP
S AUREUS DNA NOSE QL NAA+PROBE: SIGNIFICANT CHANGE UP
S AUREUS DNA NOSE QL NAA+PROBE: SIGNIFICANT CHANGE UP
SODIUM SERPL-SCNC: 136 MMOL/L — SIGNIFICANT CHANGE UP (ref 135–145)
SODIUM SERPL-SCNC: 136 MMOL/L — SIGNIFICANT CHANGE UP (ref 135–145)
STOMATOCYTES BLD QL SMEAR: SLIGHT — SIGNIFICANT CHANGE UP
STOMATOCYTES BLD QL SMEAR: SLIGHT — SIGNIFICANT CHANGE UP
URATE SERPL-MCNC: 3.8 MG/DL — SIGNIFICANT CHANGE UP (ref 2.5–7)
URATE SERPL-MCNC: 3.8 MG/DL — SIGNIFICANT CHANGE UP (ref 2.5–7)
WBC # BLD: 1.36 K/UL — LOW (ref 3.8–10.5)
WBC # BLD: 1.36 K/UL — LOW (ref 3.8–10.5)
WBC # FLD AUTO: 1.36 K/UL — LOW (ref 3.8–10.5)
WBC # FLD AUTO: 1.36 K/UL — LOW (ref 3.8–10.5)

## 2023-12-01 PROCEDURE — 99232 SBSQ HOSP IP/OBS MODERATE 35: CPT

## 2023-12-01 RX ORDER — ACETAMINOPHEN 500 MG
650 TABLET ORAL EVERY 6 HOURS
Refills: 0 | Status: DISCONTINUED | OUTPATIENT
Start: 2023-12-01 | End: 2023-12-06

## 2023-12-01 RX ORDER — LORATADINE 10 MG/1
10 TABLET ORAL DAILY
Refills: 0 | Status: DISCONTINUED | OUTPATIENT
Start: 2023-12-01 | End: 2023-12-06

## 2023-12-01 RX ADMIN — SODIUM CHLORIDE 50 MILLILITER(S): 9 INJECTION INTRAMUSCULAR; INTRAVENOUS; SUBCUTANEOUS at 06:31

## 2023-12-01 RX ADMIN — LOTEPREDNOL ETABONATE 1 DROP(S): 2 SUSPENSION/ DROPS OPHTHALMIC at 21:57

## 2023-12-01 RX ADMIN — VENETOCLAX 100 MILLIGRAM(S): 100 TABLET, FILM COATED ORAL at 17:50

## 2023-12-01 RX ADMIN — Medication 5 MILLILITER(S): at 12:09

## 2023-12-01 RX ADMIN — CHLORHEXIDINE GLUCONATE 1 APPLICATION(S): 213 SOLUTION TOPICAL at 06:32

## 2023-12-01 RX ADMIN — ONDANSETRON 116 MILLIGRAM(S): 8 TABLET, FILM COATED ORAL at 15:30

## 2023-12-01 RX ADMIN — LOTEPREDNOL ETABONATE 1 DROP(S): 2 SUSPENSION/ DROPS OPHTHALMIC at 12:12

## 2023-12-01 RX ADMIN — AZACITIDINE FOR 120 MILLIGRAM(S): 100 INJECTION, POWDER, LYOPHILIZED, FOR SOLUTION INTRAVENOUS; SUBCUTANEOUS at 15:57

## 2023-12-01 RX ADMIN — FLUCONAZOLE 200 MILLIGRAM(S): 150 TABLET ORAL at 12:09

## 2023-12-01 RX ADMIN — LOSARTAN POTASSIUM 50 MILLIGRAM(S): 100 TABLET, FILM COATED ORAL at 06:31

## 2023-12-01 RX ADMIN — Medication 300 MILLIGRAM(S): at 12:09

## 2023-12-01 RX ADMIN — Medication 650 MILLIGRAM(S): at 14:58

## 2023-12-01 RX ADMIN — Medication 1 TABLET(S): at 21:53

## 2023-12-01 RX ADMIN — Medication 200 MILLIGRAM(S): at 06:32

## 2023-12-01 RX ADMIN — Medication 200 MILLIGRAM(S): at 17:47

## 2023-12-01 RX ADMIN — Medication 5 MILLILITER(S): at 23:42

## 2023-12-01 RX ADMIN — Medication 5 MILLILITER(S): at 17:47

## 2023-12-01 RX ADMIN — ATORVASTATIN CALCIUM 40 MILLIGRAM(S): 80 TABLET, FILM COATED ORAL at 21:54

## 2023-12-01 RX ADMIN — Medication 50 MILLIGRAM(S): at 06:32

## 2023-12-01 RX ADMIN — Medication 5 MILLILITER(S): at 06:32

## 2023-12-01 NOTE — PROGRESS NOTE ADULT - PROBLEM SELECTOR PLAN 1
chemo per oncology  s/p PICC  continue to monitor  slowly trending counts  defer to oncology attending

## 2023-12-01 NOTE — PROGRESS NOTE ADULT - SUBJECTIVE AND OBJECTIVE BOX
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    History of Present Illness:  in general feeling ok, no f/c, does feel some congestion c/w normal allergies, very mild cough, no dyspnea, no n/v/abd pain, + loose stools yesterday have now resolved, no bleeding, no leg pain or swelling      MEDICATIONS  (STANDING):  acyclovir   Oral Tab/Cap 200 milliGRAM(s) Oral two times a day  allopurinol 300 milliGRAM(s) Oral daily  amoxicillin  250 milliGRAM(s)/clavulanate 1 Tablet(s) Oral at bedtime  atorvastatin 40 milliGRAM(s) Oral at bedtime  azaCITIDine IVPB (eMAR) 120 milliGRAM(s) IV Intermittent daily  Biotene Dry Mouth Oral Rinse 5 milliLiter(s) Swish and Spit four times a day  chlorhexidine 4% Liquid 1 Application(s) Topical <User Schedule>  fluconAZOLE   Tablet 200 milliGRAM(s) Oral daily  losartan 50 milliGRAM(s) Oral daily  loteprednol 0.5% Ophthalmic Suspension 1 Drop(s) Right EYE daily  loteprednol 0.5% Ophthalmic Suspension 1 Drop(s) Right EYE at bedtime  metoprolol succinate ER 50 milliGRAM(s) Oral daily  ondansetron  IVPB 16 milliGRAM(s) IV Intermittent daily  sodium chloride 0.9%. 1000 milliLiter(s) (50 mL/Hr) IV Continuous <Continuous>  venetoclax 100 milliGRAM(s) Oral <User Schedule>    MEDICATIONS  (PRN):  ALPRAZolam 0.125 milliGRAM(s) Oral daily PRN for anxiety  senna 2 Tablet(s) Oral at bedtime PRN Constipation  sodium chloride 0.9% lock flush 10 milliLiter(s) IV Push every 1 hour PRN Pre/post blood products, medications, blood draw, and to maintain line patency      Allergies    No Known Allergies    Intolerances        Vital Signs Last 24 Hrs  T(C): 36.7 (01 Dec 2023 13:25), Max: 37.2 (30 Nov 2023 20:59)  T(F): 98.1 (01 Dec 2023 13:25), Max: 99 (30 Nov 2023 20:59)  HR: 108 (01 Dec 2023 13:25) (87 - 108)  BP: 146/68 (01 Dec 2023 13:25) (130/70 - 156/74)  BP(mean): --  RR: 18 (01 Dec 2023 13:25) (18 - 18)  SpO2: 100% (01 Dec 2023 13:25) (96% - 100%)    Parameters below as of 01 Dec 2023 13:25  Patient On (Oxygen Delivery Method): room air        PHYSICAL EXAM  General: adult in NAD  HEENT: clear oropharynx, anicteric sclera, pink conjunctiva  Neck: supple  CV: normal S1/S2   Lungs: clear to auscultation, no wheezes, no rales  Abdomen: soft non-tender non-distended, positive bowel sounds  Ext: no clubbing cyanosis or edema  Skin: no rashes and no petechiae; RUE PICC site without erythema or edema  Lymph Nodes: No LAD in neck  Neuro: alert and oriented X 3, no focal deficits    LABS:                          7.3    1.36  )-----------( 49       ( 01 Dec 2023 07:21 )             26.1         Mean Cell Volume : 107.4 fl  Mean Cell Hemoglobin : 30.0 pg  Mean Cell Hemoglobin Concentration : 28.0 gm/dL  Auto Neutrophil # : 0.72 K/uL  Auto Lymphocyte # : 0.59 K/uL  Auto Monocyte # : 0.01 K/uL  Auto Eosinophil # : 0.01 K/uL  Auto Basophil # : 0.02 K/uL  Auto Neutrophil % : 53.1 %  Auto Lymphocyte % : 43.3 %  Auto Monocyte % : 0.9 %  Auto Eosinophil % : 0.9 %  Auto Basophil % : 1.8 %      Serial CBC's  12-01 @ 07:21  Hct-26.1 / Hgb-7.3 / Plat-49 / RBC-2.43 / WBC-1.36  Serial CBC's  11-30 @ 07:35  Hct-27.9 / Hgb-7.8 / Plat-58 / RBC-2.56 / WBC-1.48  Serial CBC's  11-29 @ 07:15  Hct-28.5 / Hgb-8.1 / Plat-66 / RBC-2.65 / WBC-1.16  Serial CBC's  11-28 @ 06:56  Hct-32.3 / Hgb-9.3 / Plat-98 / RBC-3.02 / WBC-1.60      12-01    136  |  104  |  15  ----------------------------<  90  3.7   |  21<L>  |  0.86    Ca    8.8      01 Dec 2023 07:21  Phos  2.1     12-01  Mg     1.7     12-01    TPro  6.1  /  Alb  3.3  /  TBili  0.8  /  DBili  x   /  AST  13  /  ALT  11  /  AlkPhos  48  12-01          Vitamin B12, Serum: 857 pg/mL (11-28 @ 06:56)          12-01 @ 07:21    ldh1 --  haptoglobin --  CHARY--  uric acid3.8  11-30 @ 07:35    ldh1 --  haptoglobin --  CHARY--  uric acid3.5  11-29 @ 07:15    ldh1 --  haptoglobin --  CHARY--  uric acid4.0  11-28 @ 06:56    ldh1 --  haptoglobin --  CHARY--  uric acid3.8      Radiology:

## 2023-12-01 NOTE — PROGRESS NOTE ADULT - SUBJECTIVE AND OBJECTIVE BOX
Patient is a 89y old  Female who presents with a chief complaint of elective chemotherapy (01 Dec 2023 08:21)      DATE OF SERVICE: 12-01-23 @ 14:15    SUBJECTIVE / OVERNIGHT EVENTS: overnight events noted    ROS:  Resp: No cough no sputum production  CVS: No chest pain no palpitations no orthopnea  GI: no N/V/D        MEDICATIONS  (STANDING):  acyclovir   Oral Tab/Cap 200 milliGRAM(s) Oral two times a day  allopurinol 300 milliGRAM(s) Oral daily  amoxicillin  250 milliGRAM(s)/clavulanate 1 Tablet(s) Oral at bedtime  atorvastatin 40 milliGRAM(s) Oral at bedtime  azaCITIDine IVPB (eMAR) 120 milliGRAM(s) IV Intermittent daily  Biotene Dry Mouth Oral Rinse 5 milliLiter(s) Swish and Spit four times a day  chlorhexidine 4% Liquid 1 Application(s) Topical <User Schedule>  fluconAZOLE   Tablet 200 milliGRAM(s) Oral daily  losartan 50 milliGRAM(s) Oral daily  loteprednol 0.5% Ophthalmic Suspension 1 Drop(s) Right EYE daily  loteprednol 0.5% Ophthalmic Suspension 1 Drop(s) Right EYE at bedtime  metoprolol succinate ER 50 milliGRAM(s) Oral daily  ondansetron  IVPB 16 milliGRAM(s) IV Intermittent daily  sodium chloride 0.9%. 1000 milliLiter(s) (50 mL/Hr) IV Continuous <Continuous>  venetoclax 100 milliGRAM(s) Oral <User Schedule>    MEDICATIONS  (PRN):  ALPRAZolam 0.125 milliGRAM(s) Oral daily PRN for anxiety  senna 2 Tablet(s) Oral at bedtime PRN Constipation  sodium chloride 0.9% lock flush 10 milliLiter(s) IV Push every 1 hour PRN Pre/post blood products, medications, blood draw, and to maintain line patency        CAPILLARY BLOOD GLUCOSE        I&O's Summary    30 Nov 2023 07:01  -  01 Dec 2023 07:00  --------------------------------------------------------  IN: 1478 mL / OUT: 1400 mL / NET: 78 mL        Vital Signs Last 24 Hrs  T(C): 36.7 (01 Dec 2023 13:25), Max: 37.2 (30 Nov 2023 20:59)  T(F): 98.1 (01 Dec 2023 13:25), Max: 99 (30 Nov 2023 20:59)  HR: 108 (01 Dec 2023 13:25) (87 - 108)  BP: 146/68 (01 Dec 2023 13:25) (130/70 - 156/74)  BP(mean): --  RR: 18 (01 Dec 2023 13:25) (18 - 18)  SpO2: 100% (01 Dec 2023 13:25) (96% - 100%)    PHYSICAL EXAM:   Neck: Supple  Lungs: no wheeze  CVS: S1 S2 no M/R/G  Abdomen: no tenderness  Neuro: AO x 3 nonfocal   Ext: no edema    LABS:                        7.3    1.36  )-----------( 49       ( 01 Dec 2023 07:21 )             26.1     12-01    136  |  104  |  15  ----------------------------<  90  3.7   |  21<L>  |  0.86    Ca    8.8      01 Dec 2023 07:21  Phos  2.1     12-01  Mg     1.7     12-01    TPro  6.1  /  Alb  3.3  /  TBili  0.8  /  DBili  x   /  AST  13  /  ALT  11  /  AlkPhos  48  12-01          Urinalysis Basic - ( 01 Dec 2023 07:21 )    Color: x / Appearance: x / SG: x / pH: x  Gluc: 90 mg/dL / Ketone: x  / Bili: x / Urobili: x   Blood: x / Protein: x / Nitrite: x   Leuk Esterase: x / RBC: x / WBC x   Sq Epi: x / Non Sq Epi: x / Bacteria: x          All consultant(s) notes reviewed and care discussed with other providers        Contact Number, Dr Yen [phone]

## 2023-12-01 NOTE — PROGRESS NOTE ADULT - SUBJECTIVE AND OBJECTIVE BOX
[facility]  Division of Infectious Diseases  [phone]    Name: FUNMI ROMERO  Age: 89y  Gender: Female  MRN: [id number]    Interval History--  Notes reviewed.     Past Medical History--  Hypertension    Hyperlipidemia    Palpitations    GERD (gastroesophageal reflux disease)    Chronic UTI    Post corneal transplant    No significant past surgical history    S/P cataract surgery        For details regarding the patient's social history, family history, and other miscellaneous elements, please refer the initial infectious diseases consultation and/or the admitting history and physical examination for this admission.    Allergies    No Known Allergies    Intolerances        Medications--  Antibiotics:  acyclovir   Oral Tab/Cap 200 milliGRAM(s) Oral two times a day  amoxicillin  250 milliGRAM(s)/clavulanate 1 Tablet(s) Oral at bedtime  fluconAZOLE   Tablet 200 milliGRAM(s) Oral daily    Immunologic:    Other:  allopurinol  ALPRAZolam PRN  atorvastatin  azaCITIDine IVPB (eMAR)  Biotene Dry Mouth Oral Rinse  chlorhexidine 4% Liquid  losartan  loteprednol 0.5% Ophthalmic Suspension  loteprednol 0.5% Ophthalmic Suspension  metoprolol succinate ER  ondansetron  IVPB  senna PRN  sodium chloride 0.9% lock flush PRN  sodium chloride 0.9%.  venetoclax      Review of Systems--  A 10-point review of systems was obtained.     Pertinent positives and negatives--  Constitutional: No fevers. No Chills. No Rigors.   Cardiovascular: No chest pain. No palpitations.  Respiratory: No shortness of breath. No cough.  Gastrointestinal: No nausea or vomiting. No diarrhea or constipation.   Psychiatric: Pleasant. Appropriate affect.    Review of systems otherwise negative except as previously noted.    Physical Examination--  Vital Signs: T(F): 97.9 (12-01-23 @ 05:28), Max: 99 (11-30-23 @ 20:59)  HR: 104 (12-01-23 @ 05:28)  BP: 132/72 (12-01-23 @ 05:28)  RR: 18 (12-01-23 @ 05:28)  SpO2: 99% (12-01-23 @ 05:28)  Wt(kg): --  General: Nontoxic-appearing Female in no acute distress.  HEENT: AT/NC. PERRL. EOMI. Anicteric. Conjunctiva pink and moist. Oropharynx clear. Dentition fair.  Neck: Not rigid. No sense of mass.  Nodes: None palpable.  Lungs: Clear bilaterally without rales, wheezing or rhonchi  Heart: Regular rate and rhythm. No Murmur. No rub. No gallop. No palpable thrill.  Abdomen: Bowel sounds present and normoactive. Soft. Nondistended. Nontender. No sense of mass. No organomegaly.  Back: No spinal tenderness. No costovertebral angle tenderness.   Extremities: No cyanosis or clubbing. No edema.   Skin: Warm. Dry. Good turgor. No rash. No vasculitic stigmata.  Psychiatric: Appropriate affect and mood for situation.         Laboratory Studies--  CBC                        7.3    1.36  )-----------( 49       ( 01 Dec 2023 07:21 )             26.1       Chemistries  11-30    139  |  106  |  16  ----------------------------<  93  3.9   |  21<L>  |  0.84    Ca    8.7      30 Nov 2023 07:35  Phos  2.6     11-30  Mg     1.8     11-30    TPro  6.2  /  Alb  3.3  /  TBili  0.6  /  DBili  x   /  AST  12  /  ALT  11  /  AlkPhos  49  11-30      Culture Data           [facility]  Division of Infectious Diseases  [phone]    Name: FUNMI ROMERO  Age: 89y  Gender: Female  MRN: [id number]    Interval History--  Notes reviewed. Feels fine. Seen by yesterday ENT for sensation of something stuck in her throat, seen by ENT, no findings.      Past Medical History--  Hypertension    Hyperlipidemia    Palpitations    GERD (gastroesophageal reflux disease)    Chronic UTI    Post corneal transplant    No significant past surgical history    S/P cataract surgery        For details regarding the patient's social history, family history, and other miscellaneous elements, please refer the initial infectious diseases consultation and/or the admitting history and physical examination for this admission.    Allergies    No Known Allergies    Intolerances        Medications--  Antibiotics:  acyclovir   Oral Tab/Cap 200 milliGRAM(s) Oral two times a day  amoxicillin  250 milliGRAM(s)/clavulanate 1 Tablet(s) Oral at bedtime  fluconAZOLE   Tablet 200 milliGRAM(s) Oral daily    Immunologic:    Other:  allopurinol  ALPRAZolam PRN  atorvastatin  azaCITIDine IVPB (eMAR)  Biotene Dry Mouth Oral Rinse  chlorhexidine 4% Liquid  losartan  loteprednol 0.5% Ophthalmic Suspension  loteprednol 0.5% Ophthalmic Suspension  metoprolol succinate ER  ondansetron  IVPB  senna PRN  sodium chloride 0.9% lock flush PRN  sodium chloride 0.9%.  venetoclax      Review of Systems--  A 10-point review of systems was obtained.   Review of systems otherwise unchanged compared to prior visit except as previously noted.    Physical Examination--  Vital Signs: T(F): 97.9 (12-01-23 @ 05:28), Max: 99 (11-30-23 @ 20:59)  HR: 104 (12-01-23 @ 05:28)  BP: 132/72 (12-01-23 @ 05:28)  RR: 18 (12-01-23 @ 05:28)  SpO2: 99% (12-01-23 @ 05:28)  Wt(kg): --  General: Nontoxic-appearing Female in no acute distress.  HEENT: AT/NC. Anicteric. Conjunctiva pink and moist. Oropharynx clear.   Neck: Not rigid. No sense of mass.  Nodes: None palpable.  Lungs: Clear bilaterally without rales, wheezing or rhonchi  Heart: Regular rate and rhythm.  Abdomen: Bowel sounds present and normoactive. Soft. Nondistended. Nontender.   Extremities: No cyanosis or clubbing. No edema.   Skin: Warm. Dry. Good turgor. No rash. No vasculitic stigmata.  Psychiatric: Appropriate affect and mood for situation.       Laboratory Studies--  CBC                        7.3    1.36  )-----------( 49       ( 01 Dec 2023 07:21 )             26.1       Chemistries  11-30    139  |  106  |  16  ----------------------------<  93  3.9   |  21<L>  |  0.84    Ca    8.7      30 Nov 2023 07:35  Phos  2.6     11-30  Mg     1.8     11-30    TPro  6.2  /  Alb  3.3  /  TBili  0.6  /  DBili  x   /  AST  12  /  ALT  11  /  AlkPhos  49  11-30      Culture Data

## 2023-12-01 NOTE — PROGRESS NOTE ADULT - ASSESSMENT
Patient with frequent UTI, with improvement over the last 2 months but not entirely clear how much of the improvement is related to topical estrogen therapy, pessary being mpre effective in addressing functionalanatomic abnormalities, or antibiotics.  Patient presently asymptomatic.  I see no role to alter Augmentin 250-125 QD at this point in time    11/28: No concern of active infection on exam. Specifically, no urinary symptoms. Tolerating Augmentin without issues.   11/29: Still no urinary symptoms.   11/30: No concern for active or uncontrolled infection on the basis of examination.  12/1: Still no concern for active or uncontrolled infection at this time.     Suggestions  Continue Augmentin  Will need to remain cognizant of increased risk of resistant mario if she were to develop fever/infection in the setting of chemotherapy.  If empiric therapy needed vancomycin and cefepime would be reasonable.    If any ID input is needed over the weekend, please call [phone]. Thanks.    Thaddeus Geronimo MD  Attending Physician  [facility]  Division of Infectious Diseases  [phone]

## 2023-12-01 NOTE — PROGRESS NOTE ADULT - ASSESSMENT
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  - admitted to begin cycle 1 of azacitadine 75mg/m2 Day 1-7, Venetoclax dose modified and escalated, Day 1 10mg, Day 2 20mg, Day 3 50mg, day 4 and ongoing 100mg- consent obtained and placed in chart: pt tolerating well, for day 5 today  - hydration, no evidence of overload  - LDH sl elevated, uric acid normal- continue allopurinol, monitor TLS labs daily- LDH is lower, uric acid remains normal  - prophylactic antiviral acyclovir 200 bid, prophylactic antifungal diflucan 200mg daily  - continue prophylactic augmentin  - ID input appreciated  - s/p PICC  - monitor CBC- counts decreasing as expected, ANC is >500, Hg now 7.3; plan transfusion as close to DC as possible- re eval in AM    #congestion,  - pt feels c/w normal allergies, will order loratadine prn (therapeutic exchange for allegra)  - if cough persists/worsens, would check CXR    #diarrhea- likely secondary to laxatives, has resolved    D/w Dr. 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  - admitted to begin cycle 1 of azacitadine 75mg/m2 Day 1-7, Venetoclax dose modified and escalated, Day 1 10mg, Day 2 20mg, Day 3 50mg, day 4 and ongoing 100mg- consent obtained and placed in chart: pt tolerating well, for day 5 today  - hydration, no evidence of overload  - LDH sl elevated, uric acid normal- continue allopurinol, monitor TLS labs daily- LDH is lower, uric acid remains normal  - prophylactic antiviral acyclovir 200 bid, prophylactic antifungal diflucan 200mg daily  - continue prophylactic augmentin  - ID input appreciated  - s/p PICC  - monitor CBC- counts decreasing as expected, ANC is >500, Hg now 7.3; plan transfusion as close to DC as possible- re eval in AM; active type and screen from today    #congestion,  - pt feels c/w normal allergies, will order loratadine prn (therapeutic exchange for allegra)  - if cough persists/worsens, would check CXR    #diarrhea- likely secondary to laxatives, has resolved    D/w Dr. Yen  d/w family bedside

## 2023-12-02 LAB
ALBUMIN SERPL ELPH-MCNC: 3.5 G/DL — SIGNIFICANT CHANGE UP (ref 3.3–5)
ALBUMIN SERPL ELPH-MCNC: 3.5 G/DL — SIGNIFICANT CHANGE UP (ref 3.3–5)
ALP SERPL-CCNC: 49 U/L — SIGNIFICANT CHANGE UP (ref 40–120)
ALP SERPL-CCNC: 49 U/L — SIGNIFICANT CHANGE UP (ref 40–120)
ALT FLD-CCNC: 13 U/L — SIGNIFICANT CHANGE UP (ref 10–45)
ALT FLD-CCNC: 13 U/L — SIGNIFICANT CHANGE UP (ref 10–45)
ANION GAP SERPL CALC-SCNC: 11 MMOL/L — SIGNIFICANT CHANGE UP (ref 5–17)
ANION GAP SERPL CALC-SCNC: 11 MMOL/L — SIGNIFICANT CHANGE UP (ref 5–17)
AST SERPL-CCNC: 17 U/L — SIGNIFICANT CHANGE UP (ref 10–40)
AST SERPL-CCNC: 17 U/L — SIGNIFICANT CHANGE UP (ref 10–40)
BASOPHILS # BLD AUTO: 0 K/UL — SIGNIFICANT CHANGE UP (ref 0–0.2)
BASOPHILS # BLD AUTO: 0 K/UL — SIGNIFICANT CHANGE UP (ref 0–0.2)
BASOPHILS NFR BLD AUTO: 0 % — SIGNIFICANT CHANGE UP (ref 0–2)
BASOPHILS NFR BLD AUTO: 0 % — SIGNIFICANT CHANGE UP (ref 0–2)
BILIRUB SERPL-MCNC: 0.7 MG/DL — SIGNIFICANT CHANGE UP (ref 0.2–1.2)
BILIRUB SERPL-MCNC: 0.7 MG/DL — SIGNIFICANT CHANGE UP (ref 0.2–1.2)
BUN SERPL-MCNC: 12 MG/DL — SIGNIFICANT CHANGE UP (ref 7–23)
BUN SERPL-MCNC: 12 MG/DL — SIGNIFICANT CHANGE UP (ref 7–23)
CALCIUM SERPL-MCNC: 8.7 MG/DL — SIGNIFICANT CHANGE UP (ref 8.4–10.5)
CALCIUM SERPL-MCNC: 8.7 MG/DL — SIGNIFICANT CHANGE UP (ref 8.4–10.5)
CHLORIDE SERPL-SCNC: 103 MMOL/L — SIGNIFICANT CHANGE UP (ref 96–108)
CHLORIDE SERPL-SCNC: 103 MMOL/L — SIGNIFICANT CHANGE UP (ref 96–108)
CO2 SERPL-SCNC: 22 MMOL/L — SIGNIFICANT CHANGE UP (ref 22–31)
CO2 SERPL-SCNC: 22 MMOL/L — SIGNIFICANT CHANGE UP (ref 22–31)
CREAT SERPL-MCNC: 0.87 MG/DL — SIGNIFICANT CHANGE UP (ref 0.5–1.3)
CREAT SERPL-MCNC: 0.87 MG/DL — SIGNIFICANT CHANGE UP (ref 0.5–1.3)
EGFR: 63 ML/MIN/1.73M2 — SIGNIFICANT CHANGE UP
EGFR: 63 ML/MIN/1.73M2 — SIGNIFICANT CHANGE UP
EOSINOPHIL # BLD AUTO: 0.02 K/UL — SIGNIFICANT CHANGE UP (ref 0–0.5)
EOSINOPHIL # BLD AUTO: 0.02 K/UL — SIGNIFICANT CHANGE UP (ref 0–0.5)
EOSINOPHIL NFR BLD AUTO: 1.7 % — SIGNIFICANT CHANGE UP (ref 0–6)
EOSINOPHIL NFR BLD AUTO: 1.7 % — SIGNIFICANT CHANGE UP (ref 0–6)
GIANT PLATELETS BLD QL SMEAR: PRESENT — SIGNIFICANT CHANGE UP
GIANT PLATELETS BLD QL SMEAR: PRESENT — SIGNIFICANT CHANGE UP
GLUCOSE SERPL-MCNC: 103 MG/DL — HIGH (ref 70–99)
GLUCOSE SERPL-MCNC: 103 MG/DL — HIGH (ref 70–99)
HCT VFR BLD CALC: 24.8 % — LOW (ref 34.5–45)
HCT VFR BLD CALC: 24.8 % — LOW (ref 34.5–45)
HGB BLD-MCNC: 7.1 G/DL — LOW (ref 11.5–15.5)
HGB BLD-MCNC: 7.1 G/DL — LOW (ref 11.5–15.5)
LDH SERPL L TO P-CCNC: 299 U/L — HIGH (ref 50–242)
LDH SERPL L TO P-CCNC: 299 U/L — HIGH (ref 50–242)
LYMPHOCYTES # BLD AUTO: 0.44 K/UL — LOW (ref 1–3.3)
LYMPHOCYTES # BLD AUTO: 0.44 K/UL — LOW (ref 1–3.3)
LYMPHOCYTES # BLD AUTO: 33.9 % — SIGNIFICANT CHANGE UP (ref 13–44)
LYMPHOCYTES # BLD AUTO: 33.9 % — SIGNIFICANT CHANGE UP (ref 13–44)
MAGNESIUM SERPL-MCNC: 1.7 MG/DL — SIGNIFICANT CHANGE UP (ref 1.6–2.6)
MAGNESIUM SERPL-MCNC: 1.7 MG/DL — SIGNIFICANT CHANGE UP (ref 1.6–2.6)
MANUAL SMEAR VERIFICATION: SIGNIFICANT CHANGE UP
MANUAL SMEAR VERIFICATION: SIGNIFICANT CHANGE UP
MCHC RBC-ENTMCNC: 28.6 GM/DL — LOW (ref 32–36)
MCHC RBC-ENTMCNC: 28.6 GM/DL — LOW (ref 32–36)
MCHC RBC-ENTMCNC: 30.5 PG — SIGNIFICANT CHANGE UP (ref 27–34)
MCHC RBC-ENTMCNC: 30.5 PG — SIGNIFICANT CHANGE UP (ref 27–34)
MCV RBC AUTO: 106.4 FL — HIGH (ref 80–100)
MCV RBC AUTO: 106.4 FL — HIGH (ref 80–100)
MONOCYTES # BLD AUTO: 0.01 K/UL — SIGNIFICANT CHANGE UP (ref 0–0.9)
MONOCYTES # BLD AUTO: 0.01 K/UL — SIGNIFICANT CHANGE UP (ref 0–0.9)
MONOCYTES NFR BLD AUTO: 0.9 % — LOW (ref 2–14)
MONOCYTES NFR BLD AUTO: 0.9 % — LOW (ref 2–14)
NEUTROPHILS # BLD AUTO: 0.82 K/UL — LOW (ref 1.8–7.4)
NEUTROPHILS # BLD AUTO: 0.82 K/UL — LOW (ref 1.8–7.4)
NEUTROPHILS NFR BLD AUTO: 62.6 % — SIGNIFICANT CHANGE UP (ref 43–77)
NEUTROPHILS NFR BLD AUTO: 62.6 % — SIGNIFICANT CHANGE UP (ref 43–77)
NEUTS BAND # BLD: 0.9 % — SIGNIFICANT CHANGE UP (ref 0–8)
NEUTS BAND # BLD: 0.9 % — SIGNIFICANT CHANGE UP (ref 0–8)
NRBC # BLD: 4 /100 — HIGH (ref 0–0)
NRBC # BLD: 4 /100 — HIGH (ref 0–0)
PHOSPHATE SERPL-MCNC: 1.9 MG/DL — LOW (ref 2.5–4.5)
PHOSPHATE SERPL-MCNC: 1.9 MG/DL — LOW (ref 2.5–4.5)
PLAT MORPH BLD: NORMAL — SIGNIFICANT CHANGE UP
PLAT MORPH BLD: NORMAL — SIGNIFICANT CHANGE UP
PLATELET # BLD AUTO: 50 K/UL — LOW (ref 150–400)
PLATELET # BLD AUTO: 50 K/UL — LOW (ref 150–400)
POTASSIUM SERPL-MCNC: 3.8 MMOL/L — SIGNIFICANT CHANGE UP (ref 3.5–5.3)
POTASSIUM SERPL-MCNC: 3.8 MMOL/L — SIGNIFICANT CHANGE UP (ref 3.5–5.3)
POTASSIUM SERPL-SCNC: 3.8 MMOL/L — SIGNIFICANT CHANGE UP (ref 3.5–5.3)
POTASSIUM SERPL-SCNC: 3.8 MMOL/L — SIGNIFICANT CHANGE UP (ref 3.5–5.3)
PROT SERPL-MCNC: 6.3 G/DL — SIGNIFICANT CHANGE UP (ref 6–8.3)
PROT SERPL-MCNC: 6.3 G/DL — SIGNIFICANT CHANGE UP (ref 6–8.3)
RBC # BLD: 2.33 M/UL — LOW (ref 3.8–5.2)
RBC # BLD: 2.33 M/UL — LOW (ref 3.8–5.2)
RBC # FLD: 18 % — HIGH (ref 10.3–14.5)
RBC # FLD: 18 % — HIGH (ref 10.3–14.5)
RBC BLD AUTO: SIGNIFICANT CHANGE UP
RBC BLD AUTO: SIGNIFICANT CHANGE UP
SODIUM SERPL-SCNC: 136 MMOL/L — SIGNIFICANT CHANGE UP (ref 135–145)
SODIUM SERPL-SCNC: 136 MMOL/L — SIGNIFICANT CHANGE UP (ref 135–145)
URATE SERPL-MCNC: 3.6 MG/DL — SIGNIFICANT CHANGE UP (ref 2.5–7)
URATE SERPL-MCNC: 3.6 MG/DL — SIGNIFICANT CHANGE UP (ref 2.5–7)
WBC # BLD: 1.29 K/UL — LOW (ref 3.8–10.5)
WBC # BLD: 1.29 K/UL — LOW (ref 3.8–10.5)
WBC # FLD AUTO: 1.29 K/UL — LOW (ref 3.8–10.5)
WBC # FLD AUTO: 1.29 K/UL — LOW (ref 3.8–10.5)

## 2023-12-02 RX ADMIN — LOTEPREDNOL ETABONATE 1 DROP(S): 2 SUSPENSION/ DROPS OPHTHALMIC at 08:00

## 2023-12-02 RX ADMIN — Medication 200 MILLIGRAM(S): at 17:56

## 2023-12-02 RX ADMIN — Medication 255 MILLIMOLE(S): at 18:00

## 2023-12-02 RX ADMIN — Medication 5 MILLILITER(S): at 05:56

## 2023-12-02 RX ADMIN — ONDANSETRON 116 MILLIGRAM(S): 8 TABLET, FILM COATED ORAL at 15:50

## 2023-12-02 RX ADMIN — SODIUM CHLORIDE 50 MILLILITER(S): 9 INJECTION INTRAMUSCULAR; INTRAVENOUS; SUBCUTANEOUS at 05:58

## 2023-12-02 RX ADMIN — Medication 5 MILLILITER(S): at 11:23

## 2023-12-02 RX ADMIN — Medication 650 MILLIGRAM(S): at 22:20

## 2023-12-02 RX ADMIN — AZACITIDINE FOR 120 MILLIGRAM(S): 100 INJECTION, POWDER, LYOPHILIZED, FOR SOLUTION INTRAVENOUS; SUBCUTANEOUS at 16:21

## 2023-12-02 RX ADMIN — Medication 1 TABLET(S): at 21:28

## 2023-12-02 RX ADMIN — Medication 200 MILLIGRAM(S): at 06:00

## 2023-12-02 RX ADMIN — LOSARTAN POTASSIUM 50 MILLIGRAM(S): 100 TABLET, FILM COATED ORAL at 06:00

## 2023-12-02 RX ADMIN — ATORVASTATIN CALCIUM 40 MILLIGRAM(S): 80 TABLET, FILM COATED ORAL at 21:28

## 2023-12-02 RX ADMIN — CHLORHEXIDINE GLUCONATE 1 APPLICATION(S): 213 SOLUTION TOPICAL at 05:56

## 2023-12-02 RX ADMIN — FLUCONAZOLE 200 MILLIGRAM(S): 150 TABLET ORAL at 11:23

## 2023-12-02 RX ADMIN — Medication 50 MILLIGRAM(S): at 06:00

## 2023-12-02 RX ADMIN — VENETOCLAX 100 MILLIGRAM(S): 100 TABLET, FILM COATED ORAL at 18:04

## 2023-12-02 RX ADMIN — Medication 650 MILLIGRAM(S): at 21:32

## 2023-12-02 RX ADMIN — Medication 300 MILLIGRAM(S): at 11:23

## 2023-12-02 RX ADMIN — SODIUM CHLORIDE 50 MILLILITER(S): 9 INJECTION INTRAMUSCULAR; INTRAVENOUS; SUBCUTANEOUS at 18:04

## 2023-12-02 NOTE — PROGRESS NOTE ADULT - SUBJECTIVE AND OBJECTIVE BOX
DS to AML  89 yo woman with minimal past history had MDS to AML here receiving venetoclax/ and azacytidine tolerating well  pmh sh fh unchanged 7 pt ros negative  physical  elderly  comfortable, ambulating  vs  t98.4 99 121/67 18 97 sat  lungs clear  c0r s1s2  abd soft nontender  ext no edema  skin no petechiae    data  wbc 1290 hgb 7.1 plt 62670 mcv 106  cr 0.87

## 2023-12-02 NOTE — PROGRESS NOTE ADULT - SUBJECTIVE AND OBJECTIVE BOX
Patient is a 89y old  Female who presents with a chief complaint of elective chemotherapy (02 Dec 2023 10:59)      DATE OF SERVICE: 12-02-23 @ 12:54    SUBJECTIVE / OVERNIGHT EVENTS: overnight events noted    ROS:  Resp: No cough no sputum production  CVS: No chest pain no palpitations no orthopnea  GI: no N/V/D  : no dysuria, no hematuria  "I feel fine'   + small BM today        MEDICATIONS  (STANDING):  acyclovir   Oral Tab/Cap 200 milliGRAM(s) Oral two times a day  allopurinol 300 milliGRAM(s) Oral daily  amoxicillin  250 milliGRAM(s)/clavulanate 1 Tablet(s) Oral at bedtime  atorvastatin 40 milliGRAM(s) Oral at bedtime  azaCITIDine IVPB (eMAR) 120 milliGRAM(s) IV Intermittent daily  Biotene Dry Mouth Oral Rinse 5 milliLiter(s) Swish and Spit four times a day  chlorhexidine 4% Liquid 1 Application(s) Topical <User Schedule>  fluconAZOLE   Tablet 200 milliGRAM(s) Oral daily  losartan 50 milliGRAM(s) Oral daily  loteprednol 0.5% Ophthalmic Suspension 1 Drop(s) Right EYE daily  loteprednol 0.5% Ophthalmic Suspension 1 Drop(s) Right EYE at bedtime  metoprolol succinate ER 50 milliGRAM(s) Oral daily  ondansetron  IVPB 16 milliGRAM(s) IV Intermittent daily  sodium chloride 0.9%. 1000 milliLiter(s) (50 mL/Hr) IV Continuous <Continuous>  venetoclax 100 milliGRAM(s) Oral <User Schedule>    MEDICATIONS  (PRN):  acetaminophen     Tablet .. 650 milliGRAM(s) Oral every 6 hours PRN Temp greater or equal to 38C (100.4F), Mild Pain (1 - 3)  ALPRAZolam 0.125 milliGRAM(s) Oral daily PRN for anxiety  loratadine 10 milliGRAM(s) Oral daily PRN allergies  senna 2 Tablet(s) Oral at bedtime PRN Constipation  sodium chloride 0.9% lock flush 10 milliLiter(s) IV Push every 1 hour PRN Pre/post blood products, medications, blood draw, and to maintain line patency        CAPILLARY BLOOD GLUCOSE        I&O's Summary    01 Dec 2023 07:01  -  02 Dec 2023 07:00  --------------------------------------------------------  IN: 1438 mL / OUT: 2450 mL / NET: -1012 mL    02 Dec 2023 07:01  -  02 Dec 2023 12:54  --------------------------------------------------------  IN: 0 mL / OUT: 450 mL / NET: -450 mL        Vital Signs Last 24 Hrs  T(C): 36.9 (02 Dec 2023 09:31), Max: 36.9 (02 Dec 2023 04:43)  T(F): 98.4 (02 Dec 2023 09:31), Max: 98.4 (02 Dec 2023 04:43)  HR: 99 (02 Dec 2023 09:31) (92 - 108)  BP: 121/67 (02 Dec 2023 09:31) (121/67 - 146/68)  BP(mean): --  RR: 18 (02 Dec 2023 09:31) (17 - 19)  SpO2: 97% (02 Dec 2023 09:31) (97% - 100%)        PHYSICAL EXAM:   Neck: Supple  Lungs: no wheeze  CVS: S1 S2 no M/R/G  Abdomen: no tenderness  Neuro: AO x 3 nonfocal   Ext: no edema    LABS:                        7.1    1.29  )-----------( 50       ( 02 Dec 2023 07:16 )             24.8     12-02    136  |  103  |  12  ----------------------------<  103<H>  3.8   |  22  |  0.87    Ca    8.7      02 Dec 2023 07:15  Phos  1.9     12-02  Mg     1.7     12-02    TPro  6.3  /  Alb  3.5  /  TBili  0.7  /  DBili  x   /  AST  17  /  ALT  13  /  AlkPhos  49  12-02          Urinalysis Basic - ( 02 Dec 2023 07:15 )    Color: x / Appearance: x / SG: x / pH: x  Gluc: 103 mg/dL / Ketone: x  / Bili: x / Urobili: x   Blood: x / Protein: x / Nitrite: x   Leuk Esterase: x / RBC: x / WBC x   Sq Epi: x / Non Sq Epi: x / Bacteria: x          All consultant(s) notes reviewed and care discussed with other providers        Contact Number, Dr Yen [phone]

## 2023-12-02 NOTE — PROGRESS NOTE ADULT - ASSESSMENT
MDS to AML day 5/7 azacytidine, on venetoclax 100 mg  prophylactic antifungal, antiviral, antibacterial therapy  would transfuse one unit pRBC today, likely one more unit monday.

## 2023-12-03 LAB
AGGLUTINATION: PRESENT — SIGNIFICANT CHANGE UP
AGGLUTINATION: PRESENT — SIGNIFICANT CHANGE UP
ALBUMIN SERPL ELPH-MCNC: 3.1 G/DL — LOW (ref 3.3–5)
ALBUMIN SERPL ELPH-MCNC: 3.1 G/DL — LOW (ref 3.3–5)
ALP SERPL-CCNC: 51 U/L — SIGNIFICANT CHANGE UP (ref 40–120)
ALP SERPL-CCNC: 51 U/L — SIGNIFICANT CHANGE UP (ref 40–120)
ALT FLD-CCNC: 13 U/L — SIGNIFICANT CHANGE UP (ref 10–45)
ALT FLD-CCNC: 13 U/L — SIGNIFICANT CHANGE UP (ref 10–45)
ANION GAP SERPL CALC-SCNC: 11 MMOL/L — SIGNIFICANT CHANGE UP (ref 5–17)
ANION GAP SERPL CALC-SCNC: 11 MMOL/L — SIGNIFICANT CHANGE UP (ref 5–17)
ANISOCYTOSIS BLD QL: SLIGHT — SIGNIFICANT CHANGE UP
ANISOCYTOSIS BLD QL: SLIGHT — SIGNIFICANT CHANGE UP
APPEARANCE UR: CLEAR — SIGNIFICANT CHANGE UP
APPEARANCE UR: CLEAR — SIGNIFICANT CHANGE UP
AST SERPL-CCNC: 19 U/L — SIGNIFICANT CHANGE UP (ref 10–40)
AST SERPL-CCNC: 19 U/L — SIGNIFICANT CHANGE UP (ref 10–40)
BACTERIA # UR AUTO: ABNORMAL /HPF
BACTERIA # UR AUTO: ABNORMAL /HPF
BASOPHILS # BLD AUTO: 0.01 K/UL — SIGNIFICANT CHANGE UP (ref 0–0.2)
BASOPHILS # BLD AUTO: 0.01 K/UL — SIGNIFICANT CHANGE UP (ref 0–0.2)
BASOPHILS NFR BLD AUTO: 0.9 % — SIGNIFICANT CHANGE UP (ref 0–2)
BASOPHILS NFR BLD AUTO: 0.9 % — SIGNIFICANT CHANGE UP (ref 0–2)
BILIRUB SERPL-MCNC: 1 MG/DL — SIGNIFICANT CHANGE UP (ref 0.2–1.2)
BILIRUB SERPL-MCNC: 1 MG/DL — SIGNIFICANT CHANGE UP (ref 0.2–1.2)
BILIRUB UR-MCNC: NEGATIVE — SIGNIFICANT CHANGE UP
BILIRUB UR-MCNC: NEGATIVE — SIGNIFICANT CHANGE UP
BLASTS # FLD: 1.8 % — HIGH (ref 0–0)
BLASTS # FLD: 1.8 % — HIGH (ref 0–0)
BUN SERPL-MCNC: 13 MG/DL — SIGNIFICANT CHANGE UP (ref 7–23)
BUN SERPL-MCNC: 13 MG/DL — SIGNIFICANT CHANGE UP (ref 7–23)
CALCIUM SERPL-MCNC: 8.9 MG/DL — SIGNIFICANT CHANGE UP (ref 8.4–10.5)
CALCIUM SERPL-MCNC: 8.9 MG/DL — SIGNIFICANT CHANGE UP (ref 8.4–10.5)
CAST: 0 /LPF — SIGNIFICANT CHANGE UP (ref 0–4)
CAST: 0 /LPF — SIGNIFICANT CHANGE UP (ref 0–4)
CHLORIDE SERPL-SCNC: 104 MMOL/L — SIGNIFICANT CHANGE UP (ref 96–108)
CHLORIDE SERPL-SCNC: 104 MMOL/L — SIGNIFICANT CHANGE UP (ref 96–108)
CO2 SERPL-SCNC: 22 MMOL/L — SIGNIFICANT CHANGE UP (ref 22–31)
CO2 SERPL-SCNC: 22 MMOL/L — SIGNIFICANT CHANGE UP (ref 22–31)
COLOR SPEC: YELLOW — SIGNIFICANT CHANGE UP
COLOR SPEC: YELLOW — SIGNIFICANT CHANGE UP
CREAT SERPL-MCNC: 0.81 MG/DL — SIGNIFICANT CHANGE UP (ref 0.5–1.3)
CREAT SERPL-MCNC: 0.81 MG/DL — SIGNIFICANT CHANGE UP (ref 0.5–1.3)
DIFF PNL FLD: ABNORMAL
DIFF PNL FLD: ABNORMAL
EGFR: 68 ML/MIN/1.73M2 — SIGNIFICANT CHANGE UP
EGFR: 68 ML/MIN/1.73M2 — SIGNIFICANT CHANGE UP
EOSINOPHIL # BLD AUTO: 0.01 K/UL — SIGNIFICANT CHANGE UP (ref 0–0.5)
EOSINOPHIL # BLD AUTO: 0.01 K/UL — SIGNIFICANT CHANGE UP (ref 0–0.5)
EOSINOPHIL NFR BLD AUTO: 0.9 % — SIGNIFICANT CHANGE UP (ref 0–6)
EOSINOPHIL NFR BLD AUTO: 0.9 % — SIGNIFICANT CHANGE UP (ref 0–6)
GIANT PLATELETS BLD QL SMEAR: PRESENT — SIGNIFICANT CHANGE UP
GIANT PLATELETS BLD QL SMEAR: PRESENT — SIGNIFICANT CHANGE UP
GLUCOSE SERPL-MCNC: 99 MG/DL — SIGNIFICANT CHANGE UP (ref 70–99)
GLUCOSE SERPL-MCNC: 99 MG/DL — SIGNIFICANT CHANGE UP (ref 70–99)
GLUCOSE UR QL: NEGATIVE MG/DL — SIGNIFICANT CHANGE UP
GLUCOSE UR QL: NEGATIVE MG/DL — SIGNIFICANT CHANGE UP
HCT VFR BLD CALC: 29.9 % — LOW (ref 34.5–45)
HCT VFR BLD CALC: 29.9 % — LOW (ref 34.5–45)
HGB BLD-MCNC: 9.1 G/DL — LOW (ref 11.5–15.5)
HGB BLD-MCNC: 9.1 G/DL — LOW (ref 11.5–15.5)
KETONES UR-MCNC: NEGATIVE MG/DL — SIGNIFICANT CHANGE UP
KETONES UR-MCNC: NEGATIVE MG/DL — SIGNIFICANT CHANGE UP
LDH SERPL L TO P-CCNC: 326 U/L — HIGH (ref 50–242)
LDH SERPL L TO P-CCNC: 326 U/L — HIGH (ref 50–242)
LEUKOCYTE ESTERASE UR-ACNC: ABNORMAL
LEUKOCYTE ESTERASE UR-ACNC: ABNORMAL
LYMPHOCYTES # BLD AUTO: 0.35 K/UL — LOW (ref 1–3.3)
LYMPHOCYTES # BLD AUTO: 0.35 K/UL — LOW (ref 1–3.3)
LYMPHOCYTES # BLD AUTO: 25.5 % — SIGNIFICANT CHANGE UP (ref 13–44)
LYMPHOCYTES # BLD AUTO: 25.5 % — SIGNIFICANT CHANGE UP (ref 13–44)
MACROCYTES BLD QL: SLIGHT — SIGNIFICANT CHANGE UP
MACROCYTES BLD QL: SLIGHT — SIGNIFICANT CHANGE UP
MAGNESIUM SERPL-MCNC: 1.7 MG/DL — SIGNIFICANT CHANGE UP (ref 1.6–2.6)
MAGNESIUM SERPL-MCNC: 1.7 MG/DL — SIGNIFICANT CHANGE UP (ref 1.6–2.6)
MANUAL SMEAR VERIFICATION: SIGNIFICANT CHANGE UP
MANUAL SMEAR VERIFICATION: SIGNIFICANT CHANGE UP
MCHC RBC-ENTMCNC: 30.4 GM/DL — LOW (ref 32–36)
MCHC RBC-ENTMCNC: 30.4 GM/DL — LOW (ref 32–36)
MCHC RBC-ENTMCNC: 31 PG — SIGNIFICANT CHANGE UP (ref 27–34)
MCHC RBC-ENTMCNC: 31 PG — SIGNIFICANT CHANGE UP (ref 27–34)
MCV RBC AUTO: 101.7 FL — HIGH (ref 80–100)
MCV RBC AUTO: 101.7 FL — HIGH (ref 80–100)
MONOCYTES # BLD AUTO: 0.01 K/UL — SIGNIFICANT CHANGE UP (ref 0–0.9)
MONOCYTES # BLD AUTO: 0.01 K/UL — SIGNIFICANT CHANGE UP (ref 0–0.9)
MONOCYTES NFR BLD AUTO: 0.9 % — LOW (ref 2–14)
MONOCYTES NFR BLD AUTO: 0.9 % — LOW (ref 2–14)
NEUTROPHILS # BLD AUTO: 0.97 K/UL — LOW (ref 1.8–7.4)
NEUTROPHILS # BLD AUTO: 0.97 K/UL — LOW (ref 1.8–7.4)
NEUTROPHILS NFR BLD AUTO: 70 % — SIGNIFICANT CHANGE UP (ref 43–77)
NEUTROPHILS NFR BLD AUTO: 70 % — SIGNIFICANT CHANGE UP (ref 43–77)
NITRITE UR-MCNC: NEGATIVE — SIGNIFICANT CHANGE UP
NITRITE UR-MCNC: NEGATIVE — SIGNIFICANT CHANGE UP
NRBC # BLD: 7 /100 — HIGH (ref 0–0)
NRBC # BLD: 7 /100 — HIGH (ref 0–0)
PH UR: 6.5 — SIGNIFICANT CHANGE UP (ref 5–8)
PH UR: 6.5 — SIGNIFICANT CHANGE UP (ref 5–8)
PHOSPHATE SERPL-MCNC: 2.3 MG/DL — LOW (ref 2.5–4.5)
PHOSPHATE SERPL-MCNC: 2.3 MG/DL — LOW (ref 2.5–4.5)
PLAT MORPH BLD: NORMAL — SIGNIFICANT CHANGE UP
PLAT MORPH BLD: NORMAL — SIGNIFICANT CHANGE UP
PLATELET # BLD AUTO: 61 K/UL — LOW (ref 150–400)
PLATELET # BLD AUTO: 61 K/UL — LOW (ref 150–400)
POLYCHROMASIA BLD QL SMEAR: SIGNIFICANT CHANGE UP
POLYCHROMASIA BLD QL SMEAR: SIGNIFICANT CHANGE UP
POTASSIUM SERPL-MCNC: 3.9 MMOL/L — SIGNIFICANT CHANGE UP (ref 3.5–5.3)
POTASSIUM SERPL-MCNC: 3.9 MMOL/L — SIGNIFICANT CHANGE UP (ref 3.5–5.3)
POTASSIUM SERPL-SCNC: 3.9 MMOL/L — SIGNIFICANT CHANGE UP (ref 3.5–5.3)
POTASSIUM SERPL-SCNC: 3.9 MMOL/L — SIGNIFICANT CHANGE UP (ref 3.5–5.3)
PROT SERPL-MCNC: 6.2 G/DL — SIGNIFICANT CHANGE UP (ref 6–8.3)
PROT SERPL-MCNC: 6.2 G/DL — SIGNIFICANT CHANGE UP (ref 6–8.3)
PROT UR-MCNC: 100 MG/DL
PROT UR-MCNC: 100 MG/DL
RAPID RVP RESULT: SIGNIFICANT CHANGE UP
RAPID RVP RESULT: SIGNIFICANT CHANGE UP
RBC # BLD: 2.94 M/UL — LOW (ref 3.8–5.2)
RBC # BLD: 2.94 M/UL — LOW (ref 3.8–5.2)
RBC # FLD: 19.9 % — HIGH (ref 10.3–14.5)
RBC # FLD: 19.9 % — HIGH (ref 10.3–14.5)
RBC BLD AUTO: SIGNIFICANT CHANGE UP
RBC BLD AUTO: SIGNIFICANT CHANGE UP
RBC CASTS # UR COMP ASSIST: 7 /HPF — HIGH (ref 0–4)
RBC CASTS # UR COMP ASSIST: 7 /HPF — HIGH (ref 0–4)
SARS-COV-2 RNA SPEC QL NAA+PROBE: SIGNIFICANT CHANGE UP
SARS-COV-2 RNA SPEC QL NAA+PROBE: SIGNIFICANT CHANGE UP
SODIUM SERPL-SCNC: 137 MMOL/L — SIGNIFICANT CHANGE UP (ref 135–145)
SODIUM SERPL-SCNC: 137 MMOL/L — SIGNIFICANT CHANGE UP (ref 135–145)
SP GR SPEC: 1.01 — SIGNIFICANT CHANGE UP (ref 1–1.03)
SP GR SPEC: 1.01 — SIGNIFICANT CHANGE UP (ref 1–1.03)
SQUAMOUS # UR AUTO: 2 /HPF — SIGNIFICANT CHANGE UP (ref 0–5)
SQUAMOUS # UR AUTO: 2 /HPF — SIGNIFICANT CHANGE UP (ref 0–5)
URATE SERPL-MCNC: 3.3 MG/DL — SIGNIFICANT CHANGE UP (ref 2.5–7)
URATE SERPL-MCNC: 3.3 MG/DL — SIGNIFICANT CHANGE UP (ref 2.5–7)
UROBILINOGEN FLD QL: 1 MG/DL — SIGNIFICANT CHANGE UP (ref 0.2–1)
UROBILINOGEN FLD QL: 1 MG/DL — SIGNIFICANT CHANGE UP (ref 0.2–1)
WBC # BLD: 1.39 K/UL — LOW (ref 3.8–10.5)
WBC # BLD: 1.39 K/UL — LOW (ref 3.8–10.5)
WBC # FLD AUTO: 1.39 K/UL — LOW (ref 3.8–10.5)
WBC # FLD AUTO: 1.39 K/UL — LOW (ref 3.8–10.5)
WBC UR QL: 3 /HPF — SIGNIFICANT CHANGE UP (ref 0–5)
WBC UR QL: 3 /HPF — SIGNIFICANT CHANGE UP (ref 0–5)

## 2023-12-03 PROCEDURE — 71045 X-RAY EXAM CHEST 1 VIEW: CPT | Mod: 26

## 2023-12-03 RX ORDER — PIPERACILLIN AND TAZOBACTAM 4; .5 G/20ML; G/20ML
3.38 INJECTION, POWDER, LYOPHILIZED, FOR SOLUTION INTRAVENOUS ONCE
Refills: 0 | Status: COMPLETED | OUTPATIENT
Start: 2023-12-03 | End: 2023-12-04

## 2023-12-03 RX ORDER — SODIUM,POTASSIUM PHOSPHATES 278-250MG
2 POWDER IN PACKET (EA) ORAL ONCE
Refills: 0 | Status: COMPLETED | OUTPATIENT
Start: 2023-12-03 | End: 2023-12-03

## 2023-12-03 RX ORDER — PIPERACILLIN AND TAZOBACTAM 4; .5 G/20ML; G/20ML
3.38 INJECTION, POWDER, LYOPHILIZED, FOR SOLUTION INTRAVENOUS ONCE
Refills: 0 | Status: COMPLETED | OUTPATIENT
Start: 2023-12-03 | End: 2023-12-03

## 2023-12-03 RX ORDER — VANCOMYCIN HCL 1 G
1000 VIAL (EA) INTRAVENOUS ONCE
Refills: 0 | Status: COMPLETED | OUTPATIENT
Start: 2023-12-03 | End: 2023-12-03

## 2023-12-03 RX ORDER — PIPERACILLIN AND TAZOBACTAM 4; .5 G/20ML; G/20ML
3.38 INJECTION, POWDER, LYOPHILIZED, FOR SOLUTION INTRAVENOUS EVERY 8 HOURS
Refills: 0 | Status: DISCONTINUED | OUTPATIENT
Start: 2023-12-04 | End: 2023-12-06

## 2023-12-03 RX ADMIN — CHLORHEXIDINE GLUCONATE 1 APPLICATION(S): 213 SOLUTION TOPICAL at 05:44

## 2023-12-03 RX ADMIN — Medication 2 TABLET(S): at 17:27

## 2023-12-03 RX ADMIN — Medication 200 MILLIGRAM(S): at 17:28

## 2023-12-03 RX ADMIN — Medication 300 MILLIGRAM(S): at 11:24

## 2023-12-03 RX ADMIN — ATORVASTATIN CALCIUM 40 MILLIGRAM(S): 80 TABLET, FILM COATED ORAL at 22:04

## 2023-12-03 RX ADMIN — Medication 5 MILLILITER(S): at 17:28

## 2023-12-03 RX ADMIN — Medication 0.12 MILLIGRAM(S): at 22:05

## 2023-12-03 RX ADMIN — Medication 250 MILLIGRAM(S): at 20:06

## 2023-12-03 RX ADMIN — Medication 50 MILLIGRAM(S): at 05:45

## 2023-12-03 RX ADMIN — PIPERACILLIN AND TAZOBACTAM 200 GRAM(S): 4; .5 INJECTION, POWDER, LYOPHILIZED, FOR SOLUTION INTRAVENOUS at 18:51

## 2023-12-03 RX ADMIN — Medication 200 MILLIGRAM(S): at 05:45

## 2023-12-03 RX ADMIN — Medication 5 MILLILITER(S): at 11:23

## 2023-12-03 RX ADMIN — LOSARTAN POTASSIUM 50 MILLIGRAM(S): 100 TABLET, FILM COATED ORAL at 05:44

## 2023-12-03 RX ADMIN — FLUCONAZOLE 200 MILLIGRAM(S): 150 TABLET ORAL at 11:24

## 2023-12-03 RX ADMIN — SODIUM CHLORIDE 50 MILLILITER(S): 9 INJECTION INTRAMUSCULAR; INTRAVENOUS; SUBCUTANEOUS at 20:06

## 2023-12-03 RX ADMIN — SODIUM CHLORIDE 50 MILLILITER(S): 9 INJECTION INTRAMUSCULAR; INTRAVENOUS; SUBCUTANEOUS at 18:51

## 2023-12-03 RX ADMIN — ONDANSETRON 116 MILLIGRAM(S): 8 TABLET, FILM COATED ORAL at 14:27

## 2023-12-03 RX ADMIN — Medication 5 MILLILITER(S): at 05:44

## 2023-12-03 RX ADMIN — VENETOCLAX 100 MILLIGRAM(S): 100 TABLET, FILM COATED ORAL at 17:27

## 2023-12-03 RX ADMIN — LOTEPREDNOL ETABONATE 1 DROP(S): 2 SUSPENSION/ DROPS OPHTHALMIC at 09:20

## 2023-12-03 RX ADMIN — AZACITIDINE FOR 120 MILLIGRAM(S): 100 INJECTION, POWDER, LYOPHILIZED, FOR SOLUTION INTRAVENOUS; SUBCUTANEOUS at 15:46

## 2023-12-03 RX ADMIN — Medication 650 MILLIGRAM(S): at 17:30

## 2023-12-03 NOTE — PROGRESS NOTE ADULT - ASSESSMENT
MDS to AML on venetoclax/azacytidine prophylactic antifungal antibacterial antiviral  improved h/h s/p transfusion  chills, check temperature ?from transfusion  if has T gr than 38C will need broad spectrum abx.

## 2023-12-03 NOTE — PROGRESS NOTE ADULT - SUBJECTIVE AND OBJECTIVE BOX
AML  89 yo woman with MDS to AML day 6/7 venetoclax/azacytidine had transfusion yesterday developed chills this am. starting to feel better  pmh sh fh unchanged 7 pt ros chills otherwise neg  physical  comfortable  vs  t98.6 106 164/77 18 99 sat  lungs clear  cor s1s2  abd soft nontender  ext no edema  skin warm dry    data  wbc 1390 hgb 9.1 plt 09655 anc 970 1.8 pct blasts  cr 1.8 AML  89 yo woman with MDS to AML day 6/7 venetoclax/azacytidine had transfusion yesterday developed chills this am. starting to feel better  pmh sh fh unchanged 7 pt ros chills otherwise neg  physical  comfortable  vs  t98.6 106 164/77 18 99 sat  lungs clear  cor s1s2  abd soft nontender  ext no edema  skin warm dry    data  wbc 1390 hgb 9.1 plt 61310 anc 970 1.8 pct blasts  cr 1.8 AML  89 yo woman with MDS to AML day 6/7 venetoclax/azacytidine had transfusion yesterday developed chills this am. starting to feel better  pmh sh fh unchanged 7 pt ros chills otherwise neg  physical  comfortable  vs  t98.6 106 164/77 18 99 sat  lungs clear  cor s1s2  abd soft nontender  ext no edema  skin warm dry    data  wbc 1390 hgb 9.1 plt 17946 anc 970 1.8 pct blasts  cr 1.8

## 2023-12-03 NOTE — ADVANCED PRACTICE NURSE CONSULT - ASSESSMENT
Patient seen in bed a/ox4,denies any discomfort at this time. Chemotherapy teachings done. Pt. verbalized understanding. Pt. has right upper arm PICC, site with no redness, swelling or pain placed today by IR. Placement confirmed by chest x-ray. With positive blood return noted and flushing easily with 10 ml NS. Dressing is clean, dry, and intact. Drug verification done by 2 RN.'s. Lab values reviewed by Dr. Rai. Patient received zofran 16 mg  IVPB as premedications. 1546, azaCITIDine IVPB (eMAR) [Ordered as VIDAZA IVPB (eMAR)] - 120 milliGRAM(s) in sodium chloride 0.9% 50 milliLiter(s), IV Intermittent, daily, infuse over 10 Minute(s), infuse at 372 mL/Hr, Stop After 7 DosesThis is a High Alert Medication. Hung as a secondary line. Left pt comfortable in bed. Primary RN aware of present treatment. Safety maintained.  
Patient seen in bed a/ox4,denies any discomfort at this time. Chemotherapy teachings done. Pt. verbalized understanding. Pt. has right upper arm PICC, site with no redness, swelling or pain placed today by IR. Placement confirmed by chest x-ray. With positive blood return noted and flushing easily with 10 ml NS. Dressing is clean, dry, and intact. Drug verification done by 2 RN.'s. Lab values reviewed by Dr. Rai. Patient received zofran 16 mg  IVPB as premedications. 1628, azaCITIDine IVPB (eMAR) [Ordered as VIDAZA IVPB (eMAR)] - 120 milliGRAM(s) in sodium chloride 0.9% 50 milliLiter(s), IV Intermittent, daily, infuse over 10 Minute(s), infuse at 372 mL/Hr, Stop After 7 DosesThis is a High Alert Medication. Hung as a secondary line. Pt only recieved 15mL due to bag expiring. Eliana Hernandez NP aware. New order given and rest of dose given. At 1724,  azaCITIDine IVPB (eMAR) [Known as VIDAZA IVPB (eMAR)] - Start Date: 29-Nov-2023  91 milliGRAM(s) in sodium chloride 0.9% 50 milliLiter(s), IV Intermittent, once, infuse over 10 Minute(s), infuse at 355 mL/Hr, Stop After 1 Doses, was given through red lumen hung as a secondary via alaris pump. Left pt comfortable in bed. Primary RN aware of present treatment. Safety maintained.  
Patient seen in bed a/ox4,denies any discomfort at this time. Chemotherapy teachings done. Pt. verbalized understanding. Pt. has right upper arm PICC, site with no redness, swelling or pain. Placement confirmed by chest x-ray. With positive blood return noted and flushing easily with 10 ml NS. Dressing is clean, dry, and intact. Drug verification done by 2 RN.'s. Lab values reviewed by Dr. Rai. Patient received zofran 16 mg  IVPB as premedications. At 1557, azaCITIDine IVPB (eMAR) [Ordered as VIDAZA IVPB (eMAR)] - 120 milliGRAM(s) in sodium chloride 0.9% 50 milliLiter(s), IV Intermittent, daily, infuse over 10 Minute(s), infused at 372 mL/Hr, hung as a secondary via alaris pump. Left pt comfortable in bed. Primary RN aware of present treatment. Safety maintained.  
Patient seen in bed a/ox4,denies any discomfort at this time. Chemotherapy teachings done. Pt. verbalized understanding. Pt. has right upper arm PICC, site with no redness, swelling or pain. Placement confirmed by chest x-ray. With positive blood return noted and flushing easily with 10 ml NS. Dressing is clean, dry, and intact. Drug verification done by 2 RN.'s. Lab values reviewed by Dr. Rai. Patient received zofran 16 mg  IVPB as premedications. 1556, azaCITIDine IVPB (eMAR) [Ordered as VIDAZA IVPB (eMAR)] - 120 milliGRAM(s) in sodium chloride 0.9% 50 milliLiter(s), IV Intermittent, daily, infuse over 10 Minute(s), infused at 372 mL/Hr, hung as a secondary via alaris pump. Left pt comfortable in bed. Primary RN aware of present treatment. Safety maintained.  
Patient seen in bed a/ox4,denies any discomfort at this time. Chemotherapy teachings done. Pt. verbalized understanding. Pt. with right upper arm PICC, site with no redness, swelling or pain. Placement confirmed by chest x-ray. With positive blood return noted and flushing easily with 10 ml NS. Dressing is clean, dry, and intact. Drug verification done by 2 RN.'s. Lab values reviewed by Dr. Muñoz and team during rounds, ok to continue treatment. Patient received zofran 16 mg  IVPB as premedication. At 1546 started day 7, azaCITIDine IVPB (eMAR) [Ordered as VIDAZA IVPB (eMAR)] - 120 milliGRAM(s) in sodium chloride 0.9% 50 milliLiter(s), IV Intermittent, daily, infuse over 10 Minute(s), infused at 372 mL/Hr, attached as a secondary into primary NS line into red lumen via alaris pump. Left pt comfortable in bed. Primary RN aware of present treatment. Safety maintained. Nursing care on-going.   
Pt. seen in bed a/ox4,denies any discomfort at this time. Chemotherapy teachings done.Pt. verbalized understanding. Pt. has right lower arm PIV G #22,site with no redness,swelling or pain .With positive blood return noted and flushing easily with 10 ml NS inserted by primary RN  .Dsg dry and intact . Drug verification done by 2 RN.'s. Lab. values reviewed by Dr. Hodge prior to signing orders. Pt. received zofran 16 mg  IVSS as premedications. 1529,azaCITIDine IVPB (eMAR) [Ordered as VIDAZA IVPB (eMAR)] - Start Date: 27-Nov-2023  120 milliGRAM(s) in sodium chloride 0.9% 50 milliLiter(s), IV Intermittent, daily, infuse over 10 Minute(s), infuse at 372 mL/Hr, Stop After 7 Doses  Administration Instructions: This is a Look-alike/Sound-alike Medication  CAUTION: HAZARDOUS DRUG  This is a High Alert Medication. Runned a a secondary line .Left pt comfortable in bed.Primary RN aware of present treatment . Safety maintained .  
Patient seen in bed a/ox4,denies any discomfort at this time. Chemotherapy teachings done. Pt. verbalized understanding. Pt. with right upper arm PICC, site with no redness, swelling or pain. Placement confirmed by chest x-ray. With positive blood return noted and flushing easily with 10 ml NS. Dressing is clean, dry, and intact. Drug verification done by 2 RN.'s. Lab values reviewed by Dr. Muñoz and team during rounds, ok to start treatment. Patient received zofran 16 mg  IVPB as premedication. At 1621 started day 6, azaCITIDine IVPB (eMAR) [Ordered as VIDAZA IVPB (eMAR)] - 120 milliGRAM(s) in sodium chloride 0.9% 50 milliLiter(s), IV Intermittent, daily, infuse over 10 Minute(s), infused at 372 mL/Hr, attached as a secondary into primary NS line into red lumen via alaris pump. Left pt comfortable in bed. Primary RN aware of present treatment. Safety maintained. Nursing care on-going.

## 2023-12-03 NOTE — PROGRESS NOTE ADULT - SUBJECTIVE AND OBJECTIVE BOX
Patient is a 89y old  Female who presents with a chief complaint of elective chemotherapy (03 Dec 2023 10:33)      DATE OF SERVICE: 12-03-23 @ 11:53    SUBJECTIVE / OVERNIGHT EVENTS: overnight events noted    ROS:  Resp: No cough no sputum production  CVS: No chest pain no palpitations no orthopnea  GI: no N/V/D  : no dysuria, no hematuria  "I felt cold this AM"        MEDICATIONS  (STANDING):  acyclovir   Oral Tab/Cap 200 milliGRAM(s) Oral two times a day  allopurinol 300 milliGRAM(s) Oral daily  amoxicillin  250 milliGRAM(s)/clavulanate 1 Tablet(s) Oral at bedtime  atorvastatin 40 milliGRAM(s) Oral at bedtime  azaCITIDine IVPB (eMAR) 120 milliGRAM(s) IV Intermittent daily  Biotene Dry Mouth Oral Rinse 5 milliLiter(s) Swish and Spit four times a day  chlorhexidine 4% Liquid 1 Application(s) Topical <User Schedule>  fluconAZOLE   Tablet 200 milliGRAM(s) Oral daily  losartan 50 milliGRAM(s) Oral daily  loteprednol 0.5% Ophthalmic Suspension 1 Drop(s) Right EYE daily  loteprednol 0.5% Ophthalmic Suspension 1 Drop(s) Right EYE at bedtime  metoprolol succinate ER 50 milliGRAM(s) Oral daily  ondansetron  IVPB 16 milliGRAM(s) IV Intermittent daily  potassium phosphate / sodium phosphate Tablet (K-PHOS No. 2) 2 Tablet(s) Oral once  sodium chloride 0.9%. 1000 milliLiter(s) (50 mL/Hr) IV Continuous <Continuous>  venetoclax 100 milliGRAM(s) Oral <User Schedule>    MEDICATIONS  (PRN):  acetaminophen     Tablet .. 650 milliGRAM(s) Oral every 6 hours PRN Temp greater or equal to 38C (100.4F), Mild Pain (1 - 3)  ALPRAZolam 0.125 milliGRAM(s) Oral daily PRN for anxiety  loratadine 10 milliGRAM(s) Oral daily PRN allergies  senna 2 Tablet(s) Oral at bedtime PRN Constipation  sodium chloride 0.9% lock flush 10 milliLiter(s) IV Push every 1 hour PRN Pre/post blood products, medications, blood draw, and to maintain line patency        CAPILLARY BLOOD GLUCOSE        I&O's Summary    02 Dec 2023 07:01  -  03 Dec 2023 07:00  --------------------------------------------------------  IN: 800 mL / OUT: 1250 mL / NET: -450 mL        Vital Signs Last 24 Hrs  T(C): 37.6 (03 Dec 2023 10:37), Max: 37.6 (03 Dec 2023 10:37)  T(F): 99.6 (03 Dec 2023 10:37), Max: 99.6 (03 Dec 2023 10:37)  HR: 102 (03 Dec 2023 10:37) (85 - 106)  BP: 153/82 (03 Dec 2023 10:37) (118/63 - 179/84)  BP(mean): --  RR: 18 (03 Dec 2023 10:37) (18 - 18)  SpO2: 98% (03 Dec 2023 10:37) (97% - 99%)      PHYSICAL EXAM:   Neck: Supple  Lungs: no wheeze  CVS: S1 S2 no M/R/G  Abdomen: no tenderness  Neuro: AO x 3 nonfocal   Ext: no edema    LABS:                        9.1    1.39  )-----------( 61       ( 03 Dec 2023 06:51 )             29.9     12-03    137  |  104  |  13  ----------------------------<  99  3.9   |  22  |  0.81    Ca    8.9      03 Dec 2023 06:49  Phos  2.3     12-03  Mg     1.7     12-03    TPro  6.2  /  Alb  3.1<L>  /  TBili  1.0  /  DBili  x   /  AST  19  /  ALT  13  /  AlkPhos  51  12-03          Urinalysis Basic - ( 03 Dec 2023 06:49 )    Color: x / Appearance: x / SG: x / pH: x  Gluc: 99 mg/dL / Ketone: x  / Bili: x / Urobili: x   Blood: x / Protein: x / Nitrite: x   Leuk Esterase: x / RBC: x / WBC x   Sq Epi: x / Non Sq Epi: x / Bacteria: x          All consultant(s) notes reviewed and care discussed with other providers        Contact Number, Dr Yen [phone] Patient is a 89y old  Female who presents with a chief complaint of elective chemotherapy (03 Dec 2023 10:33)      DATE OF SERVICE: 12-03-23 @ 11:53    SUBJECTIVE / OVERNIGHT EVENTS: overnight events noted    ROS:  Resp: No cough no sputum production  CVS: No chest pain no palpitations no orthopnea  GI: no N/V/D  : no dysuria, no hematuria  "I felt cold this AM"        MEDICATIONS  (STANDING):  acyclovir   Oral Tab/Cap 200 milliGRAM(s) Oral two times a day  allopurinol 300 milliGRAM(s) Oral daily  amoxicillin  250 milliGRAM(s)/clavulanate 1 Tablet(s) Oral at bedtime  atorvastatin 40 milliGRAM(s) Oral at bedtime  azaCITIDine IVPB (eMAR) 120 milliGRAM(s) IV Intermittent daily  Biotene Dry Mouth Oral Rinse 5 milliLiter(s) Swish and Spit four times a day  chlorhexidine 4% Liquid 1 Application(s) Topical <User Schedule>  fluconAZOLE   Tablet 200 milliGRAM(s) Oral daily  losartan 50 milliGRAM(s) Oral daily  loteprednol 0.5% Ophthalmic Suspension 1 Drop(s) Right EYE daily  loteprednol 0.5% Ophthalmic Suspension 1 Drop(s) Right EYE at bedtime  metoprolol succinate ER 50 milliGRAM(s) Oral daily  ondansetron  IVPB 16 milliGRAM(s) IV Intermittent daily  potassium phosphate / sodium phosphate Tablet (K-PHOS No. 2) 2 Tablet(s) Oral once  sodium chloride 0.9%. 1000 milliLiter(s) (50 mL/Hr) IV Continuous <Continuous>  venetoclax 100 milliGRAM(s) Oral <User Schedule>    MEDICATIONS  (PRN):  acetaminophen     Tablet .. 650 milliGRAM(s) Oral every 6 hours PRN Temp greater or equal to 38C (100.4F), Mild Pain (1 - 3)  ALPRAZolam 0.125 milliGRAM(s) Oral daily PRN for anxiety  loratadine 10 milliGRAM(s) Oral daily PRN allergies  senna 2 Tablet(s) Oral at bedtime PRN Constipation  sodium chloride 0.9% lock flush 10 milliLiter(s) IV Push every 1 hour PRN Pre/post blood products, medications, blood draw, and to maintain line patency        CAPILLARY BLOOD GLUCOSE        I&O's Summary    02 Dec 2023 07:01  -  03 Dec 2023 07:00  --------------------------------------------------------  IN: 800 mL / OUT: 1250 mL / NET: -450 mL        Vital Signs Last 24 Hrs  T(C): 37.6 (03 Dec 2023 10:37), Max: 37.6 (03 Dec 2023 10:37)  T(F): 99.6 (03 Dec 2023 10:37), Max: 99.6 (03 Dec 2023 10:37)  HR: 102 (03 Dec 2023 10:37) (85 - 106)  BP: 153/82 (03 Dec 2023 10:37) (118/63 - 179/84)  BP(mean): --  RR: 18 (03 Dec 2023 10:37) (18 - 18)  SpO2: 98% (03 Dec 2023 10:37) (97% - 99%)      PHYSICAL EXAM:   Neck: Supple  Lungs: no wheeze  CVS: S1 S2 no M/R/G  Abdomen: no tenderness  Neuro: AO x 3 nonfocal   Ext: no edema    LABS:                        9.1    1.39  )-----------( 61       ( 03 Dec 2023 06:51 )             29.9     12-03    137  |  104  |  13  ----------------------------<  99  3.9   |  22  |  0.81    Ca    8.9      03 Dec 2023 06:49  Phos  2.3     12-03  Mg     1.7     12-03    TPro  6.2  /  Alb  3.1<L>  /  TBili  1.0  /  DBili  x   /  AST  19  /  ALT  13  /  AlkPhos  51  12-03          Urinalysis Basic - ( 03 Dec 2023 06:49 )    Color: x / Appearance: x / SG: x / pH: x  Gluc: 99 mg/dL / Ketone: x  / Bili: x / Urobili: x   Blood: x / Protein: x / Nitrite: x   Leuk Esterase: x / RBC: x / WBC x   Sq Epi: x / Non Sq Epi: x / Bacteria: x          All consultant(s) notes reviewed and care discussed with other providers        Contact Number, Dr Yen [phone] Patient is a 89y old  Female who presents with a chief complaint of elective chemotherapy (03 Dec 2023 10:33)      DATE OF SERVICE: 12-03-23 @ 11:53    SUBJECTIVE / OVERNIGHT EVENTS: overnight events noted    ROS:  Resp: No cough no sputum production  CVS: No chest pain no palpitations no orthopnea  GI: no N/V/D  : no dysuria, no hematuria  "I felt cold this AM"        MEDICATIONS  (STANDING):  acyclovir   Oral Tab/Cap 200 milliGRAM(s) Oral two times a day  allopurinol 300 milliGRAM(s) Oral daily  amoxicillin  250 milliGRAM(s)/clavulanate 1 Tablet(s) Oral at bedtime  atorvastatin 40 milliGRAM(s) Oral at bedtime  azaCITIDine IVPB (eMAR) 120 milliGRAM(s) IV Intermittent daily  Biotene Dry Mouth Oral Rinse 5 milliLiter(s) Swish and Spit four times a day  chlorhexidine 4% Liquid 1 Application(s) Topical <User Schedule>  fluconAZOLE   Tablet 200 milliGRAM(s) Oral daily  losartan 50 milliGRAM(s) Oral daily  loteprednol 0.5% Ophthalmic Suspension 1 Drop(s) Right EYE daily  loteprednol 0.5% Ophthalmic Suspension 1 Drop(s) Right EYE at bedtime  metoprolol succinate ER 50 milliGRAM(s) Oral daily  ondansetron  IVPB 16 milliGRAM(s) IV Intermittent daily  potassium phosphate / sodium phosphate Tablet (K-PHOS No. 2) 2 Tablet(s) Oral once  sodium chloride 0.9%. 1000 milliLiter(s) (50 mL/Hr) IV Continuous <Continuous>  venetoclax 100 milliGRAM(s) Oral <User Schedule>    MEDICATIONS  (PRN):  acetaminophen     Tablet .. 650 milliGRAM(s) Oral every 6 hours PRN Temp greater or equal to 38C (100.4F), Mild Pain (1 - 3)  ALPRAZolam 0.125 milliGRAM(s) Oral daily PRN for anxiety  loratadine 10 milliGRAM(s) Oral daily PRN allergies  senna 2 Tablet(s) Oral at bedtime PRN Constipation  sodium chloride 0.9% lock flush 10 milliLiter(s) IV Push every 1 hour PRN Pre/post blood products, medications, blood draw, and to maintain line patency        CAPILLARY BLOOD GLUCOSE        I&O's Summary    02 Dec 2023 07:01  -  03 Dec 2023 07:00  --------------------------------------------------------  IN: 800 mL / OUT: 1250 mL / NET: -450 mL        Vital Signs Last 24 Hrs  T(C): 37.6 (03 Dec 2023 10:37), Max: 37.6 (03 Dec 2023 10:37)  T(F): 99.6 (03 Dec 2023 10:37), Max: 99.6 (03 Dec 2023 10:37)  HR: 102 (03 Dec 2023 10:37) (85 - 106)  BP: 153/82 (03 Dec 2023 10:37) (118/63 - 179/84)  BP(mean): --  RR: 18 (03 Dec 2023 10:37) (18 - 18)  SpO2: 98% (03 Dec 2023 10:37) (97% - 99%)      PHYSICAL EXAM:   Neck: Supple  Lungs: no wheeze  CVS: S1 S2 no M/R/G  Abdomen: no tenderness  Neuro: AO x 3 nonfocal   Ext: no edema    LABS:                        9.1    1.39  )-----------( 61       ( 03 Dec 2023 06:51 )             29.9     12-03    137  |  104  |  13  ----------------------------<  99  3.9   |  22  |  0.81    Ca    8.9      03 Dec 2023 06:49  Phos  2.3     12-03  Mg     1.7     12-03    TPro  6.2  /  Alb  3.1<L>  /  TBili  1.0  /  DBili  x   /  AST  19  /  ALT  13  /  AlkPhos  51  12-03          Urinalysis Basic - ( 03 Dec 2023 06:49 )    Color: x / Appearance: x / SG: x / pH: x  Gluc: 99 mg/dL / Ketone: x  / Bili: x / Urobili: x   Blood: x / Protein: x / Nitrite: x   Leuk Esterase: x / RBC: x / WBC x   Sq Epi: x / Non Sq Epi: x / Bacteria: x          All consultant(s) notes reviewed and care discussed with other providers        Contact Number, Dr Yen [phone]

## 2023-12-03 NOTE — [facility]ED PRACTICE NURSE CONSULT - REASON FOR CONSULT
Chemo Notes : Day 1/VIALE-A,cycle 1 ,consent obtained ,on file
Chemo Notes : Day 5/7 VIALE-A,cycle 1 ,consent obtained ,on file
Chemo Notes : Day 6/7 Vidaza IV, consent obtained ,on file
Chemo Notes : Day 2/VIALE-A,cycle 1 ,consent obtained ,on file
Chemo Notes : Day 3/VIALE-A,cycle 1 ,consent obtained ,on file
Chemo Notes : Day 4/7 VIALE-A,cycle 1 ,consent obtained ,on file
Chemo Notes : Day 7/7 Jeannine JACINTO, consent in chart

## 2023-12-04 LAB
ALBUMIN SERPL ELPH-MCNC: 3.1 G/DL — LOW (ref 3.3–5)
ALBUMIN SERPL ELPH-MCNC: 3.1 G/DL — LOW (ref 3.3–5)
ALP SERPL-CCNC: 54 U/L — SIGNIFICANT CHANGE UP (ref 40–120)
ALP SERPL-CCNC: 54 U/L — SIGNIFICANT CHANGE UP (ref 40–120)
ALT FLD-CCNC: 15 U/L — SIGNIFICANT CHANGE UP (ref 10–45)
ALT FLD-CCNC: 15 U/L — SIGNIFICANT CHANGE UP (ref 10–45)
ANION GAP SERPL CALC-SCNC: 9 MMOL/L — SIGNIFICANT CHANGE UP (ref 5–17)
ANION GAP SERPL CALC-SCNC: 9 MMOL/L — SIGNIFICANT CHANGE UP (ref 5–17)
AST SERPL-CCNC: 18 U/L — SIGNIFICANT CHANGE UP (ref 10–40)
AST SERPL-CCNC: 18 U/L — SIGNIFICANT CHANGE UP (ref 10–40)
BASOPHILS # BLD AUTO: 0.01 K/UL — SIGNIFICANT CHANGE UP (ref 0–0.2)
BASOPHILS # BLD AUTO: 0.01 K/UL — SIGNIFICANT CHANGE UP (ref 0–0.2)
BASOPHILS NFR BLD AUTO: 0.9 % — SIGNIFICANT CHANGE UP (ref 0–2)
BASOPHILS NFR BLD AUTO: 0.9 % — SIGNIFICANT CHANGE UP (ref 0–2)
BILIRUB SERPL-MCNC: 1.1 MG/DL — SIGNIFICANT CHANGE UP (ref 0.2–1.2)
BILIRUB SERPL-MCNC: 1.1 MG/DL — SIGNIFICANT CHANGE UP (ref 0.2–1.2)
BLASTS # FLD: 0.9 % — HIGH (ref 0–0)
BLASTS # FLD: 0.9 % — HIGH (ref 0–0)
BUN SERPL-MCNC: 11 MG/DL — SIGNIFICANT CHANGE UP (ref 7–23)
BUN SERPL-MCNC: 11 MG/DL — SIGNIFICANT CHANGE UP (ref 7–23)
CALCIUM SERPL-MCNC: 9.1 MG/DL — SIGNIFICANT CHANGE UP (ref 8.4–10.5)
CALCIUM SERPL-MCNC: 9.1 MG/DL — SIGNIFICANT CHANGE UP (ref 8.4–10.5)
CHLORIDE SERPL-SCNC: 101 MMOL/L — SIGNIFICANT CHANGE UP (ref 96–108)
CHLORIDE SERPL-SCNC: 101 MMOL/L — SIGNIFICANT CHANGE UP (ref 96–108)
CO2 SERPL-SCNC: 25 MMOL/L — SIGNIFICANT CHANGE UP (ref 22–31)
CO2 SERPL-SCNC: 25 MMOL/L — SIGNIFICANT CHANGE UP (ref 22–31)
CREAT SERPL-MCNC: 0.88 MG/DL — SIGNIFICANT CHANGE UP (ref 0.5–1.3)
CREAT SERPL-MCNC: 0.88 MG/DL — SIGNIFICANT CHANGE UP (ref 0.5–1.3)
EGFR: 62 ML/MIN/1.73M2 — SIGNIFICANT CHANGE UP
EGFR: 62 ML/MIN/1.73M2 — SIGNIFICANT CHANGE UP
EOSINOPHIL # BLD AUTO: 0 K/UL — SIGNIFICANT CHANGE UP (ref 0–0.5)
EOSINOPHIL # BLD AUTO: 0 K/UL — SIGNIFICANT CHANGE UP (ref 0–0.5)
EOSINOPHIL NFR BLD AUTO: 0 % — SIGNIFICANT CHANGE UP (ref 0–6)
EOSINOPHIL NFR BLD AUTO: 0 % — SIGNIFICANT CHANGE UP (ref 0–6)
GLUCOSE SERPL-MCNC: 108 MG/DL — HIGH (ref 70–99)
GLUCOSE SERPL-MCNC: 108 MG/DL — HIGH (ref 70–99)
HCT VFR BLD CALC: 28.5 % — LOW (ref 34.5–45)
HCT VFR BLD CALC: 28.5 % — LOW (ref 34.5–45)
HGB BLD-MCNC: 8.9 G/DL — LOW (ref 11.5–15.5)
HGB BLD-MCNC: 8.9 G/DL — LOW (ref 11.5–15.5)
LDH SERPL L TO P-CCNC: 317 U/L — HIGH (ref 50–242)
LDH SERPL L TO P-CCNC: 317 U/L — HIGH (ref 50–242)
LYMPHOCYTES # BLD AUTO: 0.47 K/UL — LOW (ref 1–3.3)
LYMPHOCYTES # BLD AUTO: 0.47 K/UL — LOW (ref 1–3.3)
LYMPHOCYTES # BLD AUTO: 28.9 % — SIGNIFICANT CHANGE UP (ref 13–44)
LYMPHOCYTES # BLD AUTO: 28.9 % — SIGNIFICANT CHANGE UP (ref 13–44)
MAGNESIUM SERPL-MCNC: 1.7 MG/DL — SIGNIFICANT CHANGE UP (ref 1.6–2.6)
MAGNESIUM SERPL-MCNC: 1.7 MG/DL — SIGNIFICANT CHANGE UP (ref 1.6–2.6)
MANUAL SMEAR VERIFICATION: SIGNIFICANT CHANGE UP
MANUAL SMEAR VERIFICATION: SIGNIFICANT CHANGE UP
MCHC RBC-ENTMCNC: 31 PG — SIGNIFICANT CHANGE UP (ref 27–34)
MCHC RBC-ENTMCNC: 31 PG — SIGNIFICANT CHANGE UP (ref 27–34)
MCHC RBC-ENTMCNC: 31.2 GM/DL — LOW (ref 32–36)
MCHC RBC-ENTMCNC: 31.2 GM/DL — LOW (ref 32–36)
MCV RBC AUTO: 99.3 FL — SIGNIFICANT CHANGE UP (ref 80–100)
MCV RBC AUTO: 99.3 FL — SIGNIFICANT CHANGE UP (ref 80–100)
MONOCYTES # BLD AUTO: 0 K/UL — SIGNIFICANT CHANGE UP (ref 0–0.9)
MONOCYTES # BLD AUTO: 0 K/UL — SIGNIFICANT CHANGE UP (ref 0–0.9)
MONOCYTES NFR BLD AUTO: 0 % — LOW (ref 2–14)
MONOCYTES NFR BLD AUTO: 0 % — LOW (ref 2–14)
NEUTROPHILS # BLD AUTO: 1.12 K/UL — LOW (ref 1.8–7.4)
NEUTROPHILS # BLD AUTO: 1.12 K/UL — LOW (ref 1.8–7.4)
NEUTROPHILS NFR BLD AUTO: 69.3 % — SIGNIFICANT CHANGE UP (ref 43–77)
NEUTROPHILS NFR BLD AUTO: 69.3 % — SIGNIFICANT CHANGE UP (ref 43–77)
NRBC # BLD: 4 /100 — HIGH (ref 0–0)
NRBC # BLD: 4 /100 — HIGH (ref 0–0)
PHOSPHATE SERPL-MCNC: 2.5 MG/DL — SIGNIFICANT CHANGE UP (ref 2.5–4.5)
PHOSPHATE SERPL-MCNC: 2.5 MG/DL — SIGNIFICANT CHANGE UP (ref 2.5–4.5)
PLAT MORPH BLD: NORMAL — SIGNIFICANT CHANGE UP
PLAT MORPH BLD: NORMAL — SIGNIFICANT CHANGE UP
PLATELET # BLD AUTO: 79 K/UL — LOW (ref 150–400)
PLATELET # BLD AUTO: 79 K/UL — LOW (ref 150–400)
POTASSIUM SERPL-MCNC: 3.5 MMOL/L — SIGNIFICANT CHANGE UP (ref 3.5–5.3)
POTASSIUM SERPL-MCNC: 3.5 MMOL/L — SIGNIFICANT CHANGE UP (ref 3.5–5.3)
POTASSIUM SERPL-SCNC: 3.5 MMOL/L — SIGNIFICANT CHANGE UP (ref 3.5–5.3)
POTASSIUM SERPL-SCNC: 3.5 MMOL/L — SIGNIFICANT CHANGE UP (ref 3.5–5.3)
PROT SERPL-MCNC: 6.2 G/DL — SIGNIFICANT CHANGE UP (ref 6–8.3)
PROT SERPL-MCNC: 6.2 G/DL — SIGNIFICANT CHANGE UP (ref 6–8.3)
RBC # BLD: 2.87 M/UL — LOW (ref 3.8–5.2)
RBC # BLD: 2.87 M/UL — LOW (ref 3.8–5.2)
RBC # FLD: 18.9 % — HIGH (ref 10.3–14.5)
RBC # FLD: 18.9 % — HIGH (ref 10.3–14.5)
RBC BLD AUTO: SIGNIFICANT CHANGE UP
RBC BLD AUTO: SIGNIFICANT CHANGE UP
SODIUM SERPL-SCNC: 135 MMOL/L — SIGNIFICANT CHANGE UP (ref 135–145)
SODIUM SERPL-SCNC: 135 MMOL/L — SIGNIFICANT CHANGE UP (ref 135–145)
URATE SERPL-MCNC: 2.3 MG/DL — LOW (ref 2.5–7)
URATE SERPL-MCNC: 2.3 MG/DL — LOW (ref 2.5–7)
WBC # BLD: 1.61 K/UL — LOW (ref 3.8–10.5)
WBC # BLD: 1.61 K/UL — LOW (ref 3.8–10.5)
WBC # FLD AUTO: 1.61 K/UL — LOW (ref 3.8–10.5)
WBC # FLD AUTO: 1.61 K/UL — LOW (ref 3.8–10.5)

## 2023-12-04 PROCEDURE — 99232 SBSQ HOSP IP/OBS MODERATE 35: CPT

## 2023-12-04 RX ORDER — ALPRAZOLAM 0.25 MG
0.12 TABLET ORAL DAILY
Refills: 0 | Status: DISCONTINUED | OUTPATIENT
Start: 2023-12-05 | End: 2023-12-06

## 2023-12-04 RX ADMIN — LOSARTAN POTASSIUM 50 MILLIGRAM(S): 100 TABLET, FILM COATED ORAL at 05:39

## 2023-12-04 RX ADMIN — Medication 300 MILLIGRAM(S): at 12:55

## 2023-12-04 RX ADMIN — FLUCONAZOLE 200 MILLIGRAM(S): 150 TABLET ORAL at 12:55

## 2023-12-04 RX ADMIN — CHLORHEXIDINE GLUCONATE 1 APPLICATION(S): 213 SOLUTION TOPICAL at 12:55

## 2023-12-04 RX ADMIN — Medication 5 MILLILITER(S): at 23:15

## 2023-12-04 RX ADMIN — LOTEPREDNOL ETABONATE 1 DROP(S): 2 SUSPENSION/ DROPS OPHTHALMIC at 12:55

## 2023-12-04 RX ADMIN — Medication 5 MILLILITER(S): at 17:06

## 2023-12-04 RX ADMIN — Medication 5 MILLILITER(S): at 05:39

## 2023-12-04 RX ADMIN — ATORVASTATIN CALCIUM 40 MILLIGRAM(S): 80 TABLET, FILM COATED ORAL at 22:08

## 2023-12-04 RX ADMIN — LOTEPREDNOL ETABONATE 1 DROP(S): 2 SUSPENSION/ DROPS OPHTHALMIC at 08:21

## 2023-12-04 RX ADMIN — VENETOCLAX 100 MILLIGRAM(S): 100 TABLET, FILM COATED ORAL at 17:06

## 2023-12-04 RX ADMIN — PIPERACILLIN AND TAZOBACTAM 25 GRAM(S): 4; .5 INJECTION, POWDER, LYOPHILIZED, FOR SOLUTION INTRAVENOUS at 11:12

## 2023-12-04 RX ADMIN — PIPERACILLIN AND TAZOBACTAM 25 GRAM(S): 4; .5 INJECTION, POWDER, LYOPHILIZED, FOR SOLUTION INTRAVENOUS at 01:04

## 2023-12-04 RX ADMIN — Medication 5 MILLILITER(S): at 12:55

## 2023-12-04 RX ADMIN — Medication 50 MILLIGRAM(S): at 05:38

## 2023-12-04 RX ADMIN — PIPERACILLIN AND TAZOBACTAM 25 GRAM(S): 4; .5 INJECTION, POWDER, LYOPHILIZED, FOR SOLUTION INTRAVENOUS at 18:32

## 2023-12-04 RX ADMIN — Medication 200 MILLIGRAM(S): at 05:39

## 2023-12-04 RX ADMIN — Medication 200 MILLIGRAM(S): at 17:06

## 2023-12-04 NOTE — PROGRESS NOTE ADULT - PROBLEM SELECTOR PLAN 1
low grade fever yesterday  pan-cultured  empiric piperacillin/tazobactam  ID follow up appreciated  follow recommendations   s/p PICC site clean nontender   continue to monitor

## 2023-12-04 NOTE — PROGRESS NOTE ADULT - SUBJECTIVE AND OBJECTIVE BOX
Chief Complaint:  FU AML    History of Present Illness:   feels ok, had chill earlier but no fever, no dyspnea, no increased congestion/cough, no n/v, no abd pain, no BM in a few days, no bleeding, no leg edema, no rash      MEDICATIONS  (STANDING):  acyclovir   Oral Tab/Cap 200 milliGRAM(s) Oral two times a day  allopurinol 300 milliGRAM(s) Oral daily  atorvastatin 40 milliGRAM(s) Oral at bedtime  Biotene Dry Mouth Oral Rinse 5 milliLiter(s) Swish and Spit four times a day  chlorhexidine 4% Liquid 1 Application(s) Topical <User Schedule>  fluconAZOLE   Tablet 200 milliGRAM(s) Oral daily  losartan 50 milliGRAM(s) Oral daily  loteprednol 0.5% Ophthalmic Suspension 1 Drop(s) Right EYE daily  loteprednol 0.5% Ophthalmic Suspension 1 Drop(s) Right EYE at bedtime  metoprolol succinate ER 50 milliGRAM(s) Oral daily  piperacillin/tazobactam IVPB.. 3.375 Gram(s) IV Intermittent every 8 hours  sodium chloride 0.9%. 1000 milliLiter(s) (50 mL/Hr) IV Continuous <Continuous>  venetoclax 100 milliGRAM(s) Oral <User Schedule>    MEDICATIONS  (PRN):  acetaminophen     Tablet .. 650 milliGRAM(s) Oral every 6 hours PRN Temp greater or equal to 38C (100.4F), Mild Pain (1 - 3)  ALPRAZolam 0.125 milliGRAM(s) Oral daily PRN for anxiety  loratadine 10 milliGRAM(s) Oral daily PRN allergies  senna 2 Tablet(s) Oral at bedtime PRN Constipation  sodium chloride 0.9% lock flush 10 milliLiter(s) IV Push every 1 hour PRN Pre/post blood products, medications, blood draw, and to maintain line patency      Allergies    No Known Allergies    Intolerances        Vital Signs Last 24 Hrs  T(C): 36.9 (04 Dec 2023 12:57), Max: 38 (03 Dec 2023 16:59)  T(F): 98.4 (04 Dec 2023 12:57), Max: 100.4 (03 Dec 2023 16:59)  HR: 91 (04 Dec 2023 09:49) (88 - 100)  BP: 133/62 (04 Dec 2023 09:49) (133/62 - 161/90)  BP(mean): --  RR: 18 (04 Dec 2023 09:49) (18 - 18)  SpO2: 98% (04 Dec 2023 09:49) (95% - 98%)    Parameters below as of 04 Dec 2023 09:49  Patient On (Oxygen Delivery Method): room air        PHYSICAL EXAM  General: adult in NAD  HEENT: clear oropharynx, anicteric sclera, pink conjunctiva  Neck: supple  CV: normal S1/S2  Lungs: clear to auscultation, no wheezes, no rales  Abdomen: soft non-tender  sl distended, no hepatosplenomegaly, positive bowel sounds  Ext: no clubbing cyanosis or edema  Skin: no rashes and no petechiae  Lymph Nodes: No LAD in  neck  Neuro: alert and oriented X 3, no focal deficits    LABS:                          8.9    1.61  )-----------( 79       ( 04 Dec 2023 06:41 )             28.5         Mean Cell Volume : 99.3 fl  Mean Cell Hemoglobin : 31.0 pg  Mean Cell Hemoglobin Concentration : 31.2 gm/dL  Auto Neutrophil # : 1.12 K/uL  Auto Lymphocyte # : 0.47 K/uL  Auto Monocyte # : 0.00 K/uL  Auto Eosinophil # : 0.00 K/uL  Auto Basophil # : 0.01 K/uL  Auto Neutrophil % : 69.3 %  Auto Lymphocyte % : 28.9 %  Auto Monocyte % : 0.0 %  Auto Eosinophil % : 0.0 %  Auto Basophil % : 0.9 %      Serial CBC's  12-04 @ 06:41  Hct-28.5 / Hgb-8.9 / Plat-79 / RBC-2.87 / WBC-1.61  Serial CBC's  12-03 @ 06:51  Hct-29.9 / Hgb-9.1 / Plat-61 / RBC-2.94 / WBC-1.39  Serial CBC's  12-02 @ 07:16  Hct-24.8 / Hgb-7.1 / Plat-50 / RBC-2.33 / WBC-1.29  Serial CBC's  12-01 @ 07:21  Hct-26.1 / Hgb-7.3 / Plat-49 / RBC-2.43 / WBC-1.36      12-04    135  |  101  |  11  ----------------------------<  108<H>  3.5   |  25  |  0.88    Ca    9.1      04 Dec 2023 06:39  Phos  2.5     12-04  Mg     1.7     12-04    TPro  6.2  /  Alb  3.1<L>  /  TBili  1.1  /  DBili  x   /  AST  18  /  ALT  15  /  AlkPhos  54  12-04          Vitamin B12, Serum: 857 pg/mL (11-28 @ 06:56)          12-04 @ 06:39    ldh1 --  haptoglobin --  CHARY--  uric acid2.3  12-03 @ 06:49    ldh1 --  haptoglobin --  CHARY--  uric acid3.3  12-02 @ 07:15    ldh1 --  haptoglobin --  CHARY--  uric acid3.6  12-01 @ 07:21    ldh1 --  haptoglobin --  CHARY--  uric acid3.8      Radiology:

## 2023-12-04 NOTE — PROGRESS NOTE ADULT - ASSESSMENT
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  - admitted to begin cycle 1 of azacitadine 75mg/m2 Day 1-7, Venetoclax dose modified and escalated, Day 1 10mg, Day 2 20mg, Day 3 50mg, day 4 and ongoing 100mg- consent obtained and placed in chart: pt tolerating well, for day 5 today  - hydration, no evidence of overload  - LDH sl elevated, uric acid normal- continue allopurinol, monitor TLS labs daily- uric acid remains normal  - prophylactic antiviral acyclovir 200 bid, prophylactic antifungal diflucan 200mg daily  - developed fever and Abx changed to Zosyn 12/3; RVP negative, CXR negative per ID  - PICC site ok  - monitor CBC- s/p 1 unit PRBC 12/2- Hg improved; ANC improved now >1000, plt improving    #congestion,  - pt feels c/w normal allergies, will order loratadine prn (therapeutic exchange for allegra)- pt not using  - CXR clear 12/3    #diarrhea- likely secondary to laxatives, has resolved

## 2023-12-04 NOTE — PROGRESS NOTE ADULT - SUBJECTIVE AND OBJECTIVE BOX
Patient is a 89y old  Female who presents with a chief complaint of elective chemotherapy (04 Dec 2023 14:41)      DATE OF SERVICE: 12-04-23 @ 18:06    SUBJECTIVE / OVERNIGHT EVENTS: overnight events noted    ROS:  Resp: No cough no sputum production  CVS: No chest pain no palpitations no orthopnea  GI: no N/V/D          MEDICATIONS  (STANDING):  acyclovir   Oral Tab/Cap 200 milliGRAM(s) Oral two times a day  allopurinol 300 milliGRAM(s) Oral daily  atorvastatin 40 milliGRAM(s) Oral at bedtime  Biotene Dry Mouth Oral Rinse 5 milliLiter(s) Swish and Spit four times a day  chlorhexidine 4% Liquid 1 Application(s) Topical <User Schedule>  fluconAZOLE   Tablet 200 milliGRAM(s) Oral daily  losartan 50 milliGRAM(s) Oral daily  loteprednol 0.5% Ophthalmic Suspension 1 Drop(s) Right EYE daily  loteprednol 0.5% Ophthalmic Suspension 1 Drop(s) Right EYE at bedtime  metoprolol succinate ER 50 milliGRAM(s) Oral daily  piperacillin/tazobactam IVPB.. 3.375 Gram(s) IV Intermittent every 8 hours  sodium chloride 0.9%. 1000 milliLiter(s) (50 mL/Hr) IV Continuous <Continuous>  venetoclax 100 milliGRAM(s) Oral <User Schedule>    MEDICATIONS  (PRN):  acetaminophen     Tablet .. 650 milliGRAM(s) Oral every 6 hours PRN Temp greater or equal to 38C (100.4F), Mild Pain (1 - 3)  ALPRAZolam 0.125 milliGRAM(s) Oral daily PRN for anxiety  loratadine 10 milliGRAM(s) Oral daily PRN allergies  senna 2 Tablet(s) Oral at bedtime PRN Constipation  sodium chloride 0.9% lock flush 10 milliLiter(s) IV Push every 1 hour PRN Pre/post blood products, medications, blood draw, and to maintain line patency        CAPILLARY BLOOD GLUCOSE        I&O's Summary    03 Dec 2023 07:01  -  04 Dec 2023 07:00  --------------------------------------------------------  IN: 1584.7 mL / OUT: 1950 mL / NET: -365.3 mL    04 Dec 2023 07:01  -  04 Dec 2023 18:06  --------------------------------------------------------  IN: 100 mL / OUT: 0 mL / NET: 100 mL        Vital Signs Last 24 Hrs  T(C): 36.8 (04 Dec 2023 17:02), Max: 37.1 (04 Dec 2023 13:52)  T(F): 98.2 (04 Dec 2023 17:02), Max: 98.8 (04 Dec 2023 13:52)  HR: 105 (04 Dec 2023 17:02) (88 - 106)  BP: 158/76 (04 Dec 2023 17:02) (119/65 - 161/90)  BP(mean): --  RR: 18 (04 Dec 2023 17:02) (18 - 18)  SpO2: 100% (04 Dec 2023 17:02) (95% - 100%)    PHYSICAL EXAM:   Neck: Supple  Lungs: no wheeze  CVS: S1 S2 no M/R/G  Abdomen: no tenderness  Neuro: AO x 3 nonfocal   Ext: no edema    LABS:                        8.9    1.61  )-----------( 79       ( 04 Dec 2023 06:41 )             28.5     12-04    135  |  101  |  11  ----------------------------<  108<H>  3.5   |  25  |  0.88    Ca    9.1      04 Dec 2023 06:39  Phos  2.5     12-04  Mg     1.7     12-04    TPro  6.2  /  Alb  3.1<L>  /  TBili  1.1  /  DBili  x   /  AST  18  /  ALT  15  /  AlkPhos  54  12-04          Urinalysis Basic - ( 04 Dec 2023 06:39 )    Color: x / Appearance: x / SG: x / pH: x  Gluc: 108 mg/dL / Ketone: x  / Bili: x / Urobili: x   Blood: x / Protein: x / Nitrite: x   Leuk Esterase: x / RBC: x / WBC x   Sq Epi: x / Non Sq Epi: x / Bacteria: x          All consultant(s) notes reviewed and care discussed with other providers        Contact Number, Dr Yen [phone] Patient is a 89y old  Female who presents with a chief complaint of elective chemotherapy (04 Dec 2023 14:41)      DATE OF SERVICE: 12-04-23 @ 18:06    SUBJECTIVE / OVERNIGHT EVENTS: overnight events noted    ROS:  Resp: No cough no sputum production  CVS: No chest pain no palpitations no orthopnea  GI: no N/V/D          MEDICATIONS  (STANDING):  acyclovir   Oral Tab/Cap 200 milliGRAM(s) Oral two times a day  allopurinol 300 milliGRAM(s) Oral daily  atorvastatin 40 milliGRAM(s) Oral at bedtime  Biotene Dry Mouth Oral Rinse 5 milliLiter(s) Swish and Spit four times a day  chlorhexidine 4% Liquid 1 Application(s) Topical <User Schedule>  fluconAZOLE   Tablet 200 milliGRAM(s) Oral daily  losartan 50 milliGRAM(s) Oral daily  loteprednol 0.5% Ophthalmic Suspension 1 Drop(s) Right EYE daily  loteprednol 0.5% Ophthalmic Suspension 1 Drop(s) Right EYE at bedtime  metoprolol succinate ER 50 milliGRAM(s) Oral daily  piperacillin/tazobactam IVPB.. 3.375 Gram(s) IV Intermittent every 8 hours  sodium chloride 0.9%. 1000 milliLiter(s) (50 mL/Hr) IV Continuous <Continuous>  venetoclax 100 milliGRAM(s) Oral <User Schedule>    MEDICATIONS  (PRN):  acetaminophen     Tablet .. 650 milliGRAM(s) Oral every 6 hours PRN Temp greater or equal to 38C (100.4F), Mild Pain (1 - 3)  ALPRAZolam 0.125 milliGRAM(s) Oral daily PRN for anxiety  loratadine 10 milliGRAM(s) Oral daily PRN allergies  senna 2 Tablet(s) Oral at bedtime PRN Constipation  sodium chloride 0.9% lock flush 10 milliLiter(s) IV Push every 1 hour PRN Pre/post blood products, medications, blood draw, and to maintain line patency        CAPILLARY BLOOD GLUCOSE        I&O's Summary    03 Dec 2023 07:01  -  04 Dec 2023 07:00  --------------------------------------------------------  IN: 1584.7 mL / OUT: 1950 mL / NET: -365.3 mL    04 Dec 2023 07:01  -  04 Dec 2023 18:06  --------------------------------------------------------  IN: 100 mL / OUT: 0 mL / NET: 100 mL        Vital Signs Last 24 Hrs  T(C): 36.8 (04 Dec 2023 17:02), Max: 37.1 (04 Dec 2023 13:52)  T(F): 98.2 (04 Dec 2023 17:02), Max: 98.8 (04 Dec 2023 13:52)  HR: 105 (04 Dec 2023 17:02) (88 - 106)  BP: 158/76 (04 Dec 2023 17:02) (119/65 - 161/90)  BP(mean): --  RR: 18 (04 Dec 2023 17:02) (18 - 18)  SpO2: 100% (04 Dec 2023 17:02) (95% - 100%)    PHYSICAL EXAM:   Neck: Supple  Lungs: no wheeze  CVS: S1 S2 no M/R/G  Abdomen: no tenderness  Neuro: AO x 3 nonfocal   Ext: no edema    LABS:                        8.9    1.61  )-----------( 79       ( 04 Dec 2023 06:41 )             28.5     12-04    135  |  101  |  11  ----------------------------<  108<H>  3.5   |  25  |  0.88    Ca    9.1      04 Dec 2023 06:39  Phos  2.5     12-04  Mg     1.7     12-04    TPro  6.2  /  Alb  3.1<L>  /  TBili  1.1  /  DBili  x   /  AST  18  /  ALT  15  /  AlkPhos  54  12-04          Urinalysis Basic - ( 04 Dec 2023 06:39 )    Color: x / Appearance: x / SG: x / pH: x  Gluc: 108 mg/dL / Ketone: x  / Bili: x / Urobili: x   Blood: x / Protein: x / Nitrite: x   Leuk Esterase: x / RBC: x / WBC x   Sq Epi: x / Non Sq Epi: x / Bacteria: x          All consultant(s) notes reviewed and care discussed with other providers        Contact Number, Dr Yen [phone]

## 2023-12-04 NOTE — PROGRESS NOTE ADULT - SUBJECTIVE AND OBJECTIVE BOX
[facility]  Division of Infectious Diseases  [phone]    Name: FUNMI ROMERO  Age: 89y  Gender: Female  MRN: [id number]    Interval History--  Notes reviewed.     Past Medical History--  Hypertension    Hyperlipidemia    Palpitations    GERD (gastroesophageal reflux disease)    Chronic UTI    Post corneal transplant    No significant past surgical history    S/P cataract surgery        For details regarding the patient's social history, family history, and other miscellaneous elements, please refer the initial infectious diseases consultation and/or the admitting history and physical examination for this admission.    Allergies    No Known Allergies    Intolerances        Medications--  Antibiotics:  acyclovir   Oral Tab/Cap 200 milliGRAM(s) Oral two times a day  fluconAZOLE   Tablet 200 milliGRAM(s) Oral daily  piperacillin/tazobactam IVPB.. 3.375 Gram(s) IV Intermittent every 8 hours    Immunologic:    Other:  acetaminophen     Tablet .. PRN  allopurinol  ALPRAZolam PRN  atorvastatin  Biotene Dry Mouth Oral Rinse  chlorhexidine 4% Liquid  loratadine PRN  losartan  loteprednol 0.5% Ophthalmic Suspension  loteprednol 0.5% Ophthalmic Suspension  metoprolol succinate ER  senna PRN  sodium chloride 0.9% lock flush PRN  sodium chloride 0.9%.  venetoclax      Review of Systems--  A 10-point review of systems was obtained.     Pertinent positives and negatives--  Constitutional: No fevers. No Chills. No Rigors.   Cardiovascular: No chest pain. No palpitations.  Respiratory: No shortness of breath. No cough.  Gastrointestinal: No nausea or vomiting. No diarrhea or constipation.   Psychiatric: Pleasant. Appropriate affect.    Review of systems otherwise negative except as previously noted.    Physical Examination--  Vital Signs: T(F): 98.1 (12-04-23 @ 05:47), Max: 100.4 (12-03-23 @ 16:59)  HR: 99 (12-04-23 @ 05:47)  BP: 161/90 (12-04-23 @ 05:47)  RR: 18 (12-04-23 @ 05:47)  SpO2: 95% (12-04-23 @ 05:47)  Wt(kg): --  General: Nontoxic-appearing Female in no acute distress.  HEENT: AT/NC. PERRL. EOMI. Anicteric. Conjunctiva pink and moist. Oropharynx clear. Dentition fair.  Neck: Not rigid. No sense of mass.  Nodes: None palpable.  Lungs: Clear bilaterally without rales, wheezing or rhonchi  Heart: Regular rate and rhythm. No Murmur. No rub. No gallop. No palpable thrill.  Abdomen: Bowel sounds present and normoactive. Soft. Nondistended. Nontender. No sense of mass. No organomegaly.  Back: No spinal tenderness. No costovertebral angle tenderness.   Extremities: No cyanosis or clubbing. No edema.   Skin: Warm. Dry. Good turgor. No rash. No vasculitic stigmata.  Psychiatric: Appropriate affect and mood for situation.         Laboratory Studies--  CBC                        8.9    1.61  )-----------( 79       ( 04 Dec 2023 06:41 )             28.5       Chemistries  12-04    135  |  101  |  11  ----------------------------<  108<H>  3.5   |  25  |  0.88    Ca    9.1      04 Dec 2023 06:39  Phos  2.5     12-04  Mg     1.7     12-04    TPro  6.2  /  Alb  3.1<L>  /  TBili  1.1  /  DBili  x   /  AST  18  /  ALT  15  /  AlkPhos  54  12-04      Culture Data           [facility]  Division of Infectious Diseases  [phone]    Name: FUNMI ROMERO  Age: 89y  Gender: Female  MRN: [id number]    Interval History--  Notes reviewed.     Past Medical History--  Hypertension    Hyperlipidemia    Palpitations    GERD (gastroesophageal reflux disease)    Chronic UTI    Post corneal transplant    No significant past surgical history    S/P cataract surgery        For details regarding the patient's social history, family history, and other miscellaneous elements, please refer the initial infectious diseases consultation and/or the admitting history and physical examination for this admission.    Allergies    No Known Allergies    Intolerances        Medications--  Antibiotics:  acyclovir   Oral Tab/Cap 200 milliGRAM(s) Oral two times a day  fluconAZOLE   Tablet 200 milliGRAM(s) Oral daily  piperacillin/tazobactam IVPB.. 3.375 Gram(s) IV Intermittent every 8 hours    Immunologic:    Other:  acetaminophen     Tablet .. PRN  allopurinol  ALPRAZolam PRN  atorvastatin  Biotene Dry Mouth Oral Rinse  chlorhexidine 4% Liquid  loratadine PRN  losartan  loteprednol 0.5% Ophthalmic Suspension  loteprednol 0.5% Ophthalmic Suspension  metoprolol succinate ER  senna PRN  sodium chloride 0.9% lock flush PRN  sodium chloride 0.9%.  venetoclax      Review of Systems--  A 10-point review of systems was obtained.     Pertinent positives and negatives--  Constitutional: No fevers. No Chills. No Rigors.   Cardiovascular: No chest pain. No palpitations.  Respiratory: No shortness of breath. No cough.  Gastrointestinal: No nausea or vomiting. No diarrhea or constipation.   Psychiatric: Pleasant. Appropriate affect.    Review of systems otherwise negative except as previously noted.    Physical Examination--  Vital Signs: T(F): 98.1 (12-04-23 @ 05:47), Max: 100.4 (12-03-23 @ 16:59)  HR: 99 (12-04-23 @ 05:47)  BP: 161/90 (12-04-23 @ 05:47)  RR: 18 (12-04-23 @ 05:47)  SpO2: 95% (12-04-23 @ 05:47)  Wt(kg): --  General: Nontoxic-appearing Female in no acute distress.  HEENT: AT/NC. PERRL. EOMI. Anicteric. Conjunctiva pink and moist. Oropharynx clear. Dentition fair.  Neck: Not rigid. No sense of mass.  Nodes: None palpable.  Lungs: Clear bilaterally without rales, wheezing or rhonchi  Heart: Regular rate and rhythm. No Murmur. No rub. No gallop. No palpable thrill.  Abdomen: Bowel sounds present and normoactive. Soft. Nondistended. Nontender. No sense of mass. No organomegaly.  Back: No spinal tenderness. No costovertebral angle tenderness.   Extremities: No cyanosis or clubbing. No edema.   Skin: Warm. Dry. Good turgor. No rash. No vasculitic stigmata.  Psychiatric: Appropriate affect and mood for situation.         Laboratory Studies--  CBC                        8.9    1.61  )-----------( 79       ( 04 Dec 2023 06:41 )             28.5       Chemistries  12-04    135  |  101  |  11  ----------------------------<  108<H>  3.5   |  25  |  0.88    Ca    9.1      04 Dec 2023 06:39  Phos  2.5     12-04  Mg     1.7     12-04    TPro  6.2  /  Alb  3.1<L>  /  TBili  1.1  /  DBili  x   /  AST  18  /  ALT  15  /  AlkPhos  54  12-04      Culture Data           [facility]  Division of Infectious Diseases  [phone]    Name: FUNMI ROMERO  Age: 89y  Gender: Female  MRN: [id number]    Interval History--  Notes reviewed. Seen this am. Dry cough with postnasal drip "...allergies ... I always get this when the weather changes." No HA or visual changes. No ear pain. No SIB or CP.   Low grade fever, Zosyn initiated.       Past Medical History--  Hypertension    Hyperlipidemia    Palpitations    GERD (gastroesophageal reflux disease)    Chronic UTI    Post corneal transplant    No significant past surgical history    S/P cataract surgery        For details regarding the patient's social history, family history, and other miscellaneous elements, please refer the initial infectious diseases consultation and/or the admitting history and physical examination for this admission.    Allergies    No Known Allergies    Intolerances        Medications--  Antibiotics:  acyclovir   Oral Tab/Cap 200 milliGRAM(s) Oral two times a day  fluconAZOLE   Tablet 200 milliGRAM(s) Oral daily  piperacillin/tazobactam IVPB.. 3.375 Gram(s) IV Intermittent every 8 hours    Immunologic:    Other:  acetaminophen     Tablet .. PRN  allopurinol  ALPRAZolam PRN  atorvastatin  Biotene Dry Mouth Oral Rinse  chlorhexidine 4% Liquid  loratadine PRN  losartan  loteprednol 0.5% Ophthalmic Suspension  loteprednol 0.5% Ophthalmic Suspension  metoprolol succinate ER  senna PRN  sodium chloride 0.9% lock flush PRN  sodium chloride 0.9%.  venetoclax      Review of Systems--  A 10-point review of systems was obtained.   Review of systems otherwise negative except as previously noted.    Physical Examination--  Vital Signs: T(F): 98.1 (12-04-23 @ 05:47), Max: 100.4 (12-03-23 @ 16:59)  HR: 99 (12-04-23 @ 05:47)  BP: 161/90 (12-04-23 @ 05:47)  RR: 18 (12-04-23 @ 05:47)  SpO2: 95% (12-04-23 @ 05:47)  Wt(kg): --  General: Nontoxic-appearing Female in no acute distress.  HEENT: AT/NC. Anicteric. Conjunctiva pink and moist. Oropharynx clear.   Neck: Not rigid. No sense of mass.  Nodes: None palpable.  Lungs: Clear bilaterally without rales, wheezing or rhonchi  Heart: Regular rate and rhythm.  Abdomen: Bowel sounds present and normoactive. Soft. Nondistended. Nontender.   Extremities: No cyanosis or clubbing. No edema.   Skin: Warm. Dry. Good turgor. No rash. No vasculitic stigmata.  Psychiatric: Appropriate affect and mood for situation.         Laboratory Studies--  CBC                        8.9    1.61  )-----------( 79       ( 04 Dec 2023 06:41 )             28.5       Chemistries  12-04    135  |  101  |  11  ----------------------------<  108<H>  3.5   |  25  |  0.88    Ca    9.1      04 Dec 2023 06:39  Phos  2.5     12-04  Mg     1.7     12-04    TPro  6.2  /  Alb  3.1<L>  /  TBili  1.1  /  DBili  x   /  AST  18  /  ALT  15  /  AlkPhos  54  12-04      Culture Data           [facility]  Division of Infectious Diseases  [phone]    Name: FUNMI ROMERO  Age: 89y  Gender: Female  MRN: [id number]    Interval History--  Notes reviewed. Seen this am. Dry cough with postnasal drip "...allergies ... I always get this when the weather changes." No HA or visual changes. No ear pain. No SIB or CP.   Low grade fever, Zosyn initiated.       Past Medical History--  Hypertension    Hyperlipidemia    Palpitations    GERD (gastroesophageal reflux disease)    Chronic UTI    Post corneal transplant    No significant past surgical history    S/P cataract surgery        For details regarding the patient's social history, family history, and other miscellaneous elements, please refer the initial infectious diseases consultation and/or the admitting history and physical examination for this admission.    Allergies    No Known Allergies    Intolerances        Medications--  Antibiotics:  acyclovir   Oral Tab/Cap 200 milliGRAM(s) Oral two times a day  fluconAZOLE   Tablet 200 milliGRAM(s) Oral daily  piperacillin/tazobactam IVPB.. 3.375 Gram(s) IV Intermittent every 8 hours    Immunologic:    Other:  acetaminophen     Tablet .. PRN  allopurinol  ALPRAZolam PRN  atorvastatin  Biotene Dry Mouth Oral Rinse  chlorhexidine 4% Liquid  loratadine PRN  losartan  loteprednol 0.5% Ophthalmic Suspension  loteprednol 0.5% Ophthalmic Suspension  metoprolol succinate ER  senna PRN  sodium chloride 0.9% lock flush PRN  sodium chloride 0.9%.  venetoclax      Review of Systems--  A 10-point review of systems was obtained.   Review of systems otherwise negative except as previously noted.    Physical Examination--  Vital Signs: T(F): 98.1 (12-04-23 @ 05:47), Max: 100.4 (12-03-23 @ 16:59)  HR: 99 (12-04-23 @ 05:47)  BP: 161/90 (12-04-23 @ 05:47)  RR: 18 (12-04-23 @ 05:47)  SpO2: 95% (12-04-23 @ 05:47)  Wt(kg): --  General: Nontoxic-appearing Female in no acute distress.  HEENT: AT/NC. Anicteric. Conjunctiva pink and moist. Oropharynx clear.   Neck: Not rigid. No sense of mass.  Nodes: None palpable.  Lungs: Clear bilaterally without rales, wheezing or rhonchi  Heart: Regular rate and rhythm.  Abdomen: Bowel sounds present and normoactive. Soft. Nondistended. Nontender.   Extremities: No cyanosis or clubbing. No edema.   Skin: Warm. Dry. Good turgor. No rash. No vasculitic stigmata.  Psychiatric: Appropriate affect and mood for situation.         Laboratory Studies--  CBC                        8.9    1.61  )-----------( 79       ( 04 Dec 2023 06:41 )             28.5       Chemistries  12-04    135  |  101  |  11  ----------------------------<  108<H>  3.5   |  25  |  0.88    Ca    9.1      04 Dec 2023 06:39  Phos  2.5     12-04  Mg     1.7     12-04    TPro  6.2  /  Alb  3.1<L>  /  TBili  1.1  /  DBili  x   /  AST  18  /  ALT  15  /  AlkPhos  54  12-04      Culture Data

## 2023-12-04 NOTE — PROGRESS NOTE ADULT - ASSESSMENT
Patient with frequent UTI, with improvement over the last 2 months but not entirely clear how much of the improvement is related to topical estrogen therapy, pessary being mpre effective in addressing functionalanatomic abnormalities, or antibiotics.  Patient presently asymptomatic.  I see no role to alter Augmentin 250-125 QD at this point in time    11/28: No concern of active infection on exam. Specifically, no urinary symptoms. Tolerating Augmentin without issues.   11/29: Still no urinary symptoms.   11/30: No concern for active or uncontrolled infection on the basis of examination.  12/1: Still no concern for active or uncontrolled infection at this time.   12/4: recurrent low grade fever, started on empiric antibiotics.    Suggestions  Continue Zosyn since initiated over the weekend.   F/U Cx  Monitor temperature curve  Will need to remain cognizant of increased risk of resistant mario if she were to develop fever/infection in the setting of chemotherapy.    Thaddeus Geronimo MD  Attending Physician  [facility]  Division of Infectious Diseases  [phone] Patient with frequent UTI, with improvement over the last 2 months but not entirely clear how much of the improvement is related to topical estrogen therapy, pessary being mpre effective in addressing functionalanatomic abnormalities, or antibiotics.  Patient presently asymptomatic.  I see no role to alter Augmentin 250-125 QD at this point in time    11/28: No concern of active infection on exam. Specifically, no urinary symptoms. Tolerating Augmentin without issues.   11/29: Still no urinary symptoms.   11/30: No concern for active or uncontrolled infection on the basis of examination.  12/1: Still no concern for active or uncontrolled infection at this time.   12/4: recurrent low grade fever, started on empiric antibiotics.    Suggestions  Continue Zosyn since initiated over the weekend.   F/U Cx  Monitor temperature curve  Will need to remain cognizant of increased risk of resistant mario if she were to develop fever/infection in the setting of chemotherapy.    Thaddeus Geronimo MD  Attending Physician  [facility]  Division of Infectious Diseases  [phone]

## 2023-12-05 LAB
ALBUMIN SERPL ELPH-MCNC: 3.3 G/DL — SIGNIFICANT CHANGE UP (ref 3.3–5)
ALBUMIN SERPL ELPH-MCNC: 3.3 G/DL — SIGNIFICANT CHANGE UP (ref 3.3–5)
ALP SERPL-CCNC: 58 U/L — SIGNIFICANT CHANGE UP (ref 40–120)
ALP SERPL-CCNC: 58 U/L — SIGNIFICANT CHANGE UP (ref 40–120)
ALT FLD-CCNC: 19 U/L — SIGNIFICANT CHANGE UP (ref 10–45)
ALT FLD-CCNC: 19 U/L — SIGNIFICANT CHANGE UP (ref 10–45)
ANION GAP SERPL CALC-SCNC: 9 MMOL/L — SIGNIFICANT CHANGE UP (ref 5–17)
ANION GAP SERPL CALC-SCNC: 9 MMOL/L — SIGNIFICANT CHANGE UP (ref 5–17)
AST SERPL-CCNC: 17 U/L — SIGNIFICANT CHANGE UP (ref 10–40)
AST SERPL-CCNC: 17 U/L — SIGNIFICANT CHANGE UP (ref 10–40)
BASOPHILS # BLD AUTO: 0.02 K/UL — SIGNIFICANT CHANGE UP (ref 0–0.2)
BASOPHILS # BLD AUTO: 0.02 K/UL — SIGNIFICANT CHANGE UP (ref 0–0.2)
BASOPHILS NFR BLD AUTO: 0.9 % — SIGNIFICANT CHANGE UP (ref 0–2)
BASOPHILS NFR BLD AUTO: 0.9 % — SIGNIFICANT CHANGE UP (ref 0–2)
BILIRUB SERPL-MCNC: 1.2 MG/DL — SIGNIFICANT CHANGE UP (ref 0.2–1.2)
BILIRUB SERPL-MCNC: 1.2 MG/DL — SIGNIFICANT CHANGE UP (ref 0.2–1.2)
BLD GP AB SCN SERPL QL: NEGATIVE — SIGNIFICANT CHANGE UP
BLD GP AB SCN SERPL QL: NEGATIVE — SIGNIFICANT CHANGE UP
BUN SERPL-MCNC: 12 MG/DL — SIGNIFICANT CHANGE UP (ref 7–23)
BUN SERPL-MCNC: 12 MG/DL — SIGNIFICANT CHANGE UP (ref 7–23)
CALCIUM SERPL-MCNC: 9.5 MG/DL — SIGNIFICANT CHANGE UP (ref 8.4–10.5)
CALCIUM SERPL-MCNC: 9.5 MG/DL — SIGNIFICANT CHANGE UP (ref 8.4–10.5)
CHLORIDE SERPL-SCNC: 98 MMOL/L — SIGNIFICANT CHANGE UP (ref 96–108)
CHLORIDE SERPL-SCNC: 98 MMOL/L — SIGNIFICANT CHANGE UP (ref 96–108)
CO2 SERPL-SCNC: 25 MMOL/L — SIGNIFICANT CHANGE UP (ref 22–31)
CO2 SERPL-SCNC: 25 MMOL/L — SIGNIFICANT CHANGE UP (ref 22–31)
CREAT SERPL-MCNC: 0.93 MG/DL — SIGNIFICANT CHANGE UP (ref 0.5–1.3)
CREAT SERPL-MCNC: 0.93 MG/DL — SIGNIFICANT CHANGE UP (ref 0.5–1.3)
EGFR: 58 ML/MIN/1.73M2 — LOW
EGFR: 58 ML/MIN/1.73M2 — LOW
EOSINOPHIL # BLD AUTO: 0 K/UL — SIGNIFICANT CHANGE UP (ref 0–0.5)
EOSINOPHIL # BLD AUTO: 0 K/UL — SIGNIFICANT CHANGE UP (ref 0–0.5)
EOSINOPHIL NFR BLD AUTO: 0 % — SIGNIFICANT CHANGE UP (ref 0–6)
EOSINOPHIL NFR BLD AUTO: 0 % — SIGNIFICANT CHANGE UP (ref 0–6)
GIANT PLATELETS BLD QL SMEAR: PRESENT — SIGNIFICANT CHANGE UP
GIANT PLATELETS BLD QL SMEAR: PRESENT — SIGNIFICANT CHANGE UP
GLUCOSE SERPL-MCNC: 103 MG/DL — HIGH (ref 70–99)
GLUCOSE SERPL-MCNC: 103 MG/DL — HIGH (ref 70–99)
HCT VFR BLD CALC: 27.8 % — LOW (ref 34.5–45)
HCT VFR BLD CALC: 27.8 % — LOW (ref 34.5–45)
HGB BLD-MCNC: 8.6 G/DL — LOW (ref 11.5–15.5)
HGB BLD-MCNC: 8.6 G/DL — LOW (ref 11.5–15.5)
LDH SERPL L TO P-CCNC: 319 U/L — HIGH (ref 50–242)
LDH SERPL L TO P-CCNC: 319 U/L — HIGH (ref 50–242)
LYMPHOCYTES # BLD AUTO: 0.5 K/UL — LOW (ref 1–3.3)
LYMPHOCYTES # BLD AUTO: 0.5 K/UL — LOW (ref 1–3.3)
LYMPHOCYTES # BLD AUTO: 28.9 % — SIGNIFICANT CHANGE UP (ref 13–44)
LYMPHOCYTES # BLD AUTO: 28.9 % — SIGNIFICANT CHANGE UP (ref 13–44)
MAGNESIUM SERPL-MCNC: 1.7 MG/DL — SIGNIFICANT CHANGE UP (ref 1.6–2.6)
MAGNESIUM SERPL-MCNC: 1.7 MG/DL — SIGNIFICANT CHANGE UP (ref 1.6–2.6)
MANUAL SMEAR VERIFICATION: SIGNIFICANT CHANGE UP
MANUAL SMEAR VERIFICATION: SIGNIFICANT CHANGE UP
MCHC RBC-ENTMCNC: 30.9 GM/DL — LOW (ref 32–36)
MCHC RBC-ENTMCNC: 30.9 GM/DL — LOW (ref 32–36)
MCHC RBC-ENTMCNC: 30.9 PG — SIGNIFICANT CHANGE UP (ref 27–34)
MCHC RBC-ENTMCNC: 30.9 PG — SIGNIFICANT CHANGE UP (ref 27–34)
MCV RBC AUTO: 100 FL — SIGNIFICANT CHANGE UP (ref 80–100)
MCV RBC AUTO: 100 FL — SIGNIFICANT CHANGE UP (ref 80–100)
MONOCYTES # BLD AUTO: 0.02 K/UL — SIGNIFICANT CHANGE UP (ref 0–0.9)
MONOCYTES # BLD AUTO: 0.02 K/UL — SIGNIFICANT CHANGE UP (ref 0–0.9)
MONOCYTES NFR BLD AUTO: 0.9 % — LOW (ref 2–14)
MONOCYTES NFR BLD AUTO: 0.9 % — LOW (ref 2–14)
NEUTROPHILS # BLD AUTO: 1.21 K/UL — LOW (ref 1.8–7.4)
NEUTROPHILS # BLD AUTO: 1.21 K/UL — LOW (ref 1.8–7.4)
NEUTROPHILS NFR BLD AUTO: 68.4 % — SIGNIFICANT CHANGE UP (ref 43–77)
NEUTROPHILS NFR BLD AUTO: 68.4 % — SIGNIFICANT CHANGE UP (ref 43–77)
NEUTS BAND # BLD: 0.9 % — SIGNIFICANT CHANGE UP (ref 0–8)
NEUTS BAND # BLD: 0.9 % — SIGNIFICANT CHANGE UP (ref 0–8)
NRBC # BLD: 1 /100 — HIGH (ref 0–0)
NRBC # BLD: 1 /100 — HIGH (ref 0–0)
PHOSPHATE SERPL-MCNC: 2.5 MG/DL — SIGNIFICANT CHANGE UP (ref 2.5–4.5)
PHOSPHATE SERPL-MCNC: 2.5 MG/DL — SIGNIFICANT CHANGE UP (ref 2.5–4.5)
PLAT MORPH BLD: ABNORMAL
PLAT MORPH BLD: ABNORMAL
PLATELET # BLD AUTO: 96 K/UL — LOW (ref 150–400)
PLATELET # BLD AUTO: 96 K/UL — LOW (ref 150–400)
POTASSIUM SERPL-MCNC: 3.5 MMOL/L — SIGNIFICANT CHANGE UP (ref 3.5–5.3)
POTASSIUM SERPL-MCNC: 3.5 MMOL/L — SIGNIFICANT CHANGE UP (ref 3.5–5.3)
POTASSIUM SERPL-SCNC: 3.5 MMOL/L — SIGNIFICANT CHANGE UP (ref 3.5–5.3)
POTASSIUM SERPL-SCNC: 3.5 MMOL/L — SIGNIFICANT CHANGE UP (ref 3.5–5.3)
PROT SERPL-MCNC: 6.5 G/DL — SIGNIFICANT CHANGE UP (ref 6–8.3)
PROT SERPL-MCNC: 6.5 G/DL — SIGNIFICANT CHANGE UP (ref 6–8.3)
RBC # BLD: 2.78 M/UL — LOW (ref 3.8–5.2)
RBC # BLD: 2.78 M/UL — LOW (ref 3.8–5.2)
RBC # FLD: 17.9 % — HIGH (ref 10.3–14.5)
RBC # FLD: 17.9 % — HIGH (ref 10.3–14.5)
RBC BLD AUTO: SIGNIFICANT CHANGE UP
RBC BLD AUTO: SIGNIFICANT CHANGE UP
RH IG SCN BLD-IMP: POSITIVE — SIGNIFICANT CHANGE UP
RH IG SCN BLD-IMP: POSITIVE — SIGNIFICANT CHANGE UP
SODIUM SERPL-SCNC: 132 MMOL/L — LOW (ref 135–145)
SODIUM SERPL-SCNC: 132 MMOL/L — LOW (ref 135–145)
URATE SERPL-MCNC: 2.1 MG/DL — LOW (ref 2.5–7)
URATE SERPL-MCNC: 2.1 MG/DL — LOW (ref 2.5–7)
WBC # BLD: 1.74 K/UL — LOW (ref 3.8–10.5)
WBC # BLD: 1.74 K/UL — LOW (ref 3.8–10.5)
WBC # FLD AUTO: 1.74 K/UL — LOW (ref 3.8–10.5)
WBC # FLD AUTO: 1.74 K/UL — LOW (ref 3.8–10.5)

## 2023-12-05 PROCEDURE — 93010 ELECTROCARDIOGRAM REPORT: CPT

## 2023-12-05 PROCEDURE — 99232 SBSQ HOSP IP/OBS MODERATE 35: CPT

## 2023-12-05 RX ORDER — POLYETHYLENE GLYCOL 3350 17 G/17G
17 POWDER, FOR SOLUTION ORAL DAILY
Refills: 0 | Status: DISCONTINUED | OUTPATIENT
Start: 2023-12-05 | End: 2023-12-06

## 2023-12-05 RX ORDER — LACTULOSE 10 G/15ML
10 SOLUTION ORAL EVERY 6 HOURS
Refills: 0 | Status: DISCONTINUED | OUTPATIENT
Start: 2023-12-05 | End: 2023-12-06

## 2023-12-05 RX ADMIN — Medication 5 MILLILITER(S): at 11:43

## 2023-12-05 RX ADMIN — LACTULOSE 10 GRAM(S): 10 SOLUTION ORAL at 15:59

## 2023-12-05 RX ADMIN — SENNA PLUS 2 TABLET(S): 8.6 TABLET ORAL at 08:12

## 2023-12-05 RX ADMIN — PIPERACILLIN AND TAZOBACTAM 25 GRAM(S): 4; .5 INJECTION, POWDER, LYOPHILIZED, FOR SOLUTION INTRAVENOUS at 17:08

## 2023-12-05 RX ADMIN — Medication 5 MILLILITER(S): at 05:08

## 2023-12-05 RX ADMIN — LOTEPREDNOL ETABONATE 1 DROP(S): 2 SUSPENSION/ DROPS OPHTHALMIC at 11:42

## 2023-12-05 RX ADMIN — LOSARTAN POTASSIUM 50 MILLIGRAM(S): 100 TABLET, FILM COATED ORAL at 05:08

## 2023-12-05 RX ADMIN — Medication 5 MILLILITER(S): at 17:08

## 2023-12-05 RX ADMIN — VENETOCLAX 100 MILLIGRAM(S): 100 TABLET, FILM COATED ORAL at 17:08

## 2023-12-05 RX ADMIN — Medication 200 MILLIGRAM(S): at 17:07

## 2023-12-05 RX ADMIN — ATORVASTATIN CALCIUM 40 MILLIGRAM(S): 80 TABLET, FILM COATED ORAL at 22:39

## 2023-12-05 RX ADMIN — SODIUM CHLORIDE 50 MILLILITER(S): 9 INJECTION INTRAMUSCULAR; INTRAVENOUS; SUBCUTANEOUS at 05:08

## 2023-12-05 RX ADMIN — FLUCONAZOLE 200 MILLIGRAM(S): 150 TABLET ORAL at 11:42

## 2023-12-05 RX ADMIN — CHLORHEXIDINE GLUCONATE 1 APPLICATION(S): 213 SOLUTION TOPICAL at 08:12

## 2023-12-05 RX ADMIN — Medication 200 MILLIGRAM(S): at 05:07

## 2023-12-05 RX ADMIN — LACTULOSE 10 GRAM(S): 10 SOLUTION ORAL at 22:42

## 2023-12-05 RX ADMIN — PIPERACILLIN AND TAZOBACTAM 25 GRAM(S): 4; .5 INJECTION, POWDER, LYOPHILIZED, FOR SOLUTION INTRAVENOUS at 11:43

## 2023-12-05 RX ADMIN — Medication 300 MILLIGRAM(S): at 11:42

## 2023-12-05 RX ADMIN — PIPERACILLIN AND TAZOBACTAM 25 GRAM(S): 4; .5 INJECTION, POWDER, LYOPHILIZED, FOR SOLUTION INTRAVENOUS at 02:09

## 2023-12-05 RX ADMIN — Medication 50 MILLIGRAM(S): at 05:08

## 2023-12-05 NOTE — PROVIDER CONTACT NOTE (OTHER) - ASSESSMENT
"Anesthesia Transfer of Care Note    Patient: Louisa Morrow    Procedure(s) Performed: Procedure(s) (LRB):  DELIVERY- SECTION (N/A)    Patient location: Other: IR    Anesthesia Type: CSE    Transport from OR: Transported from OR on room air with adequate spontaneous ventilation    Post pain: adequate analgesia    Post assessment: no apparent anesthetic complications and tolerated procedure well    Post vital signs: stable    Level of consciousness: awake, alert and oriented    Nausea/Vomiting: no nausea/vomiting    Complications: none    Transfer of care protocol was followed      Last vitals:   Visit Vitals  /63   Pulse 99   Temp 36.2 °C (97.1 °F) (Tympanic)   Resp 18   Ht 5' 7.99" (1.727 m)   Wt 88.4 kg (194 lb 14.2 oz)   Breastfeeding? No   BMI 29.64 kg/m²     "
NAD noted at this time, pt stated "I take this medication in the morning"
NAD noted at this time, pt states "I feel my heart racing"

## 2023-12-05 NOTE — PROGRESS NOTE ADULT - ASSESSMENT
Patient with frequent UTI, with improvement over the last 2 months but not entirely clear how much of the improvement is related to topical estrogen therapy, pessary being mpre effective in addressing functionalanatomic abnormalities, or antibiotics.  Patient presently asymptomatic.  I see no role to alter Augmentin 250-125 QD at this point in time    11/28: No concern of active infection on exam. Specifically, no urinary symptoms. Tolerating Augmentin without issues.   11/29: Still no urinary symptoms.   11/30: No concern for active or uncontrolled infection on the basis of examination.  12/1: Still no concern for active or uncontrolled infection at this time.   12/4: recurrent low grade fever, started on empiric antibiotics.  12/5: No further fevers.  Feels well.  Remains devoid of urinary symptoms.  Enterococcus faecium can be vancomycin and ampicillin resistant.  Indeed, given her chronic Augmentin use that would suspect so.  However if that is indeed the case she is no longer febrile and as such strictly speaking does not meet criteria for treatment of asymptomatic bacteriuria.  I do not see a role to alter therapy at this juncture, and patient is afebrile and asymptomatic would be comfortable discharging patient with oral Augmentin prophylaxis as per prior.    Suggestions  Continue Zosyn For now  F/U Cx Identification and sensitivity  Monitor temperature curve  Will need to remain cognizant of increased risk of resistant mario if she were to develop fever/infection in the setting of chemotherapy.    Thaddeus Geronimo MD  Attending Physician  [facility]  Division of Infectious Diseases  [phone] Patient with frequent UTI, with improvement over the last 2 months but not entirely clear how much of the improvement is related to topical estrogen therapy, pessary being mpre effective in addressing functionalanatomic abnormalities, or antibiotics.  Patient presently asymptomatic.  I see no role to alter Augmentin 250-125 QD at this point in time    11/28: No concern of active infection on exam. Specifically, no urinary symptoms. Tolerating Augmentin without issues.   11/29: Still no urinary symptoms.   11/30: No concern for active or uncontrolled infection on the basis of examination.  12/1: Still no concern for active or uncontrolled infection at this time.   12/4: recurrent low grade fever, started on empiric antibiotics.  12/5: No further fevers.  Feels well.  Remains devoid of urinary symptoms.  Enterococcus faecium can be vancomycin and ampicillin resistant.  Indeed, given her chronic Augmentin use that would suspect so.  However if that is indeed the case she is no longer febrile and as such strictly speaking does not meet criteria for treatment of asymptomatic bacteriuria.  I do not see a role to alter therapy at this juncture, and patient is afebrile and asymptomatic would be comfortable discharging patient with oral Augmentin prophylaxis as per prior.    Suggestions  Continue Zosyn For now  F/U Cx Identification and sensitivity  Monitor temperature curve  Will need to remain cognizant of increased risk of resistant mario if she were to develop fever/infection in the setting of chemotherapy.    Thaddeus Geronimo MD  Attending Physician  [facility]  Division of Infectious Diseases  [phone]

## 2023-12-05 NOTE — PROGRESS NOTE ADULT - SUBJECTIVE AND OBJECTIVE BOX
[facility]  Division of Infectious Diseases  [phone]    Name: FUNMI ROMERO  Age: 89y  Gender: Female  MRN: [id number]    Interval History--  Notes reviewed.     Past Medical History--  Hypertension    Hyperlipidemia    Palpitations    GERD (gastroesophageal reflux disease)    Chronic UTI    Post corneal transplant    No significant past surgical history    S/P cataract surgery        For details regarding the patient's social history, family history, and other miscellaneous elements, please refer the initial infectious diseases consultation and/or the admitting history and physical examination for this admission.    Allergies    No Known Allergies    Intolerances        Medications--  Antibiotics:  acyclovir   Oral Tab/Cap 200 milliGRAM(s) Oral two times a day  fluconAZOLE   Tablet 200 milliGRAM(s) Oral daily  piperacillin/tazobactam IVPB.. 3.375 Gram(s) IV Intermittent every 8 hours    Immunologic:    Other:  acetaminophen     Tablet .. PRN  allopurinol  ALPRAZolam PRN  atorvastatin  Biotene Dry Mouth Oral Rinse  chlorhexidine 4% Liquid  loratadine PRN  losartan  loteprednol 0.5% Ophthalmic Suspension  loteprednol 0.5% Ophthalmic Suspension  metoprolol succinate ER  senna PRN  sodium chloride 0.9% lock flush PRN  sodium chloride 0.9%.  venetoclax      Review of Systems--  A 10-point review of systems was obtained.     Pertinent positives and negatives--  Constitutional: No fevers. No Chills. No Rigors.   Cardiovascular: No chest pain. No palpitations.  Respiratory: No shortness of breath. No cough.  Gastrointestinal: No nausea or vomiting. No diarrhea or constipation.   Psychiatric: Pleasant. Appropriate affect.    Review of systems otherwise negative except as previously noted.    Physical Examination--  Vital Signs: T(F): 98.2 (12-05-23 @ 05:00), Max: 98.8 (12-04-23 @ 13:52)  HR: 110 (12-05-23 @ 05:00)  BP: 157/77 (12-05-23 @ 05:00)  RR: 18 (12-05-23 @ 05:00)  SpO2: 97% (12-05-23 @ 05:00)  Wt(kg): --  General: Nontoxic-appearing Female in no acute distress.  HEENT: AT/NC. PERRL. EOMI. Anicteric. Conjunctiva pink and moist. Oropharynx clear. Dentition fair.  Neck: Not rigid. No sense of mass.  Nodes: None palpable.  Lungs: Clear bilaterally without rales, wheezing or rhonchi  Heart: Regular rate and rhythm. No Murmur. No rub. No gallop. No palpable thrill.  Abdomen: Bowel sounds present and normoactive. Soft. Nondistended. Nontender. No sense of mass. No organomegaly.  Back: No spinal tenderness. No costovertebral angle tenderness.   Extremities: No cyanosis or clubbing. No edema.   Skin: Warm. Dry. Good turgor. No rash. No vasculitic stigmata.  Psychiatric: Appropriate affect and mood for situation.         Laboratory Studies--  CBC                        8.6    1.74  )-----------( 96       ( 05 Dec 2023 06:51 )             27.8       Chemistries  12-05    132<L>  |  98  |  12  ----------------------------<  103<H>  3.5   |  25  |  0.93    Ca    9.5      05 Dec 2023 06:51  Phos  2.5     12-05  Mg     1.7     12-05    TPro  6.5  /  Alb  3.3  /  TBili  1.2  /  DBili  x   /  AST  17  /  ALT  19  /  AlkPhos  58  12-05      Culture Data    Culture - Urine (collected 03 Dec 2023 18:33)  Source: Clean Catch Clean Catch (Midstream)  Preliminary Report (04 Dec 2023 17:27):    >100,000 CFU/ml Gram positive organisms    Culture - Blood (collected 03 Dec 2023 18:33)  Source: .Blood Blood-Peripheral  Preliminary Report (04 Dec 2023 22:02):    No growth at 24 hours    Culture - Blood (collected 03 Dec 2023 18:03)  Source: .Blood Blood-Peripheral  Preliminary Report (04 Dec 2023 22:02):    No growth at 24 hours             [facility]  Division of Infectious Diseases  [phone]    Name: FUNMI ROMERO  Age: 89y  Gender: Female  MRN: [id number]    Interval History--  Notes reviewed.     Past Medical History--  Hypertension    Hyperlipidemia    Palpitations    GERD (gastroesophageal reflux disease)    Chronic UTI    Post corneal transplant    No significant past surgical history    S/P cataract surgery        For details regarding the patient's social history, family history, and other miscellaneous elements, please refer the initial infectious diseases consultation and/or the admitting history and physical examination for this admission.    Allergies    No Known Allergies    Intolerances        Medications--  Antibiotics:  acyclovir   Oral Tab/Cap 200 milliGRAM(s) Oral two times a day  fluconAZOLE   Tablet 200 milliGRAM(s) Oral daily  piperacillin/tazobactam IVPB.. 3.375 Gram(s) IV Intermittent every 8 hours    Immunologic:    Other:  acetaminophen     Tablet .. PRN  allopurinol  ALPRAZolam PRN  atorvastatin  Biotene Dry Mouth Oral Rinse  chlorhexidine 4% Liquid  loratadine PRN  losartan  loteprednol 0.5% Ophthalmic Suspension  loteprednol 0.5% Ophthalmic Suspension  metoprolol succinate ER  senna PRN  sodium chloride 0.9% lock flush PRN  sodium chloride 0.9%.  venetoclax      Review of Systems--  A 10-point review of systems was obtained.     Pertinent positives and negatives--  Constitutional: No fevers. No Chills. No Rigors.   Cardiovascular: No chest pain. No palpitations.  Respiratory: No shortness of breath. No cough.  Gastrointestinal: No nausea or vomiting. No diarrhea or constipation.   Psychiatric: Pleasant. Appropriate affect.    Review of systems otherwise negative except as previously noted.    Physical Examination--  Vital Signs: T(F): 98.2 (12-05-23 @ 05:00), Max: 98.8 (12-04-23 @ 13:52)  HR: 110 (12-05-23 @ 05:00)  BP: 157/77 (12-05-23 @ 05:00)  RR: 18 (12-05-23 @ 05:00)  SpO2: 97% (12-05-23 @ 05:00)  Wt(kg): --  General: Nontoxic-appearing Female in no acute distress.  HEENT: AT/NC. PERRL. EOMI. Anicteric. Conjunctiva pink and moist. Oropharynx clear. Dentition fair.  Neck: Not rigid. No sense of mass.  Nodes: None palpable.  Lungs: Clear bilaterally without rales, wheezing or rhonchi  Heart: Regular rate and rhythm. No Murmur. No rub. No gallop. No palpable thrill.  Abdomen: Bowel sounds present and normoactive. Soft. Nondistended. Nontender. No sense of mass. No organomegaly.  Back: No spinal tenderness. No costovertebral angle tenderness.   Extremities: No cyanosis or clubbing. No edema.   Skin: Warm. Dry. Good turgor. No rash. No vasculitic stigmata.  Psychiatric: Appropriate affect and mood for situation.         Laboratory Studies--  CBC                        8.6    1.74  )-----------( 96       ( 05 Dec 2023 06:51 )             27.8       Chemistries  12-05    132<L>  |  98  |  12  ----------------------------<  103<H>  3.5   |  25  |  0.93    Ca    9.5      05 Dec 2023 06:51  Phos  2.5     12-05  Mg     1.7     12-05    TPro  6.5  /  Alb  3.3  /  TBili  1.2  /  DBili  x   /  AST  17  /  ALT  19  /  AlkPhos  58  12-05      Culture Data    Culture - Urine (collected 03 Dec 2023 18:33)  Source: Clean Catch Clean Catch (Midstream)  Preliminary Report (04 Dec 2023 17:27):    >100,000 CFU/ml Gram positive organisms    Culture - Blood (collected 03 Dec 2023 18:33)  Source: .Blood Blood-Peripheral  Preliminary Report (04 Dec 2023 22:02):    No growth at 24 hours    Culture - Blood (collected 03 Dec 2023 18:03)  Source: .Blood Blood-Peripheral  Preliminary Report (04 Dec 2023 22:02):    No growth at 24 hours             [facility]  Division of Infectious Diseases  [phone]    Name: FUNMI ROMERO  Age: 89y  Gender: Female  MRN: [id number]    Interval History--  Notes reviewed. Seen earlier today.  Denies cough.  No shortness of breath or chest pain.  Denies any urinary symptoms.  No back pain or flank pain.  No nausea or vomiting.  Still constipated.    Past Medical History--  Hypertension    Hyperlipidemia    Palpitations    GERD (gastroesophageal reflux disease)    Chronic UTI    Post corneal transplant    No significant past surgical history    S/P cataract surgery        For details regarding the patient's social history, family history, and other miscellaneous elements, please refer the initial infectious diseases consultation and/or the admitting history and physical examination for this admission.    Allergies    No Known Allergies    Intolerances        Medications--  Antibiotics:  acyclovir   Oral Tab/Cap 200 milliGRAM(s) Oral two times a day  fluconAZOLE   Tablet 200 milliGRAM(s) Oral daily  piperacillin/tazobactam IVPB.. 3.375 Gram(s) IV Intermittent every 8 hours    Immunologic:    Other:  acetaminophen     Tablet .. PRN  allopurinol  ALPRAZolam PRN  atorvastatin  Biotene Dry Mouth Oral Rinse  chlorhexidine 4% Liquid  loratadine PRN  losartan  loteprednol 0.5% Ophthalmic Suspension  loteprednol 0.5% Ophthalmic Suspension  metoprolol succinate ER  senna PRN  sodium chloride 0.9% lock flush PRN  sodium chloride 0.9%.  venetoclax      Review of Systems--  A 10-point review of systems was obtained.   Review of systems otherwise negative except as previously noted.    Physical Examination--  Vital Signs: T(F): 98.2 (12-05-23 @ 05:00), Max: 98.8 (12-04-23 @ 13:52)  HR: 110 (12-05-23 @ 05:00)  BP: 157/77 (12-05-23 @ 05:00)  RR: 18 (12-05-23 @ 05:00)  SpO2: 97% (12-05-23 @ 05:00)  Wt(kg): --  General: Nontoxic-appearing Female in no acute distress.  HEENT: AT/NC. Anicteric. Conjunctiva pink and moist. Oropharynx clear.   Neck: Not rigid. No sense of mass.  Nodes: None palpable.  Lungs: Clear bilaterally without rales, wheezing or rhonchi  Heart: Regular rate and rhythm.  Abdomen: Bowel sounds present and normoactive. Soft. Nondistended. Nontender.   Extremities: No cyanosis or clubbing. No edema.   Skin: Warm. Dry. Good turgor. No rash. No vasculitic stigmata.  Psychiatric: Appropriate affect and mood for situation.       Laboratory Studies--  CBC                        8.6    1.74  )-----------( 96       ( 05 Dec 2023 06:51 )             27.8       Chemistries  12-05    132<L>  |  98  |  12  ----------------------------<  103<H>  3.5   |  25  |  0.93    Ca    9.5      05 Dec 2023 06:51  Phos  2.5     12-05  Mg     1.7     12-05    TPro  6.5  /  Alb  3.3  /  TBili  1.2  /  DBili  x   /  AST  17  /  ALT  19  /  AlkPhos  58  12-05      Culture Data  Culture - Urine (12.03.23 @ 18:33)    Specimen Source: Clean Catch Clean Catch (Midstream)   Culture Results:   >100,000 CFU/ml Enterococcus faecium    Culture - Blood (collected 03 Dec 2023 18:33)  Source: .Blood Blood-Peripheral  Preliminary Report (04 Dec 2023 22:02):    No growth at 24 hours    Culture - Blood (collected 03 Dec 2023 18:03)  Source: .Blood Blood-Peripheral  Preliminary Report (04 Dec 2023 22:02):    No growth at 24 hours             [facility]  Division of Infectious Diseases  [phone]    Name: FUNMI ROMERO  Age: 89y  Gender: Female  MRN: [id number]    Interval History--  Notes reviewed. Seen earlier today.  Denies cough.  No shortness of breath or chest pain.  Denies any urinary symptoms.  No back pain or flank pain.  No nausea or vomiting.  Still constipated.    Past Medical History--  Hypertension    Hyperlipidemia    Palpitations    GERD (gastroesophageal reflux disease)    Chronic UTI    Post corneal transplant    No significant past surgical history    S/P cataract surgery        For details regarding the patient's social history, family history, and other miscellaneous elements, please refer the initial infectious diseases consultation and/or the admitting history and physical examination for this admission.    Allergies    No Known Allergies    Intolerances        Medications--  Antibiotics:  acyclovir   Oral Tab/Cap 200 milliGRAM(s) Oral two times a day  fluconAZOLE   Tablet 200 milliGRAM(s) Oral daily  piperacillin/tazobactam IVPB.. 3.375 Gram(s) IV Intermittent every 8 hours    Immunologic:    Other:  acetaminophen     Tablet .. PRN  allopurinol  ALPRAZolam PRN  atorvastatin  Biotene Dry Mouth Oral Rinse  chlorhexidine 4% Liquid  loratadine PRN  losartan  loteprednol 0.5% Ophthalmic Suspension  loteprednol 0.5% Ophthalmic Suspension  metoprolol succinate ER  senna PRN  sodium chloride 0.9% lock flush PRN  sodium chloride 0.9%.  venetoclax      Review of Systems--  A 10-point review of systems was obtained.   Review of systems otherwise negative except as previously noted.    Physical Examination--  Vital Signs: T(F): 98.2 (12-05-23 @ 05:00), Max: 98.8 (12-04-23 @ 13:52)  HR: 110 (12-05-23 @ 05:00)  BP: 157/77 (12-05-23 @ 05:00)  RR: 18 (12-05-23 @ 05:00)  SpO2: 97% (12-05-23 @ 05:00)  Wt(kg): --  General: Nontoxic-appearing Female in no acute distress.  HEENT: AT/NC. Anicteric. Conjunctiva pink and moist. Oropharynx clear.   Neck: Not rigid. No sense of mass.  Nodes: None palpable.  Lungs: Clear bilaterally without rales, wheezing or rhonchi  Heart: Regular rate and rhythm.  Abdomen: Bowel sounds present and normoactive. Soft. Nondistended. Nontender.   Extremities: No cyanosis or clubbing. No edema.   Skin: Warm. Dry. Good turgor. No rash. No vasculitic stigmata.  Psychiatric: Appropriate affect and mood for situation.       Laboratory Studies--  CBC                        8.6    1.74  )-----------( 96       ( 05 Dec 2023 06:51 )             27.8       Chemistries  12-05    132<L>  |  98  |  12  ----------------------------<  103<H>  3.5   |  25  |  0.93    Ca    9.5      05 Dec 2023 06:51  Phos  2.5     12-05  Mg     1.7     12-05    TPro  6.5  /  Alb  3.3  /  TBili  1.2  /  DBili  x   /  AST  17  /  ALT  19  /  AlkPhos  58  12-05      Culture Data  Culture - Urine (12.03.23 @ 18:33)    Specimen Source: Clean Catch Clean Catch (Midstream)   Culture Results:   >100,000 CFU/ml Enterococcus faecium    Culture - Blood (collected 03 Dec 2023 18:33)  Source: .Blood Blood-Peripheral  Preliminary Report (04 Dec 2023 22:02):    No growth at 24 hours    Culture - Blood (collected 03 Dec 2023 18:03)  Source: .Blood Blood-Peripheral  Preliminary Report (04 Dec 2023 22:02):    No growth at 24 hours

## 2023-12-05 NOTE — PROVIDER CONTACT NOTE (OTHER) - NAME OF MD/NP/PA/DO NOTIFIED:
[facility]

## 2023-12-05 NOTE — PROGRESS NOTE ADULT - PROBLEM SELECTOR PLAN 1
no further fever  blood cultures negative so far  urine culture > [id number] enterococcus faecium   defer to ID  empiric piperacillin/tazobactam no further fever  blood cultures negative so far  urine culture > [id number] enterococcus faecium   defer to ID  empiric piperacillin/tazobactam

## 2023-12-05 NOTE — PROGRESS NOTE ADULT - SUBJECTIVE AND OBJECTIVE BOX
Patient is a 89y old  Female who presents with a chief complaint of elective chemotherapy (05 Dec 2023 11:16)      DATE OF SERVICE: 12-05-23 @ 14:16    SUBJECTIVE / OVERNIGHT EVENTS: overnight events noted    ROS:  Resp: No cough no sputum production  CVS: No chest pain no palpitations no orthopnea  GI: no N/V/D  "I feel fine'         MEDICATIONS  (STANDING):  acyclovir   Oral Tab/Cap 200 milliGRAM(s) Oral two times a day  allopurinol 300 milliGRAM(s) Oral daily  atorvastatin 40 milliGRAM(s) Oral at bedtime  Biotene Dry Mouth Oral Rinse 5 milliLiter(s) Swish and Spit four times a day  chlorhexidine 4% Liquid 1 Application(s) Topical <User Schedule>  fluconAZOLE   Tablet 200 milliGRAM(s) Oral daily  losartan 50 milliGRAM(s) Oral daily  loteprednol 0.5% Ophthalmic Suspension 1 Drop(s) Right EYE daily  loteprednol 0.5% Ophthalmic Suspension 1 Drop(s) Right EYE at bedtime  metoprolol succinate ER 50 milliGRAM(s) Oral daily  piperacillin/tazobactam IVPB.. 3.375 Gram(s) IV Intermittent every 8 hours  sodium chloride 0.9%. 1000 milliLiter(s) (50 mL/Hr) IV Continuous <Continuous>  venetoclax 100 milliGRAM(s) Oral <User Schedule>    MEDICATIONS  (PRN):  acetaminophen     Tablet .. 650 milliGRAM(s) Oral every 6 hours PRN Temp greater or equal to 38C (100.4F), Mild Pain (1 - 3)  ALPRAZolam 0.125 milliGRAM(s) Oral daily PRN for anxiety  lactulose Syrup 10 Gram(s) Oral every 6 hours PRN constipation  loratadine 10 milliGRAM(s) Oral daily PRN allergies  polyethylene glycol 3350 17 Gram(s) Oral daily PRN Constipation  senna 2 Tablet(s) Oral at bedtime PRN Constipation  sodium chloride 0.9% lock flush 10 milliLiter(s) IV Push every 1 hour PRN Pre/post blood products, medications, blood draw, and to maintain line patency        CAPILLARY BLOOD GLUCOSE  PHYSICAL EXAM:   Neck: Supple  Lungs: no wheeze  CVS: S1 S2 no M/R/G  Abdomen: no tenderness  Neuro: AO x 3 nonfocal   Ext: no edema      I&O's Summary    04 Dec 2023 07:01  -  05 Dec 2023 07:00  --------------------------------------------------------  IN: 1951 mL / OUT: 650 mL / NET: 1301 mL    05 Dec 2023 07:01  -  05 Dec 2023 14:16  --------------------------------------------------------  IN: 351 mL / OUT: 1200 mL / NET: -849 mL        Vital Signs Last 24 Hrs  T(C): 36.6 (05 Dec 2023 13:00), Max: 37 (04 Dec 2023 22:04)  T(F): 97.9 (05 Dec 2023 13:00), Max: 98.6 (04 Dec 2023 22:04)  HR: 93 (05 Dec 2023 13:00) (86 - 110)  BP: 133/64 (05 Dec 2023 13:00) (132/72 - 158/76)  BP(mean): --  RR: 18 (05 Dec 2023 13:00) (17 - 18)  SpO2: 99% (05 Dec 2023 13:00) (97% - 100%)    PHYSICAL EXAM:   Neck: Supple  Lungs: no wheeze  CVS: S1 S2 no M/R/G  Abdomen: no tenderness  Neuro: AO x 3 nonfocal   Ext: no edema    LABS:                        8.6    1.74  )-----------( 96       ( 05 Dec 2023 06:51 )             27.8     12-05    132<L>  |  98  |  12  ----------------------------<  103<H>  3.5   |  25  |  0.93    Ca    9.5      05 Dec 2023 06:51  Phos  2.5     12-05  Mg     1.7     12-05    TPro  6.5  /  Alb  3.3  /  TBili  1.2  /  DBili  x   /  AST  17  /  ALT  19  /  AlkPhos  58  12-05          Urinalysis Basic - ( 05 Dec 2023 06:51 )    Color: x / Appearance: x / SG: x / pH: x  Gluc: 103 mg/dL / Ketone: x  / Bili: x / Urobili: x   Blood: x / Protein: x / Nitrite: x   Leuk Esterase: x / RBC: x / WBC x   Sq Epi: x / Non Sq Epi: x / Bacteria: x          All consultant(s) notes reviewed and care discussed with other providers        Contact Number, Dr Yen [phone] Patient is a 89y old  Female who presents with a chief complaint of elective chemotherapy (05 Dec 2023 11:16)      DATE OF SERVICE: 12-05-23 @ 14:16    SUBJECTIVE / OVERNIGHT EVENTS: overnight events noted    ROS:  Resp: No cough no sputum production  CVS: No chest pain no palpitations no orthopnea  GI: no N/V/D  "I feel fine'         MEDICATIONS  (STANDING):  acyclovir   Oral Tab/Cap 200 milliGRAM(s) Oral two times a day  allopurinol 300 milliGRAM(s) Oral daily  atorvastatin 40 milliGRAM(s) Oral at bedtime  Biotene Dry Mouth Oral Rinse 5 milliLiter(s) Swish and Spit four times a day  chlorhexidine 4% Liquid 1 Application(s) Topical <User Schedule>  fluconAZOLE   Tablet 200 milliGRAM(s) Oral daily  losartan 50 milliGRAM(s) Oral daily  loteprednol 0.5% Ophthalmic Suspension 1 Drop(s) Right EYE daily  loteprednol 0.5% Ophthalmic Suspension 1 Drop(s) Right EYE at bedtime  metoprolol succinate ER 50 milliGRAM(s) Oral daily  piperacillin/tazobactam IVPB.. 3.375 Gram(s) IV Intermittent every 8 hours  sodium chloride 0.9%. 1000 milliLiter(s) (50 mL/Hr) IV Continuous <Continuous>  venetoclax 100 milliGRAM(s) Oral <User Schedule>    MEDICATIONS  (PRN):  acetaminophen     Tablet .. 650 milliGRAM(s) Oral every 6 hours PRN Temp greater or equal to 38C (100.4F), Mild Pain (1 - 3)  ALPRAZolam 0.125 milliGRAM(s) Oral daily PRN for anxiety  lactulose Syrup 10 Gram(s) Oral every 6 hours PRN constipation  loratadine 10 milliGRAM(s) Oral daily PRN allergies  polyethylene glycol 3350 17 Gram(s) Oral daily PRN Constipation  senna 2 Tablet(s) Oral at bedtime PRN Constipation  sodium chloride 0.9% lock flush 10 milliLiter(s) IV Push every 1 hour PRN Pre/post blood products, medications, blood draw, and to maintain line patency        CAPILLARY BLOOD GLUCOSE  PHYSICAL EXAM:   Neck: Supple  Lungs: no wheeze  CVS: S1 S2 no M/R/G  Abdomen: no tenderness  Neuro: AO x 3 nonfocal   Ext: no edema      I&O's Summary    04 Dec 2023 07:01  -  05 Dec 2023 07:00  --------------------------------------------------------  IN: 1951 mL / OUT: 650 mL / NET: 1301 mL    05 Dec 2023 07:01  -  05 Dec 2023 14:16  --------------------------------------------------------  IN: 351 mL / OUT: 1200 mL / NET: -849 mL        Vital Signs Last 24 Hrs  T(C): 36.6 (05 Dec 2023 13:00), Max: 37 (04 Dec 2023 22:04)  T(F): 97.9 (05 Dec 2023 13:00), Max: 98.6 (04 Dec 2023 22:04)  HR: 93 (05 Dec 2023 13:00) (86 - 110)  BP: 133/64 (05 Dec 2023 13:00) (132/72 - 158/76)  BP(mean): --  RR: 18 (05 Dec 2023 13:00) (17 - 18)  SpO2: 99% (05 Dec 2023 13:00) (97% - 100%)    PHYSICAL EXAM:   Neck: Supple  Lungs: no wheeze  CVS: S1 S2 no M/R/G  Abdomen: no tenderness  Neuro: AO x 3 nonfocal   Ext: no edema    LABS:                        8.6    1.74  )-----------( 96       ( 05 Dec 2023 06:51 )             27.8     12-05    132<L>  |  98  |  12  ----------------------------<  103<H>  3.5   |  25  |  0.93    Ca    9.5      05 Dec 2023 06:51  Phos  2.5     12-05  Mg     1.7     12-05    TPro  6.5  /  Alb  3.3  /  TBili  1.2  /  DBili  x   /  AST  17  /  ALT  19  /  AlkPhos  58  12-05          Urinalysis Basic - ( 05 Dec 2023 06:51 )    Color: x / Appearance: x / SG: x / pH: x  Gluc: 103 mg/dL / Ketone: x  / Bili: x / Urobili: x   Blood: x / Protein: x / Nitrite: x   Leuk Esterase: x / RBC: x / WBC x   Sq Epi: x / Non Sq Epi: x / Bacteria: x          All consultant(s) notes reviewed and care discussed with other providers        Contact Number, Dr Yen [phone]

## 2023-12-05 NOTE — PROGRESS NOTE ADULT - SUBJECTIVE AND OBJECTIVE BOX
89 yr pleasant female with PMH of MDS recently diagnosed, right corneal transplant, labile HTN, HLD admitted electively for scheduled inpatient chemo . The patient is asymptomatic and denies any nausea, vomiting or any systemic symptoms. Ne recent colds or viral infections. No dysuria no hematuria. Of note the patient does have h/o chronic UTIs and tends to get septic off prophylactic antibiotics.    Started planned chemo 11/27/23  PICC line  was inserted 11/28/23    PAST MEDICAL & SURGICAL HISTORY:  Hyperlipidemia      Palpitations      GERD (gastroesophageal reflux disease)      Chronic UTI      Post corneal transplant      S/P cataract surgery        Allergies    No Known Allergies    Intolerances      Social History:  non smoker  no IVDA  no ETOH abuse   lives with helper/aide (27 Nov 2023 09:19)    Medications:  acetaminophen     Tablet .. 650 milliGRAM(s) Oral every 6 hours PRN Temp greater or equal to 38C (100.4F), Mild Pain (1 - 3)  acyclovir   Oral Tab/Cap 200 milliGRAM(s) Oral two times a day  allopurinol 300 milliGRAM(s) Oral daily  ALPRAZolam 0.125 milliGRAM(s) Oral daily PRN for anxiety  atorvastatin 40 milliGRAM(s) Oral at bedtime  Biotene Dry Mouth Oral Rinse 5 milliLiter(s) Swish and Spit four times a day  chlorhexidine 4% Liquid 1 Application(s) Topical <User Schedule>  fluconAZOLE   Tablet 200 milliGRAM(s) Oral daily  loratadine 10 milliGRAM(s) Oral daily PRN allergies  losartan 50 milliGRAM(s) Oral daily  loteprednol 0.5% Ophthalmic Suspension 1 Drop(s) Right EYE daily  loteprednol 0.5% Ophthalmic Suspension 1 Drop(s) Right EYE at bedtime  metoprolol succinate ER 50 milliGRAM(s) Oral daily  piperacillin/tazobactam IVPB.. 3.375 Gram(s) IV Intermittent every 8 hours  senna 2 Tablet(s) Oral at bedtime PRN Constipation  sodium chloride 0.9% lock flush 10 milliLiter(s) IV Push every 1 hour PRN Pre/post blood products, medications, blood draw, and to maintain line patency  sodium chloride 0.9%. 1000 milliLiter(s) IV Continuous <Continuous>  venetoclax 100 milliGRAM(s) Oral <User Schedule>    Labs:  CBC Full  -  ( 05 Dec 2023 06:51 )  WBC Count : 1.74 K/uL  RBC Count : 2.78 M/uL  Hemoglobin : 8.6 g/dL  Hematocrit : 27.8 %  Platelet Count - Automated : 96 K/uL  Mean Cell Volume : 100.0 fl  Mean Cell Hemoglobin : 30.9 pg  Mean Cell Hemoglobin Concentration : 30.9 gm/dL  Auto Neutrophil # : 1.21 K/uL  Auto Lymphocyte # : 0.50 K/uL  Auto Monocyte # : 0.02 K/uL  Auto Eosinophil # : 0.00 K/uL  Auto Basophil # : 0.02 K/uL  Auto Neutrophil % : 68.4 %  Auto Lymphocyte % : 28.9 %  Auto Monocyte % : 0.9 %  Auto Eosinophil % : 0.0 %  Auto Basophil % : 0.9 %    12-05    132<L>  |  98  |  12  ----------------------------<  103<H>  3.5   |  25  |  0.93    Ca    9.5      05 Dec 2023 06:51  Phos  2.5     12-05  Mg     1.7     12-05    TPro  6.5  /  Alb  3.3  /  TBili  1.2  /  DBili  x   /  AST  17  /  ALT  19  /  AlkPhos  58  12-05      Radiology:             ROS:  Patient comfortable without distress  Some cpmgestion  No SOB or chest pain  No palpitation  No abdominal pain, diarrhaea or constipation  No weakness of extremities  No skin changes or swelling of legs  Rest of the comprehensive ROS was negative  Vital Signs Last 24 Hrs  T(C): 36.8 (05 Dec 2023 05:00), Max: 37.1 (04 Dec 2023 13:52)  T(F): 98.2 (05 Dec 2023 05:00), Max: 98.8 (04 Dec 2023 13:52)  HR: 110 (05 Dec 2023 05:00) (86 - 110)  BP: 157/77 (05 Dec 2023 05:00) (119/65 - 158/76)  BP(mean): --  RR: 18 (05 Dec 2023 05:00) (17 - 18)  SpO2: 97% (05 Dec 2023 05:00) (97% - 100%)    Parameters below as of 05 Dec 2023 05:00  Patient On (Oxygen Delivery Method): room air        Physical exam:  Patient alert and oriented  No distress  CVS: S1, S2   Chest: bilateral breath sound without rales  Abdomen: soft, not tender, no organomegaly or masses  CNS: No focal neuro deficit  Musculoskeletal:  Normal range of motion  Skin: No rash    Assessment and Plan:

## 2023-12-05 NOTE — PROGRESS NOTE ADULT - ASSESSMENT
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  Had PICC line placed 11/28/23    For AML, transformed from MDS to began cycle 1 of azacitadine 75mg/m2 Day 1-7, Venetoclax dose modified and escalated, Day 1 10mg, Day 2 20mg, Day 3 50mg, day 4 and ongoing 100mg 11/27/23.      On prophylactic antiviral acyclovir 200 bid, prophylactic antifungal diflucan 200mg daily  Developed fever and Abx changed to Zosyn 12/3; RVP negative, CXR negative per ID  CBC stable, CMP noted  Uric Acid: 2.1 mg/dL (12.05.23 @ 06:51) Lactate Dehydrogenase, Serum: 319 U/L (12.05.23 @ 06:51)

## 2023-12-05 NOTE — CHART NOTE - NSCHARTNOTEFT_GEN_A_CORE
Pt reported having palpitations when in AM. Pt states "I have this feeling sometimes, that's why Im taking metoprolol". EKG with ST. Apical pulse 101. Pt denies chest pain, dizziness, shortness of breath. Scheduled Toprol 50 mg was administered.     Nohemy Dimas NP, #[id number] Pt reported having palpitations when in AM. Pt states "I have this feeling sometimes, that's why Im taking metoprolol". EKG with ST. Apical pulse 101. Pt denies chest pain, dizziness, shortness of breath. Scheduled Toprol 50 mg was administered.     Nohemy Dimas NP, #[id number]

## 2023-12-06 ENCOUNTER — TRANSCRIPTION ENCOUNTER (OUTPATIENT)
Age: 88
End: 2023-12-06

## 2023-12-06 VITALS
RESPIRATION RATE: 16 BRPM | TEMPERATURE: 98 F | OXYGEN SATURATION: 100 % | HEART RATE: 92 BPM | SYSTOLIC BLOOD PRESSURE: 130 MMHG | DIASTOLIC BLOOD PRESSURE: 68 MMHG

## 2023-12-06 LAB
-  AMPICILLIN: SIGNIFICANT CHANGE UP
-  AMPICILLIN: SIGNIFICANT CHANGE UP
-  CIPROFLOXACIN: SIGNIFICANT CHANGE UP
-  CIPROFLOXACIN: SIGNIFICANT CHANGE UP
-  LEVOFLOXACIN: SIGNIFICANT CHANGE UP
-  LEVOFLOXACIN: SIGNIFICANT CHANGE UP
-  NITROFURANTOIN: SIGNIFICANT CHANGE UP
-  NITROFURANTOIN: SIGNIFICANT CHANGE UP
-  TETRACYCLINE: SIGNIFICANT CHANGE UP
-  TETRACYCLINE: SIGNIFICANT CHANGE UP
-  VANCOMYCIN: SIGNIFICANT CHANGE UP
-  VANCOMYCIN: SIGNIFICANT CHANGE UP
ALBUMIN SERPL ELPH-MCNC: 3.4 G/DL — SIGNIFICANT CHANGE UP (ref 3.3–5)
ALBUMIN SERPL ELPH-MCNC: 3.4 G/DL — SIGNIFICANT CHANGE UP (ref 3.3–5)
ALP SERPL-CCNC: 65 U/L — SIGNIFICANT CHANGE UP (ref 40–120)
ALP SERPL-CCNC: 65 U/L — SIGNIFICANT CHANGE UP (ref 40–120)
ALT FLD-CCNC: 25 U/L — SIGNIFICANT CHANGE UP (ref 10–45)
ALT FLD-CCNC: 25 U/L — SIGNIFICANT CHANGE UP (ref 10–45)
ANION GAP SERPL CALC-SCNC: 10 MMOL/L — SIGNIFICANT CHANGE UP (ref 5–17)
ANION GAP SERPL CALC-SCNC: 10 MMOL/L — SIGNIFICANT CHANGE UP (ref 5–17)
AST SERPL-CCNC: 23 U/L — SIGNIFICANT CHANGE UP (ref 10–40)
AST SERPL-CCNC: 23 U/L — SIGNIFICANT CHANGE UP (ref 10–40)
BASOPHILS # BLD AUTO: 0 K/UL — SIGNIFICANT CHANGE UP (ref 0–0.2)
BASOPHILS # BLD AUTO: 0 K/UL — SIGNIFICANT CHANGE UP (ref 0–0.2)
BASOPHILS NFR BLD AUTO: 0 % — SIGNIFICANT CHANGE UP (ref 0–2)
BASOPHILS NFR BLD AUTO: 0 % — SIGNIFICANT CHANGE UP (ref 0–2)
BILIRUB SERPL-MCNC: 1 MG/DL — SIGNIFICANT CHANGE UP (ref 0.2–1.2)
BILIRUB SERPL-MCNC: 1 MG/DL — SIGNIFICANT CHANGE UP (ref 0.2–1.2)
BUN SERPL-MCNC: 10 MG/DL — SIGNIFICANT CHANGE UP (ref 7–23)
BUN SERPL-MCNC: 10 MG/DL — SIGNIFICANT CHANGE UP (ref 7–23)
CALCIUM SERPL-MCNC: 9.2 MG/DL — SIGNIFICANT CHANGE UP (ref 8.4–10.5)
CALCIUM SERPL-MCNC: 9.2 MG/DL — SIGNIFICANT CHANGE UP (ref 8.4–10.5)
CHLORIDE SERPL-SCNC: 98 MMOL/L — SIGNIFICANT CHANGE UP (ref 96–108)
CHLORIDE SERPL-SCNC: 98 MMOL/L — SIGNIFICANT CHANGE UP (ref 96–108)
CO2 SERPL-SCNC: 24 MMOL/L — SIGNIFICANT CHANGE UP (ref 22–31)
CO2 SERPL-SCNC: 24 MMOL/L — SIGNIFICANT CHANGE UP (ref 22–31)
CREAT SERPL-MCNC: 0.79 MG/DL — SIGNIFICANT CHANGE UP (ref 0.5–1.3)
CREAT SERPL-MCNC: 0.79 MG/DL — SIGNIFICANT CHANGE UP (ref 0.5–1.3)
CULTURE RESULTS: ABNORMAL
CULTURE RESULTS: ABNORMAL
EGFR: 71 ML/MIN/1.73M2 — SIGNIFICANT CHANGE UP
EGFR: 71 ML/MIN/1.73M2 — SIGNIFICANT CHANGE UP
EOSINOPHIL # BLD AUTO: 0 K/UL — SIGNIFICANT CHANGE UP (ref 0–0.5)
EOSINOPHIL # BLD AUTO: 0 K/UL — SIGNIFICANT CHANGE UP (ref 0–0.5)
EOSINOPHIL NFR BLD AUTO: 0 % — SIGNIFICANT CHANGE UP (ref 0–6)
EOSINOPHIL NFR BLD AUTO: 0 % — SIGNIFICANT CHANGE UP (ref 0–6)
GLUCOSE SERPL-MCNC: 103 MG/DL — HIGH (ref 70–99)
GLUCOSE SERPL-MCNC: 103 MG/DL — HIGH (ref 70–99)
HCT VFR BLD CALC: 28.9 % — LOW (ref 34.5–45)
HCT VFR BLD CALC: 28.9 % — LOW (ref 34.5–45)
HGB BLD-MCNC: 8.7 G/DL — LOW (ref 11.5–15.5)
HGB BLD-MCNC: 8.7 G/DL — LOW (ref 11.5–15.5)
IMM GRANULOCYTES NFR BLD AUTO: 0.9 % — SIGNIFICANT CHANGE UP (ref 0–0.9)
IMM GRANULOCYTES NFR BLD AUTO: 0.9 % — SIGNIFICANT CHANGE UP (ref 0–0.9)
LDH SERPL L TO P-CCNC: 333 U/L — HIGH (ref 50–242)
LDH SERPL L TO P-CCNC: 333 U/L — HIGH (ref 50–242)
LYMPHOCYTES # BLD AUTO: 0.54 K/UL — LOW (ref 1–3.3)
LYMPHOCYTES # BLD AUTO: 0.54 K/UL — LOW (ref 1–3.3)
LYMPHOCYTES # BLD AUTO: 23.1 % — SIGNIFICANT CHANGE UP (ref 13–44)
LYMPHOCYTES # BLD AUTO: 23.1 % — SIGNIFICANT CHANGE UP (ref 13–44)
MAGNESIUM SERPL-MCNC: 1.8 MG/DL — SIGNIFICANT CHANGE UP (ref 1.6–2.6)
MAGNESIUM SERPL-MCNC: 1.8 MG/DL — SIGNIFICANT CHANGE UP (ref 1.6–2.6)
MCHC RBC-ENTMCNC: 30.1 GM/DL — LOW (ref 32–36)
MCHC RBC-ENTMCNC: 30.1 GM/DL — LOW (ref 32–36)
MCHC RBC-ENTMCNC: 30.4 PG — SIGNIFICANT CHANGE UP (ref 27–34)
MCHC RBC-ENTMCNC: 30.4 PG — SIGNIFICANT CHANGE UP (ref 27–34)
MCV RBC AUTO: 101 FL — HIGH (ref 80–100)
MCV RBC AUTO: 101 FL — HIGH (ref 80–100)
METHOD TYPE: SIGNIFICANT CHANGE UP
METHOD TYPE: SIGNIFICANT CHANGE UP
MONOCYTES # BLD AUTO: 0.02 K/UL — SIGNIFICANT CHANGE UP (ref 0–0.9)
MONOCYTES # BLD AUTO: 0.02 K/UL — SIGNIFICANT CHANGE UP (ref 0–0.9)
MONOCYTES NFR BLD AUTO: 0.9 % — LOW (ref 2–14)
MONOCYTES NFR BLD AUTO: 0.9 % — LOW (ref 2–14)
NEUTROPHILS # BLD AUTO: 1.76 K/UL — LOW (ref 1.8–7.4)
NEUTROPHILS # BLD AUTO: 1.76 K/UL — LOW (ref 1.8–7.4)
NEUTROPHILS NFR BLD AUTO: 75.1 % — SIGNIFICANT CHANGE UP (ref 43–77)
NEUTROPHILS NFR BLD AUTO: 75.1 % — SIGNIFICANT CHANGE UP (ref 43–77)
NRBC # BLD: 1 /100 WBCS — HIGH (ref 0–0)
NRBC # BLD: 1 /100 WBCS — HIGH (ref 0–0)
ORGANISM # SPEC MICROSCOPIC CNT: ABNORMAL
PHOSPHATE SERPL-MCNC: 2.8 MG/DL — SIGNIFICANT CHANGE UP (ref 2.5–4.5)
PHOSPHATE SERPL-MCNC: 2.8 MG/DL — SIGNIFICANT CHANGE UP (ref 2.5–4.5)
PLATELET # BLD AUTO: 110 K/UL — LOW (ref 150–400)
PLATELET # BLD AUTO: 110 K/UL — LOW (ref 150–400)
POTASSIUM SERPL-MCNC: 3.6 MMOL/L — SIGNIFICANT CHANGE UP (ref 3.5–5.3)
POTASSIUM SERPL-MCNC: 3.6 MMOL/L — SIGNIFICANT CHANGE UP (ref 3.5–5.3)
POTASSIUM SERPL-SCNC: 3.6 MMOL/L — SIGNIFICANT CHANGE UP (ref 3.5–5.3)
POTASSIUM SERPL-SCNC: 3.6 MMOL/L — SIGNIFICANT CHANGE UP (ref 3.5–5.3)
PROT SERPL-MCNC: 6.8 G/DL — SIGNIFICANT CHANGE UP (ref 6–8.3)
PROT SERPL-MCNC: 6.8 G/DL — SIGNIFICANT CHANGE UP (ref 6–8.3)
RBC # BLD: 2.86 M/UL — LOW (ref 3.8–5.2)
RBC # BLD: 2.86 M/UL — LOW (ref 3.8–5.2)
RBC # FLD: 17.3 % — HIGH (ref 10.3–14.5)
RBC # FLD: 17.3 % — HIGH (ref 10.3–14.5)
SODIUM SERPL-SCNC: 132 MMOL/L — LOW (ref 135–145)
SODIUM SERPL-SCNC: 132 MMOL/L — LOW (ref 135–145)
SPECIMEN SOURCE: SIGNIFICANT CHANGE UP
SPECIMEN SOURCE: SIGNIFICANT CHANGE UP
URATE SERPL-MCNC: 1.8 MG/DL — LOW (ref 2.5–7)
URATE SERPL-MCNC: 1.8 MG/DL — LOW (ref 2.5–7)
WBC # BLD: 2.34 K/UL — LOW (ref 3.8–10.5)
WBC # BLD: 2.34 K/UL — LOW (ref 3.8–10.5)
WBC # FLD AUTO: 2.34 K/UL — LOW (ref 3.8–10.5)
WBC # FLD AUTO: 2.34 K/UL — LOW (ref 3.8–10.5)

## 2023-12-06 PROCEDURE — 99232 SBSQ HOSP IP/OBS MODERATE 35: CPT

## 2023-12-06 RX ORDER — FLUCONAZOLE 150 MG/1
1 TABLET ORAL
Qty: 0 | Refills: 0 | DISCHARGE
Start: 2023-12-06

## 2023-12-06 RX ORDER — ACYCLOVIR SODIUM 500 MG
1 VIAL (EA) INTRAVENOUS
Qty: 0 | Refills: 0 | DISCHARGE
Start: 2023-12-06

## 2023-12-06 RX ORDER — LEVOFLOXACIN 5 MG/ML
1 INJECTION, SOLUTION INTRAVENOUS
Qty: 30 | Refills: 3
Start: 2023-12-06 | End: 2024-04-03

## 2023-12-06 RX ORDER — VENETOCLAX 100 MG/1
1 TABLET, FILM COATED ORAL
Qty: 0 | Refills: 0 | DISCHARGE
Start: 2023-12-06

## 2023-12-06 RX ORDER — METOPROLOL TARTRATE 50 MG
1 TABLET ORAL
Qty: 30 | Refills: 0
Start: 2023-12-06 | End: 2024-01-04

## 2023-12-06 RX ORDER — ALLOPURINOL 300 MG
1 TABLET ORAL
Refills: 0 | DISCHARGE

## 2023-12-06 RX ADMIN — LOTEPREDNOL ETABONATE 1 DROP(S): 2 SUSPENSION/ DROPS OPHTHALMIC at 11:09

## 2023-12-06 RX ADMIN — PIPERACILLIN AND TAZOBACTAM 25 GRAM(S): 4; .5 INJECTION, POWDER, LYOPHILIZED, FOR SOLUTION INTRAVENOUS at 11:09

## 2023-12-06 RX ADMIN — Medication 5 MILLILITER(S): at 00:37

## 2023-12-06 RX ADMIN — CHLORHEXIDINE GLUCONATE 1 APPLICATION(S): 213 SOLUTION TOPICAL at 08:44

## 2023-12-06 RX ADMIN — Medication 50 MILLIGRAM(S): at 06:09

## 2023-12-06 RX ADMIN — FLUCONAZOLE 200 MILLIGRAM(S): 150 TABLET ORAL at 11:08

## 2023-12-06 RX ADMIN — LOTEPREDNOL ETABONATE 1 DROP(S): 2 SUSPENSION/ DROPS OPHTHALMIC at 06:09

## 2023-12-06 RX ADMIN — Medication 300 MILLIGRAM(S): at 11:08

## 2023-12-06 RX ADMIN — Medication 5 MILLILITER(S): at 06:10

## 2023-12-06 RX ADMIN — SODIUM CHLORIDE 50 MILLILITER(S): 9 INJECTION INTRAMUSCULAR; INTRAVENOUS; SUBCUTANEOUS at 06:09

## 2023-12-06 RX ADMIN — Medication 5 MILLILITER(S): at 11:08

## 2023-12-06 RX ADMIN — LOSARTAN POTASSIUM 50 MILLIGRAM(S): 100 TABLET, FILM COATED ORAL at 06:09

## 2023-12-06 RX ADMIN — Medication 200 MILLIGRAM(S): at 06:10

## 2023-12-06 RX ADMIN — PIPERACILLIN AND TAZOBACTAM 25 GRAM(S): 4; .5 INJECTION, POWDER, LYOPHILIZED, FOR SOLUTION INTRAVENOUS at 03:24

## 2023-12-06 NOTE — PROGRESS NOTE ADULT - PROBLEM SELECTOR PLAN 5
moderate exacerbation and persistent tachycardia  add metoprolol ER 50 po qd
acceptable  continue metoprolol ER 50 po qd and losartan
continue losartan  acceptable for now  may add metoprolol ER if persistent tachy   adjust meds as needed
acceptable  continue metoprolol ER 50 po qd and losartan
improved   still with persistent tachycardia  however likely physiologic  continue metoprolol ER 50 po qd
acceptable  continue metoprolol ER 50 po qd and losartan
acceptable  likely physiologic tachy  continue metoprolol ER 50 po qd
moderate exacerbation and persistent tachycardia  add metoprolol ER 50 po qd
acceptable  continue metoprolol ER 50 po qd and losartan

## 2023-12-06 NOTE — PROGRESS NOTE ADULT - PROBLEM SELECTOR PROBLEM 3
Post corneal transplant
Post corneal transplant
AML (acute myeloid leukemia)
Post corneal transplant

## 2023-12-06 NOTE — PROGRESS NOTE ADULT - ASSESSMENT
Patient with frequent UTI, with improvement over the last 2 months but not entirely clear how much of the improvement is related to topical estrogen therapy, pessary being mpre effective in addressing functionalanatomic abnormalities, or antibiotics.  Patient presently asymptomatic.  I see no role to alter Augmentin 250-125 QD at this point in time    11/28: No concern of active infection on exam. Specifically, no urinary symptoms. Tolerating Augmentin without issues.   11/29: Still no urinary symptoms.   11/30: No concern for active or uncontrolled infection on the basis of examination.  12/1: Still no concern for active or uncontrolled infection at this time.   12/4: recurrent low grade fever, started on empiric antibiotics.  12/5: No further fevers.  Feels well.  Remains devoid of urinary symptoms.  Enterococcus faecium can be vancomycin and ampicillin resistant.  Indeed, given her chronic Augmentin use that would suspect so.  However if that is indeed the case she is no longer febrile and as such strictly speaking does not meet criteria for treatment of asymptomatic bacteriuria.  I do not see a role to alter therapy at this juncture, and patient is afebrile and asymptomatic would be comfortable discharging patient with oral Augmentin prophylaxis as per prior.  12/6: White blood cell count up to 2.34, with ANC 1.76.  No concern of uncontrolled infection on examination.  No urinary symptoms.  Urine culture as expected revealed Enterococcus resistant to ampicillin.  It is sensitive to mario quinolones and vancomycin.  Regardless I do not think it merits treatment at this point in time.  Reviewed with NP, discharged with Levaquin as part of their standard protocol and patient Will be sent out on this medication regardless.  No infectious disease objection to discharge.  Happy to see patient in the office and follow-up as needed.  Discussed with the patient's son by phone.  Reviewed with Dr. Yen.    Suggestions  No ID objection to discharge.  I'll sign off at this time.   Thank you for the courtesy of this referral.    Thaddeus Geronimo MD  Attending Physician  [facility]  Division of Infectious Diseases  [phone] Patient with frequent UTI, with improvement over the last 2 months but not entirely clear how much of the improvement is related to topical estrogen therapy, pessary being mpre effective in addressing functionalanatomic abnormalities, or antibiotics.  Patient presently asymptomatic.  I see no role to alter Augmentin 250-125 QD at this point in time    11/28: No concern of active infection on exam. Specifically, no urinary symptoms. Tolerating Augmentin without issues.   11/29: Still no urinary symptoms.   11/30: No concern for active or uncontrolled infection on the basis of examination.  12/1: Still no concern for active or uncontrolled infection at this time.   12/4: recurrent low grade fever, started on empiric antibiotics.  12/5: No further fevers.  Feels well.  Remains devoid of urinary symptoms.  Enterococcus faecium can be vancomycin and ampicillin resistant.  Indeed, given her chronic Augmentin use that would suspect so.  However if that is indeed the case she is no longer febrile and as such strictly speaking does not meet criteria for treatment of asymptomatic bacteriuria.  I do not see a role to alter therapy at this juncture, and patient is afebrile and asymptomatic would be comfortable discharging patient with oral Augmentin prophylaxis as per prior.  12/6: White blood cell count up to 2.34, with ANC 1.76.  No concern of uncontrolled infection on examination.  No urinary symptoms.  Urine culture as expected revealed Enterococcus resistant to ampicillin.  It is sensitive to mario quinolones and vancomycin.  Regardless I do not think it merits treatment at this point in time.  Reviewed with NP, discharged with Levaquin as part of their standard protocol and patient Will be sent out on this medication regardless.  No infectious disease objection to discharge.  Happy to see patient in the office and follow-up as needed.  Discussed with the patient's son by phone.  Reviewed with Dr. Yen.    Suggestions  No ID objection to discharge.  I'll sign off at this time.   Thank you for the courtesy of this referral.    Thaddeus Geronimo MD  Attending Physician  [facility]  Division of Infectious Diseases  [phone]

## 2023-12-06 NOTE — PROGRESS NOTE ADULT - PROBLEM SELECTOR PROBLEM 2
Chronic UTI
Diarrhea
Chronic UTI
Need for prophylactic antibiotic
Chronic UTI

## 2023-12-06 NOTE — PROGRESS NOTE ADULT - PROBLEM SELECTOR PROBLEM 1
MDS (myelodysplastic syndrome)
AML (acute myeloid leukemia)
HTN (hypertension)
MDS (myelodysplastic syndrome)

## 2023-12-06 NOTE — PROGRESS NOTE ADULT - PROVIDER SPECIALTY LIST ADULT
Heme/Onc
Heme/Onc
Infectious Disease
Internal Medicine
Heme/Onc
Infectious Disease
Internal Medicine
Heme/Onc
Internal Medicine
Internal Medicine
Heme/Onc
Heme/Onc
Infectious Disease
Heme/Onc
Heme/Onc
Internal Medicine

## 2023-12-06 NOTE — PROGRESS NOTE ADULT - PROBLEM SELECTOR PLAN 4
continue statin
-- senekot at bedtime
continue statin

## 2023-12-06 NOTE — PROGRESS NOTE ADULT - PROBLEM SELECTOR PLAN 2
-- could be due laxatives --- unclear if due to venetoclax --- will cont to monitor
now on piperacillin/tazobactam
no evidence at present  ID help appreciated   continue acyclovir and fluconazole
no evidence at present  ID help appreciated   continue acyclovir and fluconazole
temp 99.6 earlier with some chills  no localizing s/s at present  if temp > 100 will need to send urine culture, blood cultures and start piperacillin/tazobactam   continue acyclovir and fluconazole for now
no evidence at present  ID help appreciated   continue acyclovir and fluconazole
continue amox-clavulanate at bedtime once daily  ID help appreciated   started on acyclovir and fluconazole
- kody ID input  - cont amoxicillin prophylaxis for UTI
no evidence at present  ID help appreciated   continue acyclovir and fluconazole
resume home Augmentin on discharge
now on piperacillin/tazobactam  will d/w ID

## 2023-12-06 NOTE — PROGRESS NOTE ADULT - SUBJECTIVE AND OBJECTIVE BOX
AML    HPI  89 yo woman new dx MDS to AML on first cycle venetoclax finished azacytidine 2 days ago, developed fever subsequently resolved  pmh sh fh unchanged 7 pt ros fatigue otherwise neg  physical  elderly  comfortable  vs  t98.6 96 142/73 18 97 sat  lungs clear   cor s1s2  abd soft nontender  ext no edema  skin warm dry    data  wbc 2340 hgb 8.7 plt [id number] anc 1700 ldh 333 cr  0.79   AML    HPI  89 yo woman new dx MDS to AML on first cycle venetoclax finished azacytidine 2 days ago, developed fever subsequently resolved  pmh sh fh unchanged 7 pt ros fatigue otherwise neg  physical  elderly  comfortable  vs  t98.6 96 142/73 18 97 sat  lungs clear   cor s1s2  abd soft nontender  ext no edema  skin warm dry    data  wbc 2340 hgb 8.7 plt [id number] anc 1700 ldh 333 cr  0.79

## 2023-12-06 NOTE — DISCHARGE NOTE NURSING/CASE MANAGEMENT/SOCIAL WORK - PATIENT PORTAL LINK FT
You can access the FollowMyHealth Patient Portal offered by [facility] by registering at the following website: [email or web address]. By joining [employer]’s FollowMyHealth portal, you will also be able to view your health information using other applications (apps) compatible with our system. You can access the FollowMyHealth Patient Portal offered by [facility] by registering at the following website: [email or web address]. By joining [employer]’s FollowMyHealth portal, you will also be able to view your health information using other applications (apps) compatible with our system.

## 2023-12-06 NOTE — PROGRESS NOTE ADULT - REASON FOR ADMISSION
elective chemotherapy
[id number]
elective chemotherapy

## 2023-12-06 NOTE — PROGRESS NOTE ADULT - PROBLEM SELECTOR PLAN 1
no further fever  blood cultures negative so far  urine culture > [id number] enterococcus faecium   no intervention at this time   resume home prophylactic dose Augmentin on discharge no further fever  blood cultures negative so far  urine culture > [id number] enterococcus faecium   no intervention at this time   resume home prophylactic dose Augmentin on discharge

## 2023-12-06 NOTE — PROGRESS NOTE ADULT - ASSESSMENT
AML first cycle venetoclax aza   no transfusions necessary can f/u dr romo as outpat  ID plan for prophylactic abx, off IV abx today

## 2023-12-06 NOTE — PROGRESS NOTE ADULT - PROBLEM SELECTOR PLAN 3
-- Day #3 Azacytadine/Venetoclax  -- tolerating well  -- Tumor lysis labs stable  -- monitor for temps as  now   -- if spikes temp then would panculture and consider broad spectrum abx
continue Lotemax eye drop QHS right eye daily
continue Lotemax eye drop QHS
continue Lotemax eye drop QHS right eye daily
continue Lotemax eye drop QHS
continue Lotemax eye drop QHS right eye daily
continue Lotemax eye drop QHS right eye daily

## 2023-12-06 NOTE — PROGRESS NOTE ADULT - SUBJECTIVE AND OBJECTIVE BOX
[facility]  Division of Infectious Diseases  [phone]    Name: FUNMI ROMERO  Age: 89y  Gender: Female  MRN: [id number]    Interval History--  Notes reviewed.     Past Medical History--  Hypertension    Hyperlipidemia    Palpitations    GERD (gastroesophageal reflux disease)    Chronic UTI    Post corneal transplant    No significant past surgical history    S/P cataract surgery        For details regarding the patient's social history, family history, and other miscellaneous elements, please refer the initial infectious diseases consultation and/or the admitting history and physical examination for this admission.    Allergies    No Known Allergies    Intolerances        Medications--  Antibiotics:  acyclovir   Oral Tab/Cap 200 milliGRAM(s) Oral two times a day  fluconAZOLE   Tablet 200 milliGRAM(s) Oral daily  piperacillin/tazobactam IVPB.. 3.375 Gram(s) IV Intermittent every 8 hours    Immunologic:    Other:  acetaminophen     Tablet .. PRN  allopurinol  ALPRAZolam PRN  atorvastatin  Biotene Dry Mouth Oral Rinse  chlorhexidine 4% Liquid  lactulose Syrup PRN  loratadine PRN  losartan  loteprednol 0.5% Ophthalmic Suspension  loteprednol 0.5% Ophthalmic Suspension  metoprolol succinate ER  polyethylene glycol 3350 PRN  senna PRN  sodium chloride 0.9% lock flush PRN  sodium chloride 0.9%.  venetoclax      Review of Systems--  A 10-point review of systems was obtained.     Pertinent positives and negatives--  Constitutional: No fevers. No Chills. No Rigors.   Cardiovascular: No chest pain. No palpitations.  Respiratory: No shortness of breath. No cough.  Gastrointestinal: No nausea or vomiting. No diarrhea or constipation.   Psychiatric: Pleasant. Appropriate affect.    Review of systems otherwise negative except as previously noted.    Physical Examination--  Vital Signs: T(F): 98.6 (12-06-23 @ 05:33), Max: 99 (12-05-23 @ 17:12)  HR: 96 (12-06-23 @ 05:33)  BP: 142/73 (12-06-23 @ 05:33)  RR: 18 (12-06-23 @ 05:33)  SpO2: 97% (12-06-23 @ 05:33)  Wt(kg): --  General: Nontoxic-appearing Female in no acute distress.  HEENT: AT/NC. PERRL. EOMI. Anicteric. Conjunctiva pink and moist. Oropharynx clear. Dentition fair.  Neck: Not rigid. No sense of mass.  Nodes: None palpable.  Lungs: Clear bilaterally without rales, wheezing or rhonchi  Heart: Regular rate and rhythm. No Murmur. No rub. No gallop. No palpable thrill.  Abdomen: Bowel sounds present and normoactive. Soft. Nondistended. Nontender. No sense of mass. No organomegaly.  Back: No spinal tenderness. No costovertebral angle tenderness.   Extremities: No cyanosis or clubbing. No edema.   Skin: Warm. Dry. Good turgor. No rash. No vasculitic stigmata.  Psychiatric: Appropriate affect and mood for situation.         Laboratory Studies--  CBC                        8.7    2.34  )-----------( 110      ( 06 Dec 2023 06:59 )             28.9       Chemistries  12-06    132<L>  |  98  |  10  ----------------------------<  103<H>  3.6   |  24  |  0.79    Ca    9.2      06 Dec 2023 06:57  Phos  2.8     12-06  Mg     1.8     12-06    TPro  6.8  /  Alb  3.4  /  TBili  1.0  /  DBili  x   /  AST  23  /  ALT  25  /  AlkPhos  65  12-06      Culture Data    Culture - Urine (collected 03 Dec 2023 18:33)  Source: Clean Catch Clean Catch (Midstream)  Preliminary Report (05 Dec 2023 11:38):    >100,000 CFU/ml Enterococcus faecium    Culture - Blood (collected 03 Dec 2023 18:33)  Source: .Blood Blood-Peripheral  Preliminary Report (05 Dec 2023 22:02):    No growth at 48 Hours    Culture - Blood (collected 03 Dec 2023 18:03)  Source: .Blood Blood-Peripheral  Preliminary Report (05 Dec 2023 22:02):    No growth at 48 Hours             [facility]  Division of Infectious Diseases  [phone]    Name: FUNMI ROMERO  Age: 89y  Gender: Female  MRN: [id number]    Interval History--  Notes reviewed.     Past Medical History--  Hypertension    Hyperlipidemia    Palpitations    GERD (gastroesophageal reflux disease)    Chronic UTI    Post corneal transplant    No significant past surgical history    S/P cataract surgery        For details regarding the patient's social history, family history, and other miscellaneous elements, please refer the initial infectious diseases consultation and/or the admitting history and physical examination for this admission.    Allergies    No Known Allergies    Intolerances        Medications--  Antibiotics:  acyclovir   Oral Tab/Cap 200 milliGRAM(s) Oral two times a day  fluconAZOLE   Tablet 200 milliGRAM(s) Oral daily  piperacillin/tazobactam IVPB.. 3.375 Gram(s) IV Intermittent every 8 hours    Immunologic:    Other:  acetaminophen     Tablet .. PRN  allopurinol  ALPRAZolam PRN  atorvastatin  Biotene Dry Mouth Oral Rinse  chlorhexidine 4% Liquid  lactulose Syrup PRN  loratadine PRN  losartan  loteprednol 0.5% Ophthalmic Suspension  loteprednol 0.5% Ophthalmic Suspension  metoprolol succinate ER  polyethylene glycol 3350 PRN  senna PRN  sodium chloride 0.9% lock flush PRN  sodium chloride 0.9%.  venetoclax      Review of Systems--  A 10-point review of systems was obtained.     Pertinent positives and negatives--  Constitutional: No fevers. No Chills. No Rigors.   Cardiovascular: No chest pain. No palpitations.  Respiratory: No shortness of breath. No cough.  Gastrointestinal: No nausea or vomiting. No diarrhea or constipation.   Psychiatric: Pleasant. Appropriate affect.    Review of systems otherwise negative except as previously noted.    Physical Examination--  Vital Signs: T(F): 98.6 (12-06-23 @ 05:33), Max: 99 (12-05-23 @ 17:12)  HR: 96 (12-06-23 @ 05:33)  BP: 142/73 (12-06-23 @ 05:33)  RR: 18 (12-06-23 @ 05:33)  SpO2: 97% (12-06-23 @ 05:33)  Wt(kg): --  General: Nontoxic-appearing Female in no acute distress.  HEENT: AT/NC. PERRL. EOMI. Anicteric. Conjunctiva pink and moist. Oropharynx clear. Dentition fair.  Neck: Not rigid. No sense of mass.  Nodes: None palpable.  Lungs: Clear bilaterally without rales, wheezing or rhonchi  Heart: Regular rate and rhythm. No Murmur. No rub. No gallop. No palpable thrill.  Abdomen: Bowel sounds present and normoactive. Soft. Nondistended. Nontender. No sense of mass. No organomegaly.  Back: No spinal tenderness. No costovertebral angle tenderness.   Extremities: No cyanosis or clubbing. No edema.   Skin: Warm. Dry. Good turgor. No rash. No vasculitic stigmata.  Psychiatric: Appropriate affect and mood for situation.         Laboratory Studies--  CBC                        8.7    2.34  )-----------( 110      ( 06 Dec 2023 06:59 )             28.9       Chemistries  12-06    132<L>  |  98  |  10  ----------------------------<  103<H>  3.6   |  24  |  0.79    Ca    9.2      06 Dec 2023 06:57  Phos  2.8     12-06  Mg     1.8     12-06    TPro  6.8  /  Alb  3.4  /  TBili  1.0  /  DBili  x   /  AST  23  /  ALT  25  /  AlkPhos  65  12-06      Culture Data    Culture - Urine (collected 03 Dec 2023 18:33)  Source: Clean Catch Clean Catch (Midstream)  Preliminary Report (05 Dec 2023 11:38):    >100,000 CFU/ml Enterococcus faecium    Culture - Blood (collected 03 Dec 2023 18:33)  Source: .Blood Blood-Peripheral  Preliminary Report (05 Dec 2023 22:02):    No growth at 48 Hours    Culture - Blood (collected 03 Dec 2023 18:03)  Source: .Blood Blood-Peripheral  Preliminary Report (05 Dec 2023 22:02):    No growth at 48 Hours             [facility]  Division of Infectious Diseases  [phone]    Name: FUNMI ROMERO  Age: 89y  Gender: Female  MRN: [id number]    Interval History--  Notes reviewed. Seen earlier today.  Feels pretty well.  No specific complaints.  Anxious to leave.    Past Medical History--  Hypertension    Hyperlipidemia    Palpitations    GERD (gastroesophageal reflux disease)    Chronic UTI    Post corneal transplant    No significant past surgical history    S/P cataract surgery        For details regarding the patient's social history, family history, and other miscellaneous elements, please refer the initial infectious diseases consultation and/or the admitting history and physical examination for this admission.    Allergies    No Known Allergies    Intolerances        Medications--  Antibiotics:  acyclovir   Oral Tab/Cap 200 milliGRAM(s) Oral two times a day  fluconAZOLE   Tablet 200 milliGRAM(s) Oral daily  piperacillin/tazobactam IVPB.. 3.375 Gram(s) IV Intermittent every 8 hours    Immunologic:    Other:  acetaminophen     Tablet .. PRN  allopurinol  ALPRAZolam PRN  atorvastatin  Biotene Dry Mouth Oral Rinse  chlorhexidine 4% Liquid  lactulose Syrup PRN  loratadine PRN  losartan  loteprednol 0.5% Ophthalmic Suspension  loteprednol 0.5% Ophthalmic Suspension  metoprolol succinate ER  polyethylene glycol 3350 PRN  senna PRN  sodium chloride 0.9% lock flush PRN  sodium chloride 0.9%.  venetoclax      Review of Systems--  A 10-point review of systems was obtained. Review of systems otherwise negative except as previously noted.    Physical Examination--  Vital Signs: T(F): 98.6 (12-06-23 @ 05:33), Max: 99 (12-05-23 @ 17:12)  HR: 96 (12-06-23 @ 05:33)  BP: 142/73 (12-06-23 @ 05:33)  RR: 18 (12-06-23 @ 05:33)  SpO2: 97% (12-06-23 @ 05:33)  Wt(kg): --  General: Nontoxic-appearing Female in no acute distress.  HEENT: AT/NC. Anicteric. Conjunctiva pink and moist. Oropharynx clear.   Neck: Not rigid. No sense of mass.  Nodes: None palpable.  Lungs: Clear bilaterally without rales, wheezing or rhonchi  Heart: Regular rate and rhythm.  Abdomen: Bowel sounds present and normoactive. Soft. Nondistended. Nontender.   Extremities: No cyanosis or clubbing. No edema.   Skin: Warm. Dry. Good turgor. No rash. No vasculitic stigmata.  Psychiatric: Appropriate affect and mood for situation.       Laboratory Studies--  CBC                        8.7    2.34  )-----------( 110      ( 06 Dec 2023 06:59 )             28.9       Chemistries  12-06    132<L>  |  98  |  10  ----------------------------<  103<H>  3.6   |  24  |  0.79    Ca    9.2      06 Dec 2023 06:57  Phos  2.8     12-06  Mg     1.8     12-06    TPro  6.8  /  Alb  3.4  /  TBili  1.0  /  DBili  x   /  AST  23  /  ALT  25  /  AlkPhos  65  12-06      Culture Data    Culture - Urine (collected 03 Dec 2023 18:33)  Source: Clean Catch Clean Catch (Midstream)  Preliminary Report (05 Dec 2023 11:38):    >100,000 CFU/ml Enterococcus faecium    Culture - Blood (collected 03 Dec 2023 18:33)  Source: .Blood Blood-Peripheral  Preliminary Report (05 Dec 2023 22:02):    No growth at 48 Hours    Culture - Blood (collected 03 Dec 2023 18:03)  Source: .Blood Blood-Peripheral  Preliminary Report (05 Dec 2023 22:02):    No growth at 48 Hours             [facility]  Division of Infectious Diseases  [phone]    Name: FUNMI ROMERO  Age: 89y  Gender: Female  MRN: [id number]    Interval History--  Notes reviewed. Seen earlier today.  Feels pretty well.  No specific complaints.  Anxious to leave.    Past Medical History--  Hypertension    Hyperlipidemia    Palpitations    GERD (gastroesophageal reflux disease)    Chronic UTI    Post corneal transplant    No significant past surgical history    S/P cataract surgery        For details regarding the patient's social history, family history, and other miscellaneous elements, please refer the initial infectious diseases consultation and/or the admitting history and physical examination for this admission.    Allergies    No Known Allergies    Intolerances        Medications--  Antibiotics:  acyclovir   Oral Tab/Cap 200 milliGRAM(s) Oral two times a day  fluconAZOLE   Tablet 200 milliGRAM(s) Oral daily  piperacillin/tazobactam IVPB.. 3.375 Gram(s) IV Intermittent every 8 hours    Immunologic:    Other:  acetaminophen     Tablet .. PRN  allopurinol  ALPRAZolam PRN  atorvastatin  Biotene Dry Mouth Oral Rinse  chlorhexidine 4% Liquid  lactulose Syrup PRN  loratadine PRN  losartan  loteprednol 0.5% Ophthalmic Suspension  loteprednol 0.5% Ophthalmic Suspension  metoprolol succinate ER  polyethylene glycol 3350 PRN  senna PRN  sodium chloride 0.9% lock flush PRN  sodium chloride 0.9%.  venetoclax      Review of Systems--  A 10-point review of systems was obtained. Review of systems otherwise negative except as previously noted.    Physical Examination--  Vital Signs: T(F): 98.6 (12-06-23 @ 05:33), Max: 99 (12-05-23 @ 17:12)  HR: 96 (12-06-23 @ 05:33)  BP: 142/73 (12-06-23 @ 05:33)  RR: 18 (12-06-23 @ 05:33)  SpO2: 97% (12-06-23 @ 05:33)  Wt(kg): --  General: Nontoxic-appearing Female in no acute distress.  HEENT: AT/NC. Anicteric. Conjunctiva pink and moist. Oropharynx clear.   Neck: Not rigid. No sense of mass.  Nodes: None palpable.  Lungs: Clear bilaterally without rales, wheezing or rhonchi  Heart: Regular rate and rhythm.  Abdomen: Bowel sounds present and normoactive. Soft. Nondistended. Nontender.   Extremities: No cyanosis or clubbing. No edema.   Skin: Warm. Dry. Good turgor. No rash. No vasculitic stigmata.  Psychiatric: Appropriate affect and mood for situation.       Laboratory Studies--  CBC                        8.7    2.34  )-----------( 110      ( 06 Dec 2023 06:59 )             28.9       Chemistries  12-06    132<L>  |  98  |  10  ----------------------------<  103<H>  3.6   |  24  |  0.79    Ca    9.2      06 Dec 2023 06:57  Phos  2.8     12-06  Mg     1.8     12-06    TPro  6.8  /  Alb  3.4  /  TBili  1.0  /  DBili  x   /  AST  23  /  ALT  25  /  AlkPhos  65  12-06      Culture Data    Culture - Urine (collected 03 Dec 2023 18:33)  Source: Clean Catch Clean Catch (Midstream)  Preliminary Report (05 Dec 2023 11:38):    >100,000 CFU/ml Enterococcus faecium    Culture - Blood (collected 03 Dec 2023 18:33)  Source: .Blood Blood-Peripheral  Preliminary Report (05 Dec 2023 22:02):    No growth at 48 Hours    Culture - Blood (collected 03 Dec 2023 18:03)  Source: .Blood Blood-Peripheral  Preliminary Report (05 Dec 2023 22:02):    No growth at 48 Hours

## 2023-12-06 NOTE — PROGRESS NOTE ADULT - SUBJECTIVE AND OBJECTIVE BOX
Patient is a 89y old  Female who presents with a chief complaint of elective chemotherapy (06 Dec 2023 09:49)      DATE OF SERVICE: 12-06-23 @ 13:58    SUBJECTIVE / OVERNIGHT EVENTS: overnight events noted    ROS:  Resp: No cough no sputum production  CVS: No chest pain no palpitations no orthopnea  GI: no N/V/D  : no dysuria, no hematuria        MEDICATIONS  (STANDING):  acyclovir   Oral Tab/Cap 200 milliGRAM(s) Oral two times a day  allopurinol 300 milliGRAM(s) Oral daily  atorvastatin 40 milliGRAM(s) Oral at bedtime  Biotene Dry Mouth Oral Rinse 5 milliLiter(s) Swish and Spit four times a day  chlorhexidine 4% Liquid 1 Application(s) Topical <User Schedule>  fluconAZOLE   Tablet 200 milliGRAM(s) Oral daily  losartan 50 milliGRAM(s) Oral daily  loteprednol 0.5% Ophthalmic Suspension 1 Drop(s) Right EYE daily  loteprednol 0.5% Ophthalmic Suspension 1 Drop(s) Right EYE at bedtime  metoprolol succinate ER 50 milliGRAM(s) Oral daily  piperacillin/tazobactam IVPB.. 3.375 Gram(s) IV Intermittent every 8 hours  sodium chloride 0.9%. 1000 milliLiter(s) (50 mL/Hr) IV Continuous <Continuous>  venetoclax 100 milliGRAM(s) Oral <User Schedule>    MEDICATIONS  (PRN):  acetaminophen     Tablet .. 650 milliGRAM(s) Oral every 6 hours PRN Temp greater or equal to 38C (100.4F), Mild Pain (1 - 3)  ALPRAZolam 0.125 milliGRAM(s) Oral daily PRN for anxiety  lactulose Syrup 10 Gram(s) Oral every 6 hours PRN constipation  loratadine 10 milliGRAM(s) Oral daily PRN allergies  polyethylene glycol 3350 17 Gram(s) Oral daily PRN Constipation  senna 2 Tablet(s) Oral at bedtime PRN Constipation  sodium chloride 0.9% lock flush 10 milliLiter(s) IV Push every 1 hour PRN Pre/post blood products, medications, blood draw, and to maintain line patency        CAPILLARY BLOOD GLUCOSE        I&O's Summary    05 Dec 2023 07:01  -  06 Dec 2023 07:00  --------------------------------------------------------  IN: 1759 mL / OUT: 1875 mL / NET: -116 mL    06 Dec 2023 07:01  -  06 Dec 2023 13:58  --------------------------------------------------------  IN: 446 mL / OUT: 0 mL / NET: 446 mL        Vital Signs Last 24 Hrs  T(C): 36.4 (06 Dec 2023 13:07), Max: 37.2 (05 Dec 2023 17:12)  T(F): 97.6 (06 Dec 2023 13:07), Max: 99 (05 Dec 2023 17:12)  HR: 92 (06 Dec 2023 13:07) (92 - 101)  BP: 130/68 (06 Dec 2023 13:07) (109/57 - 148/75)  BP(mean): --  RR: 16 (06 Dec 2023 13:07) (16 - 18)  SpO2: 100% (06 Dec 2023 13:07) (97% - 100%)    PHYSICAL EXAM:   Neck: Supple  Lungs: no wheeze  CVS: S1 S2 no M/R/G  Abdomen: no tenderness  Neuro: nonfocal     LABS:                        8.7    2.34  )-----------( 110      ( 06 Dec 2023 06:59 )             28.9     12-06    132<L>  |  98  |  10  ----------------------------<  103<H>  3.6   |  24  |  0.79    Ca    9.2      06 Dec 2023 06:57  Phos  2.8     12-06  Mg     1.8     12-06    TPro  6.8  /  Alb  3.4  /  TBili  1.0  /  DBili  x   /  AST  23  /  ALT  25  /  AlkPhos  65  12-06          Urinalysis Basic - ( 06 Dec 2023 06:57 )    Color: x / Appearance: x / SG: x / pH: x  Gluc: 103 mg/dL / Ketone: x  / Bili: x / Urobili: x   Blood: x / Protein: x / Nitrite: x   Leuk Esterase: x / RBC: x / WBC x   Sq Epi: x / Non Sq Epi: x / Bacteria: x          All consultant(s) notes reviewed and care discussed with other providers        Contact Number, Dr Yen [phone] Patient is a 89y old  Female who presents with a chief complaint of elective chemotherapy (06 Dec 2023 09:49)      DATE OF SERVICE: 12-06-23 @ 13:58    SUBJECTIVE / OVERNIGHT EVENTS: overnight events noted    ROS:  Resp: No cough no sputum production  CVS: No chest pain no palpitations no orthopnea  GI: no N/V/D  : no dysuria, no hematuria        MEDICATIONS  (STANDING):  acyclovir   Oral Tab/Cap 200 milliGRAM(s) Oral two times a day  allopurinol 300 milliGRAM(s) Oral daily  atorvastatin 40 milliGRAM(s) Oral at bedtime  Biotene Dry Mouth Oral Rinse 5 milliLiter(s) Swish and Spit four times a day  chlorhexidine 4% Liquid 1 Application(s) Topical <User Schedule>  fluconAZOLE   Tablet 200 milliGRAM(s) Oral daily  losartan 50 milliGRAM(s) Oral daily  loteprednol 0.5% Ophthalmic Suspension 1 Drop(s) Right EYE daily  loteprednol 0.5% Ophthalmic Suspension 1 Drop(s) Right EYE at bedtime  metoprolol succinate ER 50 milliGRAM(s) Oral daily  piperacillin/tazobactam IVPB.. 3.375 Gram(s) IV Intermittent every 8 hours  sodium chloride 0.9%. 1000 milliLiter(s) (50 mL/Hr) IV Continuous <Continuous>  venetoclax 100 milliGRAM(s) Oral <User Schedule>    MEDICATIONS  (PRN):  acetaminophen     Tablet .. 650 milliGRAM(s) Oral every 6 hours PRN Temp greater or equal to 38C (100.4F), Mild Pain (1 - 3)  ALPRAZolam 0.125 milliGRAM(s) Oral daily PRN for anxiety  lactulose Syrup 10 Gram(s) Oral every 6 hours PRN constipation  loratadine 10 milliGRAM(s) Oral daily PRN allergies  polyethylene glycol 3350 17 Gram(s) Oral daily PRN Constipation  senna 2 Tablet(s) Oral at bedtime PRN Constipation  sodium chloride 0.9% lock flush 10 milliLiter(s) IV Push every 1 hour PRN Pre/post blood products, medications, blood draw, and to maintain line patency        CAPILLARY BLOOD GLUCOSE        I&O's Summary    05 Dec 2023 07:01  -  06 Dec 2023 07:00  --------------------------------------------------------  IN: 1759 mL / OUT: 1875 mL / NET: -116 mL    06 Dec 2023 07:01  -  06 Dec 2023 13:58  --------------------------------------------------------  IN: 446 mL / OUT: 0 mL / NET: 446 mL        Vital Signs Last 24 Hrs  T(C): 36.4 (06 Dec 2023 13:07), Max: 37.2 (05 Dec 2023 17:12)  T(F): 97.6 (06 Dec 2023 13:07), Max: 99 (05 Dec 2023 17:12)  HR: 92 (06 Dec 2023 13:07) (92 - 101)  BP: 130/68 (06 Dec 2023 13:07) (109/57 - 148/75)  BP(mean): --  RR: 16 (06 Dec 2023 13:07) (16 - 18)  SpO2: 100% (06 Dec 2023 13:07) (97% - 100%)    PHYSICAL EXAM:   Neck: Supple  Lungs: no wheeze  CVS: S1 S2 no M/R/G  Abdomen: no tenderness  Neuro: nonfocal     LABS:                        8.7    2.34  )-----------( 110      ( 06 Dec 2023 06:59 )             28.9     12-06    132<L>  |  98  |  10  ----------------------------<  103<H>  3.6   |  24  |  0.79    Ca    9.2      06 Dec 2023 06:57  Phos  2.8     12-06  Mg     1.8     12-06    TPro  6.8  /  Alb  3.4  /  TBili  1.0  /  DBili  x   /  AST  23  /  ALT  25  /  AlkPhos  65  12-06          Urinalysis Basic - ( 06 Dec 2023 06:57 )    Color: x / Appearance: x / SG: x / pH: x  Gluc: 103 mg/dL / Ketone: x  / Bili: x / Urobili: x   Blood: x / Protein: x / Nitrite: x   Leuk Esterase: x / RBC: x / WBC x   Sq Epi: x / Non Sq Epi: x / Bacteria: x          All consultant(s) notes reviewed and care discussed with other providers        Contact Number, Dr Yen [phone]

## 2023-12-06 NOTE — PROGRESS NOTE ADULT - PROBLEM SELECTOR PROBLEM 4
HLD (hyperlipidemia)
Simple constipation
HLD (hyperlipidemia)

## 2023-12-06 NOTE — DISCHARGE NOTE NURSING/CASE MANAGEMENT/SOCIAL WORK - NSDCPEFALRISK_GEN_ALL_CORE
For information on Fall & Injury Prevention, visit: [email or web address] OR  [email or web address] OR  https://www.cdc.gov/steadi/patient.html For information on Fall & Injury Prevention, visit: [email or web address] OR  [email or web address] OR  https://www.cdc.gov/steadi/patient.html

## 2023-12-08 LAB
CULTURE RESULTS: SIGNIFICANT CHANGE UP
SPECIMEN SOURCE: SIGNIFICANT CHANGE UP

## 2023-12-13 PROCEDURE — 87186 SC STD MICRODIL/AGAR DIL: CPT

## 2023-12-13 PROCEDURE — 93005 ELECTROCARDIOGRAM TRACING: CPT

## 2023-12-13 PROCEDURE — 87040 BLOOD CULTURE FOR BACTERIA: CPT

## 2023-12-13 PROCEDURE — 36415 COLL VENOUS BLD VENIPUNCTURE: CPT

## 2023-12-13 PROCEDURE — 86900 BLOOD TYPING SEROLOGIC ABO: CPT

## 2023-12-13 PROCEDURE — 36430 TRANSFUSION BLD/BLD COMPNT: CPT

## 2023-12-13 PROCEDURE — 87641 MR-STAPH DNA AMP PROBE: CPT

## 2023-12-13 PROCEDURE — 84443 ASSAY THYROID STIM HORMONE: CPT

## 2023-12-13 PROCEDURE — 97116 GAIT TRAINING THERAPY: CPT

## 2023-12-13 PROCEDURE — 84550 ASSAY OF BLOOD/URIC ACID: CPT

## 2023-12-13 PROCEDURE — 83036 HEMOGLOBIN GLYCOSYLATED A1C: CPT

## 2023-12-13 PROCEDURE — P9040: CPT

## 2023-12-13 PROCEDURE — 85025 COMPLETE CBC W/AUTO DIFF WBC: CPT

## 2023-12-13 PROCEDURE — 83615 LACTATE (LD) (LDH) ENZYME: CPT

## 2023-12-13 PROCEDURE — 87077 CULTURE AEROBIC IDENTIFY: CPT

## 2023-12-13 PROCEDURE — 84100 ASSAY OF PHOSPHORUS: CPT

## 2023-12-13 PROCEDURE — 0225U NFCT DS DNA&RNA 21 SARSCOV2: CPT

## 2023-12-13 PROCEDURE — 86901 BLOOD TYPING SEROLOGIC RH(D): CPT

## 2023-12-13 PROCEDURE — 82607 VITAMIN B-12: CPT

## 2023-12-13 PROCEDURE — 87086 URINE CULTURE/COLONY COUNT: CPT

## 2023-12-13 PROCEDURE — 36573 INSJ PICC RS&I 5 YR+: CPT

## 2023-12-13 PROCEDURE — 85730 THROMBOPLASTIN TIME PARTIAL: CPT

## 2023-12-13 PROCEDURE — C1751: CPT

## 2023-12-13 PROCEDURE — 97530 THERAPEUTIC ACTIVITIES: CPT

## 2023-12-13 PROCEDURE — 80053 COMPREHEN METABOLIC PANEL: CPT

## 2023-12-13 PROCEDURE — 71045 X-RAY EXAM CHEST 1 VIEW: CPT

## 2023-12-13 PROCEDURE — 85610 PROTHROMBIN TIME: CPT

## 2023-12-13 PROCEDURE — 81001 URINALYSIS AUTO W/SCOPE: CPT

## 2023-12-13 PROCEDURE — 86923 COMPATIBILITY TEST ELECTRIC: CPT

## 2023-12-13 PROCEDURE — 83735 ASSAY OF MAGNESIUM: CPT

## 2023-12-13 PROCEDURE — 87640 STAPH A DNA AMP PROBE: CPT

## 2023-12-13 PROCEDURE — 86850 RBC ANTIBODY SCREEN: CPT

## 2023-12-13 PROCEDURE — 97161 PT EVAL LOW COMPLEX 20 MIN: CPT

## 2023-12-13 PROCEDURE — 85027 COMPLETE CBC AUTOMATED: CPT

## 2024-01-13 PROBLEM — Z94.7 CORNEAL TRANSPLANT STATUS: Chronic | Status: ACTIVE | Noted: 2023-11-27

## 2024-01-13 PROBLEM — N39.0 URINARY TRACT INFECTION, SITE NOT SPECIFIED: Chronic | Status: ACTIVE | Noted: 2023-11-27

## 2024-01-16 ENCOUNTER — TRANSCRIPTION ENCOUNTER (OUTPATIENT)
Age: 89
End: 2024-01-16

## 2024-01-16 RX ORDER — ROSUVASTATIN CALCIUM 5 MG/1
1 TABLET ORAL
Refills: 0 | DISCHARGE

## 2024-01-16 RX ORDER — ALPRAZOLAM 0.25 MG
0.5 TABLET ORAL
Refills: 0 | DISCHARGE

## 2024-01-16 RX ORDER — LOSARTAN POTASSIUM 100 MG/1
1 TABLET, FILM COATED ORAL
Refills: 0 | DISCHARGE

## 2024-01-16 RX ORDER — LOTEPREDNOL ETABONATE 2 MG/ML
1 SUSPENSION/ DROPS OPHTHALMIC
Refills: 0 | DISCHARGE

## 2024-02-14 ENCOUNTER — APPOINTMENT (OUTPATIENT)
Dept: OPHTHALMOLOGY | Facility: CLINIC | Age: 89
End: 2024-02-14

## 2024-05-10 ENCOUNTER — NON-APPOINTMENT (OUTPATIENT)
Age: 89
End: 2024-05-10

## 2024-06-17 ENCOUNTER — APPOINTMENT (OUTPATIENT)
Dept: PULMONOLOGY | Facility: CLINIC | Age: 89
End: 2024-06-17
Payer: MEDICARE

## 2024-06-17 VITALS — OXYGEN SATURATION: 96 % | HEART RATE: 91 BPM

## 2024-06-17 VITALS — SYSTOLIC BLOOD PRESSURE: 120 MMHG | DIASTOLIC BLOOD PRESSURE: 56 MMHG

## 2024-06-17 DIAGNOSIS — R06.02 SHORTNESS OF BREATH: ICD-10-CM

## 2024-06-17 DIAGNOSIS — R93.89 ABNORMAL FINDINGS ON DIAGNOSTIC IMAGING OF OTHER SPECIFIED BODY STRUCTURES: ICD-10-CM

## 2024-06-17 LAB — POCT - HEMOGLOBIN (HGB), QUANTITATIVE, TRANSCUTANEOUS: 6.1

## 2024-06-17 PROCEDURE — 99214 OFFICE O/P EST MOD 30 MIN: CPT | Mod: 25

## 2024-06-17 PROCEDURE — 94727 GAS DIL/WSHOT DETER LNG VOL: CPT

## 2024-06-17 PROCEDURE — 88738 HGB QUANT TRANSCUTANEOUS: CPT

## 2024-06-17 PROCEDURE — 94729 DIFFUSING CAPACITY: CPT

## 2024-06-17 PROCEDURE — 94010 BREATHING CAPACITY TEST: CPT

## 2024-06-17 NOTE — PHYSICAL EXAM
[No Acute Distress] : no acute distress [Normal Oropharynx] : normal oropharynx [Supple] : supple [No JVD] : no jvd [Normal S1, S2] : normal s1, s2 [No Murmurs] : no murmurs [Clear to Auscultation Bilaterally] : clear to auscultation bilaterally [Normal to Percussion] : normal to percussion [No Abnormalities] : no abnormalities [Benign] : benign [No HSM] : no hsm [No Clubbing] : no clubbing [No Cyanosis] : no cyanosis [No Edema] : no edema [No Focal Deficits] : no focal deficits [Oriented x3] : oriented x3

## 2024-06-18 PROBLEM — R93.89 ABNORMAL CHEST CT: Status: ACTIVE | Noted: 2023-11-13

## 2024-06-18 NOTE — PROCEDURE
[FreeTextEntry1] : Ct chest reviewed May 10, 2024.  Compared to prior.  New right upper lobe nodule decreased in size.  Stable other pulmonary nodules. Persistent mild airways of mucoid impaction and atelectatic change right middle lobe and lingula.  06/17/2024 Pulmonary function testing These data demonstrate a mild obstructive ventilatory deficit. Normal Lung Volumes. Diffusion capacity is normal.  Essentially stable function compared to November 2023.

## 2024-06-18 NOTE — ASSESSMENT
[FreeTextEntry1] : Had CBC today Dr Blanca.  Discussed with hematology and patient sent directly to office for stat CBC. Observation. Consider follow-up CAT scan in 1 year task sent as reminder. Follow-up post CT or sooner on a as needed basis.  35 minutes spent in evaluation management and review of studies.

## 2024-06-18 NOTE — CONSULT LETTER
[Dear  ___] : Dear ~YAEL, [Courtesy Letter:] : I had the pleasure of seeing your patient, [unfilled], in my office today. [Consult Closing:] : Thank you very much for allowing me to participate in the care of this patient.  If you have any questions, please do not hesitate to contact me. [Sincerely,] : Sincerely, [FreeTextEntry3] : Eugene Ramos MD FCCP\par   [FreeTextEntry2] : Eugene Quinteros MD

## 2024-06-18 NOTE — DISCUSSION/SUMMARY
[FreeTextEntry1] : Radiographic abnormalities highly likely infectious inflammatory in origin. New density right upper lobe likely infectious inflammatory.  Decreased in size. 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 Component of underlying bronchiectasis and bronchiolectasis. Exertional dyspnea.  Significant contribution likely related to significant anemia.

## 2024-06-18 NOTE — HISTORY OF PRESENT ILLNESS
[Former] : former [>= 20 pack years] : >= 20 pack years [TextBox_4] : Increase in SOB Had CT. Seeing hematology.  In remission.  No cough except usual PN Drip No wheezing.  Denies chest discomfort. Had follow-up CT.  Issues with sight.  [TextBox_11] : 1 [TextBox_13] : 30 [YearQuit] : 1980

## 2024-08-27 ENCOUNTER — INPATIENT (INPATIENT)
Facility: HOSPITAL | Age: 89
LOS: 9 days | Discharge: HOME | DRG: 864 | End: 2024-09-06
Attending: INTERNAL MEDICINE | Admitting: INTERNAL MEDICINE
Payer: MEDICARE

## 2024-08-27 VITALS
HEART RATE: 94 BPM | RESPIRATION RATE: 16 BRPM | TEMPERATURE: 100 F | SYSTOLIC BLOOD PRESSURE: 111 MMHG | OXYGEN SATURATION: 99 % | DIASTOLIC BLOOD PRESSURE: 65 MMHG

## 2024-08-27 DIAGNOSIS — Z98.49 CATARACT EXTRACTION STATUS, UNSPECIFIED EYE: Chronic | ICD-10-CM

## 2024-08-27 DIAGNOSIS — R50.9 FEVER, UNSPECIFIED: ICD-10-CM

## 2024-08-27 LAB
ALBUMIN SERPL ELPH-MCNC: 3.9 G/DL — SIGNIFICANT CHANGE UP (ref 3.3–5)
ALP SERPL-CCNC: 96 U/L — SIGNIFICANT CHANGE UP (ref 40–120)
ALT FLD-CCNC: 49 U/L — HIGH (ref 10–45)
ANION GAP SERPL CALC-SCNC: 14 MMOL/L — SIGNIFICANT CHANGE UP (ref 5–17)
ANISOCYTOSIS BLD QL: SIGNIFICANT CHANGE UP
APPEARANCE UR: CLEAR — SIGNIFICANT CHANGE UP
APTT BLD: 27.9 SEC — SIGNIFICANT CHANGE UP (ref 24.5–35.6)
AST SERPL-CCNC: 29 U/L — SIGNIFICANT CHANGE UP (ref 10–40)
BASOPHILS # BLD AUTO: 0 K/UL — SIGNIFICANT CHANGE UP (ref 0–0.2)
BASOPHILS NFR BLD AUTO: 0 % — SIGNIFICANT CHANGE UP (ref 0–2)
BILIRUB SERPL-MCNC: 0.7 MG/DL — SIGNIFICANT CHANGE UP (ref 0.2–1.2)
BILIRUB UR-MCNC: NEGATIVE — SIGNIFICANT CHANGE UP
BUN SERPL-MCNC: 14 MG/DL — SIGNIFICANT CHANGE UP (ref 7–23)
CALCIUM SERPL-MCNC: 9.5 MG/DL — SIGNIFICANT CHANGE UP (ref 8.4–10.5)
CHLORIDE SERPL-SCNC: 93 MMOL/L — LOW (ref 96–108)
CO2 SERPL-SCNC: 21 MMOL/L — LOW (ref 22–31)
COLOR SPEC: YELLOW — SIGNIFICANT CHANGE UP
CREAT SERPL-MCNC: 0.74 MG/DL — SIGNIFICANT CHANGE UP (ref 0.5–1.3)
DACRYOCYTES BLD QL SMEAR: SLIGHT — SIGNIFICANT CHANGE UP
DIFF PNL FLD: NEGATIVE — SIGNIFICANT CHANGE UP
EGFR: 76 ML/MIN/1.73M2 — SIGNIFICANT CHANGE UP
EOSINOPHIL # BLD AUTO: 0 K/UL — SIGNIFICANT CHANGE UP (ref 0–0.5)
EOSINOPHIL NFR BLD AUTO: 0 % — SIGNIFICANT CHANGE UP (ref 0–6)
FLUAV AG NPH QL: SIGNIFICANT CHANGE UP
FLUBV AG NPH QL: SIGNIFICANT CHANGE UP
GAS PNL BLDV: SIGNIFICANT CHANGE UP
GIANT PLATELETS BLD QL SMEAR: PRESENT — SIGNIFICANT CHANGE UP
GLUCOSE SERPL-MCNC: 114 MG/DL — HIGH (ref 70–99)
GLUCOSE UR QL: NEGATIVE MG/DL — SIGNIFICANT CHANGE UP
HCT VFR BLD CALC: 27 % — LOW (ref 34.5–45)
HGB BLD-MCNC: 9.1 G/DL — LOW (ref 11.5–15.5)
INR BLD: 1.15 RATIO — SIGNIFICANT CHANGE UP (ref 0.85–1.18)
KETONES UR-MCNC: NEGATIVE MG/DL — SIGNIFICANT CHANGE UP
LEUKOCYTE ESTERASE UR-ACNC: NEGATIVE — SIGNIFICANT CHANGE UP
LYMPHOCYTES # BLD AUTO: 0.2 K/UL — LOW (ref 1–3.3)
LYMPHOCYTES # BLD AUTO: 14.8 % — SIGNIFICANT CHANGE UP (ref 13–44)
MANUAL SMEAR VERIFICATION: SIGNIFICANT CHANGE UP
MCHC RBC-ENTMCNC: 30.2 PG — SIGNIFICANT CHANGE UP (ref 27–34)
MCHC RBC-ENTMCNC: 33.7 GM/DL — SIGNIFICANT CHANGE UP (ref 32–36)
MCV RBC AUTO: 89.7 FL — SIGNIFICANT CHANGE UP (ref 80–100)
MICROCYTES BLD QL: SLIGHT — SIGNIFICANT CHANGE UP
MONOCYTES # BLD AUTO: 0.05 K/UL — SIGNIFICANT CHANGE UP (ref 0–0.9)
MONOCYTES NFR BLD AUTO: 3.5 % — SIGNIFICANT CHANGE UP (ref 2–14)
NEUTROPHILS # BLD AUTO: 1.07 K/UL — LOW (ref 1.8–7.4)
NEUTROPHILS NFR BLD AUTO: 73 % — SIGNIFICANT CHANGE UP (ref 43–77)
NEUTS BAND # BLD: 7.8 % — SIGNIFICANT CHANGE UP (ref 0–8)
NITRITE UR-MCNC: NEGATIVE — SIGNIFICANT CHANGE UP
NRBC # BLD: 1 /100 WBCS — HIGH (ref 0–0)
PH UR: 6 — SIGNIFICANT CHANGE UP (ref 5–8)
PLAT MORPH BLD: ABNORMAL
PLATELET # BLD AUTO: 24 K/UL — LOW (ref 150–400)
POIKILOCYTOSIS BLD QL AUTO: SLIGHT — SIGNIFICANT CHANGE UP
POTASSIUM SERPL-MCNC: 4.3 MMOL/L — SIGNIFICANT CHANGE UP (ref 3.5–5.3)
POTASSIUM SERPL-SCNC: 4.3 MMOL/L — SIGNIFICANT CHANGE UP (ref 3.5–5.3)
PROT SERPL-MCNC: 7.3 G/DL — SIGNIFICANT CHANGE UP (ref 6–8.3)
PROT UR-MCNC: SIGNIFICANT CHANGE UP MG/DL
PROTHROM AB SERPL-ACNC: 12.6 SEC — SIGNIFICANT CHANGE UP (ref 9.5–13)
RBC # BLD: 3.01 M/UL — LOW (ref 3.8–5.2)
RBC # FLD: 19 % — HIGH (ref 10.3–14.5)
RBC BLD AUTO: ABNORMAL
RSV RNA NPH QL NAA+NON-PROBE: SIGNIFICANT CHANGE UP
SARS-COV-2 RNA SPEC QL NAA+PROBE: SIGNIFICANT CHANGE UP
SODIUM SERPL-SCNC: 128 MMOL/L — LOW (ref 135–145)
SP GR SPEC: 1.02 — SIGNIFICANT CHANGE UP (ref 1–1.03)
UROBILINOGEN FLD QL: 0.2 MG/DL — SIGNIFICANT CHANGE UP (ref 0.2–1)
VARIANT LYMPHS # BLD: 0.9 % — SIGNIFICANT CHANGE UP (ref 0–6)
WBC # BLD: 1.33 K/UL — LOW (ref 3.8–10.5)
WBC # FLD AUTO: 1.33 K/UL — LOW (ref 3.8–10.5)

## 2024-08-27 PROCEDURE — 99222 1ST HOSP IP/OBS MODERATE 55: CPT | Mod: GC

## 2024-08-27 PROCEDURE — 71101 X-RAY EXAM UNILAT RIBS/CHEST: CPT | Mod: 26

## 2024-08-27 PROCEDURE — 74177 CT ABD & PELVIS W/CONTRAST: CPT | Mod: 26,MC

## 2024-08-27 PROCEDURE — 99285 EMERGENCY DEPT VISIT HI MDM: CPT

## 2024-08-27 PROCEDURE — 71046 X-RAY EXAM CHEST 2 VIEWS: CPT | Mod: 26,59

## 2024-08-27 RX ORDER — FLUCONAZOLE 150 MG/1
200 TABLET ORAL DAILY
Refills: 0 | Status: DISCONTINUED | OUTPATIENT
Start: 2024-08-27 | End: 2024-09-06

## 2024-08-27 RX ORDER — PIPERACILLIN SODIUM AND TAZOBACTAM SODIUM 3; .375 G/15ML; G/15ML
3.38 INJECTION, POWDER, FOR SOLUTION INTRAVENOUS ONCE
Refills: 0 | Status: COMPLETED | OUTPATIENT
Start: 2024-08-27 | End: 2024-08-27

## 2024-08-27 RX ORDER — ONDANSETRON 2 MG/ML
4 INJECTION, SOLUTION INTRAMUSCULAR; INTRAVENOUS ONCE
Refills: 0 | Status: COMPLETED | OUTPATIENT
Start: 2024-08-27 | End: 2024-08-27

## 2024-08-27 RX ORDER — ACETAMINOPHEN 325 MG/1
650 TABLET ORAL EVERY 6 HOURS
Refills: 0 | Status: DISCONTINUED | OUTPATIENT
Start: 2024-08-27 | End: 2024-08-28

## 2024-08-27 RX ORDER — ACETAMINOPHEN 325 MG/1
1000 TABLET ORAL ONCE
Refills: 0 | Status: COMPLETED | OUTPATIENT
Start: 2024-08-27 | End: 2024-08-27

## 2024-08-27 RX ORDER — SENNA 187 MG
2 TABLET ORAL AT BEDTIME
Refills: 0 | Status: DISCONTINUED | OUTPATIENT
Start: 2024-08-27 | End: 2024-09-06

## 2024-08-27 RX ORDER — TIMOLOL MALEATE 5 MG/ML
1 SOLUTION/ DROPS OPHTHALMIC DAILY
Refills: 0 | Status: DISCONTINUED | OUTPATIENT
Start: 2024-08-27 | End: 2024-09-06

## 2024-08-27 RX ORDER — TIMOLOL MALEATE 5 MG/ML
1 SOLUTION/ DROPS OPHTHALMIC
Refills: 0 | DISCHARGE

## 2024-08-27 RX ORDER — POLYETHYLENE GLYCOL 3350 17 G/17G
17 POWDER, FOR SOLUTION ORAL ONCE
Refills: 0 | Status: COMPLETED | OUTPATIENT
Start: 2024-08-27 | End: 2024-08-27

## 2024-08-27 RX ORDER — LIDOCAINE/BENZALKONIUM/ALCOHOL
1 SOLUTION, NON-ORAL TOPICAL ONCE
Refills: 0 | Status: COMPLETED | OUTPATIENT
Start: 2024-08-27 | End: 2024-08-27

## 2024-08-27 RX ORDER — POLYETHYLENE GLYCOL 3350 17 G/17G
17 POWDER, FOR SOLUTION ORAL
Refills: 0 | Status: DISCONTINUED | OUTPATIENT
Start: 2024-08-27 | End: 2024-09-04

## 2024-08-27 RX ORDER — ROSUVASTATIN CALCIUM 10 MG/1
10 TABLET ORAL AT BEDTIME
Refills: 0 | Status: DISCONTINUED | OUTPATIENT
Start: 2024-08-27 | End: 2024-09-06

## 2024-08-27 RX ORDER — ALPRAZOLAM 0.25 MG
0.25 TABLET ORAL DAILY
Refills: 0 | Status: DISCONTINUED | OUTPATIENT
Start: 2024-08-27 | End: 2024-08-27

## 2024-08-27 RX ORDER — METOPROLOL TARTRATE 100 MG/1
50 TABLET ORAL DAILY
Refills: 0 | Status: DISCONTINUED | OUTPATIENT
Start: 2024-08-27 | End: 2024-09-06

## 2024-08-27 RX ORDER — PIPERACILLIN SODIUM AND TAZOBACTAM SODIUM 3; .375 G/15ML; G/15ML
3.38 INJECTION, POWDER, FOR SOLUTION INTRAVENOUS EVERY 8 HOURS
Refills: 0 | Status: COMPLETED | OUTPATIENT
Start: 2024-08-28 | End: 2024-09-03

## 2024-08-27 RX ADMIN — Medication 1 PATCH: at 11:06

## 2024-08-27 RX ADMIN — ACETAMINOPHEN 650 MILLIGRAM(S): 325 TABLET ORAL at 17:58

## 2024-08-27 RX ADMIN — ACETAMINOPHEN 1000 MILLIGRAM(S): 325 TABLET ORAL at 11:55

## 2024-08-27 RX ADMIN — ACETAMINOPHEN 400 MILLIGRAM(S): 325 TABLET ORAL at 11:06

## 2024-08-27 RX ADMIN — Medication 1000 MILLILITER(S): at 10:56

## 2024-08-27 RX ADMIN — Medication 200 MILLIGRAM(S): at 17:53

## 2024-08-27 RX ADMIN — ROSUVASTATIN CALCIUM 10 MILLIGRAM(S): 10 TABLET ORAL at 22:09

## 2024-08-27 RX ADMIN — Medication 2 TABLET(S): at 22:08

## 2024-08-27 RX ADMIN — Medication 1000 MILLIGRAM(S): at 11:55

## 2024-08-27 RX ADMIN — PIPERACILLIN SODIUM AND TAZOBACTAM SODIUM 200 GRAM(S): 3; .375 INJECTION, POWDER, FOR SOLUTION INTRAVENOUS at 17:54

## 2024-08-27 RX ADMIN — PIPERACILLIN SODIUM AND TAZOBACTAM SODIUM 25 GRAM(S): 3; .375 INJECTION, POWDER, FOR SOLUTION INTRAVENOUS at 22:08

## 2024-08-27 RX ADMIN — Medication 100 MILLIGRAM(S): at 10:56

## 2024-08-27 RX ADMIN — Medication 1000 MILLILITER(S): at 11:55

## 2024-08-27 RX ADMIN — POLYETHYLENE GLYCOL 3350 17 GRAM(S): 17 POWDER, FOR SOLUTION ORAL at 17:53

## 2024-08-27 NOTE — ED ADULT NURSE NOTE - OBJECTIVE STATEMENT
year old female pt presented to the ED via ems for fever, pt with 2 days of nausea no BM x 3 days with dec PO, pt also so right sided pain x weeks, pt is A&Ox3 ambulatory with cane and walker as needed , as per aid pt with recent incontinence, skin warm to touch abd soft tender to right side, pt states nausea denies vomiting pt with fever in ED lung field cta

## 2024-08-27 NOTE — H&P ADULT - NSHPLABSRESULTS_GEN_ALL_CORE
9.1    1.33  )-----------( 24       ( 27 Aug 2024 11:00 )             27.0           128<L>  |  93<L>  |  14  ----------------------------<  114<H>  4.3   |  21<L>  |  0.74    Ca    9.5      27 Aug 2024 11:00    TPro  7.3  /  Alb  3.9  /  TBili  0.7  /  DBili  x   /  AST  29  /  ALT  49<H>  /  AlkPhos  96                Urinalysis Basic - ( 27 Aug 2024 12:59 )    Color: Yellow / Appearance: Clear / S.022 / pH: x  Gluc: x / Ketone: Negative mg/dL  / Bili: Negative / Urobili: 0.2 mg/dL   Blood: x / Protein: Trace mg/dL / Nitrite: Negative   Leuk Esterase: Negative / RBC: x / WBC x   Sq Epi: x / Non Sq Epi: x / Bacteria: x        PT/INR - ( 27 Aug 2024 11:00 )   PT: 12.6 sec;   INR: 1.15 ratio         PTT - ( 27 Aug 2024 11:00 )  PTT:27.9 sec    < from: CT Abdomen and Pelvis w/ IV Cont (24 @ 13:39) >    IMPRESSION: No acute abnormality.    < end of copied text >    < from: Xray Chest 2 Views PA/Lat (24 @ 10:51) >      IMPRESSION:  1.  No focal consolidation.  2.  Mild bibasilar hazy opacities, which may represent atelectasis.    < end of copied text >    EKG SR

## 2024-08-27 NOTE — H&P ADULT - NSHPSOCIALHISTORY_GEN_ALL_CORE
Social History:    Marital Status:  (   )    (   ) Single    (   )    (x  )   Occupation:   Lives with: (  ) alone  (  ) children   (  ) spouse   (  ) parents  ( x ) other    Substance Use (street drugs): ( x ) never used  (  ) other:  Tobacco Usage:  (x  ) never smoked   (   ) former smoker   (   ) current smoker  (     ) pack years  (        ) last cigarette date  Alcohol Usage: no    (     ) Advanced Directives: (     ) None    (      ) DNR    (     ) DNI    (     ) Health Care Proxy:

## 2024-08-27 NOTE — ED PROVIDER NOTE - PHYSICAL EXAMINATION
General: Alert and Orientated x 3. No apparent distress.  Eyes: No scleral icterus.   ENT: Mucous membranes moist  Cardiac: No murmurs appreciated.   Pulmonary: CTA bilaterally. No increased WOB.   Abdominal: Soft, Non-distended, non-tender   Neurologic: No focal sensory or motor deficits.  Extremities: No lower extremity edema, bruising, soreness   Skin: Color appropriate for race. Intact, warm, and well-perfused.  Psychiatric: Appropriate mood and affect. No apparent risk to self or others.

## 2024-08-27 NOTE — ED PROVIDER NOTE - ATTENDING CONTRIBUTION TO CARE
89 year old female with PMH of myelodysplastic syndrome not on active chemo, right corneal transplant, HTN, HLD presents with fever 101.2 to perform a septic work up, pt with urinary incontinence the past few days with r flank pain, no trauma or fall.  Patient prophylactically on antibiotics for UTI and Augmentin and Cipro.  Patient denies any URI symptoms abdomen soft nontender likely needs admission for urosepsis

## 2024-08-27 NOTE — ED ADULT NURSE NOTE - NSFALLHARMRISKINTERV_ED_ALL_ED
Assistance OOB with selected safe patient handling equipment if applicable/Assistance with ambulation/Communicate risk of Fall with Harm to all staff, patient, and family/Monitor gait and stability/Provide visual cue: red socks, yellow wristband, yellow gown, etc/Reinforce activity limits and safety measures with patient and family/Bed in lowest position, wheels locked, appropriate side rails in place/Call bell, personal items and telephone in reach/Instruct patient to call for assistance before getting out of bed/chair/stretcher/Non-slip footwear applied when patient is off stretcher/[place] to call system/Physically safe environment - no spills, clutter or unnecessary equipment/Purposeful Proactive Rounding/Room/bathroom lighting operational, light cord in reach

## 2024-08-27 NOTE — CONSULT NOTE ADULT - ASSESSMENT
89 year old female with PMH of myelodysplastic syndrome not on active chemo, right corneal transplant, HTN, HLD, recurrent UTI (on Augmentin for ppx) presents with fever.    Assessment:  #Fever  #Recurrent UTI  -Pt with 1 day of fever, nausea and fatigue. Has been having right flank pain for 2 weeks (x-ray done outpatient was normal)  -Pt febrile in the ED to 101.2 with WBC of 1.33  -Currently on augmentin for chronic UTI ppx. Was started on cipro 2 weeks ago by heme/onc for drop in WBC  -UA neg for nitrites or leukocyte esterase, RVP neg, CXR with no consolidation  -CT A/P with no acute abnormalities  -s/p Ceftriaxone in the ED    Recommendation:       89 year old female with PMH of myelodysplastic syndrome not on active chemo, right corneal transplant, HTN, HLD, recurrent UTI (on Augmentin for ppx) presents with fever.    Assessment:  #Fever  #Recurrent UTI  -Pt with 1 day of fever, nausea and fatigue. Has been having right flank pain for 2 weeks (x-ray done outpatient was normal)  -Pt febrile in the ED to 101.2 with WBC of 1.33  -Currently on Augmentin for chronic UTI ppx. Was started on cipro 2 weeks ago by heme/onc for drop in WBC  -UA neg for nitrites or leukocyte esterase, RVP neg, CXR with no consolidation  -CT A/P with no acute abnormalities  -s/p Ceftriaxone in the ED    Recommendation:  -Fevers likely 2/2 Pyelonephritis   -Recommend switching abx to Zosyn   -F/U BCX and UCX  -Cont to monitor fever and WBC curve     Discussed w/ Dr. Calles. Discussed w/ primary team    Mendy CEVALLOS Fellow

## 2024-08-27 NOTE — H&P ADULT - ASSESSMENT
89 f with    Fever  - panculture  - LED  - Procalcitonin  - empiric antibiotic  - ID evaluation    AML  - follow CBC  - continue prophylaxis  - Hem Onc evaluation Dr Huston    Corneal transplant  - continue gtt    Hyponatremia  - follow    GERD  - stable    Anxiety control    DVT prophylaxis  - PAS    Further action as per clinical course     d/w patient and family at bedside    Timi Henderson MD phone [phone]

## 2024-08-27 NOTE — H&P ADULT - NSICDXPASTMEDICALHX_GEN_ALL_CORE_FT
PAST MEDICAL HISTORY:  AML (acute myeloid leukemia)     Chronic UTI     GERD (gastroesophageal reflux disease)     Hyperlipidemia     Palpitations     Post corneal transplant

## 2024-08-27 NOTE — H&P ADULT - NSHPREVIEWOFSYSTEMS_GEN_ALL_CORE
REVIEW OF SYSTEMS:    CONSTITUTIONAL: + weakness, + fevers + chills  EYES/ENT: No visual changes;  No vertigo or throat pain   NECK: No pain or stiffness  RESPIRATORY: No cough, wheezing, hemoptysis; + shortness of breath  CARDIOVASCULAR: No chest pain or palpitations  GASTROINTESTINAL: No abdominal or epigastric pain. No nausea, vomiting, or hematemesis; No diarrhea or constipation. No melena or hematochezia.  GENITOURINARY: No dysuria, frequency or hematuria R flank pain  NEUROLOGICAL: No numbness or weakness  SKIN: No itching, burning, rashes, or lesions   All other review of systems is negative unless indicated above.

## 2024-08-27 NOTE — CONSULT NOTE ADULT - SUBJECTIVE AND OBJECTIVE BOX
Patient is a 89y old  Female who presents with a chief complaint of   HPI:  89 year old female with PMH of myelodysplastic syndrome not on active chemo (AML on remission), right corneal transplant, HTN, HLD presents with fever. She has had right flank pain for the last couple of weeks. She had an x-ray done which was unremarkable but states that the pain improves with icyhot cream. She started having nausea and fatigue 1 day prior to admission and then started having low grade fever that night. She has a history of recurrent UTI and is currently on augmentin PO for ppx. He oncologist started her on cipro for ppx 2 weeks ago because of her drop in hgb. She also endorses chronic constipation. Denies any chills, night sweats, vomiting, abdominal pain or dizziness.    PAST MEDICAL & SURGICAL HISTORY:  Hyperlipidemia      Palpitations      GERD (gastroesophageal reflux disease)      Chronic UTI      Post corneal transplant      AML (acute myeloid leukemia)      S/P cataract surgery          Allergies  No Known Allergies    ANTIMICROBIALS (past 90 days)  MEDICATIONS  (STANDING):    cefTRIAXone   IVPB   100 mL/Hr IV Intermittent (24 @ 10:56)        acyclovir   Oral Tab/Cap 200 two times a day  cefTRIAXone   IVPB 1000 every 24 hours  fluconAZOLE   Tablet 200 daily    MEDICATIONS  (STANDING):  acetaminophen     Tablet .. 650 every 6 hours PRN  ALPRAZolam 0.25 daily PRN  metoprolol succinate ER 50 daily  rosuvastatin 10 at bedtime    SOCIAL HISTORY:       FAMILY HISTORY:  No pertinent family history in first degree relatives      REVIEW OF SYSTEMS:    CONSTITUTIONAL: + fever and fatigue.   EYES:  No visual changes, no eye pain   ENT: No vertigo or throat pain   NECK: No pain or stiffness  RESPIRATORY: No cough, wheezing, hemoptysis; No shortness of breath  CARDIOVASCULAR: No chest pain or palpitations  GASTROINTESTINAL: No abdominal or epigastric pain. + nausea. No vomiting, or hematemesis; No diarrhea or constipation. No melena or hematochezia.  GENITOURINARY: No dysuria, frequency or hematuria. + right flank pain   NEUROLOGICAL: No numbness or weakness  SKIN: No itching, rashes  Psych: no anxiety or depression     Vital Signs Last 24 Hrs  T(F): 98.9 (24 @ 12:42), Max: 101.2 (24 @ 11:10)  Vital Signs Last 24 Hrs  HR: 98 (24 @ 12:42) (94 - 100)  BP: 109/80 (24 @ 12:42) (108/87 - 112/57)  RR: 16 (24 @ 12:42)  SpO2: 98% (24 @ 12:42) (98% - 99%)  Wt(kg): --    PHYSICAL EXAM:  Constitutional: non-toxic, no distress  HEAD/EYES: anicteric, no conjunctival injection  ENT:  supple, no thrush  Cardiovascular:   normal S1, S2, no murmur, no edema  Respiratory:  clear BS bilaterally, no wheezes, no rales  GI:  soft, non-tender, normal bowel sounds  :  + R. CVA tenderness  Musculoskeletal:  no synovitis, normal ROM  Neurologic: awake and alert, normal strength, no focal findings  Skin:  no rash, no erythema, no phlebitis  Heme/Onc: no lymphadenopathy   Psychiatric:  awake, alert, appropriate mood                            9.1    1.33  )-----------( 24       ( 27 Aug 2024 11:00 )             27.0       128<L>  |  93<L>  |  14  ----------------------------<  114<H>  4.3   |  21<L>  |  0.74    Ca    9.5      27 Aug 2024 11:00    TPro  7.3  /  Alb  3.9  /  TBili  0.7  /  DBili  x   /  AST  29  /  ALT  49<H>  /  AlkPhos  96      Urinalysis Basic - ( 27 Aug 2024 12:59 )    Color: Yellow / Appearance: Clear / S.022 / pH: x  Gluc: x / Ketone: Negative mg/dL  / Bili: Negative / Urobili: 0.2 mg/dL   Blood: x / Protein: Trace mg/dL / Nitrite: Negative   Leuk Esterase: Negative / RBC: x / WBC x   Sq Epi: x / Non Sq Epi: x / Bacteria: x    MICROBIOLOGY:              RADIOLOGY:  imaging below personally reviewed and agree with findings    < from: CT Abdomen and Pelvis w/ IV Cont (24 @ 13:39) >  FINDINGS:    LOWERCHEST: Within normal limits.    LIVER: Within normal limits.  BILE DUCTS: Normal caliber.  GALLBLADDER: Within normal limits.  SPLEEN: Within normal limits.  PANCREAS: Within normal limits.  ADRENALS: Within normal limits.  KIDNEYS/URETERS: A right renal cyst.    BLADDER: Within normal limits.  REPRODUCTIVE ORGANS: Vaginal pessary. Unremarkable uterus.    BOWEL: No bowel obstruction. The appendix is normal.  Colonic   diverticulosis.  PERITONEUM: No ascites.  VESSELS:  Within normal limits.  RETROPERITONEUM/LYMPH NODES: No lymphadenopathy.  ABDOMINAL WALL: Within normal limits.  BONES: Within normal limits.    IMPRESSION: No acute abnormality.    < end of copied text >   Patient is a 89y old  Female who presents with a chief complaint of   HPI:  89 year old female with PMH of myelodysplastic syndrome not on active chemo (AML on remission), right corneal transplant, HTN, HLD presents with fever. She has had right flank pain for the last couple of weeks. She had an x-ray done which was unremarkable but states that the pain improves with icyhot cream. She started having nausea and fatigue 1 day prior to admission and then started having low grade fever that night. She has a history of recurrent UTI and is currently on augmentin PO for ppx. He oncologist started her on cipro for ppx 2 weeks ago because of her drop in hgb. She also endorses chronic constipation. Denies any chills, night sweats, vomiting, abdominal pain or dizziness.    PAST MEDICAL & SURGICAL HISTORY:  Hyperlipidemia      Palpitations      GERD (gastroesophageal reflux disease)      Chronic UTI      Post corneal transplant      AML (acute myeloid leukemia)      S/P cataract surgery          Allergies  No Known Allergies    ANTIMICROBIALS (past 90 days)  MEDICATIONS  (STANDING):    cefTRIAXone   IVPB   100 mL/Hr IV Intermittent (24 @ 10:56)        acyclovir   Oral Tab/Cap 200 two times a day  cefTRIAXone   IVPB 1000 every 24 hours  fluconAZOLE   Tablet 200 daily    MEDICATIONS  (STANDING):  acetaminophen     Tablet .. 650 every 6 hours PRN  ALPRAZolam 0.25 daily PRN  metoprolol succinate ER 50 daily  rosuvastatin 10 at bedtime    SOCIAL HISTORY:       FAMILY HISTORY:  No pertinent family history in first degree relatives      REVIEW OF SYSTEMS:    CONSTITUTIONAL: + fever, chills and fatigue.   EYES:  No visual changes, no eye pain   ENT: No vertigo or throat pain   NECK: No pain or stiffness  RESPIRATORY: No cough, wheezing, hemoptysis; No shortness of breath  CARDIOVASCULAR: No chest pain or palpitations  GASTROINTESTINAL: No abdominal or epigastric pain. + nausea. No vomiting, or hematemesis; No diarrhea or constipation. No melena or hematochezia.  GENITOURINARY: No dysuria, frequency or hematuria. + right flank pain   NEUROLOGICAL: No numbness or weakness  SKIN: No itching, rashes  Psych: no anxiety or depression     Vital Signs Last 24 Hrs  T(F): 98.9 (24 @ 12:42), Max: 101.2 (24 @ 11:10)  Vital Signs Last 24 Hrs  HR: 98 (24 @ 12:42) (94 - 100)  BP: 109/80 (24 @ 12:42) (108/87 - 112/57)  RR: 16 (24 @ 12:42)  SpO2: 98% (24 @ 12:42) (98% - 99%)  Wt(kg): --    PHYSICAL EXAM:  Constitutional: non-toxic, no distress  HEAD/EYES: anicteric, no conjunctival injection  ENT:  supple, no thrush  Cardiovascular:   normal S1, S2, no murmur, no edema  Respiratory:  clear BS bilaterally, no wheezes, no rales  GI:  soft, non-tender, normal bowel sounds  :  + R. CVA tenderness  Musculoskeletal:  no synovitis, normal ROM  Neurologic: awake and alert, normal strength, no focal findings  Skin:  no rash, no erythema, no phlebitis  Heme/Onc: no lymphadenopathy   Psychiatric:  awake, alert, appropriate mood                            9.1    1.33  )-----------(        ( 27 Aug 2024 11:00 )             27.0       128<L>  |  93<L>  |  14  ----------------------------<  114<H>  4.3   |  21<L>  |  0.74    Ca    9.5      27 Aug 2024 11:00    TPro  7.3  /  Alb  3.9  /  TBili  0.7  /  DBili  x   /  AST  29  /  ALT  49<H>  /  AlkPhos  96      Urinalysis Basic - ( 27 Aug 2024 12:59 )    Color: Yellow / Appearance: Clear / S.022 / pH: x  Gluc: x / Ketone: Negative mg/dL  / Bili: Negative / Urobili: 0.2 mg/dL   Blood: x / Protein: Trace mg/dL / Nitrite: Negative   Leuk Esterase: Negative / RBC: x / WBC x   Sq Epi: x / Non Sq Epi: x / Bacteria: x    MICROBIOLOGY:              RADIOLOGY:  imaging below personally reviewed and agree with findings    < from: CT Abdomen and Pelvis w/ IV Cont (24 @ 13:39) >  FINDINGS:    LOWERCHEST: Within normal limits.    LIVER: Within normal limits.  BILE DUCTS: Normal caliber.  GALLBLADDER: Within normal limits.  SPLEEN: Within normal limits.  PANCREAS: Within normal limits.  ADRENALS: Within normal limits.  KIDNEYS/URETERS: A right renal cyst.    BLADDER: Within normal limits.  REPRODUCTIVE ORGANS: Vaginal pessary. Unremarkable uterus.    BOWEL: No bowel obstruction. The appendix is normal.  Colonic   diverticulosis.  PERITONEUM: No ascites.  VESSELS:  Within normal limits.  RETROPERITONEUM/LYMPH NODES: No lymphadenopathy.  ABDOMINAL WALL: Within normal limits.  BONES: Within normal limits.    IMPRESSION: No acute abnormality.    < end of copied text >   Patient is a 89y old  Female who presents with a chief complaint of   HPI:  89 year old female with PMH of myelodysplastic syndrome not on active chemo (AML on remission), right corneal transplant, HTN, HLD presents with fever. She has had right flank pain for the last couple of weeks. She had an x-ray done which was unremarkable but states that the pain improves with icyhot cream. She started having nausea and fatigue 1 day prior to admission and then started having low grade fever that night. She has a history of recurrent UTI and is currently on augmentin PO for ppx. He oncologist started her on cipro for ppx 2 weeks ago because of her drop in hgb. She also endorses chronic constipation. Denies any chills, night sweats, vomiting, abdominal pain or dizziness.    PAST MEDICAL & SURGICAL HISTORY:  Hyperlipidemia      Palpitations      GERD (gastroesophageal reflux disease)      Chronic UTI      Post corneal transplant      AML (acute myeloid leukemia)      S/P cataract surgery          Allergies  No Known Allergies    ANTIMICROBIALS (past 90 days)  MEDICATIONS  (STANDING):    cefTRIAXone   IVPB   100 mL/Hr IV Intermittent (24 @ 10:56)        acyclovir   Oral Tab/Cap 200 two times a day  cefTRIAXone   IVPB 1000 every 24 hours  fluconAZOLE   Tablet 200 daily    MEDICATIONS  (STANDING):  acetaminophen     Tablet .. 650 every 6 hours PRN  ALPRAZolam 0.25 daily PRN  metoprolol succinate ER 50 daily  rosuvastatin 10 at bedtime    SOCIAL HISTORY:     has HHA at home  no recent travel    FAMILY HISTORY:  No recent febrile illness in family members      REVIEW OF SYSTEMS:    CONSTITUTIONAL: + fever, chills and fatigue.   EYES:  No visual changes, no eye pain   ENT: No vertigo or throat pain   NECK: No pain or stiffness  RESPIRATORY: No cough, wheezing, hemoptysis; No shortness of breath  CARDIOVASCULAR: No chest pain or palpitations  GASTROINTESTINAL: No abdominal or epigastric pain. + nausea. No vomiting, or hematemesis; No diarrhea or constipation. No melena or hematochezia.  GENITOURINARY: No dysuria, frequency or hematuria. + right flank pain   NEUROLOGICAL: No numbness or weakness  SKIN: No itching, rashes  Psych: no anxiety or depression     Vital Signs Last 24 Hrs  T(F): 98.9 (24 @ 12:42), Max: 101.2 (24 @ 11:10)  Vital Signs Last 24 Hrs  HR: 98 (24 @ 12:42) (94 - 100)  BP: 109/80 (24 @ 12:42) (108/87 - 112/57)  RR: 16 (24 @ 12:42)  SpO2: 98% (24 @ 12:42) (98% - 99%)  Wt(kg): --    PHYSICAL EXAM:  Constitutional: non-toxic, no distress  HEAD/EYES: anicteric, no conjunctival injection  ENT:  supple, no thrush  Cardiovascular:   normal S1, S2, no murmur, no edema  Respiratory:  clear BS bilaterally, no wheezes, no rales  GI:  soft, non-tender, normal bowel sounds  :  + R. CVA tenderness  Musculoskeletal:  no synovitis, normal ROM  Neurologic: awake and alert, normal strength, no focal findings  Skin:  no rash, no erythema, no phlebitis  Heme/Onc: no lymphadenopathy   Psychiatric:  awake, alert, appropriate mood                            9.1    1.33  )-----------( 24       ( 27 Aug 2024 11:00 )             27.0       128<L>  |  93<L>  |  14  ----------------------------<  114<H>  4.3   |  21<L>  |  0.74    Ca    9.5      27 Aug 2024 11:00    TPro  7.3  /  Alb  3.9  /  TBili  0.7  /  DBili  x   /  AST  29  /  ALT  49<H>  /  AlkPhos  96      Urinalysis Basic - ( 27 Aug 2024 12:59 )    Color: Yellow / Appearance: Clear / S.022 / pH: x  Gluc: x / Ketone: Negative mg/dL  / Bili: Negative / Urobili: 0.2 mg/dL   Blood: x / Protein: Trace mg/dL / Nitrite: Negative   Leuk Esterase: Negative / RBC: x / WBC x   Sq Epi: x / Non Sq Epi: x / Bacteria: x    MICROBIOLOGY:              RADIOLOGY:  imaging below personally reviewed and agree with findings    < from: CT Abdomen and Pelvis w/ IV Cont (24 @ 13:39) >  FINDINGS:    LOWERCHEST: Within normal limits.    LIVER: Within normal limits.  BILE DUCTS: Normal caliber.  GALLBLADDER: Within normal limits.  SPLEEN: Within normal limits.  PANCREAS: Within normal limits.  ADRENALS: Within normal limits.  KIDNEYS/URETERS: A right renal cyst.    BLADDER: Within normal limits.  REPRODUCTIVE ORGANS: Vaginal pessary. Unremarkable uterus.    BOWEL: No bowel obstruction. The appendix is normal.  Colonic   diverticulosis.  PERITONEUM: No ascites.  VESSELS:  Within normal limits.  RETROPERITONEUM/LYMPH NODES: No lymphadenopathy.  ABDOMINAL WALL: Within normal limits.  BONES: Within normal limits.    IMPRESSION: No acute abnormality.    < end of copied text >

## 2024-08-27 NOTE — ED PROVIDER NOTE - DISPOSITION TYPE
A1C: 8.3% today --> increased from 8.0% on 8/3/2023  Blood glucose: 186 in clinic today    Medications:   -continue with Soliqua 22 units once daily in AM (before first meal)   - start Metformin ER 500mg with dinner meal   ---> (take during or right after dinner meal)     - please give me an update on how you feel with Metformin and your blood glucose readings on Tuesday 11/14    - increase walking or dancing after dinner meal     Weight:  Wt Readings from Last 6 Encounters:   11/09/23 124 lb (56.2 kg)   11/06/23 127 lb 9.6 oz (57.9 kg)   09/22/23 126 lb 12.8 oz (57.5 kg)   08/03/23 127 lb (57.6 kg)   05/19/23 126 lb 3.2 oz (57.2 kg)   01/26/23 125 lb 6.4 oz (56.9 kg)     A1C goal:  <7.5%    Blood sugar testing:  Test your blood sugar 1 time daily   Recommended times to test: Before breakfast (fasting) or before dinner     Blood sugar targets:  Before breakfast:   (preferably < 110)  Before meals OR 2 hours after meals: <180 (preferably <150)     Call for persistent blood sugars < 75 or > 200 ADMIT

## 2024-08-27 NOTE — H&P ADULT - NSHPPHYSICALEXAM_GEN_ALL_CORE
PHYSICAL EXAMINATION:  Vital Signs Last 24 Hrs  T(C): 37.2 (27 Aug 2024 12:42), Max: 38.4 (27 Aug 2024 11:10)  T(F): 98.9 (27 Aug 2024 12:42), Max: 101.2 (27 Aug 2024 11:10)  HR: 98 (27 Aug 2024 12:42) (94 - 100)  BP: 109/80 (27 Aug 2024 12:42) (108/87 - 112/57)  BP(mean): --  RR: 16 (27 Aug 2024 12:42) (16 - 17)  SpO2: 98% (27 Aug 2024 12:42) (98% - 99%)    Parameters below as of 27 Aug 2024 12:42  Patient On (Oxygen Delivery Method): room air      CAPILLARY BLOOD GLUCOSE          GENERAL: NAD, well-groomed, well-developed  HEAD:  atraumatic, normocephalic  EYES: sclera anicteric  ENMT: mucous membranes moist  NECK: supple, No JVD  CHEST/LUNG: clear to auscultation bilaterally; no rales, rhonchi, or wheezing b/l  HEART: normal S1, S2  ABDOMEN: BS+, soft, ND, NT R flank mild tender  EXTREMITIES:  pulses palpable; no clubbing, cyanosis, or edema b/l LEs  NEURO: awake, alert, interactive; moves all extremities  SKIN: no rashes or lesions

## 2024-08-27 NOTE — ED PROVIDER NOTE - OBJECTIVE STATEMENT
89 year old female with PMH of myelodysplastic syndrome not on active chemo, right corneal transplant, HTN, HLD presents with fever. She has had right flank pain for the last couple of weeks that is improved with icyhot cream, otherwise was in normal state of health until this AM when she felt weak and was noted to be febrile to 100.4 F. She has chronic constipation, has not had a BM in 3 days but this is not unusual for her. She is on prophylactic augmentin/cipro for recurrent UTIs. No vomiting/diarrhea,

## 2024-08-27 NOTE — ED PROVIDER NOTE - CLINICAL SUMMARY MEDICAL DECISION MAKING FREE TEXT BOX
89 year old female with PMH of myelodysplastic syndrome not on active chemo, right corneal transplant, HTN, HLD presents with fever. She is hemodynamically stable, orally febrile 100.4 F on exam she appears generally well with clear lungs, soft non-tender abdomen, no rash. Unclear source of fever at this time, will get sepsis workup, give abx, reassess.

## 2024-08-28 LAB
ANION GAP SERPL CALC-SCNC: 11 MMOL/L — SIGNIFICANT CHANGE UP (ref 5–17)
BASOPHILS # BLD AUTO: 0 K/UL — SIGNIFICANT CHANGE UP (ref 0–0.2)
BASOPHILS NFR BLD AUTO: 0 % — SIGNIFICANT CHANGE UP (ref 0–2)
BUN SERPL-MCNC: 12 MG/DL — SIGNIFICANT CHANGE UP (ref 7–23)
CALCIUM SERPL-MCNC: 8.8 MG/DL — SIGNIFICANT CHANGE UP (ref 8.4–10.5)
CHLORIDE SERPL-SCNC: 92 MMOL/L — LOW (ref 96–108)
CO2 SERPL-SCNC: 23 MMOL/L — SIGNIFICANT CHANGE UP (ref 22–31)
CREAT SERPL-MCNC: 0.79 MG/DL — SIGNIFICANT CHANGE UP (ref 0.5–1.3)
CULTURE RESULTS: NO GROWTH — SIGNIFICANT CHANGE UP
EGFR: 70 ML/MIN/1.73M2 — SIGNIFICANT CHANGE UP
EOSINOPHIL # BLD AUTO: 0 K/UL — SIGNIFICANT CHANGE UP (ref 0–0.5)
EOSINOPHIL NFR BLD AUTO: 0 % — SIGNIFICANT CHANGE UP (ref 0–6)
GLUCOSE SERPL-MCNC: 111 MG/DL — HIGH (ref 70–99)
HCT VFR BLD CALC: 24.1 % — LOW (ref 34.5–45)
HGB BLD-MCNC: 8.4 G/DL — LOW (ref 11.5–15.5)
LYMPHOCYTES # BLD AUTO: 0.12 K/UL — LOW (ref 1–3.3)
LYMPHOCYTES # BLD AUTO: 10.8 % — LOW (ref 13–44)
MANUAL SMEAR VERIFICATION: SIGNIFICANT CHANGE UP
MCHC RBC-ENTMCNC: 30.7 PG — SIGNIFICANT CHANGE UP (ref 27–34)
MCHC RBC-ENTMCNC: 34.9 GM/DL — SIGNIFICANT CHANGE UP (ref 32–36)
MCV RBC AUTO: 88 FL — SIGNIFICANT CHANGE UP (ref 80–100)
MONOCYTES # BLD AUTO: 0 K/UL — SIGNIFICANT CHANGE UP (ref 0–0.9)
MONOCYTES NFR BLD AUTO: 0 % — LOW (ref 2–14)
NEUTROPHILS # BLD AUTO: 1 K/UL — LOW (ref 1.8–7.4)
NEUTROPHILS NFR BLD AUTO: 54.1 % — SIGNIFICANT CHANGE UP (ref 43–77)
NEUTS BAND # BLD: 35.1 % — HIGH (ref 0–8)
PLAT MORPH BLD: NORMAL — SIGNIFICANT CHANGE UP
PLATELET # BLD AUTO: 19 K/UL — CRITICAL LOW (ref 150–400)
POTASSIUM SERPL-MCNC: 3.7 MMOL/L — SIGNIFICANT CHANGE UP (ref 3.5–5.3)
POTASSIUM SERPL-SCNC: 3.7 MMOL/L — SIGNIFICANT CHANGE UP (ref 3.5–5.3)
PROCALCITONIN SERPL-MCNC: 0.64 NG/ML — HIGH (ref 0.02–0.1)
RBC # BLD: 2.74 M/UL — LOW (ref 3.8–5.2)
RBC # FLD: 19.2 % — HIGH (ref 10.3–14.5)
RBC BLD AUTO: SIGNIFICANT CHANGE UP
SODIUM SERPL-SCNC: 126 MMOL/L — LOW (ref 135–145)
SPECIMEN SOURCE: SIGNIFICANT CHANGE UP
WBC # BLD: 1.12 K/UL — LOW (ref 3.8–10.5)
WBC # FLD AUTO: 1.12 K/UL — LOW (ref 3.8–10.5)

## 2024-08-28 PROCEDURE — 99232 SBSQ HOSP IP/OBS MODERATE 35: CPT

## 2024-08-28 PROCEDURE — 93970 EXTREMITY STUDY: CPT | Mod: 26

## 2024-08-28 RX ORDER — LIDOCAINE/BENZALKONIUM/ALCOHOL
1 SOLUTION, NON-ORAL TOPICAL ONCE
Refills: 0 | Status: COMPLETED | OUTPATIENT
Start: 2024-08-28 | End: 2024-08-28

## 2024-08-28 RX ORDER — LIDOCAINE/BENZALKONIUM/ALCOHOL
1 SOLUTION, NON-ORAL TOPICAL DAILY
Refills: 0 | Status: DISCONTINUED | OUTPATIENT
Start: 2024-08-28 | End: 2024-09-06

## 2024-08-28 RX ADMIN — PIPERACILLIN SODIUM AND TAZOBACTAM SODIUM 25 GRAM(S): 3; .375 INJECTION, POWDER, FOR SOLUTION INTRAVENOUS at 12:12

## 2024-08-28 RX ADMIN — Medication 1 PATCH: at 14:13

## 2024-08-28 RX ADMIN — Medication 1 PATCH: at 19:40

## 2024-08-28 RX ADMIN — ROSUVASTATIN CALCIUM 10 MILLIGRAM(S): 10 TABLET ORAL at 22:17

## 2024-08-28 RX ADMIN — POLYETHYLENE GLYCOL 3350 17 GRAM(S): 17 POWDER, FOR SOLUTION ORAL at 05:59

## 2024-08-28 RX ADMIN — Medication 200 MILLIGRAM(S): at 18:34

## 2024-08-28 RX ADMIN — PIPERACILLIN SODIUM AND TAZOBACTAM SODIUM 25 GRAM(S): 3; .375 INJECTION, POWDER, FOR SOLUTION INTRAVENOUS at 22:17

## 2024-08-28 RX ADMIN — ACETAMINOPHEN 650 MILLIGRAM(S): 325 TABLET ORAL at 14:13

## 2024-08-28 RX ADMIN — POLYETHYLENE GLYCOL 3350 17 GRAM(S): 17 POWDER, FOR SOLUTION ORAL at 18:33

## 2024-08-28 RX ADMIN — TIMOLOL MALEATE 1 DROP(S): 5 SOLUTION/ DROPS OPHTHALMIC at 14:14

## 2024-08-28 RX ADMIN — METOPROLOL TARTRATE 50 MILLIGRAM(S): 100 TABLET ORAL at 06:00

## 2024-08-28 RX ADMIN — ACETAMINOPHEN 650 MILLIGRAM(S): 325 TABLET ORAL at 15:30

## 2024-08-28 RX ADMIN — ACETAMINOPHEN 650 MILLIGRAM(S): 325 TABLET ORAL at 06:00

## 2024-08-28 RX ADMIN — Medication 200 MILLIGRAM(S): at 06:00

## 2024-08-28 RX ADMIN — FLUCONAZOLE 200 MILLIGRAM(S): 150 TABLET ORAL at 12:12

## 2024-08-28 RX ADMIN — PIPERACILLIN SODIUM AND TAZOBACTAM SODIUM 25 GRAM(S): 3; .375 INJECTION, POWDER, FOR SOLUTION INTRAVENOUS at 05:59

## 2024-08-28 NOTE — CONSULT NOTE ADULT - ASSESSMENT
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

## 2024-08-28 NOTE — PROGRESS NOTE ADULT - ASSESSMENT
89 f with    Fever  - panculture  - LED noted  - Procalcitonin  - empiric antibiotic  - ID follow     Back pain  - lidocaine patch  - MRI T and L spine r/o fx vs osteo    Bakers cyst rupture  - pain control    AML  - follow CBC  - continue prophylaxis  - transfusion support  - Hem Onc evaluation noted    Corneal transplant  - continue gtt    Hyponatremia  - follow    GERD  - stable    Anxiety control    DVT prophylaxis  - PAS    d/w patient and family at bedside    Timi Henderson MD phone [phone]

## 2024-08-28 NOTE — PROGRESS NOTE ADULT - ASSESSMENT
89 f with HTN, HLD, MDS, AML in remission, right corneal transplant, recurrent UTI (on Augmentin for ppx) and was started on cipro 2 weeks ago as well, p/w chills, nausea and R flank pain   here with rigors and  fever to 102, WBC 1.33 but ANC 1.07  u/a positive  CT negative    pancytopenia in the setting of MDS, AML now in remission, recurrent UTI on suppression augmentin and recently cipro as well now rigors/fever, nausea, flank pain, ?pyelo and bacteremia vs hematologic etiology    pt still febrile, WBC 1.12, ANC 1 but bands 35%  * follow the blood and urine cx  * c/w zosyn  * monitor CBc/diff and CMP  * follow with hem/onc    The above assessment and plan was discussed with the primary team    Debora Calles MD  contact on teams  After 5pm and on weekends call [phone]

## 2024-08-29 LAB
ANION GAP SERPL CALC-SCNC: 11 MMOL/L — SIGNIFICANT CHANGE UP (ref 5–17)
BASOPHILS # BLD AUTO: 0 K/UL — SIGNIFICANT CHANGE UP (ref 0–0.2)
BASOPHILS NFR BLD AUTO: 0 % — SIGNIFICANT CHANGE UP (ref 0–2)
BUN SERPL-MCNC: 12 MG/DL — SIGNIFICANT CHANGE UP (ref 7–23)
CALCIUM SERPL-MCNC: 8.6 MG/DL — SIGNIFICANT CHANGE UP (ref 8.4–10.5)
CHLORIDE SERPL-SCNC: 94 MMOL/L — LOW (ref 96–108)
CO2 SERPL-SCNC: 21 MMOL/L — LOW (ref 22–31)
CREAT SERPL-MCNC: 0.77 MG/DL — SIGNIFICANT CHANGE UP (ref 0.5–1.3)
EGFR: 72 ML/MIN/1.73M2 — SIGNIFICANT CHANGE UP
EOSINOPHIL # BLD AUTO: 0 K/UL — SIGNIFICANT CHANGE UP (ref 0–0.5)
EOSINOPHIL NFR BLD AUTO: 0 % — SIGNIFICANT CHANGE UP (ref 0–6)
GLUCOSE SERPL-MCNC: 98 MG/DL — SIGNIFICANT CHANGE UP (ref 70–99)
HCT VFR BLD CALC: 22.3 % — LOW (ref 34.5–45)
HCT VFR BLD CALC: 22.6 % — LOW (ref 34.5–45)
HGB BLD-MCNC: 7.7 G/DL — LOW (ref 11.5–15.5)
HGB BLD-MCNC: 7.7 G/DL — LOW (ref 11.5–15.5)
IMM GRANULOCYTES NFR BLD AUTO: 1.4 % — HIGH (ref 0–0.9)
LYMPHOCYTES # BLD AUTO: 0.4 K/UL — LOW (ref 1–3.3)
LYMPHOCYTES # BLD AUTO: 27.8 % — SIGNIFICANT CHANGE UP (ref 13–44)
MCHC RBC-ENTMCNC: 30.1 PG — SIGNIFICANT CHANGE UP (ref 27–34)
MCHC RBC-ENTMCNC: 30.4 PG — SIGNIFICANT CHANGE UP (ref 27–34)
MCHC RBC-ENTMCNC: 34.1 GM/DL — SIGNIFICANT CHANGE UP (ref 32–36)
MCHC RBC-ENTMCNC: 34.5 GM/DL — SIGNIFICANT CHANGE UP (ref 32–36)
MCV RBC AUTO: 87.1 FL — SIGNIFICANT CHANGE UP (ref 80–100)
MCV RBC AUTO: 89.3 FL — SIGNIFICANT CHANGE UP (ref 80–100)
MONOCYTES # BLD AUTO: 0.05 K/UL — SIGNIFICANT CHANGE UP (ref 0–0.9)
MONOCYTES NFR BLD AUTO: 3.5 % — SIGNIFICANT CHANGE UP (ref 2–14)
NEUTROPHILS # BLD AUTO: 0.97 K/UL — LOW (ref 1.8–7.4)
NEUTROPHILS NFR BLD AUTO: 67.3 % — SIGNIFICANT CHANGE UP (ref 43–77)
NRBC # BLD: 0 /100 WBCS — SIGNIFICANT CHANGE UP (ref 0–0)
NRBC # BLD: 0 /100 WBCS — SIGNIFICANT CHANGE UP (ref 0–0)
PLATELET # BLD AUTO: 20 K/UL — CRITICAL LOW (ref 150–400)
PLATELET # BLD AUTO: 20 K/UL — CRITICAL LOW (ref 150–400)
POTASSIUM SERPL-MCNC: 3.4 MMOL/L — LOW (ref 3.5–5.3)
POTASSIUM SERPL-SCNC: 3.4 MMOL/L — LOW (ref 3.5–5.3)
RBC # BLD: 2.53 M/UL — LOW (ref 3.8–5.2)
RBC # BLD: 2.56 M/UL — LOW (ref 3.8–5.2)
RBC # FLD: 18.8 % — HIGH (ref 10.3–14.5)
RBC # FLD: 19.1 % — HIGH (ref 10.3–14.5)
SODIUM SERPL-SCNC: 126 MMOL/L — LOW (ref 135–145)
WBC # BLD: 1.44 K/UL — LOW (ref 3.8–10.5)
WBC # BLD: 1.44 K/UL — LOW (ref 3.8–10.5)
WBC # FLD AUTO: 1.44 K/UL — LOW (ref 3.8–10.5)
WBC # FLD AUTO: 1.44 K/UL — LOW (ref 3.8–10.5)

## 2024-08-29 PROCEDURE — 72158 MRI LUMBAR SPINE W/O & W/DYE: CPT | Mod: 26

## 2024-08-29 PROCEDURE — 72157 MRI CHEST SPINE W/O & W/DYE: CPT | Mod: 26

## 2024-08-29 PROCEDURE — 99232 SBSQ HOSP IP/OBS MODERATE 35: CPT

## 2024-08-29 RX ORDER — POTASSIUM CHLORIDE 10 MEQ
40 TABLET, EXT RELEASE, PARTICLES/CRYSTALS ORAL ONCE
Refills: 0 | Status: COMPLETED | OUTPATIENT
Start: 2024-08-29 | End: 2024-08-29

## 2024-08-29 RX ADMIN — Medication 200 MILLIGRAM(S): at 17:53

## 2024-08-29 RX ADMIN — PIPERACILLIN SODIUM AND TAZOBACTAM SODIUM 25 GRAM(S): 3; .375 INJECTION, POWDER, FOR SOLUTION INTRAVENOUS at 05:44

## 2024-08-29 RX ADMIN — METOPROLOL TARTRATE 50 MILLIGRAM(S): 100 TABLET ORAL at 05:44

## 2024-08-29 RX ADMIN — FLUCONAZOLE 200 MILLIGRAM(S): 150 TABLET ORAL at 11:41

## 2024-08-29 RX ADMIN — TIMOLOL MALEATE 1 DROP(S): 5 SOLUTION/ DROPS OPHTHALMIC at 13:57

## 2024-08-29 RX ADMIN — Medication 200 MILLIGRAM(S): at 05:59

## 2024-08-29 RX ADMIN — Medication 1 PATCH: at 02:00

## 2024-08-29 RX ADMIN — PIPERACILLIN SODIUM AND TAZOBACTAM SODIUM 25 GRAM(S): 3; .375 INJECTION, POWDER, FOR SOLUTION INTRAVENOUS at 14:40

## 2024-08-29 RX ADMIN — PIPERACILLIN SODIUM AND TAZOBACTAM SODIUM 25 GRAM(S): 3; .375 INJECTION, POWDER, FOR SOLUTION INTRAVENOUS at 21:23

## 2024-08-29 RX ADMIN — ROSUVASTATIN CALCIUM 10 MILLIGRAM(S): 10 TABLET ORAL at 21:23

## 2024-08-29 RX ADMIN — POLYETHYLENE GLYCOL 3350 17 GRAM(S): 17 POWDER, FOR SOLUTION ORAL at 05:44

## 2024-08-29 RX ADMIN — Medication 40 MILLIEQUIVALENT(S): at 11:41

## 2024-08-29 RX ADMIN — Medication 1 PATCH: at 11:40

## 2024-08-29 NOTE — PROGRESS NOTE ADULT - ASSESSMENT
89 f with    Fever  - BC NTD  - LED noted  - Procalcitonin noted  - empiric antibiotic Zosyn 3/7  - ID follow     Back pain  - lidocaine patch  - MRI T and L spine noted    Bakers cyst rupture  - pain control  - follow with Ortho as OTP    AML  - follow CBC  - continue prophylaxis  - transfusion support  - Hem Onc follow    Corneal transplant  - continue gtt    Hyponatremia  - follow    GERD  - stable    Anxiety control    DVT prophylaxis  - PAS    d/w patient and family at bedside    Timi Henderson MD phone [phone]

## 2024-08-29 NOTE — PROGRESS NOTE ADULT - ASSESSMENT
89 f with HTN, HLD, MDS, AML in remission, right corneal transplant, recurrent UTI (on Augmentin for ppx) and was started on cipro 2 weeks ago as well, p/w chills, nausea and R flank pain   here with rigors and  fever to 102, WBC 1.33 but ANC 1.07  u/a positive  CT negative    pancytopenia in the setting of MDS, AML now in remission, recurrent UTI on suppression augmentin and recently cipro as well now rigors/fever, nausea, flank pain, ?pyelo vs hematologic etiology  pt still febrile, WBC 1.12, ANC 1 but had 35% bandemia,  blood and urine cx negative, could be due to antibiotic use prior to admission vs non infectious etiolgoy  * follow the final blood and urine cx  * c/w zosyn, started 8/27, now day 3, will likely complete a 7-10 day course  * monitor CBc/diff and CMP  * follow with hem/onc    The above assessment and plan was discussed with the primary team    Debora Calles MD  contact on teams  After 5pm and on weekends call [phone]

## 2024-08-29 NOTE — PROGRESS NOTE ADULT - ASSESSMENT
89 year old female with PMH of myelodysplastic syndrome not on active chemo, right corneal transplant, HTN, HLD admitted with fever 8/27/24. She has had right flank pain for  couple of weeks.  She has chronic constipation, has not had a BM in 3 days but this is not unusual for her. She was on prophylactic augmentin/cipro for recurrent UTIs.     Patient had MDS to AML on aza/venetoclax last chemo july 19, 2024 with persistent pancytopenia  Plan is transfuse pRBC as needed  Platelet transfuion for count  less than 40241  IV Abx per ID    Consider altering venetoclax/aza regimen in future given prolonged pancytopenia

## 2024-08-30 LAB
ALBUMIN SERPL ELPH-MCNC: 3.2 G/DL — LOW (ref 3.3–5)
ALP SERPL-CCNC: 86 U/L — SIGNIFICANT CHANGE UP (ref 40–120)
ALT FLD-CCNC: 75 U/L — HIGH (ref 10–45)
ANION GAP SERPL CALC-SCNC: 12 MMOL/L — SIGNIFICANT CHANGE UP (ref 5–17)
ANION GAP SERPL CALC-SCNC: 15 MMOL/L — SIGNIFICANT CHANGE UP (ref 5–17)
AST SERPL-CCNC: 35 U/L — SIGNIFICANT CHANGE UP (ref 10–40)
BASOPHILS # BLD AUTO: 0 K/UL — SIGNIFICANT CHANGE UP (ref 0–0.2)
BASOPHILS NFR BLD AUTO: 0 % — SIGNIFICANT CHANGE UP (ref 0–2)
BILIRUB SERPL-MCNC: 0.6 MG/DL — SIGNIFICANT CHANGE UP (ref 0.2–1.2)
BUN SERPL-MCNC: 10 MG/DL — SIGNIFICANT CHANGE UP (ref 7–23)
BUN SERPL-MCNC: 10 MG/DL — SIGNIFICANT CHANGE UP (ref 7–23)
CALCIUM SERPL-MCNC: 8.6 MG/DL — SIGNIFICANT CHANGE UP (ref 8.4–10.5)
CALCIUM SERPL-MCNC: 8.8 MG/DL — SIGNIFICANT CHANGE UP (ref 8.4–10.5)
CHLORIDE SERPL-SCNC: 95 MMOL/L — LOW (ref 96–108)
CHLORIDE SERPL-SCNC: 95 MMOL/L — LOW (ref 96–108)
CO2 SERPL-SCNC: 19 MMOL/L — LOW (ref 22–31)
CO2 SERPL-SCNC: 20 MMOL/L — LOW (ref 22–31)
CREAT SERPL-MCNC: 0.69 MG/DL — SIGNIFICANT CHANGE UP (ref 0.5–1.3)
CREAT SERPL-MCNC: 0.69 MG/DL — SIGNIFICANT CHANGE UP (ref 0.5–1.3)
EGFR: 81 ML/MIN/1.73M2 — SIGNIFICANT CHANGE UP
EGFR: 81 ML/MIN/1.73M2 — SIGNIFICANT CHANGE UP
EOSINOPHIL # BLD AUTO: 0.02 K/UL — SIGNIFICANT CHANGE UP (ref 0–0.5)
EOSINOPHIL NFR BLD AUTO: 0.9 % — SIGNIFICANT CHANGE UP (ref 0–6)
GIANT PLATELETS BLD QL SMEAR: PRESENT — SIGNIFICANT CHANGE UP
GLUCOSE SERPL-MCNC: 112 MG/DL — HIGH (ref 70–99)
GLUCOSE SERPL-MCNC: 114 MG/DL — HIGH (ref 70–99)
HCT VFR BLD CALC: 22.9 % — LOW (ref 34.5–45)
HGB BLD-MCNC: 8 G/DL — LOW (ref 11.5–15.5)
LYMPHOCYTES # BLD AUTO: 0.19 K/UL — LOW (ref 1–3.3)
LYMPHOCYTES # BLD AUTO: 9.7 % — LOW (ref 13–44)
MANUAL SMEAR VERIFICATION: SIGNIFICANT CHANGE UP
MCHC RBC-ENTMCNC: 31 PG — SIGNIFICANT CHANGE UP (ref 27–34)
MCHC RBC-ENTMCNC: 34.9 GM/DL — SIGNIFICANT CHANGE UP (ref 32–36)
MCV RBC AUTO: 88.8 FL — SIGNIFICANT CHANGE UP (ref 80–100)
METAMYELOCYTES # FLD: 1.8 % — HIGH (ref 0–0)
MONOCYTES # BLD AUTO: 0.03 K/UL — SIGNIFICANT CHANGE UP (ref 0–0.9)
MONOCYTES NFR BLD AUTO: 1.8 % — LOW (ref 2–14)
MYELOCYTES NFR BLD: 0.9 % — HIGH (ref 0–0)
NEUTROPHILS # BLD AUTO: 1.65 K/UL — LOW (ref 1.8–7.4)
NEUTROPHILS NFR BLD AUTO: 69 % — SIGNIFICANT CHANGE UP (ref 43–77)
NEUTS BAND # BLD: 15.9 % — HIGH (ref 0–8)
PLAT MORPH BLD: ABNORMAL
PLATELET # BLD AUTO: 24 K/UL — LOW (ref 150–400)
POTASSIUM SERPL-MCNC: 4.2 MMOL/L — SIGNIFICANT CHANGE UP (ref 3.5–5.3)
POTASSIUM SERPL-MCNC: 4.2 MMOL/L — SIGNIFICANT CHANGE UP (ref 3.5–5.3)
POTASSIUM SERPL-SCNC: 4.2 MMOL/L — SIGNIFICANT CHANGE UP (ref 3.5–5.3)
POTASSIUM SERPL-SCNC: 4.2 MMOL/L — SIGNIFICANT CHANGE UP (ref 3.5–5.3)
PROT SERPL-MCNC: 6.4 G/DL — SIGNIFICANT CHANGE UP (ref 6–8.3)
RBC # BLD: 2.58 M/UL — LOW (ref 3.8–5.2)
RBC # FLD: 19.4 % — HIGH (ref 10.3–14.5)
RBC BLD AUTO: SIGNIFICANT CHANGE UP
SODIUM SERPL-SCNC: 127 MMOL/L — LOW (ref 135–145)
SODIUM SERPL-SCNC: 129 MMOL/L — LOW (ref 135–145)
WBC # BLD: 1.94 K/UL — LOW (ref 3.8–10.5)
WBC # FLD AUTO: 1.94 K/UL — LOW (ref 3.8–10.5)

## 2024-08-30 PROCEDURE — 99232 SBSQ HOSP IP/OBS MODERATE 35: CPT

## 2024-08-30 RX ORDER — SODIUM CHLORIDE 9 MG/ML
1000 INJECTION INTRAMUSCULAR; INTRAVENOUS; SUBCUTANEOUS
Refills: 0 | Status: DISCONTINUED | OUTPATIENT
Start: 2024-08-30 | End: 2024-09-01

## 2024-08-30 RX ORDER — ACETAMINOPHEN 325 MG/1
650 TABLET ORAL ONCE
Refills: 0 | Status: COMPLETED | OUTPATIENT
Start: 2024-08-30 | End: 2024-08-30

## 2024-08-30 RX ADMIN — TIMOLOL MALEATE 1 DROP(S): 5 SOLUTION/ DROPS OPHTHALMIC at 13:06

## 2024-08-30 RX ADMIN — Medication 1 PATCH: at 11:23

## 2024-08-30 RX ADMIN — POLYETHYLENE GLYCOL 3350 17 GRAM(S): 17 POWDER, FOR SOLUTION ORAL at 17:16

## 2024-08-30 RX ADMIN — PIPERACILLIN SODIUM AND TAZOBACTAM SODIUM 25 GRAM(S): 3; .375 INJECTION, POWDER, FOR SOLUTION INTRAVENOUS at 22:15

## 2024-08-30 RX ADMIN — Medication 200 MILLIGRAM(S): at 17:18

## 2024-08-30 RX ADMIN — SODIUM CHLORIDE 75 MILLILITER(S): 9 INJECTION INTRAMUSCULAR; INTRAVENOUS; SUBCUTANEOUS at 23:50

## 2024-08-30 RX ADMIN — METOPROLOL TARTRATE 50 MILLIGRAM(S): 100 TABLET ORAL at 05:37

## 2024-08-30 RX ADMIN — FLUCONAZOLE 200 MILLIGRAM(S): 150 TABLET ORAL at 11:22

## 2024-08-30 RX ADMIN — ROSUVASTATIN CALCIUM 10 MILLIGRAM(S): 10 TABLET ORAL at 22:15

## 2024-08-30 RX ADMIN — Medication 200 MILLIGRAM(S): at 05:37

## 2024-08-30 RX ADMIN — PIPERACILLIN SODIUM AND TAZOBACTAM SODIUM 25 GRAM(S): 3; .375 INJECTION, POWDER, FOR SOLUTION INTRAVENOUS at 13:36

## 2024-08-30 RX ADMIN — PIPERACILLIN SODIUM AND TAZOBACTAM SODIUM 25 GRAM(S): 3; .375 INJECTION, POWDER, FOR SOLUTION INTRAVENOUS at 05:37

## 2024-08-30 RX ADMIN — ACETAMINOPHEN 650 MILLIGRAM(S): 325 TABLET ORAL at 05:37

## 2024-08-30 RX ADMIN — Medication 1 PATCH: at 19:00

## 2024-08-30 NOTE — PROVIDER CONTACT NOTE (OTHER) - SITUATION
pt had a syncope episode / near fall when going to the bathroom
pt had slight fever on am vitals 100.3

## 2024-08-30 NOTE — PROGRESS NOTE ADULT - ASSESSMENT
89 yo woman with MDS to AML  response with aza/vcx  pancytopenia and fever  ANC minimally improved    SUGGEST  IV abx per ID  monitor CBC  transfuse if HGB worsens

## 2024-08-30 NOTE — PROVIDER CONTACT NOTE (OTHER) - ACTION/TREATMENT ORDERED:
1x dose 650mg Tylenol po ordered and administered
1000ml saline @75 ordered, orthostatics for am, and fall precautions initiated

## 2024-08-30 NOTE — PROGRESS NOTE ADULT - ASSESSMENT
89 f with HTN, HLD, MDS, AML in remission, right corneal transplant, recurrent UTI (on Augmentin for ppx) and was started on cipro 2 weeks ago as well, p/w chills, nausea and R flank pain   here with rigors and  fever to 102, WBC 1.33 but ANC 1.07  u/a positive  CT negative    pancytopenia in the setting of MDS, AML now in remission, recurrent UTI on suppression augmentin and recently cipro as well now rigors/fever, nausea, flank pain, ?pyelo vs hematologic etiology  pt still febrile, WBC 1.12, ANC 1 but had 35% bandemia,  blood and urine cx negative, could be due to antibiotic use prior to admission vs non infectious etiolgoy    * c/w zosyn, started 8/27, now day 4, will likely complete a 10 day course  * monitor CBc/diff and CMP  * follow with hem/onc    The above assessment and plan was discussed with the primary team    Debora Calles MD  contact on teams  After 5pm and on weekends call [phone]         89 f with HTN, HLD, MDS, AML in remission, right corneal transplant, recurrent UTI (on Augmentin for ppx) and was started on cipro 2 weeks ago as well, p/w chills, nausea and R flank pain   here with rigors and  fever to 102, WBC 1.33 but ANC 1.07  u/a positive  CT negative    pancytopenia in the setting of MDS, AML now in remission, recurrent UTI on suppression augmentin and recently cipro as well now rigors/fever, nausea, flank pain, ?pyelo vs hematologic etiology  pt still febrile, WBC 1.12, ANC 1 but had 35% bandemia,  blood and urine cx negative, could be due to antibiotic use prior to admission vs non infectious etiolgoy  thoracic and LS spine MRI did not show any discitis/osteo just degenerative changes    * c/w zosyn, started 8/27, now day 4, will likely complete a 10 day course  * monitor CBc/diff and CMP  * follow with hem/onc    The above assessment and plan was discussed with the primary team    Debora Calles MD  contact on teams  After 5pm and on weekends call [phone]

## 2024-08-30 NOTE — PROGRESS NOTE ADULT - ASSESSMENT
89 f with    Fever  - BC NTD  - LED noted  - Procalcitonin noted  - empiric antibiotic Zosyn 4/10  - ID follow     Back pain  - lidocaine patch  - MRI T and L spine noted    Bakers cyst rupture  - pain control  - follow with Ortho as OTP    AML  - follow CBC  - continue prophylaxis  - transfusion support  - Hem Onc follow    Corneal transplant  - continue gtt    Hyponatremia  - follow    GERD  - stable    Anxiety control    Pessary  - Gyn evaluation    DVT prophylaxis  - PAS    d/w patient , HHA at bedside.     Tmii Henderson MD phone [phone]

## 2024-08-30 NOTE — PROVIDER CONTACT NOTE (OTHER) - ASSESSMENT
pt vs stable otherwise, no other s/s noted or told to RN
pt stated she felt like she was going to pass out to private aid @ bedside when she was washing hands after bathroom, RN was called in, patient almost fell, caught by RN, and placed in chair. BP was 70/36, but started to increase shortly after. 105/56 lying in bed

## 2024-08-31 DIAGNOSIS — E87.1 HYPO-OSMOLALITY AND HYPONATREMIA: ICD-10-CM

## 2024-08-31 LAB
ANION GAP SERPL CALC-SCNC: 10 MMOL/L — SIGNIFICANT CHANGE UP (ref 5–17)
BASOPHILS # BLD AUTO: 0 K/UL — SIGNIFICANT CHANGE UP (ref 0–0.2)
BASOPHILS NFR BLD AUTO: 0 % — SIGNIFICANT CHANGE UP (ref 0–2)
BLD GP AB SCN SERPL QL: NEGATIVE — SIGNIFICANT CHANGE UP
BUN SERPL-MCNC: 12 MG/DL — SIGNIFICANT CHANGE UP (ref 7–23)
CALCIUM SERPL-MCNC: 8.3 MG/DL — LOW (ref 8.4–10.5)
CHLORIDE SERPL-SCNC: 94 MMOL/L — LOW (ref 96–108)
CO2 SERPL-SCNC: 23 MMOL/L — SIGNIFICANT CHANGE UP (ref 22–31)
CREAT SERPL-MCNC: 0.78 MG/DL — SIGNIFICANT CHANGE UP (ref 0.5–1.3)
EGFR: 71 ML/MIN/1.73M2 — SIGNIFICANT CHANGE UP
EOSINOPHIL # BLD AUTO: 0 K/UL — SIGNIFICANT CHANGE UP (ref 0–0.5)
EOSINOPHIL NFR BLD AUTO: 0 % — SIGNIFICANT CHANGE UP (ref 0–6)
GLUCOSE SERPL-MCNC: 110 MG/DL — HIGH (ref 70–99)
HCT VFR BLD CALC: 21.1 % — LOW (ref 34.5–45)
HCT VFR BLD CALC: 21.9 % — LOW (ref 34.5–45)
HGB BLD-MCNC: 7.3 G/DL — LOW (ref 11.5–15.5)
HGB BLD-MCNC: 7.5 G/DL — LOW (ref 11.5–15.5)
IMM GRANULOCYTES NFR BLD AUTO: 1 % — HIGH (ref 0–0.9)
LYMPHOCYTES # BLD AUTO: 0.36 K/UL — LOW (ref 1–3.3)
LYMPHOCYTES # BLD AUTO: 17.3 % — SIGNIFICANT CHANGE UP (ref 13–44)
MCHC RBC-ENTMCNC: 30.4 PG — SIGNIFICANT CHANGE UP (ref 27–34)
MCHC RBC-ENTMCNC: 30.6 PG — SIGNIFICANT CHANGE UP (ref 27–34)
MCHC RBC-ENTMCNC: 34.2 GM/DL — SIGNIFICANT CHANGE UP (ref 32–36)
MCHC RBC-ENTMCNC: 34.6 GM/DL — SIGNIFICANT CHANGE UP (ref 32–36)
MCV RBC AUTO: 87.9 FL — SIGNIFICANT CHANGE UP (ref 80–100)
MCV RBC AUTO: 89.4 FL — SIGNIFICANT CHANGE UP (ref 80–100)
MONOCYTES # BLD AUTO: 0.05 K/UL — SIGNIFICANT CHANGE UP (ref 0–0.9)
MONOCYTES NFR BLD AUTO: 2.4 % — SIGNIFICANT CHANGE UP (ref 2–14)
NEUTROPHILS # BLD AUTO: 1.65 K/UL — LOW (ref 1.8–7.4)
NEUTROPHILS NFR BLD AUTO: 79.3 % — HIGH (ref 43–77)
NRBC # BLD: 0 /100 WBCS — SIGNIFICANT CHANGE UP (ref 0–0)
NRBC # BLD: 0 /100 WBCS — SIGNIFICANT CHANGE UP (ref 0–0)
OB PNL STL: NEGATIVE — SIGNIFICANT CHANGE UP
OSMOLALITY UR: 398 MOS/KG — SIGNIFICANT CHANGE UP (ref 300–900)
PLATELET # BLD AUTO: 24 K/UL — LOW (ref 150–400)
PLATELET # BLD AUTO: 27 K/UL — LOW (ref 150–400)
POTASSIUM SERPL-MCNC: 3.9 MMOL/L — SIGNIFICANT CHANGE UP (ref 3.5–5.3)
POTASSIUM SERPL-SCNC: 3.9 MMOL/L — SIGNIFICANT CHANGE UP (ref 3.5–5.3)
POTASSIUM UR-SCNC: 25 MMOL/L — SIGNIFICANT CHANGE UP
RBC # BLD: 2.4 M/UL — LOW (ref 3.8–5.2)
RBC # BLD: 2.45 M/UL — LOW (ref 3.8–5.2)
RBC # FLD: 19.3 % — HIGH (ref 10.3–14.5)
RBC # FLD: 19.4 % — HIGH (ref 10.3–14.5)
RH IG SCN BLD-IMP: POSITIVE — SIGNIFICANT CHANGE UP
SODIUM SERPL-SCNC: 127 MMOL/L — LOW (ref 135–145)
SODIUM UR-SCNC: 18 MMOL/L — SIGNIFICANT CHANGE UP
WBC # BLD: 2.08 K/UL — LOW (ref 3.8–10.5)
WBC # BLD: 2.57 K/UL — LOW (ref 3.8–10.5)
WBC # FLD AUTO: 2.08 K/UL — LOW (ref 3.8–10.5)
WBC # FLD AUTO: 2.57 K/UL — LOW (ref 3.8–10.5)

## 2024-08-31 PROCEDURE — 99223 1ST HOSP IP/OBS HIGH 75: CPT | Mod: GC

## 2024-08-31 RX ORDER — ACETAMINOPHEN 325 MG/1
650 TABLET ORAL ONCE
Refills: 0 | Status: COMPLETED | OUTPATIENT
Start: 2024-08-31 | End: 2024-08-31

## 2024-08-31 RX ADMIN — SODIUM CHLORIDE 75 MILLILITER(S): 9 INJECTION INTRAMUSCULAR; INTRAVENOUS; SUBCUTANEOUS at 21:51

## 2024-08-31 RX ADMIN — PIPERACILLIN SODIUM AND TAZOBACTAM SODIUM 25 GRAM(S): 3; .375 INJECTION, POWDER, FOR SOLUTION INTRAVENOUS at 14:39

## 2024-08-31 RX ADMIN — ACETAMINOPHEN 650 MILLIGRAM(S): 325 TABLET ORAL at 13:00

## 2024-08-31 RX ADMIN — Medication 200 MILLIGRAM(S): at 18:44

## 2024-08-31 RX ADMIN — PIPERACILLIN SODIUM AND TAZOBACTAM SODIUM 25 GRAM(S): 3; .375 INJECTION, POWDER, FOR SOLUTION INTRAVENOUS at 21:50

## 2024-08-31 RX ADMIN — ACETAMINOPHEN 650 MILLIGRAM(S): 325 TABLET ORAL at 12:30

## 2024-08-31 RX ADMIN — Medication 1 PATCH: at 19:11

## 2024-08-31 RX ADMIN — TIMOLOL MALEATE 1 DROP(S): 5 SOLUTION/ DROPS OPHTHALMIC at 12:17

## 2024-08-31 RX ADMIN — POLYETHYLENE GLYCOL 3350 17 GRAM(S): 17 POWDER, FOR SOLUTION ORAL at 18:45

## 2024-08-31 RX ADMIN — Medication 200 MILLIGRAM(S): at 06:33

## 2024-08-31 RX ADMIN — METOPROLOL TARTRATE 50 MILLIGRAM(S): 100 TABLET ORAL at 06:33

## 2024-08-31 RX ADMIN — FLUCONAZOLE 200 MILLIGRAM(S): 150 TABLET ORAL at 11:53

## 2024-08-31 RX ADMIN — PIPERACILLIN SODIUM AND TAZOBACTAM SODIUM 25 GRAM(S): 3; .375 INJECTION, POWDER, FOR SOLUTION INTRAVENOUS at 06:34

## 2024-08-31 RX ADMIN — ROSUVASTATIN CALCIUM 10 MILLIGRAM(S): 10 TABLET ORAL at 21:51

## 2024-08-31 NOTE — CONSULT NOTE ADULT - SUBJECTIVE AND OBJECTIVE BOX
[facility] DIVISION OF KIDNEY DISEASES AND HYPERTENSION -- [phone]  -- INITIAL CONSULT NOTE  --------------------------------------------------------------------------------  HPI: 89 year old female with PMH of MDS (AML on remission), right corneal transplant, HTN, HLD presented with fever. She has had right flank pain for the last couple of weeks. She started having nausea and fatigue 1 day prior to admission and then started having low grade fever that night. She has a history of recurrent UTI and is currently on augmentin PO for ppx. Nephrology consulted for Hyponatremia.     On review of [facility] HIE/[place], patient noted to have SNa 132 on 12/5/23. On admission, SNa 128 on 8/27 worsening to 126 on 8/28. VBG Na noted to be low 123 on 8/27. Today the SNa is 127. Patient making urine although not charted. UA from 8/27 was bland. Patient admits that of late she has not been eating well and has loss of appetite. Also she endorses not drinking too much fluids. Patient has known h/o chronic hyponatremia and states that she was asked to drink high salt liquids and gatorade to keep her salt level high. Outpatient meds reviewed and no new meds started recently. Patient noted to have received 1L normal saline and 1L LR during Hospital stay. At the time of exam Patient on normal saline infusion but unclear reason.    Pt seen and evaluated bedside this afternoon. Reports feeling well, endorses chronic constipation. Denies any chills, night sweats, vomiting, abdominal pain or dizziness.      PAST HISTORY  --------------------------------------------------------------------------------  PAST MEDICAL & SURGICAL HISTORY:  Hyperlipidemia      Palpitations      GERD (gastroesophageal reflux disease)      Chronic UTI      Post corneal transplant      AML (acute myeloid leukemia)      S/P cataract surgery        FAMILY HISTORY:  No pertinent family history in first degree relatives      PAST SOCIAL HISTORY:    ALLERGIES & MEDICATIONS  --------------------------------------------------------------------------------  Allergies    No Known Allergies    Intolerances      Standing Inpatient Medications  acyclovir   Oral Tab/Cap 200 milliGRAM(s) Oral two times a day  fluconAZOLE   Tablet 200 milliGRAM(s) Oral daily  lidocaine   4% Patch 1 Patch Transdermal daily  metoprolol succinate ER 50 milliGRAM(s) Oral daily  piperacillin/tazobactam IVPB.. 3.375 Gram(s) IV Intermittent every 8 hours  polyethylene glycol 3350 17 Gram(s) Oral two times a day  rosuvastatin 10 milliGRAM(s) Oral at bedtime  senna 2 Tablet(s) Oral at bedtime  sodium chloride 0.9%. 1000 milliLiter(s) IV Continuous <Continuous>  timolol 0.5% Solution 1 Drop(s) Both EYES daily    PRN Inpatient Medications  ALPRAZolam 0.25 milliGRAM(s) Oral daily PRN      REVIEW OF SYSTEMS  --------------------------------------------------------------------------------  Gen: No fevers/chills  Skin: No rashes  Head/Eyes/Ears: Normal hearing,   Respiratory: No dyspnea, cough  CV: No chest pain  GI: No abdominal pain, diarrhea  : No dysuria, hematuria  MSK: LLE edema  Heme: No easy bruising or bleeding  Psych: No significant depression    All other systems were reviewed and are negative, except as noted.    VITALS/PHYSICAL EXAM  --------------------------------------------------------------------------------  T(C): 36.6 (08-31-24 @ 13:15), Max: 37.4 (08-30-24 @ 23:28)  HR: 97 (08-31-24 @ 13:15) (86 - 100)  BP: 107/58 (08-31-24 @ 13:15) (70/36 - 109/57)  RR: 18 (08-31-24 @ 13:15) (18 - 18)  SpO2: 98% (08-31-24 @ 13:15) (97% - 99%)  Wt(kg): --        PHYSICAL EXAM:  Gen: NAD  HEENT: MMM  Pulm: CTA B/L  CV: S1S2  Abd: Soft, +BS   Ext: Left LE edema 2/2 bakers cyst  Neuro: Awake  Skin: Warm and dry    LABS/STUDIES  --------------------------------------------------------------------------------              7.5    2.57  >-----------<  27       [08-31-24 @ 10:27]              21.9     127  |  94  |  12  ----------------------------<  110      [08-31-24 @ 07:08]  3.9   |  23  |  0.78        Ca     8.3     [08-31-24 @ 07:08]    TPro  6.4  /  Alb  3.2  /  TBili  0.6  /  DBili  x   /  AST  35  /  ALT  75  /  AlkPhos  86  [08-30-24 @ 07:06]          Creatinine Trend:  SCr 0.78 [08-31 @ 07:08]  SCr 0.69 [08-30 @ 07:06]  SCr 0.77 [08-29 @ 07:06]  SCr 0.79 [08-28 @ 07:29]  SCr 0.74 [08-27 @ 11:00]      TSH 2.05      [11-28-23 @ 06:56]

## 2024-08-31 NOTE — CONSULT NOTE ADULT - ATTENDING COMMENTS
89 f with HTN, HLD, MDS, AML in remission, right corneal transplant, recurrent UTI (on Augmentin for ppx) and was started on cipro 2 weeks ago as well, p/w chills, nausea and R flank pain   here with rigors and  fever to 102, WBC 1.33 but ANC 1.07  u/a positive  CT negative    pancytopenia in the setting of MDS, AML now in remission, recurrent UTI on suppression augmentin and recently cipro as well now rigors/fever, nausea, flank pain, ?pyelo and bacteremia    * follow the blood and urine cx  * switch to zosyn for now and will adjust as per the cultures  * monitor CBc/diff and CMP    The above assessment and plan was discussed with the primary team    Debora Calles MD  contact on teams  After 5pm and on weekends call [phone]
jeanine garcia  nephrology attending   please contact me on TEAMS   Office- [phone]

## 2024-08-31 NOTE — PROGRESS NOTE ADULT - ASSESSMENT
89 f with    Fever  - BC NTD  - LED noted  - Procalcitonin noted  - empiric antibiotic Zosyn 5/10  - ID follow     Back pain  - lidocaine patch  - MRI T and L spine noted    Bakers cyst rupture  - pain control  - follow with Ortho as OTP    AML  - follow CBC  - continue prophylaxis  - transfusion support. Give 1 PRBC today.   - Hem Onc follow    Corneal transplant  - continue gtt    Hyponatremia  - follow  - Nephrology evaluation    GERD  - stable    Anxiety control    Pessary  - Gyn evaluation    DVT prophylaxis  - PAS    d/w patient , family at bedside, ACP    Timi Henderson MD phone [phone]

## 2024-08-31 NOTE — CONSULT NOTE ADULT - ASSESSMENT
Patient with Hyponatremia acute on chronic likely due to decreased PO intake exacerbated by liberal use of IV fluids.

## 2024-08-31 NOTE — PROGRESS NOTE ADULT - ASSESSMENT
AML in remission s/p aza/venetoclax last tx july pancytopenic  neutropenia resolving fevers moderating ? does she still need abx --ID f/u  anemic symptomatic would transfuse 1-2 units pRBC please

## 2024-08-31 NOTE — CONSULT NOTE ADULT - PROBLEM SELECTOR RECOMMENDATION 9
Patient with Hyponatremia acute on chronic likely due to decreased PO intake exacerbated by liberal use of IV fluids. On review of [facility] HIE/[place], patient noted to have SNa 132 on 12/5/23. On admission, SNa 128 on 8/27 worsening to 126 on 8/28. VBG Na noted to be low 123 on 8/27. Today the SNa is 127. Patient making urine although not charted. UA from 8/27 was bland. No 24H UOP charted. Patient admits that of late she has not been eating well and has loss of appetite. Also she endorses not drinking too much fluids. Patient has known h/o chronic hyponatremia and states that she was asked to drink high salt liquids and gatorade to keep her salt level high. Outpatient meds reviewed and no new meds started recently. Patient noted to have received 1L normal saline and 1L LR during Hospital stay. At the time of exam Patient on normal saline infusion but unclear reason. Recommend holding IV fluids and encourage drinking to thirst. Obtain urine electrolytes-(Urine Sodium, Urine potassium, Urine osm, urine BUN, Urine creatinine) and UPCR. Bladder scan for PVR. Initiate salt tabs 2gm TID. Encourage PO intake. Monitor labs and UOP. Monitor SNa     If you have any questions, please feel free to contact me  Mindy Stark MD  Nephrology Fellow  Page [phone] / Microsoft Teams (Preferred); Please PAGE for urgent consults only.  (After 5pm or on weekends please page the on-call fellow)

## 2024-09-01 LAB
ANION GAP SERPL CALC-SCNC: 14 MMOL/L — SIGNIFICANT CHANGE UP (ref 5–17)
BUN SERPL-MCNC: 10 MG/DL — SIGNIFICANT CHANGE UP (ref 7–23)
CALCIUM SERPL-MCNC: 7.3 MG/DL — LOW (ref 8.4–10.5)
CHLORIDE SERPL-SCNC: 95 MMOL/L — LOW (ref 96–108)
CO2 SERPL-SCNC: 20 MMOL/L — LOW (ref 22–31)
CREAT SERPL-MCNC: <0.3 MG/DL — LOW (ref 0.5–1.3)
CULTURE RESULTS: SIGNIFICANT CHANGE UP
CULTURE RESULTS: SIGNIFICANT CHANGE UP
EGFR: 99 ML/MIN/1.73M2 — SIGNIFICANT CHANGE UP
GLUCOSE SERPL-MCNC: 104 MG/DL — HIGH (ref 70–99)
HCT VFR BLD CALC: 23.1 % — LOW (ref 34.5–45)
HGB BLD-MCNC: 8.2 G/DL — LOW (ref 11.5–15.5)
MAGNESIUM SERPL-MCNC: 1.8 MG/DL — SIGNIFICANT CHANGE UP (ref 1.6–2.6)
MCHC RBC-ENTMCNC: 30.8 PG — SIGNIFICANT CHANGE UP (ref 27–34)
MCHC RBC-ENTMCNC: 35.5 GM/DL — SIGNIFICANT CHANGE UP (ref 32–36)
MCV RBC AUTO: 86.8 FL — SIGNIFICANT CHANGE UP (ref 80–100)
NRBC # BLD: 0 /100 WBCS — SIGNIFICANT CHANGE UP (ref 0–0)
PLATELET # BLD AUTO: 25 K/UL — LOW (ref 150–400)
POTASSIUM SERPL-MCNC: 3.8 MMOL/L — SIGNIFICANT CHANGE UP (ref 3.5–5.3)
POTASSIUM SERPL-SCNC: 3.8 MMOL/L — SIGNIFICANT CHANGE UP (ref 3.5–5.3)
RBC # BLD: 2.66 M/UL — LOW (ref 3.8–5.2)
RBC # FLD: 18.4 % — HIGH (ref 10.3–14.5)
SODIUM SERPL-SCNC: 129 MMOL/L — LOW (ref 135–145)
SPECIMEN SOURCE: SIGNIFICANT CHANGE UP
SPECIMEN SOURCE: SIGNIFICANT CHANGE UP
WBC # BLD: 1.92 K/UL — LOW (ref 3.8–10.5)
WBC # FLD AUTO: 1.92 K/UL — LOW (ref 3.8–10.5)

## 2024-09-01 RX ORDER — SODIUM CHLORIDE 9 MG/ML
2 INJECTION INTRAMUSCULAR; INTRAVENOUS; SUBCUTANEOUS THREE TIMES A DAY
Refills: 0 | Status: DISCONTINUED | OUTPATIENT
Start: 2024-09-01 | End: 2024-09-06

## 2024-09-01 RX ADMIN — FLUCONAZOLE 200 MILLIGRAM(S): 150 TABLET ORAL at 12:06

## 2024-09-01 RX ADMIN — Medication 1 PATCH: at 07:28

## 2024-09-01 RX ADMIN — PIPERACILLIN SODIUM AND TAZOBACTAM SODIUM 25 GRAM(S): 3; .375 INJECTION, POWDER, FOR SOLUTION INTRAVENOUS at 14:08

## 2024-09-01 RX ADMIN — Medication 1 PATCH: at 07:11

## 2024-09-01 RX ADMIN — TIMOLOL MALEATE 1 DROP(S): 5 SOLUTION/ DROPS OPHTHALMIC at 12:10

## 2024-09-01 RX ADMIN — Medication 200 MILLIGRAM(S): at 18:02

## 2024-09-01 RX ADMIN — PIPERACILLIN SODIUM AND TAZOBACTAM SODIUM 25 GRAM(S): 3; .375 INJECTION, POWDER, FOR SOLUTION INTRAVENOUS at 22:21

## 2024-09-01 RX ADMIN — SODIUM CHLORIDE 75 MILLILITER(S): 9 INJECTION INTRAMUSCULAR; INTRAVENOUS; SUBCUTANEOUS at 05:11

## 2024-09-01 RX ADMIN — METOPROLOL TARTRATE 50 MILLIGRAM(S): 100 TABLET ORAL at 05:14

## 2024-09-01 RX ADMIN — SODIUM CHLORIDE 2 GRAM(S): 9 INJECTION INTRAMUSCULAR; INTRAVENOUS; SUBCUTANEOUS at 14:07

## 2024-09-01 RX ADMIN — Medication 200 MILLIGRAM(S): at 05:14

## 2024-09-01 RX ADMIN — ROSUVASTATIN CALCIUM 10 MILLIGRAM(S): 10 TABLET ORAL at 22:22

## 2024-09-01 RX ADMIN — POLYETHYLENE GLYCOL 3350 17 GRAM(S): 17 POWDER, FOR SOLUTION ORAL at 18:02

## 2024-09-01 RX ADMIN — PIPERACILLIN SODIUM AND TAZOBACTAM SODIUM 25 GRAM(S): 3; .375 INJECTION, POWDER, FOR SOLUTION INTRAVENOUS at 05:11

## 2024-09-01 RX ADMIN — SODIUM CHLORIDE 2 GRAM(S): 9 INJECTION INTRAMUSCULAR; INTRAVENOUS; SUBCUTANEOUS at 22:22

## 2024-09-01 RX ADMIN — Medication 1 PATCH: at 22:24

## 2024-09-01 NOTE — DIETITIAN INITIAL EVALUATION ADULT - REASON INDICATOR FOR ASSESSMENT
Pt seen for consult for RD. Information obtained from pt, RN, electronic medical record. Chart reviewed, events noted.

## 2024-09-01 NOTE — DIETITIAN INITIAL EVALUATION ADULT - PHYSCIAL ASSESSMENT
No dosing Ht and Wt in file  Ht per pt: 62.5"    Daily weight (standing or bed scale): none documented thus far   Weight obtained by RD: 66kg (9/1 bed scale)     Weight history:   Per pt: 133-135lb  Previous RD notes: no history   [facility]: 60.7kg (11/27/23), 61.2kg (11/13/23), 62kg (3/7/21), 63.5kg (3/7/21), 63.5kg (5/15/30)      ** ? accuracy of bed scale weight today. No significant weight difference between stated weight and weight history from [facility]. RD will continue to monitor wt trends as available/able.     IBW: 112.5lb

## 2024-09-01 NOTE — PROGRESS NOTE ADULT - ASSESSMENT
89 f with    Fever  - BC NTD  - LED noted  - Procalcitonin noted  - empiric antibiotic Zosyn 6/10  - ID follow     Back pain  - lidocaine patch  - MRI T and L spine noted    Bakers cyst rupture  - pain control  - follow with Ortho as OTP    AML  - follow CBC  - continue prophylaxis  - transfusion support. s/p 1 PRBC     - Hem Onc follow    Corneal transplant  - continue gtt    Hyponatremia  - follow  - Nephrology evaluation    GERD  - stable    Anxiety control    Pessary  - Gyn evaluation    DVT prophylaxis  - PAS    d/w patient , family at bedside, ACP    Timi Henderson MD phone [phone]

## 2024-09-01 NOTE — DIETITIAN INITIAL EVALUATION ADULT - OTHER CALCULATIONS
Fluid needs deferred to team. Energy and protein needs based on pt's stated weight of 60.3kg in consideration of malnutrition.

## 2024-09-01 NOTE — DIETITIAN INITIAL EVALUATION ADULT - ENERGY INTAKE
Pt reports decreased appetite <75% of foods provided since admission but reports good PO intake for breakfast today >75%. Ensure 2x daily added today (pt prefers chocolate flavor). Pt made aware of menu ordering procedures in house with menu provided, encourage pt to order as needed to encourage PO intake while in house. Food preferences obtained, will honor as able.

## 2024-09-01 NOTE — DIETITIAN INITIAL EVALUATION ADULT - OTHER INFO
-- Nephrology consulted for hyponatremia, ordered for NaCl tablet TID  -- GI: Miralax and Senna   -- history of AML not on chemo now

## 2024-09-01 NOTE — DIETITIAN INITIAL EVALUATION ADULT - PERTINENT LABORATORY DATA
09-01    129<L>  |  95<L>  |  10  ----------------------------<  104<H>  3.8   |  20<L>  |  <0.30<L>    Ca    7.3<L>      01 Sep 2024 07:15  Mg     1.8     09-01    A1C with Estimated Average Glucose Result: 5.3 % (11-28-23 @ 06:56)

## 2024-09-01 NOTE — DIETITIAN INITIAL EVALUATION ADULT - ORAL NUTRITION SUPPLEMENTS
Continue Ensure plus high protein 2x daily (700kcal, 40g proteins), will change to chocolate flavor per request.

## 2024-09-01 NOTE — DIETITIAN INITIAL EVALUATION ADULT - ORAL INTAKE PTA/DIET HISTORY
Pt reports normal PO intake until 2/3 weeks ago when she started experiencing pain causing decreased PO intake, likely <75% of normal PO intake. Pt was not following therapeutic diet; eating well-balanced meals. Confirms no known food allergies. Denies chewing or swallowing issues. Pt with constipation (need laxatives), denies nausea/vomiting. Denies micronutrient supplement use, reports drinking chocolate Ensure as needed.

## 2024-09-01 NOTE — DIETITIAN INITIAL EVALUATION ADULT - ETIOLOGY
decreased ability to consume adequate protein-energy in setting of increased medical condition/pain

## 2024-09-01 NOTE — PROGRESS NOTE ADULT - ASSESSMENT
pancytopenia s/p venetoclax /aza july slowly improving neutrophils better  On IV Abx per ID ? to complete 10 days  s/p transfusion one unit rRBC feels much better

## 2024-09-01 NOTE — DIETITIAN INITIAL EVALUATION ADULT - PERTINENT MEDS FT
MEDICATIONS  (STANDING):  acyclovir   Oral Tab/Cap 200 milliGRAM(s) Oral two times a day  fluconAZOLE   Tablet 200 milliGRAM(s) Oral daily  lidocaine   4% Patch 1 Patch Transdermal daily  metoprolol succinate ER 50 milliGRAM(s) Oral daily  piperacillin/tazobactam IVPB.. 3.375 Gram(s) IV Intermittent every 8 hours  polyethylene glycol 3350 17 Gram(s) Oral two times a day  rosuvastatin 10 milliGRAM(s) Oral at bedtime  senna 2 Tablet(s) Oral at bedtime  sodium chloride 2 Gram(s) Oral three times a day  timolol 0.5% Solution 1 Drop(s) Both EYES daily    MEDICATIONS  (PRN):  ALPRAZolam 0.25 milliGRAM(s) Oral daily PRN for anxiety

## 2024-09-01 NOTE — DIETITIAN NUTRITION RISK NOTIFICATION - TREATMENT: THE FOLLOWING DIET HAS BEEN RECOMMENDED
Diet, Regular:   Supplement Feeding Modality:  Oral  Ensure Enlive Cans or Servings Per Day:  2       Frequency:  Daily (09-01-24 @ 09:30) [Active]

## 2024-09-01 NOTE — DIETITIAN INITIAL EVALUATION ADULT - NS FNS DIET ORDER
Diet, Regular:   Supplement Feeding Modality:  Oral  Ensure Enlive Cans or Servings Per Day:  2       Frequency:  Daily (09-01-24 @ 09:30)

## 2024-09-01 NOTE — DIETITIAN INITIAL EVALUATION ADULT - NSFNSGIASSESSMENTFT_GEN_A_CORE
pt with history of constipation, on Miralax and Senna with BM on 8/31 per flowsheet, pt made aware of prune available in house, will order as needed.

## 2024-09-02 PROCEDURE — 99232 SBSQ HOSP IP/OBS MODERATE 35: CPT | Mod: GC

## 2024-09-02 RX ADMIN — Medication 200 MILLIGRAM(S): at 17:22

## 2024-09-02 RX ADMIN — ROSUVASTATIN CALCIUM 10 MILLIGRAM(S): 10 TABLET ORAL at 22:07

## 2024-09-02 RX ADMIN — PIPERACILLIN SODIUM AND TAZOBACTAM SODIUM 25 GRAM(S): 3; .375 INJECTION, POWDER, FOR SOLUTION INTRAVENOUS at 06:01

## 2024-09-02 RX ADMIN — FLUCONAZOLE 200 MILLIGRAM(S): 150 TABLET ORAL at 11:57

## 2024-09-02 RX ADMIN — SODIUM CHLORIDE 2 GRAM(S): 9 INJECTION INTRAMUSCULAR; INTRAVENOUS; SUBCUTANEOUS at 22:07

## 2024-09-02 RX ADMIN — SODIUM CHLORIDE 2 GRAM(S): 9 INJECTION INTRAMUSCULAR; INTRAVENOUS; SUBCUTANEOUS at 05:57

## 2024-09-02 RX ADMIN — Medication 1 PATCH: at 12:20

## 2024-09-02 RX ADMIN — TIMOLOL MALEATE 1 DROP(S): 5 SOLUTION/ DROPS OPHTHALMIC at 11:58

## 2024-09-02 RX ADMIN — Medication 1 PATCH: at 22:06

## 2024-09-02 RX ADMIN — METOPROLOL TARTRATE 50 MILLIGRAM(S): 100 TABLET ORAL at 05:57

## 2024-09-02 RX ADMIN — PIPERACILLIN SODIUM AND TAZOBACTAM SODIUM 25 GRAM(S): 3; .375 INJECTION, POWDER, FOR SOLUTION INTRAVENOUS at 22:06

## 2024-09-02 RX ADMIN — POLYETHYLENE GLYCOL 3350 17 GRAM(S): 17 POWDER, FOR SOLUTION ORAL at 05:56

## 2024-09-02 RX ADMIN — Medication 200 MILLIGRAM(S): at 05:57

## 2024-09-02 RX ADMIN — PIPERACILLIN SODIUM AND TAZOBACTAM SODIUM 25 GRAM(S): 3; .375 INJECTION, POWDER, FOR SOLUTION INTRAVENOUS at 13:51

## 2024-09-02 RX ADMIN — SODIUM CHLORIDE 2 GRAM(S): 9 INJECTION INTRAMUSCULAR; INTRAVENOUS; SUBCUTANEOUS at 13:51

## 2024-09-02 NOTE — PROGRESS NOTE ADULT - ASSESSMENT
Hyponatremia- SIADH with pain coupled with poor solute and excess water intake   now much improved     STOP salt tablets   liberalize salt in diet   1.5 L fluid restriction   high protein diet     jeanine garcia  nephrology attending   please contact me on TEAMS   Office- [phone]   Telephone Encounter by Luzmaria Kwon RN at 07/25/18 10:36 AM     Author:  Luzmaria Kwon RN Service:  (none) Author Type:  Registered Nurse     Filed:  07/25/18 10:43 AM Encounter Date:  7/25/2018 Status:  Signed     :  Luzmaria Kwon RN (Registered Nurse)            Dr. Vee, patient has not been seen since 6/6/2016. They have an upcoming appointment with Dr. Sweet on 7/30/18. Should we send for to Dr. Sweet's office for completion after well exam?[CD1.1M]    Received outside correspondence via fax from[CD1.1T] [facility] therapy department[CD1.1M]     Plan:[CD1.1T]  Please complete physician's order for physical and/or occupational therapy services[CD1.1M]    DX:[CD1.1T]  delay[CD1.1M]    Records placed on [CD1.1T] Nanda's[CD1.1M] desk for review and will be sent to scanning.    Referral needed:[CD1.1T]  No[CD1.1M]    MD Name:[CD1.1T]  [facility] Therapy Department[CD1.1M]  Address:[CD1.1T]  [address] [place] [address], [facility] [place] [address][CD1.1M]  Phone:[CD1.1T]  [phone][CD1.1M]  Fax:[CD1.1T]  [phone][CD1.1M]            Revision History        User Key Date/Time User Provider Type Action    > CD1.1 07/25/18 10:43 AM Luzmaria Kwon RN Registered Nurse Sign    M - Manual, T - Template

## 2024-09-02 NOTE — PROGRESS NOTE ADULT - ASSESSMENT
89 f with    Fever resolved  - BC NTD  - LED noted  - Procalcitonin noted  - empiric antibiotic Zosyn 7/10  - ID follow     Loose BM/ Diarrhea  - if worse will check C Diff    Back pain  - lidocaine patch  - MRI T and L spine noted    Bakers cyst rupture  - pain control  - follow with Ortho as OTP    AML  - follow CBC  - continue prophylaxis  - transfusion support. s/p 1 PRBC     - Hem Onc follow    Corneal transplant  - continue gtt    Hyponatremia  - follow  - Nephrology evaluation    GERD  - stable    Anxiety control    Pessary  - Gyn evaluation    DVT prophylaxis  - PAS    Labs in am    d/w patient , HHA at bedside     Timi Henderson MD phone [phone]

## 2024-09-03 LAB
ANION GAP SERPL CALC-SCNC: 8 MMOL/L — SIGNIFICANT CHANGE UP (ref 5–17)
BUN SERPL-MCNC: 10 MG/DL — SIGNIFICANT CHANGE UP (ref 7–23)
CALCIUM SERPL-MCNC: 8.3 MG/DL — LOW (ref 8.4–10.5)
CHLORIDE SERPL-SCNC: 99 MMOL/L — SIGNIFICANT CHANGE UP (ref 96–108)
CO2 SERPL-SCNC: 24 MMOL/L — SIGNIFICANT CHANGE UP (ref 22–31)
CREAT SERPL-MCNC: 0.59 MG/DL — SIGNIFICANT CHANGE UP (ref 0.5–1.3)
EGFR: 84 ML/MIN/1.73M2 — SIGNIFICANT CHANGE UP
GLUCOSE SERPL-MCNC: 169 MG/DL — HIGH (ref 70–99)
HCT VFR BLD CALC: 23.9 % — LOW (ref 34.5–45)
HGB BLD-MCNC: 8 G/DL — LOW (ref 11.5–15.5)
MCHC RBC-ENTMCNC: 30.1 PG — SIGNIFICANT CHANGE UP (ref 27–34)
MCHC RBC-ENTMCNC: 33.5 GM/DL — SIGNIFICANT CHANGE UP (ref 32–36)
MCV RBC AUTO: 89.8 FL — SIGNIFICANT CHANGE UP (ref 80–100)
NRBC # BLD: 1 /100 WBCS — HIGH (ref 0–0)
PLATELET # BLD AUTO: 33 K/UL — LOW (ref 150–400)
POTASSIUM SERPL-MCNC: 3.9 MMOL/L — SIGNIFICANT CHANGE UP (ref 3.5–5.3)
POTASSIUM SERPL-SCNC: 3.9 MMOL/L — SIGNIFICANT CHANGE UP (ref 3.5–5.3)
RBC # BLD: 2.66 M/UL — LOW (ref 3.8–5.2)
RBC # FLD: 19.5 % — HIGH (ref 10.3–14.5)
SODIUM SERPL-SCNC: 131 MMOL/L — LOW (ref 135–145)
WBC # BLD: 2.17 K/UL — LOW (ref 3.8–10.5)
WBC # FLD AUTO: 2.17 K/UL — LOW (ref 3.8–10.5)

## 2024-09-03 PROCEDURE — 99232 SBSQ HOSP IP/OBS MODERATE 35: CPT

## 2024-09-03 RX ADMIN — SODIUM CHLORIDE 2 GRAM(S): 9 INJECTION INTRAMUSCULAR; INTRAVENOUS; SUBCUTANEOUS at 21:19

## 2024-09-03 RX ADMIN — Medication 1 PATCH: at 06:49

## 2024-09-03 RX ADMIN — Medication 200 MILLIGRAM(S): at 17:20

## 2024-09-03 RX ADMIN — TIMOLOL MALEATE 1 DROP(S): 5 SOLUTION/ DROPS OPHTHALMIC at 12:38

## 2024-09-03 RX ADMIN — Medication 1 PATCH: at 21:23

## 2024-09-03 RX ADMIN — ROSUVASTATIN CALCIUM 10 MILLIGRAM(S): 10 TABLET ORAL at 21:19

## 2024-09-03 RX ADMIN — PIPERACILLIN SODIUM AND TAZOBACTAM SODIUM 25 GRAM(S): 3; .375 INJECTION, POWDER, FOR SOLUTION INTRAVENOUS at 21:21

## 2024-09-03 RX ADMIN — METOPROLOL TARTRATE 50 MILLIGRAM(S): 100 TABLET ORAL at 05:48

## 2024-09-03 RX ADMIN — SODIUM CHLORIDE 2 GRAM(S): 9 INJECTION INTRAMUSCULAR; INTRAVENOUS; SUBCUTANEOUS at 05:48

## 2024-09-03 RX ADMIN — SODIUM CHLORIDE 2 GRAM(S): 9 INJECTION INTRAMUSCULAR; INTRAVENOUS; SUBCUTANEOUS at 13:32

## 2024-09-03 RX ADMIN — Medication 1 PATCH: at 07:37

## 2024-09-03 RX ADMIN — PIPERACILLIN SODIUM AND TAZOBACTAM SODIUM 25 GRAM(S): 3; .375 INJECTION, POWDER, FOR SOLUTION INTRAVENOUS at 13:31

## 2024-09-03 RX ADMIN — FLUCONAZOLE 200 MILLIGRAM(S): 150 TABLET ORAL at 11:51

## 2024-09-03 RX ADMIN — Medication 200 MILLIGRAM(S): at 05:48

## 2024-09-03 RX ADMIN — PIPERACILLIN SODIUM AND TAZOBACTAM SODIUM 25 GRAM(S): 3; .375 INJECTION, POWDER, FOR SOLUTION INTRAVENOUS at 05:46

## 2024-09-03 NOTE — PROGRESS NOTE ADULT - ASSESSMENT
89 f with    Fever resolved  - BC NTD  - LED noted  - Procalcitonin noted  - empiric antibiotic Zosyn 8/10  - ID follow     Abdominal rash  - Dermatology evaluation     Loose BM/ Diarrhea resolved    Back pain  - lidocaine patch  - MRI T and L spine noted    Bakers cyst rupture  - pain control  - follow with Ortho as OTP    AML  - follow CBC  - continue prophylaxis  - transfusion support. s/p 1 PRBC     - Hem Onc follow    Corneal transplant  - continue gtt    Hyponatremia  - follow  - Nephrology evaluation    GERD  - stable    Anxiety control    Pessary  - Gyn evaluation as OTP    DVT prophylaxis  - PAS    d/w patient , HHA, ACP at bedside     Timi Henderson MD phone [phone]

## 2024-09-03 NOTE — CHART NOTE - NSCHARTNOTEFT_GEN_A_CORE
Dermatology Brief Chart Note  HPI:  89 year old female with PMH of myelodysplastic syndrome not on active chemo, right corneal transplant, HTN, HLD presents with fever. She has had right flank pain for the last couple of weeks that is improved with icyhot cream, otherwise was in normal state of health until this AM when she felt weak and was noted to be febrile to 100.4 F. She has chronic constipation, has not had a BM in 3 days but this is not unusual for her. She is on prophylactic augmentin/cipro for recurrent UTIs. No vomiting/diarrhea, (27 Aug 2024 15:33)      PAST MEDICAL & SURGICAL HISTORY:  Hyperlipidemia  Palpitations  GERD (gastroesophageal reflux disease)  Chronic UTI  Post corneal transplant  AML (acute myeloid leukemia)  S/P cataract surgery          REVIEW OF SYSTEMS:  General: no fevers/chills; no lethargy  Skin/Breast: see HPI  Ophthalmologic: no eye pain or changes in vision  ENT: no dysphagia or changes in hearing  Respiratory: no SOB or cough  Cardiovascular: no palpitations or chest pain  Gastrointestinal: no abdominal pain or blood in stool  Genitourinary: no dysuria or frequency  Musculoskeletal: no joint pains or weakness  Neurological: no weakness, numbness, or tingling          Allergies  No Known Allergies    Intolerances        SOCIAL HISTORY:     hx smoking  non-smoker    FAMILY HISTORY:  No pertinent family history in first degree relatives      noncontributory    Vital Signs Last 24 Hrs  T(C): 36.4 (03 Sep 2024 11:15), Max: 36.8 (02 Sep 2024 20:22)  T(F): 97.5 (03 Sep 2024 11:15), Max: 98.2 (02 Sep 2024 20:22)  HR: 94 (03 Sep 2024 11:15) (94 - 110)  BP: 114/70 (03 Sep 2024 11:15) (114/70 - 136/84)  BP(mean): --  RR: 18 (03 Sep 2024 11:15) (18 - 18)  SpO2: 98% (03 Sep 2024 11:15) (96% - 98%)    Parameters below as of 03 Sep 2024 11:15  Patient On (Oxygen Delivery Method): room air        PHYSICAL EXAM:  The patient was in NAD.  OP showed no ulcerations.  There was no visible LAD.  Conjunctiva were non-injected.  There was no clubbing or edema of extremities.   The scalp, hair, face, eyebrows, lips, OP, neck, chest, back, extremities x4, and nails were examined.  There was no hyperhidrosis or bromhidrosis.     Of note on skin exam:   Left lower abdomen with a well-demarcated pink to hyperpigmented patch left lower abdomen, superficial scale overlying      LABS:                        8.0    2.17  )-----------( 33       ( 03 Sep 2024 12:07 )             23.9     09-03    131<L>  |  99  |  10  ----------------------------<  169<H>  3.9   |  24  |  0.59    Ca    8.3<L>      03 Sep 2024 12:07        Urinalysis Basic - ( 03 Sep 2024 12:07 )    Color: x / Appearance: x / SG: x / pH: x  Gluc: 169 mg/dL / Ketone: x  / Bili: x / Urobili: x   Blood: x / Protein: x / Nitrite: x   Leuk Esterase: x / RBC: x / WBC x   Sq Epi: x / Non Sq Epi: x / Bacteria: x        RADIOLOGY & ADDITIONAL STUDIES: reviewed; no pertinent findings    A&P
Called by Dr. Riggs as concern for symptomatic anemia today. Noted hgb 7.3, repeat 7.5. Pt with presyncope and dizziness. Advised by Dr. Riggs to give 1 unit of blood with tylenol premed. Discussed with Dr. Henderson who agrees . Consent with family at bedside.
Pt with near syncopal episode at 23:20. Pt was escorted to sink in her room by her private aide and verbalized she felt dizzy, unable to stand up straight. Aide held her and called for RN who helped her sit onto a chair. Pt's SBP noted 70 at the time then 90 while sitting in the chair. Pt had no fall, head strike or LOC.  Pt then assisted into the bed and noted with .  Pt confirms she's never had this episode before, usually ambulates with no issues of gait disturbance.        89 year old female with PMH of myelodysplastic syndrome not on active chemo, right corneal transplant, HTN, HLD presents with fever. She has had right flank pain for the last couple of weeks that is improved with icyhot cream, otherwise was in normal state of health until this AM when she felt weak and was noted to be febrile to 100.4 F. She has chronic constipation, has not had a BM in 3 days but this is not unusual for her. She is on prophylactic augmentin/cipro for recurrent UTIs. Pt admitted with fever likely 2/2 pyelo treated with zosyn as per ID. Pt also with noted hyponatremia, Na 127, now with near syncopal episode.     Vital Signs Last 24 Hrs  T(C): 37.4 (30 Aug 2024 23:28), Max: 37.9 (30 Aug 2024 05:13)  T(F): 99.3 (30 Aug 2024 23:28), Max: 100.3 (30 Aug 2024 05:13)  HR: 86 (30 Aug 2024 23:33) (63 - 100)  BP: 105/86 (30 Aug 2024 23:33) (70/36 - 123/63)  BP(mean): --  RR: 18 (30 Aug 2024 23:33) (18 - 18)  SpO2: 97% (30 Aug 2024 23:33) (96% - 100%)    Parameters below as of 30 Aug 2024 23:33  Patient On (Oxygen Delivery Method): room air    - Pt with possible orthostatic event with BP self resolved with position change, pt denies any history of orthostatic hypotension.   -1000ml saline total over 75 cc/hr  ordered, orthostatics to check in am, and fall precautions initiated.  - Will endorse to day provider to follow with day team    Jose Hickman NP  [id number]

## 2024-09-03 NOTE — PROGRESS NOTE ADULT - ASSESSMENT
89 year old female with PMH of myelodysplastic syndrome not on active chemo, right corneal transplant, HTN, HLD admitted with fever 8/27/24. She has had right flank pain for  couple of weeks.  She has chronic constipation, has not had a BM in 3 days but this is not unusual for her. She was on prophylactic augmentin/cipro for recurrent UTIs.     Patient had MDS to AML on aza/venetoclax last chemo july 19, 2024 with persistent pancytopenia but WBC and platelets have improved some in hosptial  Plan is transfuse pRBC as needed, had PRBC in hospital  Platelet transfuion for count  less than [id number]  IV Abx per ID, day 7/10

## 2024-09-03 NOTE — PROGRESS NOTE ADULT - ASSESSMENT
89 f with HTN, HLD, MDS, AML in remission, right corneal transplant, recurrent UTI (on Augmentin for ppx) and was started on cipro 2 weeks ago as well, p/w chills, nausea and R flank pain   here with rigors and  fever to 102, WBC 1.33 but ANC 1.07  u/a positive  CT negative    pancytopenia in the setting of MDS, AML now in remission, recurrent UTI on suppression augmentin and recently cipro as well now rigors/fever, nausea, flank pain, ?pyelo vs hematologic etiology  pt still febrile, WBC 1.12, ANC 1 but had 35% bandemia,  blood and urine cx negative, could be due to antibiotic use prior to admission vs non infectious etiolgoy  thoracic and LS spine MRI did not show any discitis/osteo just degenerative changes  now pt afebrile and doing well, has a new erythematous silvery patch on her abd which she has a history of    * c/w zosyn, started 8/27, now day 8, will likely complete a 10 day course  * if ready for discharge can switch to PO cipro 250 bid (only 2 more days)  * follow with hem/onc and c/w fluconazole, acyclovir ppx as per hem    The above assessment and plan was discussed with the primary team    Debora Calles MD  contact on teams  After 5pm and on weekends call [phone]

## 2024-09-04 PROCEDURE — 99232 SBSQ HOSP IP/OBS MODERATE 35: CPT

## 2024-09-04 PROCEDURE — 99223 1ST HOSP IP/OBS HIGH 75: CPT

## 2024-09-04 RX ORDER — PIPERACILLIN SODIUM AND TAZOBACTAM SODIUM 3; .375 G/15ML; G/15ML
3.38 INJECTION, POWDER, FOR SOLUTION INTRAVENOUS EVERY 8 HOURS
Refills: 0 | Status: DISCONTINUED | OUTPATIENT
Start: 2024-09-04 | End: 2024-09-06

## 2024-09-04 RX ADMIN — PIPERACILLIN SODIUM AND TAZOBACTAM SODIUM 25 GRAM(S): 3; .375 INJECTION, POWDER, FOR SOLUTION INTRAVENOUS at 21:36

## 2024-09-04 RX ADMIN — SODIUM CHLORIDE 2 GRAM(S): 9 INJECTION INTRAMUSCULAR; INTRAVENOUS; SUBCUTANEOUS at 05:30

## 2024-09-04 RX ADMIN — METOPROLOL TARTRATE 50 MILLIGRAM(S): 100 TABLET ORAL at 05:32

## 2024-09-04 RX ADMIN — PIPERACILLIN SODIUM AND TAZOBACTAM SODIUM 25 GRAM(S): 3; .375 INJECTION, POWDER, FOR SOLUTION INTRAVENOUS at 14:32

## 2024-09-04 RX ADMIN — Medication 1 PATCH: at 22:45

## 2024-09-04 RX ADMIN — Medication 1 PATCH: at 07:48

## 2024-09-04 RX ADMIN — Medication 200 MILLIGRAM(S): at 05:30

## 2024-09-04 RX ADMIN — SODIUM CHLORIDE 2 GRAM(S): 9 INJECTION INTRAMUSCULAR; INTRAVENOUS; SUBCUTANEOUS at 21:37

## 2024-09-04 RX ADMIN — Medication 200 MILLIGRAM(S): at 18:05

## 2024-09-04 RX ADMIN — Medication 1 PATCH: at 09:48

## 2024-09-04 RX ADMIN — ROSUVASTATIN CALCIUM 10 MILLIGRAM(S): 10 TABLET ORAL at 21:37

## 2024-09-04 RX ADMIN — SODIUM CHLORIDE 2 GRAM(S): 9 INJECTION INTRAMUSCULAR; INTRAVENOUS; SUBCUTANEOUS at 14:29

## 2024-09-04 RX ADMIN — FLUCONAZOLE 200 MILLIGRAM(S): 150 TABLET ORAL at 12:11

## 2024-09-04 RX ADMIN — TIMOLOL MALEATE 1 DROP(S): 5 SOLUTION/ DROPS OPHTHALMIC at 12:47

## 2024-09-04 NOTE — PROGRESS NOTE ADULT - ASSESSMENT
89 f with    Fever resolved  - BC NTD  - LED noted  - Procalcitonin noted  - empiric antibiotic Zosyn 9/10  - ID follow     Diarrhea  - check C Diff    Abdominal rash/ Dermatitis   - Dermatology evaluation noted    Loose BM/ Diarrhea resolved    Back pain  - lidocaine patch  - MRI T and L spine noted    Bakers cyst rupture  - pain control  - follow with Ortho as OTP    AML  - follow CBC  - continue prophylaxis  - transfusion support. s/p 1 PRBC     - Hem Onc follow    Corneal transplant  - continue gtt    Hyponatremia  - follow  - Nephrology evaluation    GERD  - stable    Anxiety control    Pessary  - Gyn evaluation as OTP    DVT prophylaxis  - PAS    d/w patient , HHA, ACP      Timi Henderson MD phone [phone]

## 2024-09-04 NOTE — CONSULT NOTE ADULT - ASSESSMENT
#Dermatitis, unspecified   Acute to sub-acute, flaring, uncertain prognosis  Discussed clinical diagnosis given presenting symptoms and presentation.  We have discussed the nature and course of this condition.      Please follow-up final note once formally staffed with dermatology attending #Dermatitis, unspecified favor eczematous dermatitis  Acute to sub-acute, flaring, uncertain prognosis  Discussed clinical diagnosis given presenting symptoms and presentation.  We have discussed the nature and course of this condition.  PLAN  -- clobetasol 0.05% ointment BID to affected areas (never to the face/groin/skin folds) for up to 2 weeks followed by 1 week break  -- recommend frequent moisturizing to the affected area and skin surfaces      Patient was seen at bedside and staffed in person with the dermatology attending Dr. Stephanie Vallecillo  Recommendations were communicated with the primary team.  Please page [phone] for further related questions.    Shane Art MD  Resident Physician, PGY3  [facility] Dermat[facility]  Pager: [phone]  Office: [phone].

## 2024-09-04 NOTE — PROGRESS NOTE ADULT - ASSESSMENT
pancytopenia slowly improved anc gr than 1000  finished IV abx  antibiotic induced diarrhea  await stool for C diff consider empiric treatment

## 2024-09-04 NOTE — CONSULT NOTE ADULT - SUBJECTIVE AND OBJECTIVE BOX
HPI:  89 year old female with PMH of myelodysplastic syndrome not on active chemo, right corneal transplant, HTN, HLD presents with fever. She has had right flank pain for the last couple of weeks that is improved with icyhot cream, otherwise was in normal state of health until this AM when she felt weak and was noted to be febrile to 100.4 F. She has chronic constipation, has not had a BM in 3 days but this is not unusual for her. She is on prophylactic augmentin/cipro for recurrent UTIs. No vomiting/diarrhea, (27 Aug 2024 15:33)    Derm hx:  Unspecified timeline of rash of skin located on the left lower abdomen. Per patient has a history of recurrent lesions similar in appearance. Denies any associated symptoms with this patch. No itch or pain. No new exposures, personal care products or medical history or medications. Has improved in past with moisturizer alone.     PAST MEDICAL & SURGICAL HISTORY:  Hyperlipidemia      Palpitations      GERD (gastroesophageal reflux disease)      Chronic UTI      Post corneal transplant      AML (acute myeloid leukemia)      S/P cataract surgery          REVIEW OF SYSTEMS:  General: no fevers/chills; no lethargy  Skin/Breast: see HPI  Ophthalmologic: no eye pain or changes in vision  ENT: no dysphagia or changes in hearing  Respiratory: no SOB or cough  Cardiovascular: no palpitations or chest pain  Gastrointestinal: no abdominal pain or blood in stool  Genitourinary: no dysuria or frequency  Musculoskeletal: no joint pains or weakness  Neurological: no weakness, numbness, or tingling    MEDICATIONS  (STANDING):  acyclovir   Oral Tab/Cap 200 milliGRAM(s) Oral two times a day  fluconAZOLE   Tablet 200 milliGRAM(s) Oral daily  lidocaine   4% Patch 1 Patch Transdermal daily  metoprolol succinate ER 50 milliGRAM(s) Oral daily  piperacillin/tazobactam IVPB.. 3.375 Gram(s) IV Intermittent every 8 hours  rosuvastatin 10 milliGRAM(s) Oral at bedtime  senna 2 Tablet(s) Oral at bedtime  sodium chloride 2 Gram(s) Oral three times a day  timolol 0.5% Solution 1 Drop(s) Both EYES daily    MEDICATIONS  (PRN):      Allergies    No Known Allergies    Intolerances        SOCIAL HISTORY:     hx smoking  non-smoker    FAMILY HISTORY:  No pertinent family history in first degree relatives      noncontributory    Vital Signs Last 24 Hrs  T(C): 36.7 (04 Sep 2024 11:18), Max: 36.7 (04 Sep 2024 11:18)  T(F): 98 (04 Sep 2024 11:18), Max: 98 (04 Sep 2024 11:18)  HR: 92 (04 Sep 2024 11:18) (92 - 108)  BP: 114/61 (04 Sep 2024 11:18) (114/61 - 124/77)  BP(mean): --  RR: 18 (04 Sep 2024 11:18) (18 - 18)  SpO2: 99% (04 Sep 2024 11:18) (98% - 99%)    Parameters below as of 04 Sep 2024 11:18  Patient On (Oxygen Delivery Method): room air        PHYSICAL EXAM:  The patient was in NAD.  OP showed no ulcerations.  There was no visible LAD.  Conjunctiva were non-injected.  There was no clubbing or edema of extremities.   The scalp, hair, face, eyebrows, lips, OP, neck, chest, back, extremities x4, and nails were examined.  There was no hyperhidrosis or bromhidrosis.     Of note on skin exam:   Left lower abdomen with a well-demarcated pink to hyperpigmented patch left lower abdomen, superficial scale overlying    LABS:                        8.0    2.17  )-----------( 33       ( 03 Sep 2024 12:07 )             23.9     09-03    131<L>  |  99  |  10  ----------------------------<  169<H>  3.9   |  24  |  0.59    Ca    8.3<L>      03 Sep 2024 12:07        Urinalysis Basic - ( 03 Sep 2024 12:07 )    Color: x / Appearance: x / SG: x / pH: x  Gluc: 169 mg/dL / Ketone: x  / Bili: x / Urobili: x   Blood: x / Protein: x / Nitrite: x   Leuk Esterase: x / RBC: x / WBC x   Sq Epi: x / Non Sq Epi: x / Bacteria: x        RADIOLOGY & ADDITIONAL STUDIES: reviewed; no pertinent findings

## 2024-09-04 NOTE — PROGRESS NOTE ADULT - ASSESSMENT
89 f with HTN, HLD, MDS, AML in remission, right corneal transplant, recurrent UTI (on Augmentin for ppx) and was started on cipro 2 weeks ago as well, p/w chills, nausea and R flank pain   here with rigors and  fever to 102, WBC 1.33 but ANC 1.07  u/a positive  CT negative    pancytopenia in the setting of MDS, AML now in remission, recurrent UTI on suppression augmentin and recently cipro as well now rigors/fever, nausea, flank pain, ?pyelo vs hematologic etiology  pt still febrile, WBC 1.12, ANC 1 but had 35% bandemia,  blood and urine cx negative, could be due to antibiotic use prior to admission vs non infectious etiolgoy  thoracic and LS spine MRI did not show any discitis/osteo just degenerative changes  now pt afebrile and doing well, has a new erythematous silvery patch on her abd which she has a history of    * c/w zosyn, started 8/27, now day 9, will likely complete a 10 day course  * if ready for discharge can switch to PO cipro 250 bid (only 1 more day)  * follow with hem/onc and c/w fluconazole, acyclovir ppx as per hem  * will sign off, please call with questions  The above assessment and plan was discussed with the primary team    Debora Calles MD  contact on teams  After 5pm and on weekends call [phone]

## 2024-09-05 RX ORDER — CLOBETASOL PROPIONATE 0.5 MG/G
1 AEROSOL, FOAM TOPICAL
Refills: 0 | Status: DISCONTINUED | OUTPATIENT
Start: 2024-09-05 | End: 2024-09-06

## 2024-09-05 RX ADMIN — PIPERACILLIN SODIUM AND TAZOBACTAM SODIUM 25 GRAM(S): 3; .375 INJECTION, POWDER, FOR SOLUTION INTRAVENOUS at 13:30

## 2024-09-05 RX ADMIN — Medication 1 PATCH: at 10:00

## 2024-09-05 RX ADMIN — FLUCONAZOLE 200 MILLIGRAM(S): 150 TABLET ORAL at 13:31

## 2024-09-05 RX ADMIN — TIMOLOL MALEATE 1 DROP(S): 5 SOLUTION/ DROPS OPHTHALMIC at 13:35

## 2024-09-05 RX ADMIN — CLOBETASOL PROPIONATE 1 APPLICATION(S): 0.5 AEROSOL, FOAM TOPICAL at 17:43

## 2024-09-05 RX ADMIN — METOPROLOL TARTRATE 50 MILLIGRAM(S): 100 TABLET ORAL at 05:36

## 2024-09-05 RX ADMIN — SODIUM CHLORIDE 2 GRAM(S): 9 INJECTION INTRAMUSCULAR; INTRAVENOUS; SUBCUTANEOUS at 22:17

## 2024-09-05 RX ADMIN — Medication 200 MILLIGRAM(S): at 05:35

## 2024-09-05 RX ADMIN — SODIUM CHLORIDE 2 GRAM(S): 9 INJECTION INTRAMUSCULAR; INTRAVENOUS; SUBCUTANEOUS at 05:36

## 2024-09-05 RX ADMIN — Medication 200 MILLIGRAM(S): at 17:41

## 2024-09-05 RX ADMIN — Medication 1 PATCH: at 07:17

## 2024-09-05 RX ADMIN — SODIUM CHLORIDE 2 GRAM(S): 9 INJECTION INTRAMUSCULAR; INTRAVENOUS; SUBCUTANEOUS at 13:31

## 2024-09-05 RX ADMIN — Medication 1 PATCH: at 22:16

## 2024-09-05 RX ADMIN — PIPERACILLIN SODIUM AND TAZOBACTAM SODIUM 25 GRAM(S): 3; .375 INJECTION, POWDER, FOR SOLUTION INTRAVENOUS at 22:22

## 2024-09-05 RX ADMIN — PIPERACILLIN SODIUM AND TAZOBACTAM SODIUM 25 GRAM(S): 3; .375 INJECTION, POWDER, FOR SOLUTION INTRAVENOUS at 05:35

## 2024-09-05 RX ADMIN — ROSUVASTATIN CALCIUM 10 MILLIGRAM(S): 10 TABLET ORAL at 22:17

## 2024-09-05 NOTE — PROGRESS NOTE ADULT - ASSESSMENT
89 f with    Fever resolved  - BC NTD  - LED noted  - Procalcitonin noted  - empiric antibiotic Zosyn 10/10  - ID follow     Diarrhea  - resolved    Abdominal rash/ Dermatitis   - Dermatology evaluation noted    Loose BM/ Diarrhea resolved    Back pain  - lidocaine patch  - MRI T and L spine noted    Bakers cyst rupture  - pain control  - follow with Ortho as OTP    AML  - follow CBC  - continue prophylaxis  - transfusion support.   - Hem Onc follow    Corneal transplant  - continue gtt    Hyponatremia  - follow  - Nephrology evaluation    GERD  - stable    Anxiety control    Pessary  - Gyn evaluation as OTP    DVT prophylaxis  - PAS    DCP home in am.    d/w patient , family, ACP      Timi Henderson MD phone [phone]

## 2024-09-05 NOTE — PROGRESS NOTE ADULT - ASSESSMENT
89F with MDS to AML in remission, right corneal transplant, HTN, HLD admitted with fever     #AML, prior MDS  - in remission  - on aza/venetoclax, last 7/19/24  - with persistent pancytopenia stable  - will FU with Dr. Huston as outpatient    #pancytopenia  - ANC has been over 1000; plt stable and improved  - s/p PRBC  - repeat CBC today to determine if further PRBC required as potentially for DC tomorrow    d/w Medicine  d/w [facility] bedside       89F with MDS to AML in remission, right corneal transplant, HTN, HLD admitted with fever     #AML, prior MDS  - in remission  - on aza/venetoclax, last 7/19/24  - with persistent pancytopenia stable  - will FU with Dr. Huston as outpatient    #pancytopenia  - ANC has been over 1000; plt stable and improved  - s/p PRBC  - repeat CBC today to determine if further PRBC required as potentially for DC tomorrow    #neutropenic fever  - Abx per ID  - on antifungal and antiviral prophylaxis    d/w Medicine  d/w [facility]

## 2024-09-06 ENCOUNTER — TRANSCRIPTION ENCOUNTER (OUTPATIENT)
Age: 89
End: 2024-09-06

## 2024-09-06 VITALS
TEMPERATURE: 98 F | SYSTOLIC BLOOD PRESSURE: 139 MMHG | RESPIRATION RATE: 18 BRPM | HEART RATE: 91 BPM | OXYGEN SATURATION: 99 % | DIASTOLIC BLOOD PRESSURE: 75 MMHG

## 2024-09-06 LAB
ANION GAP SERPL CALC-SCNC: 9 MMOL/L — SIGNIFICANT CHANGE UP (ref 5–17)
ANISOCYTOSIS BLD QL: SLIGHT — SIGNIFICANT CHANGE UP
BASOPHILS # BLD AUTO: 0 K/UL — SIGNIFICANT CHANGE UP (ref 0–0.2)
BASOPHILS NFR BLD AUTO: 0 % — SIGNIFICANT CHANGE UP (ref 0–2)
BLD GP AB SCN SERPL QL: NEGATIVE — SIGNIFICANT CHANGE UP
BUN SERPL-MCNC: 8 MG/DL — SIGNIFICANT CHANGE UP (ref 7–23)
CALCIUM SERPL-MCNC: 8.9 MG/DL — SIGNIFICANT CHANGE UP (ref 8.4–10.5)
CHLORIDE SERPL-SCNC: 96 MMOL/L — SIGNIFICANT CHANGE UP (ref 96–108)
CO2 SERPL-SCNC: 23 MMOL/L — SIGNIFICANT CHANGE UP (ref 22–31)
CREAT SERPL-MCNC: 0.65 MG/DL — SIGNIFICANT CHANGE UP (ref 0.5–1.3)
EGFR: 83 ML/MIN/1.73M2 — SIGNIFICANT CHANGE UP
EOSINOPHIL # BLD AUTO: 0.02 K/UL — SIGNIFICANT CHANGE UP (ref 0–0.5)
EOSINOPHIL NFR BLD AUTO: 0.9 % — SIGNIFICANT CHANGE UP (ref 0–6)
GIANT PLATELETS BLD QL SMEAR: PRESENT — SIGNIFICANT CHANGE UP
GLUCOSE SERPL-MCNC: 100 MG/DL — HIGH (ref 70–99)
HCT VFR BLD CALC: 24.5 % — LOW (ref 34.5–45)
HGB BLD-MCNC: 8.1 G/DL — LOW (ref 11.5–15.5)
LYMPHOCYTES # BLD AUTO: 0.45 K/UL — LOW (ref 1–3.3)
LYMPHOCYTES # BLD AUTO: 26.5 % — SIGNIFICANT CHANGE UP (ref 13–44)
MACROCYTES BLD QL: SLIGHT — SIGNIFICANT CHANGE UP
MANUAL SMEAR VERIFICATION: SIGNIFICANT CHANGE UP
MCHC RBC-ENTMCNC: 30.5 PG — SIGNIFICANT CHANGE UP (ref 27–34)
MCHC RBC-ENTMCNC: 33.1 GM/DL — SIGNIFICANT CHANGE UP (ref 32–36)
MCV RBC AUTO: 92.1 FL — SIGNIFICANT CHANGE UP (ref 80–100)
MONOCYTES # BLD AUTO: 0.02 K/UL — SIGNIFICANT CHANGE UP (ref 0–0.9)
MONOCYTES NFR BLD AUTO: 0.9 % — LOW (ref 2–14)
MYELOCYTES NFR BLD: 1.8 % — HIGH (ref 0–0)
NEUTROPHILS # BLD AUTO: 1.17 K/UL — LOW (ref 1.8–7.4)
NEUTROPHILS NFR BLD AUTO: 69 % — SIGNIFICANT CHANGE UP (ref 43–77)
NEUTS BAND # BLD: 0.9 % — SIGNIFICANT CHANGE UP (ref 0–8)
NRBC # BLD: 3 /100 WBCS — HIGH (ref 0–0)
OVALOCYTES BLD QL SMEAR: SLIGHT — SIGNIFICANT CHANGE UP
PLAT MORPH BLD: ABNORMAL
PLATELET # BLD AUTO: 36 K/UL — LOW (ref 150–400)
POIKILOCYTOSIS BLD QL AUTO: SLIGHT — SIGNIFICANT CHANGE UP
POTASSIUM SERPL-MCNC: 4 MMOL/L — SIGNIFICANT CHANGE UP (ref 3.5–5.3)
POTASSIUM SERPL-SCNC: 4 MMOL/L — SIGNIFICANT CHANGE UP (ref 3.5–5.3)
RBC # BLD: 2.66 M/UL — LOW (ref 3.8–5.2)
RBC # FLD: 19.7 % — HIGH (ref 10.3–14.5)
RBC BLD AUTO: ABNORMAL
RH IG SCN BLD-IMP: POSITIVE — SIGNIFICANT CHANGE UP
SODIUM SERPL-SCNC: 128 MMOL/L — LOW (ref 135–145)
WBC # BLD: 1.68 K/UL — LOW (ref 3.8–10.5)
WBC # FLD AUTO: 1.68 K/UL — LOW (ref 3.8–10.5)

## 2024-09-06 PROCEDURE — 84145 PROCALCITONIN (PCT): CPT

## 2024-09-06 PROCEDURE — 84132 ASSAY OF SERUM POTASSIUM: CPT

## 2024-09-06 PROCEDURE — 93970 EXTREMITY STUDY: CPT

## 2024-09-06 PROCEDURE — 99232 SBSQ HOSP IP/OBS MODERATE 35: CPT | Mod: GC

## 2024-09-06 PROCEDURE — 81003 URINALYSIS AUTO W/O SCOPE: CPT

## 2024-09-06 PROCEDURE — A9585: CPT

## 2024-09-06 PROCEDURE — 36415 COLL VENOUS BLD VENIPUNCTURE: CPT

## 2024-09-06 PROCEDURE — 82272 OCCULT BLD FECES 1-3 TESTS: CPT

## 2024-09-06 PROCEDURE — 99285 EMERGENCY DEPT VISIT HI MDM: CPT

## 2024-09-06 PROCEDURE — 96374 THER/PROPH/DIAG INJ IV PUSH: CPT

## 2024-09-06 PROCEDURE — 74177 CT ABD & PELVIS W/CONTRAST: CPT | Mod: MC

## 2024-09-06 PROCEDURE — 84295 ASSAY OF SERUM SODIUM: CPT

## 2024-09-06 PROCEDURE — 80048 BASIC METABOLIC PNL TOTAL CA: CPT

## 2024-09-06 PROCEDURE — 82435 ASSAY OF BLOOD CHLORIDE: CPT

## 2024-09-06 PROCEDURE — 82947 ASSAY GLUCOSE BLOOD QUANT: CPT

## 2024-09-06 PROCEDURE — 36430 TRANSFUSION BLD/BLD COMPNT: CPT

## 2024-09-06 PROCEDURE — 83605 ASSAY OF LACTIC ACID: CPT

## 2024-09-06 PROCEDURE — 97161 PT EVAL LOW COMPLEX 20 MIN: CPT

## 2024-09-06 PROCEDURE — 86923 COMPATIBILITY TEST ELECTRIC: CPT

## 2024-09-06 PROCEDURE — 84300 ASSAY OF URINE SODIUM: CPT

## 2024-09-06 PROCEDURE — 85018 HEMOGLOBIN: CPT

## 2024-09-06 PROCEDURE — 72157 MRI CHEST SPINE W/O & W/DYE: CPT | Mod: MC

## 2024-09-06 PROCEDURE — 83935 ASSAY OF URINE OSMOLALITY: CPT

## 2024-09-06 PROCEDURE — 96375 TX/PRO/DX INJ NEW DRUG ADDON: CPT

## 2024-09-06 PROCEDURE — 71046 X-RAY EXAM CHEST 2 VIEWS: CPT

## 2024-09-06 PROCEDURE — 85025 COMPLETE CBC W/AUTO DIFF WBC: CPT

## 2024-09-06 PROCEDURE — 85027 COMPLETE CBC AUTOMATED: CPT

## 2024-09-06 PROCEDURE — 85014 HEMATOCRIT: CPT

## 2024-09-06 PROCEDURE — 83735 ASSAY OF MAGNESIUM: CPT

## 2024-09-06 PROCEDURE — 87040 BLOOD CULTURE FOR BACTERIA: CPT

## 2024-09-06 PROCEDURE — 85730 THROMBOPLASTIN TIME PARTIAL: CPT

## 2024-09-06 PROCEDURE — P9011: CPT

## 2024-09-06 PROCEDURE — 86850 RBC ANTIBODY SCREEN: CPT

## 2024-09-06 PROCEDURE — 84133 ASSAY OF URINE POTASSIUM: CPT

## 2024-09-06 PROCEDURE — 86900 BLOOD TYPING SEROLOGIC ABO: CPT

## 2024-09-06 PROCEDURE — 87637 SARSCOV2&INF A&B&RSV AMP PRB: CPT

## 2024-09-06 PROCEDURE — 72158 MRI LUMBAR SPINE W/O & W/DYE: CPT | Mod: MC

## 2024-09-06 PROCEDURE — 86901 BLOOD TYPING SEROLOGIC RH(D): CPT

## 2024-09-06 PROCEDURE — 82803 BLOOD GASES ANY COMBINATION: CPT

## 2024-09-06 PROCEDURE — 87086 URINE CULTURE/COLONY COUNT: CPT

## 2024-09-06 PROCEDURE — 71101 X-RAY EXAM UNILAT RIBS/CHEST: CPT

## 2024-09-06 PROCEDURE — 80053 COMPREHEN METABOLIC PANEL: CPT

## 2024-09-06 PROCEDURE — 82330 ASSAY OF CALCIUM: CPT

## 2024-09-06 PROCEDURE — 85610 PROTHROMBIN TIME: CPT

## 2024-09-06 PROCEDURE — 86985 SPLIT BLOOD OR PRODUCTS: CPT

## 2024-09-06 RX ORDER — CLOBETASOL PROPIONATE 0.5 MG/G
1 AEROSOL, FOAM TOPICAL
Qty: 0 | Refills: 0 | DISCHARGE
Start: 2024-09-06

## 2024-09-06 RX ORDER — AMOXICILLIN AND CLAVULANATE POTASSIUM 250; 125 MG/1; MG/1
1 TABLET, FILM COATED ORAL
Refills: 0 | DISCHARGE

## 2024-09-06 RX ADMIN — PIPERACILLIN SODIUM AND TAZOBACTAM SODIUM 25 GRAM(S): 3; .375 INJECTION, POWDER, FOR SOLUTION INTRAVENOUS at 13:11

## 2024-09-06 RX ADMIN — SODIUM CHLORIDE 2 GRAM(S): 9 INJECTION INTRAMUSCULAR; INTRAVENOUS; SUBCUTANEOUS at 06:04

## 2024-09-06 RX ADMIN — TIMOLOL MALEATE 1 DROP(S): 5 SOLUTION/ DROPS OPHTHALMIC at 13:11

## 2024-09-06 RX ADMIN — Medication 1 PATCH: at 07:51

## 2024-09-06 RX ADMIN — Medication 1 PATCH: at 10:37

## 2024-09-06 RX ADMIN — METOPROLOL TARTRATE 50 MILLIGRAM(S): 100 TABLET ORAL at 06:01

## 2024-09-06 RX ADMIN — PIPERACILLIN SODIUM AND TAZOBACTAM SODIUM 25 GRAM(S): 3; .375 INJECTION, POWDER, FOR SOLUTION INTRAVENOUS at 06:04

## 2024-09-06 RX ADMIN — SODIUM CHLORIDE 2 GRAM(S): 9 INJECTION INTRAMUSCULAR; INTRAVENOUS; SUBCUTANEOUS at 13:10

## 2024-09-06 RX ADMIN — Medication 200 MILLIGRAM(S): at 06:02

## 2024-09-06 RX ADMIN — CLOBETASOL PROPIONATE 1 APPLICATION(S): 0.5 AEROSOL, FOAM TOPICAL at 06:22

## 2024-09-06 RX ADMIN — FLUCONAZOLE 200 MILLIGRAM(S): 150 TABLET ORAL at 13:10

## 2024-09-06 NOTE — DISCHARGE NOTE PROVIDER - DETAILS OF MALNUTRITION DIAGNOSIS/DIAGNOSES
This patient has been assessed with a concern for Malnutrition and was treated during this hospitalization for the following Nutrition diagnosis/diagnoses:     -  09/01/2024: Moderate protein-calorie malnutrition

## 2024-09-06 NOTE — PHYSICAL THERAPY INITIAL EVALUATION ADULT - ADDITIONAL COMMENTS
Pt resides in the [facility] with 24/7 HHA. Pt ambulates with a rolling walker at baseline and aides assist with all ADLs. Pt owns rolling walker and cane.

## 2024-09-06 NOTE — DISCHARGE NOTE PROVIDER - NSDCQMCOGNITION_NEU_ALL_CORE
Interval History: More alert this AM. Oriented to self. Unable to answer detailed questions, denies basic complaints of pain, hunger, anxiety.    Review of Systems   Unable to perform ROS: Mental status change     Objective:     Vital Signs (Most Recent):  Temp: 98 °F (36.7 °C) (01/15/22 0330)  Pulse: 93 (01/15/22 0845)  Resp: (!) 22 (01/15/22 0845)  BP: (!) 168/86 (01/15/22 0700)  SpO2: 95 % (01/15/22 0845) Vital Signs (24h Range):  Temp:  [97.8 °F (36.6 °C)-98.3 °F (36.8 °C)] 98 °F (36.7 °C)  Pulse:  [] 93  Resp:  [16-38] 22  SpO2:  [90 %-100 %] 95 %  BP: (119-171)/(59-89) 168/86     Weight: 61.2 kg (135 lb)  Body mass index is 19.94 kg/m².    Intake/Output Summary (Last 24 hours) at 1/15/2022 0925  Last data filed at 1/15/2022 0500  Gross per 24 hour   Intake 859.93 ml   Output 3175 ml   Net -2315.07 ml      Physical Exam  Constitutional:       General: He is not in acute distress.     Appearance: He is ill-appearing. He is not toxic-appearing or diaphoretic.   Cardiovascular:      Rate and Rhythm: Normal rate and regular rhythm.      Heart sounds: No murmur heard.  No gallop.    Pulmonary:      Effort: Pulmonary effort is normal. No respiratory distress.      Breath sounds: Normal breath sounds. No wheezing.   Abdominal:      General: Bowel sounds are normal. There is no distension.      Palpations: Abdomen is soft.      Tenderness: There is no abdominal tenderness.         Significant Labs: All pertinent labs within the past 24 hours have been reviewed.    Significant Imaging: I have reviewed all pertinent imaging results/findings within the past 24 hours.   No difficulties

## 2024-09-06 NOTE — PROGRESS NOTE ADULT - ASSESSMENT
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    - Recommend switching oral salt tablets to Ure Na 30mg BID  - Liberalize salt in diet  - 1.5 L fluid restriction  - High protein diet  - Outpt. labs and f/u with PCP    If you have any questions, please feel free to contact me  Robbie Maldonado  Nephrology Fellow  Page [phone] / [employer] Teams (Preferred)  (After 4pm or on weekends please page the on-call fellow)

## 2024-09-06 NOTE — PROGRESS NOTE ADULT - NUTRITIONAL ASSESSMENT
This patient has been assessed with a concern for Malnutrition and has been determined to have a diagnosis/diagnoses of Moderate protein-calorie malnutrition.    This patient is being managed with:   Diet Regular-  Supplement Feeding Modality:  Oral  Ensure Enlive Cans or Servings Per Day:  2       Frequency:  Daily  Entered: Sep  1 2024  9:30AM  

## 2024-09-06 NOTE — DISCHARGE NOTE PROVIDER - NSDCFUADDAPPT_GEN_ALL_CORE_FT
APPTS ARE READY TO BE MADE: [x] YES    Best Family or Patient Contact (if needed):    Additional Information about above appointments (if needed):    1:   2:   3:     Other comments or requests:    APPTS ARE READY TO BE MADE: [x] YES    Best Family or Patient Contact (if needed):    Additional Information about above appointments (if needed):    1:   2:   3:     Other comments or requests:     Patient advised they did not want to proceed with scheduling appointments with the providers on their referrals. They will coordinate care on their own.

## 2024-09-06 NOTE — PROGRESS NOTE ADULT - PROVIDER SPECIALTY LIST ADULT
Heme/Onc
Infectious Disease
Internal Medicine
Nephrology
Heme/Onc
Infectious Disease
Internal Medicine
Internal Medicine
Heme/Onc
Infectious Disease
Internal Medicine
Nephrology
Heme/Onc

## 2024-09-06 NOTE — DISCHARGE NOTE PROVIDER - NSDCMRMEDTOKEN_GEN_ALL_CORE_FT
acyclovir 200 mg oral capsule: 1 cap(s) orally 2 times a day  Augmentin 250 mg-125 mg oral tablet: 1 tab(s) orally once a day (at bedtime)  fluconazole 200 mg oral tablet: 1 tab(s) orally once a day  loteprednol 0.5% ophthalmic suspension: 1 drop(s) in the right eye once a day (at bedtime)  metoprolol succinate 50 mg oral tablet, extended release: 1 tab(s) orally once a day  rosuvastatin 10 mg oral capsule: 1 cap(s) orally  Timolol Maleate (Eqv-Timoptic) 0.5% ophthalmic solution: 1 drop(s) in each affected eye once a day  Xanax 0.25 mg oral tablet: 0.5 tab(s) orally once a day as needed for  anxiety   acyclovir 200 mg oral capsule: 1 cap(s) orally 2 times a day  Augmentin 250 mg-125 mg oral tablet: 1 tab(s) orally once a day (at bedtime)  clobetasol 0.05% topical ointment: 1 Apply topically to affected area 2 times a day  fluconazole 200 mg oral tablet: 1 tab(s) orally once a day  loteprednol 0.5% ophthalmic suspension: 1 drop(s) in the right eye once a day (at bedtime)  metoprolol succinate 50 mg oral tablet, extended release: 1 tab(s) orally once a day  rosuvastatin 10 mg oral capsule: 1 cap(s) orally  Timolol Maleate (Eqv-Timoptic) 0.5% ophthalmic solution: 1 drop(s) in each affected eye once a day  Xanax 0.25 mg oral tablet: 0.5 tab(s) orally once a day as needed for  anxiety   acyclovir 200 mg oral capsule: 1 cap(s) orally 2 times a day  clobetasol 0.05% topical ointment: 1 Apply topically to affected area 2 times a day  fluconazole 200 mg oral tablet: 1 tab(s) orally once a day  loteprednol 0.5% ophthalmic suspension: 1 drop(s) in the right eye once a day (at bedtime)  metoprolol succinate 50 mg oral tablet, extended release: 1 tab(s) orally once a day  rosuvastatin 10 mg oral capsule: 1 cap(s) orally  Timolol Maleate (Eqv-Timoptic) 0.5% ophthalmic solution: 1 drop(s) in each affected eye once a day  Xanax 0.25 mg oral tablet: 0.5 tab(s) orally once a day as needed for  anxiety

## 2024-09-06 NOTE — PHYSICAL THERAPY INITIAL EVALUATION ADULT - GENERAL OBSERVATIONS, REHAB EVAL
pt received semi-supine in bed, A&0x4, following 100% multi-step commands, +IVL, +IV, aide at bedside.

## 2024-09-06 NOTE — DISCHARGE NOTE NURSING/CASE MANAGEMENT/SOCIAL WORK - PATIENT PORTAL LINK FT
You can access the FollowMyHealth Patient Portal offered by [facility] by registering at the following website: [email or web address]. By joining [facility]’s FollowMyHealth portal, you will also be able to view your health information using other applications (apps) compatible with our system.

## 2024-09-06 NOTE — DISCHARGE NOTE PROVIDER - NSDCCPCAREPLAN_GEN_ALL_CORE_FT
PRINCIPAL DISCHARGE DIAGNOSIS  Diagnosis: Fever  Assessment and Plan of Treatment: You had a fever due to a kidney infection. Infectious disease saw you and recommended you complete a 10 day course of Zosyn, an IV antibiotic.      SECONDARY DISCHARGE DIAGNOSES  Diagnosis: Hyponatremia  Assessment and Plan of Treatment: You had low sodium and were started on salt tabs by nephrology    Diagnosis: Dermatitis  Assessment and Plan of Treatment: You were seen by derm for an abominal rash and found to have eczema. Continue clobetasol 0.05% ointment two times a day to affected areas (never to the face/groin/skin folds) for up to 2 weeks followed by 1 week break    Diagnosis: MDS (myelodysplastic syndrome)  Assessment and Plan of Treatment: You received 1 unit for packed red blood cells for symptomatic anemia. Your hemoglobin level is now stable     PRINCIPAL DISCHARGE DIAGNOSIS  Diagnosis: Fever  Assessment and Plan of Treatment: You had a fever due to a kidney infection. Infectious disease saw you and recommended you complete a 10 day course of Zosyn, an IV antibiotic.      SECONDARY DISCHARGE DIAGNOSES  Diagnosis: Hyponatremia  Assessment and Plan of Treatment: You had low sodium and were briefly on salt tabs in the hospital possibly due to pain. Your sodium level is now normal and you do not need to take tabs    Diagnosis: Dermatitis  Assessment and Plan of Treatment: You were seen by derm for an abominal rash and found to have eczema. Continue clobetasol 0.05% ointment two times a day to affected areas (never to the face/groin/skin folds) for up to 2 weeks followed by 1 week break    Diagnosis: MDS (myelodysplastic syndrome)  Assessment and Plan of Treatment: You received 1 unit for packed red blood cells for symptomatic anemia. Your hemoglobin level is now stable    Diagnosis: Back pain  Assessment and Plan of Treatment: For back pain you underwent an MRI to confirm there was no spinal infection. The MRI was negative for infection, it showed multidegenerative changes in the lower back

## 2024-09-06 NOTE — PROGRESS NOTE ADULT - SUBJECTIVE AND OBJECTIVE BOX
Follow Up:  fever    Interval History/ROS: was febrile yesterday, no fever today flank pain improved, no cough or nausea, blood and urine cx negative        Allergies  No Known Allergies        ANTIMICROBIALS:  acyclovir   Oral Tab/Cap 200 two times a day  fluconAZOLE   Tablet 200 daily  piperacillin/tazobactam IVPB.. 3.375 every 8 hours      OTHER MEDS:  ALPRAZolam 0.25 milliGRAM(s) Oral daily PRN  lidocaine   4% Patch 1 Patch Transdermal daily  metoprolol succinate ER 50 milliGRAM(s) Oral daily  polyethylene glycol 3350 17 Gram(s) Oral two times a day  rosuvastatin 10 milliGRAM(s) Oral at bedtime  senna 2 Tablet(s) Oral at bedtime  timolol 0.5% Solution 1 Drop(s) Both EYES daily      Vital Signs Last 24 Hrs  T(C): 36.6 (29 Aug 2024 13:00), Max: 38.5 (28 Aug 2024 15:30)  T(F): 97.8 (29 Aug 2024 13:00), Max: 101.3 (28 Aug 2024 15:30)  HR: 86 (29 Aug 2024 13:00) (71 - 99)  BP: 106/58 (29 Aug 2024 13:00) (106/58 - 138/72)  BP(mean): --  RR: 18 (29 Aug 2024 13:00) (18 - 18)  SpO2: 100% (29 Aug 2024 13:00) (95% - 100%)    Parameters below as of 29 Aug 2024 13:00  Patient On (Oxygen Delivery Method): room air        Physical Exam:  General:    NAD,  non toxic  Respiratory:   comfortable on RA  abd:     soft,    no tenderness  :   no CVAT,  no suprapubic tenderness,   no  leo  Musculoskeletal:   no joint swelling  vascular: no phlebitis  Skin:    no rash                          7.7    1.44  )-----------( 20       ( 29 Aug 2024 07:04 )             22.3       08-29    126<L>  |  94<L>  |  12  ----------------------------<  98  3.4<L>   |  21<L>  |  0.77    Ca    8.6      29 Aug 2024 07:06        Urinalysis Basic - ( 29 Aug 2024 07:06 )    Color: x / Appearance: x / SG: x / pH: x  Gluc: 98 mg/dL / Ketone: x  / Bili: x / Urobili: x   Blood: x / Protein: x / Nitrite: x   Leuk Esterase: x / RBC: x / WBC x   Sq Epi: x / Non Sq Epi: x / Bacteria: x        MICROBIOLOGY:  v  Clean Catch Clean Catch (Midstream)  08-27-24   No growth  --  --      .Blood Blood-Peripheral  08-27-24   No growth at 24 hours  --  --      .Blood Blood-Peripheral  08-27-24   No growth at 24 hours  --  --                RADIOLOGY:  Images independently visualized and reviewed personally, findings as below  < from: [facility] Duplex Lower Ext Vein Scan, Bilat (08.28.24 @ 09:47) >  IMPRESSION:    No evidence of deep venous thrombosis in either lower extremity.    Bilateral Jon cysts each measuring 4.0 x 0.8 cm. There is an additional   collection in left proximal calf which indicates left Baker's cyst   rupture.    < end of copied text >  < from: CT Abdomen and Pelvis w/ IV Cont (08.27.24 @ 13:39) >  IMPRESSION: No acute abnormality.    < end of copied text >  
89 year old female with PMH of myelodysplastic syndrome not on active chemo, right corneal transplant, HTN, HLD admitted with fever 8/27/24. She has had right flank pain for  couple of weeks that is improved with icyhot cream, otherwise was in normal state of health until this AM when she felt weak and was noted to be febrile to 100.4 F. She has chronic constipation, has not had a BM in 3 days but this is not unusual for her. She was on prophylactic augmentin/cipro for recurrent UTIs.     PAST MEDICAL & SURGICAL HISTORY:  Hyperlipidemia      Palpitations      GERD (gastroesophageal reflux disease)      Chronic UTI      Post corneal transplant      AML (acute myeloid leukemia)      S/P cataract surgery        Allergies    No Known Allergies    Intolerances      Social History:  Social History:    Marital Status:  (   )    (   ) Single    (   )    (x  )   Occupation:   Lives with: (  ) alone  (  ) children   (  ) spouse   (  ) parents  ( x ) other    Substance Use (street drugs): ( x ) never used  (  ) other:  Tobacco Usage:  (x  ) never smoked   (   ) former smoker   (   ) current smoker  (     ) pack years  (        ) last cigarette date  Alcohol Usage: no    (     ) Advanced Directives: (     ) None    (      ) DNR    (     ) DNI    (     ) Health Care Proxy: (27 Aug 2024 15:33)    Medications:  acyclovir   Oral Tab/Cap 200 milliGRAM(s) Oral two times a day  ALPRAZolam 0.25 milliGRAM(s) Oral daily PRN for anxiety  fluconAZOLE   Tablet 200 milliGRAM(s) Oral daily  lidocaine   4% Patch 1 Patch Transdermal daily  metoprolol succinate ER 50 milliGRAM(s) Oral daily  piperacillin/tazobactam IVPB.. 3.375 Gram(s) IV Intermittent every 8 hours  polyethylene glycol 3350 17 Gram(s) Oral two times a day  rosuvastatin 10 milliGRAM(s) Oral at bedtime  senna 2 Tablet(s) Oral at bedtime  sodium chloride 2 Gram(s) Oral three times a day  timolol 0.5% Solution 1 Drop(s) Both EYES daily    Labs:  CBC Full  -  ( 03 Sep 2024 12:07 )  WBC Count : 2.17 K/uL  RBC Count : 2.66 M/uL  Hemoglobin : 8.0 g/dL  Hematocrit : 23.9 %  Platelet Count - Automated : 33 K/uL  Mean Cell Volume : 89.8 fl  Mean Cell Hemoglobin : 30.1 pg  Mean Cell Hemoglobin Concentration : 33.5 gm/dL  Auto Neutrophil # : x  Auto Lymphocyte # : x  Auto Monocyte # : x  Auto Eosinophil # : x  Auto Basophil # : x  Auto Neutrophil % : x  Auto Lymphocyte % : x  Auto Monocyte % : x  Auto Eosinophil % : x  Auto Basophil % : x      WBC Count: 2.17 K/uL (09.03.24 @ 12:07)   WBC Count: 1.92 K/uL (09.01.24 @ 07:15)   WBC Count: 2.57 K/uL (08.31.24 @ 10:27)   WBC Count: 2.08 K/uL (08.31.24 @ 07:08)   WBC Count: 1.94 K/uL (08.30.24 @ 07:06)   WBC Count: 1.44 K/uL (08.29.24 @ 07:04)   WBC Count: 1.44 K/uL (08.29.24 @ 07:03)   WBC Count: 1.12 K/uL (08.28.24 @ 07:25) Platelet Count - Automated: 33 K/uL (09.03.24 @ 12:07)   Platelet Count - Automated: 25 K/uL (09.01.24 @ 07:15)   Platelet Count - Automated: 27 K/uL (08.31.24 @ 10:27)   Platelet Count - Automated: 24 K/uL (08.31.24 @ 07:08)   Platelet Count - Automated: 24 K/uL (08.30.24 @ 07:06)   Platelet Count - Automated: 20 K/uL (08.29.24 @ 07:04)   Platelet Count - Automated: 20 K/uL (08.29.24 @ 07:03)   Platelet Count - Automated: 19 K/uL (08.28.24 @ 07:25)       Radiology:             ROS:  Patient comfortable without distress  No SOB or chest pain  No palpitation  No abdominal pain, diarrhaea or constipation  No weakness of extremities  No skin changes or swelling of legs  Rest of the comprehensive ROS was negative  Vital Signs Last 24 Hrs  T(C): 36.4 (03 Sep 2024 11:15), Max: 36.8 (02 Sep 2024 20:22)  T(F): 97.5 (03 Sep 2024 11:15), Max: 98.2 (02 Sep 2024 20:22)  HR: 94 (03 Sep 2024 11:15) (94 - 110)  BP: 114/70 (03 Sep 2024 11:15) (114/70 - 136/84)  BP(mean): --  RR: 18 (03 Sep 2024 11:15) (18 - 18)  SpO2: 98% (03 Sep 2024 11:15) (96% - 98%)    Parameters below as of 03 Sep 2024 11:15  Patient On (Oxygen Delivery Method): room air        Physical exam:  Patient alert and oriented  No distress  CVS: S1, S2   Chest: bilateral breath sound without rales  Abdomen: soft, not tender, no organomegaly or masses  CNS: No focal neuro deficit  Musculoskeletal:  Normal range of motion  Skin: No rash    Assessment and Plan:
Chief Complaint:  FU AML, pancytopenia    History of Present Illness:   feels well, feels a bit congested not taking allergy med; no f/c, no cp, no dyspnea, ambulated earlier multiple times, no n/v/abd pain, BM is formed, no bleeding, tolerating diet      MEDICATIONS  (STANDING):  acyclovir   Oral Tab/Cap 200 milliGRAM(s) Oral two times a day  clobetasol 0.05% Ointment 1 Application(s) Topical two times a day  fluconAZOLE   Tablet 200 milliGRAM(s) Oral daily  lidocaine   4% Patch 1 Patch Transdermal daily  metoprolol succinate ER 50 milliGRAM(s) Oral daily  piperacillin/tazobactam IVPB.. 3.375 Gram(s) IV Intermittent every 8 hours  rosuvastatin 10 milliGRAM(s) Oral at bedtime  senna 2 Tablet(s) Oral at bedtime  sodium chloride 2 Gram(s) Oral three times a day  timolol 0.5% Solution 1 Drop(s) Both EYES daily    MEDICATIONS  (PRN):      Allergies    No Known Allergies    Intolerances        Vital Signs Last 24 Hrs  T(C): 36.6 (06 Sep 2024 11:54), Max: 36.8 (05 Sep 2024 20:46)  T(F): 97.8 (06 Sep 2024 11:54), Max: 98.2 (05 Sep 2024 20:46)  HR: 80 (06 Sep 2024 11:54) (80 - 97)  BP: 138/69 (06 Sep 2024 11:54) (129/66 - 140/67)  BP(mean): --  RR: 18 (06 Sep 2024 11:54) (18 - 18)  SpO2: 98% (06 Sep 2024 11:54) (97% - 100%)    Parameters below as of 06 Sep 2024 11:54  Patient On (Oxygen Delivery Method): room air        PHYSICAL EXAM  General: adult in NAD  HEENT: clear oropharynx, anicteric sclera, pink conjunctiva  Neck: supple  CV: normal S1/S2  Lungs: clear to auscultation, no wheezes, no rales  Abdomen: soft non-tender non-distended, positive bowel sounds  Ext: no calf tenderness, no edema  Skin: no rashes and no petechiae  Lymph Nodes: No LAD in neck  Neuro: alert and oriented X 3    LABS:                          8.1    1.68  )-----------( 36       ( 06 Sep 2024 07:13 )             24.5         Mean Cell Volume : 92.1 fl  Mean Cell Hemoglobin : 30.5 pg  Mean Cell Hemoglobin Concentration : 33.1 gm/dL  Auto Neutrophil # : 1.17 K/uL  Auto Lymphocyte # : 0.45 K/uL  Auto Monocyte # : 0.02 K/uL  Auto Eosinophil # : 0.02 K/uL  Auto Basophil # : 0.00 K/uL  Auto Neutrophil % : 69.0 %  Auto Lymphocyte % : 26.5 %  Auto Monocyte % : 0.9 %  Auto Eosinophil % : 0.9 %  Auto Basophil % : 0.0 %      Serial CBC's  09-06 @ 07:13  Hct-24.5 / Hgb-8.1 / Plat-36 / RBC-2.66 / WBC-1.68  Serial CBC's  09-03 @ 12:07  Hct-23.9 / Hgb-8.0 / Plat-33 / RBC-2.66 / WBC-2.17      09-06    128<L>  |  96  |  8   ----------------------------<  100<H>  4.0   |  23  |  0.65    Ca    8.9      06 Sep 2024 07:14                        Radiology:        
Chief Complaint: FU AML/pancytopenia    History of Present Illness:  feels well today, ambulated earlier, no f/c, no dyspnea, no n/v/abd pain, tolerating diet, stool was formed today, no melena, no bleeding      MEDICATIONS  (STANDING):  acyclovir   Oral Tab/Cap 200 milliGRAM(s) Oral two times a day  clobetasol 0.05% Ointment 1 Application(s) Topical two times a day  fluconAZOLE   Tablet 200 milliGRAM(s) Oral daily  lidocaine   4% Patch 1 Patch Transdermal daily  metoprolol succinate ER 50 milliGRAM(s) Oral daily  piperacillin/tazobactam IVPB.. 3.375 Gram(s) IV Intermittent every 8 hours  rosuvastatin 10 milliGRAM(s) Oral at bedtime  senna 2 Tablet(s) Oral at bedtime  sodium chloride 2 Gram(s) Oral three times a day  timolol 0.5% Solution 1 Drop(s) Both EYES daily    MEDICATIONS  (PRN):      Allergies    No Known Allergies    Intolerances        Vital Signs Last 24 Hrs  T(C): 36.5 (05 Sep 2024 04:39), Max: 37.1 (04 Sep 2024 19:55)  T(F): 97.7 (05 Sep 2024 04:39), Max: 98.8 (04 Sep 2024 19:55)  HR: 84 (05 Sep 2024 04:39) (84 - 84)  BP: 128/71 (05 Sep 2024 04:39) (128/71 - 152/65)  BP(mean): --  RR: 18 (05 Sep 2024 04:39) (18 - 18)  SpO2: 97% (05 Sep 2024 04:39) (95% - 97%)    Parameters below as of 05 Sep 2024 04:39  Patient On (Oxygen Delivery Method): room air        PHYSICAL EXAM  General: adult in NAD  HEENT: clear oropharynx, anicteric sclera, pink conjunctiva  Neck: supple  CV: normal S1/S2  Lungs: clear to auscultation, no wheezes, no rales  Abdomen: soft non-tender non-distended, positive bowel sounds  Ext: no calf tenderness  Skin: left abdomen rash  Lymph Nodes: No LAD in neck  Neuro: alert and oriented X 3    LABS:                          8.0    2.17  )-----------( 33       ( 03 Sep 2024 12:07 )             23.9         Mean Cell Volume : 89.8 fl  Mean Cell Hemoglobin : 30.1 pg  Mean Cell Hemoglobin Concentration : 33.5 gm/dL  Auto Neutrophil # : x  Auto Lymphocyte # : x  Auto Monocyte # : x  Auto Eosinophil # : x  Auto Basophil # : x  Auto Neutrophil % : x  Auto Lymphocyte % : x  Auto Monocyte % : x  Auto Eosinophil % : x  Auto Basophil % : x      Serial CBC's  09-03 @ 12:07  Hct-23.9 / Hgb-8.0 / Plat-33 / RBC-2.66 / WBC-2.17      09-03    131<L>  |  99  |  10  ----------------------------<  169<H>  3.9   |  24  |  0.59    Ca    8.3<L>      03 Sep 2024 12:07                        Radiology:        
Follow Up:  fever    Interval History/ROS: fever curve has improved, now tmax is 100.3, still with R flank pain, no cough or nausea      Allergies  No Known Allergies        ANTIMICROBIALS:  acyclovir   Oral Tab/Cap 200 two times a day  fluconAZOLE   Tablet 200 daily  piperacillin/tazobactam IVPB.. 3.375 every 8 hours      OTHER MEDS:  ALPRAZolam 0.25 milliGRAM(s) Oral daily PRN  lidocaine   4% Patch 1 Patch Transdermal daily  metoprolol succinate ER 50 milliGRAM(s) Oral daily  polyethylene glycol 3350 17 Gram(s) Oral two times a day  rosuvastatin 10 milliGRAM(s) Oral at bedtime  senna 2 Tablet(s) Oral at bedtime  timolol 0.5% Solution 1 Drop(s) Both EYES daily      Vital Signs Last 24 Hrs  T(C): 36.4 (30 Aug 2024 11:40), Max: 37.9 (30 Aug 2024 05:13)  T(F): 97.6 (30 Aug 2024 11:40), Max: 100.3 (30 Aug 2024 05:13)  HR: 86 (30 Aug 2024 11:40) (63 - 97)  BP: 123/60 (30 Aug 2024 11:40) (123/60 - 137/65)  BP(mean): --  RR: 18 (30 Aug 2024 11:40) (18 - 18)  SpO2: 100% (30 Aug 2024 11:40) (96% - 100%)    Parameters below as of 30 Aug 2024 11:40  Patient On (Oxygen Delivery Method): room air        Physical Exam:  General:    NAD,  non toxic  Respiratory:   comfortable on RA  abd:     soft,    no tenderness  :   no CVAT,  no suprapubic tenderness,   no  leo  Musculoskeletal:   no joint swelling  vascular: no phlebitis  Skin:    no rash                          8.0    1.94  )-----------( 24       ( 30 Aug 2024 07:06 )             22.9       08-30    127<L>  |  95<L>  |  10  ----------------------------<  114<H>  4.2   |  20<L>  |  0.69    Ca    8.8      30 Aug 2024 07:06    TPro  6.4  /  Alb  3.2<L>  /  TBili  0.6  /  DBili  x   /  AST  35  /  ALT  75<H>  /  AlkPhos  86  08-30      Urinalysis Basic - ( 30 Aug 2024 07:06 )    Color: x / Appearance: x / SG: x / pH: x  Gluc: 114 mg/dL / Ketone: x  / Bili: x / Urobili: x   Blood: x / Protein: x / Nitrite: x   Leuk Esterase: x / RBC: x / WBC x   Sq Epi: x / Non Sq Epi: x / Bacteria: x        MICROBIOLOGY:  v  Clean Catch Clean Catch (Midstream)  08-27-24   No growth  --  --      .Blood Blood-Peripheral  08-27-24   No growth at 48 Hours  --  --      .Blood Blood-Peripheral  08-27-24   No growth at 48 Hours  --  --                RADIOLOGY:  Images independently visualized and reviewed personally, findings as below  < from: MR Thoracic Spine w/wo IV Cont (08.29.24 @ 11:05) >  IMPRESSION:    No definite evidence of osteomyelitis or discitisof the thoracic or   lumbar spine.   Diffuse heterogeneous enhancing marrow signal throughout   the visualized osseous structures of the thoracic and lumbar spine,   compatible with known lymphoproliferative process of AML and/or   posttreatment changes. No pathologic fractures identified.    Multilevel degenerative changes of the lumbar spine as detailed above.    Please see report of CT abdomen/pelvis of 8/27/2024 for dedicated   evaluation of the abdominal and pelvic structures.    < end of copied text >  < from: CT Abdomen and Pelvis w/ IV Cont (08.27.24 @ 13:39) >  IMPRESSION: No acute abnormality.    < end of copied text >  
HPI  89 yo woman MDS to AML in remission after aza/venetoclax now pancytopenic slowly recovering finishing abx. developed watery diarrhea  pmh sh fh unchanged 7 pt ros diarrhea otherwise neg  physical  elderly  vs  t97.5 124/64 98 sat  lungs clear  cor s1s2  abd soft nontender  ext no edema    data  9/3 wbc 2170 hgb 8 plt [id number]
HPI  89 yo woman in remission from AML s/p venetoclax/aza last therapy july here with neutropenic fever on IV abx. had tx yesterday feels better able to walk  pmh sh fh unchanged 7 pt ros less dyspnea on exertion otherwise neg  physical  elderly  vs  t98.4 117/57 97 sat  lungs clear  cor s1s2  abd soft nontender  ext no edema    data  wbc 1920 hgb 8.2 plt [id number] cr less than 0.3
Patient is a 89y old  Female who presents with a chief complaint of Fever (29 Aug 2024 10:05)      SUBJECTIVE / OVERNIGHT EVENTS: feels better, still weak. Family at bedside.   Review of Systems  chest pain no  palpitations no  sob no  nausea no  headache no    MEDICATIONS  (STANDING):  acyclovir   Oral Tab/Cap 200 milliGRAM(s) Oral two times a day  fluconAZOLE   Tablet 200 milliGRAM(s) Oral daily  lidocaine   4% Patch 1 Patch Transdermal daily  metoprolol succinate ER 50 milliGRAM(s) Oral daily  piperacillin/tazobactam IVPB.. 3.375 Gram(s) IV Intermittent every 8 hours  polyethylene glycol 3350 17 Gram(s) Oral two times a day  rosuvastatin 10 milliGRAM(s) Oral at bedtime  senna 2 Tablet(s) Oral at bedtime  timolol 0.5% Solution 1 Drop(s) Both EYES daily    MEDICATIONS  (PRN):  ALPRAZolam 0.25 milliGRAM(s) Oral daily PRN for anxiety      Vital Signs Last 24 Hrs  T(C): 36.6 (29 Aug 2024 13:00), Max: 37 (28 Aug 2024 21:01)  T(F): 97.8 (29 Aug 2024 13:00), Max: 98.6 (28 Aug 2024 21:01)  HR: 86 (29 Aug 2024 13:00) (71 - 99)  BP: 106/58 (29 Aug 2024 13:00) (106/58 - 138/72)  BP(mean): --  RR: 18 (29 Aug 2024 13:00) (18 - 18)  SpO2: 100% (29 Aug 2024 13:00) (95% - 100%)    Parameters below as of 29 Aug 2024 13:00  Patient On (Oxygen Delivery Method): room air        PHYSICAL EXAM:  GENERAL: NAD  HEAD:  Atraumatic, Normocephalic  EYES: EOMI, PERRLA, conjunctiva and sclera clear  NECK: Supple, No JVD  CHEST/LUNG: Clear to auscultation bilaterally; No wheeze  HEART: Regular rate and rhythm; No murmurs, rubs, or gallops  ABDOMEN: Soft, Nontender, Nondistended; Bowel sounds present  EXTREMITIES:  2+ Peripheral Pulses, No clubbing, cyanosis, or edema  PSYCH: AAOx3  NEUROLOGY: non-focal  SKIN: No rashes or lesions    LABS:                        7.7    1.44  )-----------( 20       ( 29 Aug 2024 07:04 )             22.3     08-29    126<L>  |  94<L>  |  12  ----------------------------<  98  3.4<L>   |  21<L>  |  0.77    Ca    8.6      29 Aug 2024 07:06            Urinalysis Basic - ( 29 Aug 2024 07:06 )    Color: x / Appearance: x / SG: x / pH: x  Gluc: 98 mg/dL / Ketone: x  / Bili: x / Urobili: x   Blood: x / Protein: x / Nitrite: x   Leuk Esterase: x / RBC: x / WBC x   Sq Epi: x / Non Sq Epi: x / Bacteria: x        Culture - Urine (collected 27 Aug 2024 12:59)  Source: Clean Catch Clean Catch (Midstream)  Final Report (28 Aug 2024 15:34):    No growth    Culture - Blood (collected 27 Aug 2024 10:35)  Source: .Blood Blood-Peripheral  Preliminary Report (29 Aug 2024 16:01):    No growth at 48 Hours    Culture - Blood (collected 27 Aug 2024 10:25)  Source: .Blood Blood-Peripheral  Preliminary Report (29 Aug 2024 16:01):    No growth at 48 Hours        RADIOLOGY & ADDITIONAL TESTS:    Imaging Personally Reviewed:  < from: MR Thoracic Spine w/wo IV Cont (08.29.24 @ 11:05) >  IMPRESSION:    No definite evidence of osteomyelitis or discitisof the thoracic or   lumbar spine.   Diffuse heterogeneous enhancing marrow signal throughout   the visualized osseous structures of the thoracic and lumbar spine,   compatible with known lymphoproliferative process of AML and/or   posttreatment changes. No pathologic fractures identified.    Multilevel degenerative changes of the lumbar spine as detailed above.    < end of copied text >    Consultant(s) Notes Reviewed:      Care Discussed with Consultants/Other Providers:  
Patient is a 89y old  Female who presents with a chief complaint of fever (02 Sep 2024 09:34)      SUBJECTIVE / OVERNIGHT EVENTS: Comfortable without new complaints.   Review of Systems  chest pain no  palpitations no  sob no  nausea no  headache no    MEDICATIONS  (STANDING):  acyclovir   Oral Tab/Cap 200 milliGRAM(s) Oral two times a day  fluconAZOLE   Tablet 200 milliGRAM(s) Oral daily  lidocaine   4% Patch 1 Patch Transdermal daily  metoprolol succinate ER 50 milliGRAM(s) Oral daily  piperacillin/tazobactam IVPB.. 3.375 Gram(s) IV Intermittent every 8 hours  polyethylene glycol 3350 17 Gram(s) Oral two times a day  rosuvastatin 10 milliGRAM(s) Oral at bedtime  senna 2 Tablet(s) Oral at bedtime  sodium chloride 2 Gram(s) Oral three times a day  timolol 0.5% Solution 1 Drop(s) Both EYES daily    MEDICATIONS  (PRN):  ALPRAZolam 0.25 milliGRAM(s) Oral daily PRN for anxiety      Vital Signs Last 24 Hrs  T(C): 36.4 (03 Sep 2024 11:15), Max: 36.8 (02 Sep 2024 20:22)  T(F): 97.5 (03 Sep 2024 11:15), Max: 98.2 (02 Sep 2024 20:22)  HR: 94 (03 Sep 2024 11:15) (94 - 110)  BP: 114/70 (03 Sep 2024 11:15) (114/70 - 136/84)  BP(mean): --  RR: 18 (03 Sep 2024 11:15) (18 - 18)  SpO2: 98% (03 Sep 2024 11:15) (96% - 98%)    Parameters below as of 03 Sep 2024 11:15  Patient On (Oxygen Delivery Method): room air        PHYSICAL EXAM:  GENERAL: NAD, well-developed  HEAD:  Atraumatic, Normocephalic  EYES: EOMI, PERRLA, conjunctiva and sclera clear  NECK: Supple, No JVD  CHEST/LUNG: Clear to auscultation bilaterally; No wheeze  HEART: Regular rate and rhythm; No murmurs, rubs, or gallops  ABDOMEN: Soft, Nontender, Nondistended; Bowel sounds present Area of rash.  EXTREMITIES:  2+ Peripheral Pulses, No clubbing, cyanosis, or edema  PSYCH: AAOx3  NEUROLOGY: non-focal  SKIN: No rashes or lesions    LABS:                        8.0    2.17  )-----------( 33       ( 03 Sep 2024 12:07 )             23.9     09-03    131<L>  |  99  |  10  ----------------------------<  169<H>  3.9   |  24  |  0.59    Ca    8.3<L>      03 Sep 2024 12:07            Urinalysis Basic - ( 03 Sep 2024 12:07 )    Color: x / Appearance: x / SG: x / pH: x  Gluc: 169 mg/dL / Ketone: x  / Bili: x / Urobili: x   Blood: x / Protein: x / Nitrite: x   Leuk Esterase: x / RBC: x / WBC x   Sq Epi: x / Non Sq Epi: x / Bacteria: x          RADIOLOGY & ADDITIONAL TESTS:    Imaging Personally Reviewed:    Consultant(s) Notes Reviewed:      Care Discussed with Consultants/Other Providers:  
Patient is a 89y old  Female who presents with a chief complaint of neutropenic fever (28 Aug 2024 10:04)      SUBJECTIVE / OVERNIGHT EVENTS: c/o R sided back pain   Review of Systems  chest pain no  palpitations no  sob no  nausea no  headache no    MEDICATIONS  (STANDING):  acyclovir   Oral Tab/Cap 200 milliGRAM(s) Oral two times a day  fluconAZOLE   Tablet 200 milliGRAM(s) Oral daily  lidocaine   4% Patch 1 Patch Transdermal daily  metoprolol succinate ER 50 milliGRAM(s) Oral daily  piperacillin/tazobactam IVPB.. 3.375 Gram(s) IV Intermittent every 8 hours  polyethylene glycol 3350 17 Gram(s) Oral two times a day  rosuvastatin 10 milliGRAM(s) Oral at bedtime  senna 2 Tablet(s) Oral at bedtime  timolol 0.5% Solution 1 Drop(s) Both EYES daily    MEDICATIONS  (PRN):  ALPRAZolam 0.25 milliGRAM(s) Oral daily PRN for anxiety      Vital Signs Last 24 Hrs  T(C): 38.5 (28 Aug 2024 15:30), Max: 39.2 (28 Aug 2024 06:00)  T(F): 101.3 (28 Aug 2024 15:30), Max: 102.6 (28 Aug 2024 06:00)  HR: 99 (28 Aug 2024 11:40) (75 - 113)  BP: 106/60 (28 Aug 2024 11:40) (106/60 - 168/70)  BP(mean): 77 (27 Aug 2024 20:04) (77 - 77)  RR: 16 (28 Aug 2024 11:40) (16 - 18)  SpO2: 100% (28 Aug 2024 11:40) (97% - 100%)    Parameters below as of 28 Aug 2024 11:40  Patient On (Oxygen Delivery Method): room air        PHYSICAL EXAM:  GENERAL: NAD   HEAD:  Atraumatic, Normocephalic  EYES: EOMI, PERRLA, conjunctiva and sclera clear  NECK: Supple, No JVD  CHEST/LUNG: Clear to auscultation bilaterally; No wheeze  HEART: Regular rate and rhythm; No murmurs, rubs, or gallops  ABDOMEN: Soft, Nontender, Nondistended; Bowel sounds present  EXTREMITIES:  1+ bipedal edema  PSYCH: AAOx3  NEUROLOGY: non-focal  SKIN: No rashes or lesions    LABS:                        8.4    1.12  )-----------( 19       ( 28 Aug 2024 07:25 )             24.1     08-28    126<L>  |  92<L>  |  12  ----------------------------<  111<H>  3.7   |  23  |  0.79    Ca    8.8      28 Aug 2024 07:29    TPro  7.3  /  Alb  3.9  /  TBili  0.7  /  DBili  x   /  AST  29  /  ALT  49<H>  /  AlkPhos  96  08-27    PT/INR - ( 27 Aug 2024 11:00 )   PT: 12.6 sec;   INR: 1.15 ratio         PTT - ( 27 Aug 2024 11:00 )  PTT:27.9 sec      Urinalysis Basic - ( 28 Aug 2024 07:29 )    Color: x / Appearance: x / SG: x / pH: x  Gluc: 111 mg/dL / Ketone: x  / Bili: x / Urobili: x   Blood: x / Protein: x / Nitrite: x   Leuk Esterase: x / RBC: x / WBC x   Sq Epi: x / Non Sq Epi: x / Bacteria: x        Culture - Urine (collected 27 Aug 2024 12:59)  Source: Clean Catch Clean Catch (Midstream)  Final Report (28 Aug 2024 15:34):    No growth    Culture - Blood (collected 27 Aug 2024 10:35)  Source: .Blood Blood-Peripheral  Preliminary Report (28 Aug 2024 16:01):    No growth at 24 hours    Culture - Blood (collected 27 Aug 2024 10:25)  Source: .Blood Blood-Peripheral  Preliminary Report (28 Aug 2024 16:01):    No growth at 24 hours        RADIOLOGY & ADDITIONAL TESTS:    Imaging Personally Reviewed:    Consultant(s) Notes Reviewed:      Care Discussed with Consultants/Other Providers:  
Patient is a 89y old  Female who presents with a chief complaint of sepsis (04 Sep 2024 08:58)      SUBJECTIVE / OVERNIGHT EVENTS: Comfortable without new complaints. Wants to go home.  Review of Systems  chest pain no  palpitations no  sob no  nausea no  headache no    MEDICATIONS  (STANDING):  acyclovir   Oral Tab/Cap 200 milliGRAM(s) Oral two times a day  clobetasol 0.05% Ointment 1 Application(s) Topical two times a day  fluconAZOLE   Tablet 200 milliGRAM(s) Oral daily  lidocaine   4% Patch 1 Patch Transdermal daily  metoprolol succinate ER 50 milliGRAM(s) Oral daily  piperacillin/tazobactam IVPB.. 3.375 Gram(s) IV Intermittent every 8 hours  rosuvastatin 10 milliGRAM(s) Oral at bedtime  senna 2 Tablet(s) Oral at bedtime  sodium chloride 2 Gram(s) Oral three times a day  timolol 0.5% Solution 1 Drop(s) Both EYES daily    MEDICATIONS  (PRN):      Vital Signs Last 24 Hrs  T(C): 36.6 (06 Sep 2024 11:54), Max: 36.8 (05 Sep 2024 20:46)  T(F): 97.8 (06 Sep 2024 11:54), Max: 98.2 (05 Sep 2024 20:46)  HR: 80 (06 Sep 2024 11:54) (80 - 97)  BP: 138/69 (06 Sep 2024 11:54) (129/66 - 140/67)  BP(mean): --  RR: 18 (06 Sep 2024 11:54) (18 - 18)  SpO2: 98% (06 Sep 2024 11:54) (97% - 100%)    Parameters below as of 06 Sep 2024 11:54  Patient On (Oxygen Delivery Method): room air        PHYSICAL EXAM:  GENERAL: NAD, well-developed  HEAD:  Atraumatic, Normocephalic  EYES: EOMI, PERRLA, conjunctiva and sclera clear  NECK: Supple, No JVD  CHEST/LUNG: Clear to auscultation bilaterally; No wheeze  HEART: Regular rate and rhythm; No murmurs, rubs, or gallops  ABDOMEN: Soft, Nontender, Nondistended; Bowel sounds present  EXTREMITIES:  2+ Peripheral Pulses, No clubbing, cyanosis, or edema  PSYCH: AAOx3  NEUROLOGY: non-focal  SKIN: No rashes or lesions    LABS:                        8.1    1.68  )-----------( 36       ( 06 Sep 2024 07:13 )             24.5     09-06    128<L>  |  96  |  8   ----------------------------<  100<H>  4.0   |  23  |  0.65    Ca    8.9      06 Sep 2024 07:14            Urinalysis Basic - ( 06 Sep 2024 07:14 )    Color: x / Appearance: x / SG: x / pH: x  Gluc: 100 mg/dL / Ketone: x  / Bili: x / Urobili: x   Blood: x / Protein: x / Nitrite: x   Leuk Esterase: x / RBC: x / WBC x   Sq Epi: x / Non Sq Epi: x / Bacteria: x          RADIOLOGY & ADDITIONAL TESTS:    Imaging Personally Reviewed:    Consultant(s) Notes Reviewed:      Care Discussed with Consultants/Other Providers:  
  Follow Up:  fever    Interval History/ROS: no more fever, no cough or nausea, has a new erythematous round silvery patch on her abd          Allergies  No Known Allergies        ANTIMICROBIALS:  acyclovir   Oral Tab/Cap 200 two times a day  fluconAZOLE   Tablet 200 daily  piperacillin/tazobactam IVPB.. 3.375 every 8 hours      OTHER MEDS:  ALPRAZolam 0.25 milliGRAM(s) Oral daily PRN  lidocaine   4% Patch 1 Patch Transdermal daily  metoprolol succinate ER 50 milliGRAM(s) Oral daily  polyethylene glycol 3350 17 Gram(s) Oral two times a day  rosuvastatin 10 milliGRAM(s) Oral at bedtime  senna 2 Tablet(s) Oral at bedtime  sodium chloride 2 Gram(s) Oral three times a day  timolol 0.5% Solution 1 Drop(s) Both EYES daily      Vital Signs Last 24 Hrs  T(C): 36.4 (03 Sep 2024 11:15), Max: 36.8 (02 Sep 2024 20:22)  T(F): 97.5 (03 Sep 2024 11:15), Max: 98.2 (02 Sep 2024 20:22)  HR: 94 (03 Sep 2024 11:15) (94 - 110)  BP: 114/70 (03 Sep 2024 11:15) (114/70 - 136/84)  BP(mean): --  RR: 18 (03 Sep 2024 11:15) (18 - 18)  SpO2: 98% (03 Sep 2024 11:15) (96% - 98%)    Parameters below as of 03 Sep 2024 11:15  Patient On (Oxygen Delivery Method): room air        Physical Exam:  General:    NAD,  non toxic  Respiratory:   comfortable on RA  abd:     soft,  no tenderness  :   no CVAT,  no suprapubic tenderness,   no  leo  Musculoskeletal:   no joint swelling  vascular: no phlebitis  Skin:    new erythematous round silvery patch on  abd                            8.0    2.17  )-----------( 33       ( 03 Sep 2024 12:07 )             23.9       09-03    131<L>  |  99  |  10  ----------------------------<  169<H>  3.9   |  24  |  0.59    Ca    8.3<L>      03 Sep 2024 12:07        Urinalysis Basic - ( 03 Sep 2024 12:07 )    Color: x / Appearance: x / SG: x / pH: x  Gluc: 169 mg/dL / Ketone: x  / Bili: x / Urobili: x   Blood: x / Protein: x / Nitrite: x   Leuk Esterase: x / RBC: x / WBC x   Sq Epi: x / Non Sq Epi: x / Bacteria: x        MICROBIOLOGY:  v  Clean Catch Clean Catch (Midstream)  08-27-24   No growth  --  --      .Blood Blood-Peripheral  08-27-24   No growth at 5 days  --  --      .Blood Blood-Peripheral  08-27-24   No growth at 5 days  --  --                RADIOLOGY:  Images independently visualized and reviewed personally, findings as below  < from: MR Thoracic Spine w/wo IV Cont (08.29.24 @ 11:05) >    IMPRESSION:    No definite evidence of osteomyelitis or discitisof the thoracic or   lumbar spine.   Diffuse heterogeneous enhancing marrow signal throughout   the visualized osseous structures of the thoracic and lumbar spine,   compatible with known lymphoproliferative process of AML and/or   posttreatment changes. No pathologic fractures identified.    Multilevel degenerative changes of the lumbar spine as detailed above.    Please see report of CT abdomen/pelvis of 8/27/2024 for dedicated   evaluation of the abdominal and pelvic structures.      < end of copied text >  < from: CT Abdomen and Pelvis w/ IV Cont (08.27.24 @ 13:39) >  IMPRESSION: No acute abnormality.    < end of copied text >  
  Follow Up:  fever    Interval History/ROS: pt still febrile today but felt better, flank pain improved and had a BM, no cough or nasuea          Allergies  No Known Allergies        ANTIMICROBIALS:  acyclovir   Oral Tab/Cap 200 two times a day  fluconAZOLE   Tablet 200 daily  piperacillin/tazobactam IVPB.. 3.375 every 8 hours      OTHER MEDS:  acetaminophen     Tablet .. 650 milliGRAM(s) Oral every 6 hours PRN  ALPRAZolam 0.25 milliGRAM(s) Oral daily PRN  metoprolol succinate ER 50 milliGRAM(s) Oral daily  polyethylene glycol 3350 17 Gram(s) Oral two times a day  rosuvastatin 10 milliGRAM(s) Oral at bedtime  senna 2 Tablet(s) Oral at bedtime  timolol 0.5% Solution 1 Drop(s) Both EYES daily      Vital Signs Last 24 Hrs  T(C): 38.7 (28 Aug 2024 07:01), Max: 39.2 (28 Aug 2024 06:00)  T(F): 101.6 (28 Aug 2024 07:01), Max: 102.6 (28 Aug 2024 06:00)  HR: 76 (28 Aug 2024 04:25) (75 - 113)  BP: 137/66 (28 Aug 2024 04:25) (109/80 - 168/70)  BP(mean): 77 (27 Aug 2024 20:04) (77 - 77)  RR: 18 (28 Aug 2024 04:25) (16 - 18)  SpO2: 98% (28 Aug 2024 04:25) (97% - 100%)    Parameters below as of 28 Aug 2024 04:25  Patient On (Oxygen Delivery Method): room air        Physical Exam:  General:    NAD,  non toxic  Respiratory:   comfortable on RA  abd:     soft,    no tenderness  :   no CVAT,  no suprapubic tenderness,   no  leo  Musculoskeletal:   no joint swelling  vascular: no phlebitis  Skin:    no rash                          8.4    1.12  )-----------( 19       ( 28 Aug 2024 07:25 )             24.1       08-28    126<L>  |  92<L>  |  12  ----------------------------<  111<H>  3.7   |  23  |  0.79    Ca    8.8      28 Aug 2024 07:29    TPro  7.3  /  Alb  3.9  /  TBili  0.7  /  DBili  x   /  AST  29  /  ALT  49<H>  /  AlkPhos  96  08-27      Urinalysis Basic - ( 28 Aug 2024 07:29 )    Color: x / Appearance: x / SG: x / pH: x  Gluc: 111 mg/dL / Ketone: x  / Bili: x / Urobili: x   Blood: x / Protein: x / Nitrite: x   Leuk Esterase: x / RBC: x / WBC x   Sq Epi: x / Non Sq Epi: x / Bacteria: x        MICROBIOLOGY:  v            RADIOLOGY:  Images independently visualized and reviewed personally, findings as below  < from: [facility] Duplex Lower Ext Vein Scan, Bilat (08.28.24 @ 09:47) >  IMPRESSION:    No evidence of deep venous thrombosis in either lower extremity.    Bilateral Jon cysts each measuring 4.0 x 0.8 cm. There is an additional   collection in left proximal calf which indicates left Baker's cyst   rupture.      < end of copied text >  < from: CT Abdomen and Pelvis w/ IV Cont (08.27.24 @ 13:39) >  IMPRESSION: No acute abnormality.      < end of copied text >  < from: Xray Ribs 3 Views, Right (08.27.24 @ 17:38) >  IMPRESSION:  Old healed posterolateral left 6th and 7th rib fracture deformities. No   acute appearing right-sided displaced rib fractures or other gross rib   pathology. However if symptoms and/or concern persists, chest CT with   applied skin surface marker suggested to further assess as warranted.    Generalized osteopenia. Spinal degenerative changes with scoliotic   curvature. Intact remaining imaged osseous structures.    Clear visualized lungs. No pleural effusion and no pneumothorax.  Aortic arch calcifications otherwise cardiac and mediastinal silhouettes   within normal limits.  Midline normal trachea.    < end of copied text >  
[facility] Division of Kidney Diseases & Hypertension  FOLLOW UP NOTE  [phone]--------------------------------------------------------------------------------  Chief Complaint:Fever due to unspecified condition    24 hour events/subjective: Pt. seen and examined, feeling better today. No fever, chills, CP, SOB, or LE swelling.     PAST HISTORY  --------------------------------------------------------------------------------  No significant changes to PMH, PSH, FHx, SHx, unless otherwise noted    ALLERGIES & MEDICATIONS  --------------------------------------------------------------------------------  Allergies    No Known Allergies    Intolerances    Standing Inpatient Medications  acyclovir   Oral Tab/Cap 200 milliGRAM(s) Oral two times a day  clobetasol 0.05% Ointment 1 Application(s) Topical two times a day  fluconAZOLE   Tablet 200 milliGRAM(s) Oral daily  lidocaine   4% Patch 1 Patch Transdermal daily  metoprolol succinate ER 50 milliGRAM(s) Oral daily  piperacillin/tazobactam IVPB.. 3.375 Gram(s) IV Intermittent every 8 hours  rosuvastatin 10 milliGRAM(s) Oral at bedtime  senna 2 Tablet(s) Oral at bedtime  sodium chloride 2 Gram(s) Oral three times a day  timolol 0.5% Solution 1 Drop(s) Both EYES daily    PRN Inpatient Medications    REVIEW OF SYSTEMS  --------------------------------------------------------------------------------  Gen: No fevers/chills  Skin: No rashes  Head/Eyes/Ears/Mouth: No headache  Respiratory: No dyspnea  CV: No chest pain  GI: No abdominal pain  : No increased frequency  MSK: No LE edema  Neuro: No dizziness/lightheadedness    All other systems were reviewed and are negative, except as noted.    VITALS/PHYSICAL EXAM  --------------------------------------------------------------------------------  T(C): 36.6 (09-06-24 @ 11:54), Max: 36.8 (09-05-24 @ 20:46)  HR: 80 (09-06-24 @ 11:54) (80 - 97)  BP: 138/69 (09-06-24 @ 11:54) (129/66 - 140/67)  RR: 18 (09-06-24 @ 11:54) (18 - 18)  SpO2: 98% (09-06-24 @ 11:54) (97% - 100%)  Wt(kg): --    Physical Exam:  Gen: NAD, well-appearing  HEENT:  supple neck, clear oropharynx  Pulm: CTA B/L  CV: RRR, S1S2; no rub  UE: Warm, FROM, no clubbing, intact strength; no edema; no asterixis  LE: Warm, FROM, no clubbing, intact strength; no edema    LABS/STUDIES  --------------------------------------------------------------------------------              8.1    1.68  >-----------<  36       [09-06-24 @ 07:13]              24.5     128  |  96  |  8   ----------------------------<  100      [09-06-24 @ 07:14]  4.0   |  23  |  0.65        Ca     8.9     [09-06-24 @ 07:14]    Creatinine Trend:  SCr 0.65 [09-06 @ 07:14]  SCr 0.59 [09-03 @ 12:07]  SCr <0.30 [09-01 @ 07:15]  SCr 0.78 [08-31 @ 07:08]  SCr 0.69 [08-30 @ 07:06]    Urine Sodium 18      [08-31-24 @ 18:14]  Urine Potassium 25      [08-31-24 @ 18:14]  Urine Osmolality 398      [08-31-24 @ 18:14]
HPI:  89 year old female with PMH of myelodysplastic syndrome not on active chemo, right corneal transplant, HTN, HLD admitted with fever 8/27/24. She has had right flank pain for  couple of weeks that is improved with icyhot cream, otherwise was in normal state of health until this AM when she felt weak and was noted to be febrile to 100.4 F. She has chronic constipation, has not had a BM in 3 days but this is not unusual for her. She was on prophylactic augmentin/cipro for recurrent UTIs.     PAST MEDICAL & SURGICAL HISTORY:  Hyperlipidemia      Palpitations      GERD (gastroesophageal reflux disease)      Chronic UTI      Post corneal transplant      AML (acute myeloid leukemia)      S/P cataract surgery        Allergies    No Known Allergies    Intolerances        Social History:    Marital Status:  (   )    (   ) Single    (   )    (x  )   Occupation:   Lives with: (  ) alone  (  ) children   (  ) spouse   (  ) parents  ( x ) other    Substance Use (street drugs): ( x ) never used  (  ) other:  Tobacco Usage:  (x  ) never smoked   (   ) former smoker   (   ) current smoker  (     ) pack years  (        ) last cigarette date  Alcohol Usage: no    (     ) Advanced Directives: (     ) None    (      ) DNR    (     ) DNI    (     ) Health Care Proxy: (27 Aug 2024 15:33)    Medications:  acyclovir   Oral Tab/Cap 200 milliGRAM(s) Oral two times a day  ALPRAZolam 0.25 milliGRAM(s) Oral daily PRN for anxiety  fluconAZOLE   Tablet 200 milliGRAM(s) Oral daily  lidocaine   4% Patch 1 Patch Transdermal daily  metoprolol succinate ER 50 milliGRAM(s) Oral daily  piperacillin/tazobactam IVPB.. 3.375 Gram(s) IV Intermittent every 8 hours  polyethylene glycol 3350 17 Gram(s) Oral two times a day  rosuvastatin 10 milliGRAM(s) Oral at bedtime  senna 2 Tablet(s) Oral at bedtime  timolol 0.5% Solution 1 Drop(s) Both EYES daily    Labs:  CBC Full  -  ( 29 Aug 2024 07:04 )  WBC Count : 1.44 K/uL  RBC Count : 2.56 M/uL  Hemoglobin : 7.7 g/dL  Hematocrit : 22.3 %  Platelet Count - Automated : 20 K/uL  Mean Cell Volume : 87.1 fl  Mean Cell Hemoglobin : 30.1 pg  Mean Cell Hemoglobin Concentration : 34.5 gm/dL  Auto Neutrophil # : x  Auto Lymphocyte # : x  Auto Monocyte # : x  Auto Eosinophil # : x  Auto Basophil # : x  Auto Neutrophil % : x  Auto Lymphocyte % : x  Auto Monocyte % : x  Auto Eosinophil % : x  Auto Basophil % : x    08-29    126<L>  |  94<L>  |  12  ----------------------------<  98  3.4<L>   |  21<L>  |  0.77    Ca    8.6      29 Aug 2024 07:06    TPro  7.3  /  Alb  3.9  /  TBili  0.7  /  DBili  x   /  AST  29  /  ALT  49<H>  /  AlkPhos  96  08-27      Radiology:             ROS:  Patient comfortable without distress  No SOB or chest pain  No palpitation  No abdominal pain, diarrhaea or constipation  No weakness of extremities  No skin changes or swelling of legs  Rest of the comprehensive ROS was negative  Vital Signs Last 24 Hrs  T(C): 36.7 (29 Aug 2024 04:44), Max: 38.5 (28 Aug 2024 15:30)  T(F): 98 (29 Aug 2024 04:44), Max: 101.3 (28 Aug 2024 15:30)  HR: 71 (29 Aug 2024 04:44) (71 - 99)  BP: 138/72 (29 Aug 2024 04:44) (106/60 - 138/72)  BP(mean): --  RR: 18 (29 Aug 2024 04:44) (16 - 18)  SpO2: 95% (29 Aug 2024 04:44) (95% - 100%)    Parameters below as of 29 Aug 2024 04:44  Patient On (Oxygen Delivery Method): room air        Physical exam:  Patient comfortable  No distress  CVS: S1, S2   Chest: bilateral breath sound without rales  Abdomen: soft, not tender, no organomegaly or masses  CNS: No focal neuro deficit  Musculoskeletal:  Normal range of motion  Skin: No rash    Assessment and Plan:
[facility] Division of Kidney Diseases & Hypertension  FOLLOW UP NOTE  --------------------------------------------------------------------------------  Chief Complaint:    24 hour events/subjective:    feels great   eating better         PAST HISTORY  --------------------------------------------------------------------------------  No significant changes to PMH, PSH, FHx, SHx, unless otherwise noted    ALLERGIES & MEDICATIONS  --------------------------------------------------------------------------------  Allergies    No Known Allergies    Intolerances      Standing Inpatient Medications  acyclovir   Oral Tab/Cap 200 milliGRAM(s) Oral two times a day  fluconAZOLE   Tablet 200 milliGRAM(s) Oral daily  lidocaine   4% Patch 1 Patch Transdermal daily  metoprolol succinate ER 50 milliGRAM(s) Oral daily  piperacillin/tazobactam IVPB.. 3.375 Gram(s) IV Intermittent every 8 hours  polyethylene glycol 3350 17 Gram(s) Oral two times a day  rosuvastatin 10 milliGRAM(s) Oral at bedtime  senna 2 Tablet(s) Oral at bedtime  sodium chloride 2 Gram(s) Oral three times a day  timolol 0.5% Solution 1 Drop(s) Both EYES daily    PRN Inpatient Medications  ALPRAZolam 0.25 milliGRAM(s) Oral daily PRN      VITALS/PHYSICAL EXAM  --------------------------------------------------------------------------------  T(C): 36.7 (09-02-24 @ 05:29), Max: 37.1 (09-01-24 @ 20:14)  HR: 110 (09-02-24 @ 05:29) (95 - 110)  BP: 112/71 (09-02-24 @ 05:29) (112/71 - 132/83)  RR: 18 (09-02-24 @ 05:29) (18 - 18)  SpO2: 96% (09-02-24 @ 05:29) (96% - 99%)  Wt(kg): --        Physical Exam:  	Gen: NAD, well-appearing  	HEENT:  supple neck, clear oropharynx  	Pulm: CTA B/L  	CV: RRR, S1S2; no rub  	UE: Warm, FROM, no clubbing, intact strength; no edema; no asterixis  	LE: Warm, FROM, no clubbing, intact strength; no edema  	  LABS/STUDIES  --------------------------------------------------------------------------------              8.2    1.92  >-----------<  25       [09-01-24 @ 07:15]              23.1     129  |  95  |  10  ----------------------------<  104      [09-01-24 @ 07:15]  3.8   |  20  |  <0.30        Ca     7.3     [09-01-24 @ 07:15]      Mg     1.8     [09-01-24 @ 07:15]            Creatinine Trend:  SCr <0.30 [09-01 @ 07:15]  SCr 0.78 [08-31 @ 07:08]  SCr 0.69 [08-30 @ 07:06]  SCr 0.77 [08-29 @ 07:06]  SCr 0.79 [08-28 @ 07:29]    Urinalysis - [09-01-24 @ 07:15]      Color  / Appearance  / SG  / pH       Gluc 104 / Ketone   / Bili  / Urobili        Blood  / Protein  / Leuk Est  / Nitrite       RBC  / WBC  / Hyaline  / Gran  / Sq Epi  / Non Sq Epi  / Bacteria     Urine Sodium 18      [08-31-24 @ 18:14]  Urine Potassium 25      [08-31-24 @ 18:14]  Urine Osmolality 398      [08-31-24 @ 18:14]        
Patient is a 89y old  Female who presents with a chief complaint of fever (30 Aug 2024 19:26)      SUBJECTIVE / OVERNIGHT EVENTS: Feels better. Back pain improving   Review of Systems  chest pain no  palpitations no  sob no  nausea no  headache no    MEDICATIONS  (STANDING):  acyclovir   Oral Tab/Cap 200 milliGRAM(s) Oral two times a day  fluconAZOLE   Tablet 200 milliGRAM(s) Oral daily  lidocaine   4% Patch 1 Patch Transdermal daily  metoprolol succinate ER 50 milliGRAM(s) Oral daily  piperacillin/tazobactam IVPB.. 3.375 Gram(s) IV Intermittent every 8 hours  polyethylene glycol 3350 17 Gram(s) Oral two times a day  rosuvastatin 10 milliGRAM(s) Oral at bedtime  senna 2 Tablet(s) Oral at bedtime  timolol 0.5% Solution 1 Drop(s) Both EYES daily    MEDICATIONS  (PRN):  ALPRAZolam 0.25 milliGRAM(s) Oral daily PRN for anxiety      Vital Signs Last 24 Hrs  T(C): 36.4 (30 Aug 2024 11:40), Max: 37.9 (30 Aug 2024 05:13)  T(F): 97.6 (30 Aug 2024 11:40), Max: 100.3 (30 Aug 2024 05:13)  HR: 86 (30 Aug 2024 11:40) (63 - 97)  BP: 123/60 (30 Aug 2024 11:40) (123/60 - 137/65)  BP(mean): --  RR: 18 (30 Aug 2024 11:40) (18 - 18)  SpO2: 100% (30 Aug 2024 11:40) (96% - 100%)    Parameters below as of 30 Aug 2024 11:40  Patient On (Oxygen Delivery Method): room air        PHYSICAL EXAM:  GENERAL: NAD   HEAD:  Atraumatic, Normocephalic  EYES: EOMI, PERRLA, conjunctiva and sclera clear  NECK: Supple, No JVD  CHEST/LUNG: Clear to auscultation bilaterally; No wheeze  HEART: Regular rate and rhythm; No murmurs, rubs, or gallops  ABDOMEN: Soft, Nontender, Nondistended; Bowel sounds present  EXTREMITIES:  2+ Peripheral Pulses, No clubbing, cyanosis, or edema  PSYCH: AAOx3  NEUROLOGY: non-focal  SKIN: No rashes or lesions    LABS:                        8.0    1.94  )-----------( 24       ( 30 Aug 2024 07:06 )             22.9     08-30    127<L>  |  95<L>  |  10  ----------------------------<  114<H>  4.2   |  20<L>  |  0.69    Ca    8.8      30 Aug 2024 07:06    TPro  6.4  /  Alb  3.2<L>  /  TBili  0.6  /  DBili  x   /  AST  35  /  ALT  75<H>  /  AlkPhos  86  08-30          Urinalysis Basic - ( 30 Aug 2024 07:06 )    Color: x / Appearance: x / SG: x / pH: x  Gluc: 114 mg/dL / Ketone: x  / Bili: x / Urobili: x   Blood: x / Protein: x / Nitrite: x   Leuk Esterase: x / RBC: x / WBC x   Sq Epi: x / Non Sq Epi: x / Bacteria: x          RADIOLOGY & ADDITIONAL TESTS:    Imaging Personally Reviewed:    Consultant(s) Notes Reviewed:      Care Discussed with Consultants/Other Providers:  
Patient is a 89y old  Female who presents with a chief complaint of fever (31 Aug 2024 09:53)      SUBJECTIVE / OVERNIGHT EVENTS: events noted. Had episode of dizziness and low BP last night.   Review of Systems  chest pain no  palpitations no  sob no  nausea no  headache no    MEDICATIONS  (STANDING):  acyclovir   Oral Tab/Cap 200 milliGRAM(s) Oral two times a day  fluconAZOLE   Tablet 200 milliGRAM(s) Oral daily  lidocaine   4% Patch 1 Patch Transdermal daily  metoprolol succinate ER 50 milliGRAM(s) Oral daily  piperacillin/tazobactam IVPB.. 3.375 Gram(s) IV Intermittent every 8 hours  polyethylene glycol 3350 17 Gram(s) Oral two times a day  rosuvastatin 10 milliGRAM(s) Oral at bedtime  senna 2 Tablet(s) Oral at bedtime  sodium chloride 0.9%. 1000 milliLiter(s) (75 mL/Hr) IV Continuous <Continuous>  timolol 0.5% Solution 1 Drop(s) Both EYES daily    MEDICATIONS  (PRN):  ALPRAZolam 0.25 milliGRAM(s) Oral daily PRN for anxiety      Vital Signs Last 24 Hrs  T(C): 36.6 (31 Aug 2024 13:15), Max: 37.4 (30 Aug 2024 23:28)  T(F): 97.9 (31 Aug 2024 13:15), Max: 99.3 (30 Aug 2024 23:28)  HR: 97 (31 Aug 2024 13:15) (86 - 100)  BP: 107/58 (31 Aug 2024 13:15) (70/36 - 109/57)  BP(mean): --  RR: 18 (31 Aug 2024 13:15) (18 - 18)  SpO2: 98% (31 Aug 2024 13:15) (97% - 99%)    Parameters below as of 31 Aug 2024 13:15  Patient On (Oxygen Delivery Method): room air        PHYSICAL EXAM:  GENERAL: NAD  HEAD:  Atraumatic, Normocephalic  EYES: EOMI, PERRLA, conjunctiva and sclera clear  NECK: Supple, No JVD  CHEST/LUNG: Clear to auscultation bilaterally; No wheeze  HEART: Regular rate and rhythm; No murmurs, rubs, or gallops  ABDOMEN: Soft, Nontender, Nondistended; Bowel sounds present  EXTREMITIES:  2+ Peripheral Pulses, No clubbing, cyanosis, or edema  PSYCH: AAOx3  NEUROLOGY: non-focal  SKIN: No rashes or lesions    LABS:                        7.5    2.57  )-----------( 27       ( 31 Aug 2024 10:27 )             21.9     08-31    127<L>  |  94<L>  |  12  ----------------------------<  110<H>  3.9   |  23  |  0.78    Ca    8.3<L>      31 Aug 2024 07:08    TPro  6.4  /  Alb  3.2<L>  /  TBili  0.6  /  DBili  x   /  AST  35  /  ALT  75<H>  /  AlkPhos  86  08-30          Urinalysis Basic - ( 31 Aug 2024 07:08 )    Color: x / Appearance: x / SG: x / pH: x  Gluc: 110 mg/dL / Ketone: x  / Bili: x / Urobili: x   Blood: x / Protein: x / Nitrite: x   Leuk Esterase: x / RBC: x / WBC x   Sq Epi: x / Non Sq Epi: x / Bacteria: x          RADIOLOGY & ADDITIONAL TESTS:    Imaging Personally Reviewed:    Consultant(s) Notes Reviewed:      Care Discussed with Consultants/Other Providers:  
  Follow Up:  fever    Interval History/ROS: no more fever, no cough or nausea        Allergies  No Known Allergies        ANTIMICROBIALS:  acyclovir   Oral Tab/Cap 200 two times a day  fluconAZOLE   Tablet 200 daily  piperacillin/tazobactam IVPB.. 3.375 every 8 hours      OTHER MEDS:  lidocaine   4% Patch 1 Patch Transdermal daily  metoprolol succinate ER 50 milliGRAM(s) Oral daily  rosuvastatin 10 milliGRAM(s) Oral at bedtime  senna 2 Tablet(s) Oral at bedtime  sodium chloride 2 Gram(s) Oral three times a day  timolol 0.5% Solution 1 Drop(s) Both EYES daily      Vital Signs Last 24 Hrs  T(C): 36.7 (04 Sep 2024 11:18), Max: 36.7 (04 Sep 2024 11:18)  T(F): 98 (04 Sep 2024 11:18), Max: 98 (04 Sep 2024 11:18)  HR: 92 (04 Sep 2024 11:18) (92 - 108)  BP: 114/61 (04 Sep 2024 11:18) (114/61 - 124/77)  BP(mean): --  RR: 18 (04 Sep 2024 11:18) (18 - 18)  SpO2: 99% (04 Sep 2024 11:18) (98% - 99%)    Parameters below as of 04 Sep 2024 11:18  Patient On (Oxygen Delivery Method): room air        Physical Exam:  General:    NAD,  non toxic  Respiratory:   comfortable on RA  abd:     soft,  no tenderness  :   no CVAT,  no suprapubic tenderness,   no  leo  Musculoskeletal:   no joint swelling  vascular: no phlebitis  Skin:    new erythematous round silvery patch on  abd                          8.0    2.17  )-----------( 33       ( 03 Sep 2024 12:07 )             23.9       09-03    131<L>  |  99  |  10  ----------------------------<  169<H>  3.9   |  24  |  0.59    Ca    8.3<L>      03 Sep 2024 12:07        Urinalysis Basic - ( 03 Sep 2024 12:07 )    Color: x / Appearance: x / SG: x / pH: x  Gluc: 169 mg/dL / Ketone: x  / Bili: x / Urobili: x   Blood: x / Protein: x / Nitrite: x   Leuk Esterase: x / RBC: x / WBC x   Sq Epi: x / Non Sq Epi: x / Bacteria: x        MICROBIOLOGY:  v  Clean Catch Clean Catch (Midstream)  08-27-24   No growth  --  --      .Blood Blood-Peripheral  08-27-24   No growth at 5 days  --  --      .Blood Blood-Peripheral  08-27-24   No growth at 5 days  --  --                RADIOLOGY:  Images independently visualized and reviewed personally, findings as below  < from: MR Thoracic Spine w/wo IV Cont (08.29.24 @ 11:05) >    IMPRESSION:    No definite evidence of osteomyelitis or discitisof the thoracic or   lumbar spine.   Diffuse heterogeneous enhancing marrow signal throughout   the visualized osseous structures of the thoracic and lumbar spine,   compatible with known lymphoproliferative process of AML and/or   posttreatment changes. No pathologic fractures identified.    Multilevel degenerative changes of the lumbar spine as detailed above.    Please see report of CT abdomen/pelvis of 8/27/2024 for dedicated   evaluation of the abdominal and pelvic structures.    < end of copied text >  < from: CT Abdomen and Pelvis w/ IV Cont (08.27.24 @ 13:39) >  IMPRESSION: No acute abnormality.    < end of copied text >  
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 pt had presyncope yesterday trying to ambulate  pmh sh fh unchanged 7 pt ros dizziness with ambulation right flank discomfort  otherwise neg  physical  elderly  vs  t97.9 109/57 18 98 sat  lungs clear  cor s1s2  abd soft nontender  ext no edema    data  wbc 2080 hgb 70.3 plt [id number] anc 1650  cr 0.78
Patient is a 89y old  Female who presents with a chief complaint of fever (02 Sep 2024 09:34)      SUBJECTIVE / OVERNIGHT EVENTS: No new complaints. Had loose BM.   Review of Systems  chest pain no  palpitations no  sob no  nausea no  headache no    MEDICATIONS  (STANDING):  acyclovir   Oral Tab/Cap 200 milliGRAM(s) Oral two times a day  fluconAZOLE   Tablet 200 milliGRAM(s) Oral daily  lidocaine   4% Patch 1 Patch Transdermal daily  metoprolol succinate ER 50 milliGRAM(s) Oral daily  piperacillin/tazobactam IVPB.. 3.375 Gram(s) IV Intermittent every 8 hours  polyethylene glycol 3350 17 Gram(s) Oral two times a day  rosuvastatin 10 milliGRAM(s) Oral at bedtime  senna 2 Tablet(s) Oral at bedtime  sodium chloride 2 Gram(s) Oral three times a day  timolol 0.5% Solution 1 Drop(s) Both EYES daily    MEDICATIONS  (PRN):  ALPRAZolam 0.25 milliGRAM(s) Oral daily PRN for anxiety      Vital Signs Last 24 Hrs  T(C): 36.4 (02 Sep 2024 11:25), Max: 37.1 (01 Sep 2024 20:14)  T(F): 97.5 (02 Sep 2024 11:25), Max: 98.8 (01 Sep 2024 20:14)  HR: 93 (02 Sep 2024 11:25) (93 - 110)  BP: 110/62 (02 Sep 2024 11:25) (110/62 - 132/83)  BP(mean): --  RR: 18 (02 Sep 2024 11:25) (18 - 18)  SpO2: 98% (02 Sep 2024 11:25) (96% - 99%)    Parameters below as of 02 Sep 2024 11:25  Patient On (Oxygen Delivery Method): room air        PHYSICAL EXAM:  GENERAL: NAD   HEAD:  Atraumatic, Normocephalic  EYES: EOMI, PERRLA, conjunctiva and sclera clear  NECK: Supple, No JVD  CHEST/LUNG: Clear to auscultation bilaterally; No wheeze  HEART: Regular rate and rhythm; No murmurs, rubs, or gallops  ABDOMEN: Soft, Nontender, Nondistended; Bowel sounds present  EXTREMITIES:  2+ Peripheral Pulses, No clubbing, cyanosis, or edema  PSYCH: AAOx3  NEUROLOGY: non-focal  SKIN: No rashes or lesions    LABS:                        8.2    1.92  )-----------( 25       ( 01 Sep 2024 07:15 )             23.1     09-01    129<L>  |  95<L>  |  10  ----------------------------<  104<H>  3.8   |  20<L>  |  <0.30<L>    Ca    7.3<L>      01 Sep 2024 07:15  Mg     1.8     09-01            Urinalysis Basic - ( 01 Sep 2024 07:15 )    Color: x / Appearance: x / SG: x / pH: x  Gluc: 104 mg/dL / Ketone: x  / Bili: x / Urobili: x   Blood: x / Protein: x / Nitrite: x   Leuk Esterase: x / RBC: x / WBC x   Sq Epi: x / Non Sq Epi: x / Bacteria: x          RADIOLOGY & ADDITIONAL TESTS:    Imaging Personally Reviewed:    Consultant(s) Notes Reviewed:      Care Discussed with Consultants/Other Providers:  
Patient is a 89y old  Female who presents with a chief complaint of sepsis (04 Sep 2024 08:58)      SUBJECTIVE / OVERNIGHT EVENTS: c/o diarrhea   Review of Systems  chest pain no  palpitations no  sob no  nausea no  headache no    MEDICATIONS  (STANDING):  acyclovir   Oral Tab/Cap 200 milliGRAM(s) Oral two times a day  fluconAZOLE   Tablet 200 milliGRAM(s) Oral daily  lidocaine   4% Patch 1 Patch Transdermal daily  metoprolol succinate ER 50 milliGRAM(s) Oral daily  piperacillin/tazobactam IVPB.. 3.375 Gram(s) IV Intermittent every 8 hours  rosuvastatin 10 milliGRAM(s) Oral at bedtime  senna 2 Tablet(s) Oral at bedtime  sodium chloride 2 Gram(s) Oral three times a day  timolol 0.5% Solution 1 Drop(s) Both EYES daily    MEDICATIONS  (PRN):      Vital Signs Last 24 Hrs  T(C): 36.7 (04 Sep 2024 11:18), Max: 36.7 (04 Sep 2024 11:18)  T(F): 98 (04 Sep 2024 11:18), Max: 98 (04 Sep 2024 11:18)  HR: 92 (04 Sep 2024 11:18) (92 - 108)  BP: 114/61 (04 Sep 2024 11:18) (114/61 - 124/77)  BP(mean): --  RR: 18 (04 Sep 2024 11:18) (18 - 18)  SpO2: 99% (04 Sep 2024 11:18) (98% - 99%)    Parameters below as of 04 Sep 2024 11:18  Patient On (Oxygen Delivery Method): room air        PHYSICAL EXAM:  GENERAL: NAD,   HEAD:  Atraumatic, Normocephalic  EYES: EOMI, PERRLA, conjunctiva and sclera clear  NECK: Supple, No JVD  CHEST/LUNG: Clear to auscultation bilaterally; No wheeze  HEART: Regular rate and rhythm; No murmurs, rubs, or gallops  ABDOMEN: Soft, Nontender, Nondistended; Bowel sounds present  EXTREMITIES:  2+ Peripheral Pulses, No clubbing, cyanosis, or edema  PSYCH: AAOx3  NEUROLOGY: non-focal  SKIN: abdominal rash stable    LABS:                        8.0    2.17  )-----------( 33       ( 03 Sep 2024 12:07 )             23.9     09-03    131<L>  |  99  |  10  ----------------------------<  169<H>  3.9   |  24  |  0.59    Ca    8.3<L>      03 Sep 2024 12:07            Urinalysis Basic - ( 03 Sep 2024 12:07 )    Color: x / Appearance: x / SG: x / pH: x  Gluc: 169 mg/dL / Ketone: x  / Bili: x / Urobili: x   Blood: x / Protein: x / Nitrite: x   Leuk Esterase: x / RBC: x / WBC x   Sq Epi: x / Non Sq Epi: x / Bacteria: x          RADIOLOGY & ADDITIONAL TESTS:    Imaging Personally Reviewed:    Consultant(s) Notes Reviewed:      Care Discussed with Consultants/Other Providers:  
    HPI:  89 year old female with PMH of myelodysplastic syndrome not on active chemo, right corneal transplant, HTN, HLD presents with fever 4/27  last dose of azacytidine/venetoclax was in July.  Currently on abx for fever in setting of pancytopenia         ROS:  Negative except for: BARNES    MEDICATIONS  (STANDING):  acyclovir   Oral Tab/Cap 200 milliGRAM(s) Oral two times a day  fluconAZOLE   Tablet 200 milliGRAM(s) Oral daily  lidocaine   4% Patch 1 Patch Transdermal daily  metoprolol succinate ER 50 milliGRAM(s) Oral daily  piperacillin/tazobactam IVPB.. 3.375 Gram(s) IV Intermittent every 8 hours  polyethylene glycol 3350 17 Gram(s) Oral two times a day  rosuvastatin 10 milliGRAM(s) Oral at bedtime  senna 2 Tablet(s) Oral at bedtime  timolol 0.5% Solution 1 Drop(s) Both EYES daily    MEDICATIONS  (PRN):  ALPRAZolam 0.25 milliGRAM(s) Oral daily PRN for anxiety      Allergies    No Known Allergies    Intolerances        Vital Signs Last 24 Hrs  T(C): 36.4 (30 Aug 2024 11:40), Max: 37.9 (30 Aug 2024 05:13)  T(F): 97.6 (30 Aug 2024 11:40), Max: 100.3 (30 Aug 2024 05:13)  HR: 86 (30 Aug 2024 11:40) (63 - 97)  BP: 123/60 (30 Aug 2024 11:40) (123/60 - 137/65)  BP(mean): --  RR: 18 (30 Aug 2024 11:40) (18 - 18)  SpO2: 100% (30 Aug 2024 11:40) (96% - 100%)    Parameters below as of 30 Aug 2024 11:40  Patient On (Oxygen Delivery Method): room air        PHYSICAL EXAM:      Constitutional: awake and alert, conversant    Eyes: anicteric    ENMT:    Neck:    Lymph nodes:    Respiratory: clear    Cardiovascular: regular    Gastrointestinal: nontender    Extremities: no edema    Skin:                    LABS:                          8.0    1.94  )-----------( 24       ( 30 Aug 2024 07:06 )             22.9         Mean Cell Volume : 88.8 fl  Mean Cell Hemoglobin : 31.0 pg  Mean Cell Hemoglobin Concentration : 34.9 gm/dL  Auto Neutrophil # : 1.65 K/uL  Auto Lymphocyte # : 0.19 K/uL  Auto Monocyte # : 0.03 K/uL  Auto Eosinophil # : 0.02 K/uL  Auto Basophil # : 0.00 K/uL  Auto Neutrophil % : 69.0 %  Auto Lymphocyte % : 9.7 %  Auto Monocyte % : 1.8 %  Auto Eosinophil % : 0.9 %  Auto Basophil % : 0.0 %    Serial CBC  Hematocrit 22.9  Hemoglobin 8.0  Plat 24  RBC 2.58  WBC 1.94  Serial CBC  Hematocrit 22.3  Hemoglobin 7.7  Plat 20  RBC 2.56  WBC 1.44  Serial CBC  Hematocrit 22.6  Hemoglobin 7.7  Plat 20  RBC 2.53  WBC 1.44  Serial CBC  Hematocrit 24.1  Hemoglobin 8.4  Plat 19  RBC 2.74  WBC 1.12  Serial CBC  Hematocrit 27.0  Hemoglobin 9.1  Plat 24  RBC 3.01  WBC 1.33    08-30    127<L>  |  95<L>  |  10  ----------------------------<  114<H>  4.2   |  20<L>  |  0.69    Ca    8.8      30 Aug 2024 07:06    TPro  6.4  /  Alb  3.2<L>  /  TBili  0.6  /  DBili  x   /  AST  35  /  ALT  75<H>  /  AlkPhos  86  08-30              
Patient is a 89y old  Female who presents with a chief complaint of Fever due to unspecified condition     (01 Sep 2024 13:29)      SUBJECTIVE / OVERNIGHT EVENTS: feels better. Son at bedside  Review of Systems  chest pain no  palpitations no  sob no  nausea no  headache no    MEDICATIONS  (STANDING):  acyclovir   Oral Tab/Cap 200 milliGRAM(s) Oral two times a day  fluconAZOLE   Tablet 200 milliGRAM(s) Oral daily  lidocaine   4% Patch 1 Patch Transdermal daily  metoprolol succinate ER 50 milliGRAM(s) Oral daily  piperacillin/tazobactam IVPB.. 3.375 Gram(s) IV Intermittent every 8 hours  polyethylene glycol 3350 17 Gram(s) Oral two times a day  rosuvastatin 10 milliGRAM(s) Oral at bedtime  senna 2 Tablet(s) Oral at bedtime  sodium chloride 2 Gram(s) Oral three times a day  timolol 0.5% Solution 1 Drop(s) Both EYES daily    MEDICATIONS  (PRN):  ALPRAZolam 0.25 milliGRAM(s) Oral daily PRN for anxiety      Vital Signs Last 24 Hrs  T(C): 36.7 (01 Sep 2024 12:37), Max: 36.9 (01 Sep 2024 05:05)  T(F): 98 (01 Sep 2024 12:37), Max: 98.4 (01 Sep 2024 05:05)  HR: 95 (01 Sep 2024 12:37) (74 - 100)  BP: 113/66 (01 Sep 2024 12:37) (93/52 - 122/68)  BP(mean): --  RR: 18 (01 Sep 2024 12:37) (18 - 18)  SpO2: 99% (01 Sep 2024 12:37) (96% - 99%)    Parameters below as of 01 Sep 2024 12:37  Patient On (Oxygen Delivery Method): room air        PHYSICAL EXAM:  GENERAL: NAD, well-developed  HEAD:  Atraumatic, Normocephalic  EYES: EOMI, PERRLA, conjunctiva and sclera clear  NECK: Supple, No JVD  CHEST/LUNG: Clear to auscultation bilaterally; No wheeze  HEART: Regular rate and rhythm; No murmurs, rubs, or gallops  ABDOMEN: Soft, Nontender, Nondistended; Bowel sounds present  EXTREMITIES:  2+ Peripheral Pulses, No clubbing, cyanosis, or edema  PSYCH: AAOx3  NEUROLOGY: non-focal  SKIN: No rashes or lesions    LABS:                        8.2    1.92  )-----------( 25       ( 01 Sep 2024 07:15 )             23.1     09-01    129<L>  |  95<L>  |  10  ----------------------------<  104<H>  3.8   |  20<L>  |  <0.30<L>    Ca    7.3<L>      01 Sep 2024 07:15  Mg     1.8     09-01            Urinalysis Basic - ( 01 Sep 2024 07:15 )    Color: x / Appearance: x / SG: x / pH: x  Gluc: 104 mg/dL / Ketone: x  / Bili: x / Urobili: x   Blood: x / Protein: x / Nitrite: x   Leuk Esterase: x / RBC: x / WBC x   Sq Epi: x / Non Sq Epi: x / Bacteria: x          RADIOLOGY & ADDITIONAL TESTS:    Imaging Personally Reviewed:    Consultant(s) Notes Reviewed:      Care Discussed with Consultants/Other Providers:  
Patient is a 89y old  Female who presents with a chief complaint of sepsis (04 Sep 2024 08:58)      SUBJECTIVE / OVERNIGHT EVENTS: Comfortable without new complaints. Diarrhea resolved.   Review of Systems  chest pain no  palpitations no  sob no  nausea no  headache no    MEDICATIONS  (STANDING):  acyclovir   Oral Tab/Cap 200 milliGRAM(s) Oral two times a day  clobetasol 0.05% Ointment 1 Application(s) Topical two times a day  fluconAZOLE   Tablet 200 milliGRAM(s) Oral daily  lidocaine   4% Patch 1 Patch Transdermal daily  metoprolol succinate ER 50 milliGRAM(s) Oral daily  piperacillin/tazobactam IVPB.. 3.375 Gram(s) IV Intermittent every 8 hours  rosuvastatin 10 milliGRAM(s) Oral at bedtime  senna 2 Tablet(s) Oral at bedtime  sodium chloride 2 Gram(s) Oral three times a day  timolol 0.5% Solution 1 Drop(s) Both EYES daily    MEDICATIONS  (PRN):      Vital Signs Last 24 Hrs  T(C): 36.7 (05 Sep 2024 13:25), Max: 37.1 (04 Sep 2024 19:55)  T(F): 98.1 (05 Sep 2024 13:25), Max: 98.8 (04 Sep 2024 19:55)  HR: 89 (05 Sep 2024 13:25) (84 - 89)  BP: 131/68 (05 Sep 2024 13:25) (128/71 - 152/65)  BP(mean): --  RR: 18 (05 Sep 2024 13:25) (18 - 18)  SpO2: 100% (05 Sep 2024 13:25) (95% - 100%)    Parameters below as of 05 Sep 2024 13:25  Patient On (Oxygen Delivery Method): room air        PHYSICAL EXAM:  GENERAL: NAD, well-developed  HEAD:  Atraumatic, Normocephalic  EYES: EOMI, PERRLA, conjunctiva and sclera clear  NECK: Supple, No JVD  CHEST/LUNG: Clear to auscultation bilaterally; No wheeze  HEART: Regular rate and rhythm; No murmurs, rubs, or gallops  ABDOMEN: Soft, Nontender, Nondistended; Bowel sounds present  EXTREMITIES:  2+ Peripheral Pulses, No clubbing, cyanosis, or edema  PSYCH: AAOx3  NEUROLOGY: non-focal  SKIN: No rashes or lesions    LABS:                      RADIOLOGY & ADDITIONAL TESTS:    Imaging Personally Reviewed:    Consultant(s) Notes Reviewed:      Care Discussed with Consultants/Other Providers:

## 2024-09-06 NOTE — PROGRESS NOTE ADULT - ATTENDING COMMENTS
Hyponatremia- SIADH with pain coupled with poor solute and excess water intake   now much improved     UreNa 30 grams bid   liberalize salt in diet   1.5 L fluid restriction   high protein diet     jeanine garcia  nephrology attending   please contact me on TEAMS   Office- [phone]    Upon discharge, for appointment scheduling please email Nephrology at [email or web address]. Please have patient follow up with Nephrology office at  with one of our physicians in 2-3 weeks Our address is [address] [place] [address]

## 2024-09-06 NOTE — PHYSICAL THERAPY INITIAL EVALUATION ADULT - PERTINENT HX OF CURRENT PROBLEM, REHAB EVAL
Pt is a 89 year old female with PMH of myelodysplastic syndrome not on active chemo, right corneal transplant, HTN, HLD presents with fever. She has had right flank pain for the last couple of weeks that is improved with icyhot cream, otherwise was in normal state of health until this AM when she felt weak and was noted to be febrile to 100.4 F. She has chronic constipation, has not had a BM in 3 days but this is not unusual for her. She is on prophylactic augmentin/cipro for recurrent UTIs. MRI of L/T  spine: No significant degenerative changes of thoracic spine. Multilevel degenerative changes of the lumbar spine as follows:  T12/L1 : Small right paracentral disc protrusion without significant foraminal or central spinal canal stenosis.  L1-L2: Small broad-based disc bulge and facet hypertrophy without significant foraminal or central spinal canal stenosis.  L2-L3: Diffuse disc bulge asymmetric to the left with left facet arthropathy resulting in moderate to severe left foraminal stenosis and mild right foraminal stenosis without significant central spinal canal stenosis.  L3-L4: Diffuse disc bulge and marked left facet arthropathy resulting in moderate to severe left foraminal stenosis without significant right foraminal or central spinal canal stenosis.  L4-L5: Diffuse disc bulge and bilateral marked facet arthropathy resulting in severe bilateral foraminal stenosis and mild central spinal canal stenosis.  L5-S1: Diffuse disc bulge and bilateral facet arthropathy resulting in moderate stenosis of the bilateral neural foramen without significant central spinal canal stenosis.  CXR: No focal consolidation. Mild bibasilar hazy opacities, which may represent atelectasis.  VA Duplex: No evidence of deep venous thrombosis in either lower extremity.

## 2024-09-06 NOTE — DISCHARGE NOTE PROVIDER - CARE PROVIDER_API CALL
Betsy Estevez  Hematology  [address] [facility], [address]  [place] [address]  Phone: [phone]  Fax: [phone]  Established Patient  Follow Up Time: 2 weeks    Nandini García  Phone: [phone]  Fax: (   )    -  Follow Up Time: 1 week

## 2024-09-06 NOTE — PROGRESS NOTE ADULT - ASSESSMENT
89 f with    Fever resolved   - BC NTD  - LED noted  - Procalcitonin noted  - empiric antibiotic Zosyn 10/10  - ID follow     Diarrhea  - resolved    Abdominal rash/ Dermatitis   - Dermatology evaluation noted    Loose BM/ Diarrhea resolved    Back pain  - lidocaine patch  - MRI T and L spine noted    Bakers cyst rupture  - pain control  - follow with Ortho as OTP    AML  - follow CBC  - continue prophylaxis  - transfusion support.   - Hem Onc follow    Corneal transplant  - continue gtt    Hyponatremia  - follow    GERD  - stable    Anxiety control    Pessary  - Gyn evaluation as OTP    DVT prophylaxis  - PAS    DC home. Follow with PMD/ HemeOnc in 3-4 days.    d/w patient , consultant,  ACP      Timi Henderson MD phone [phone]

## 2024-09-06 NOTE — PHYSICAL THERAPY INITIAL EVALUATION ADULT - ACTIVE RANGE OF MOTION EXAMINATION, REHAB EVAL
fermin. upper extremity Active ROM was WNL (within normal limits)/bilateral lower extremity Active ROM was WNL (within normal limits)

## 2024-09-06 NOTE — DISCHARGE NOTE PROVIDER - HOSPITAL COURSE
HPI:  89 year old female with PMH of myelodysplastic syndrome not on active chemo, right corneal transplant, HTN, HLD presents with fever. She has had right flank pain for the last couple of weeks that is improved with icyhot cream, otherwise was in normal state of health until this AM when she felt weak and was noted to be febrile to 100.4 F. She has chronic constipation, has not had a BM in 3 days but this is not unusual for her. She is on prophylactic augmentin/cipro for recurrent UTIs. No vomiting/diarrhea, (27 Aug 2024 15:33)    Hospital Course:  89 f with HTN, HLD, MDS, AML in remission, right corneal transplant, recurrent UTI (on Augmentin for ppx) and was started on cipro 2 weeks ago as well, p/w chills, nausea and R flank pain. here with rigors and  fever to 102, WBC 1.33 but ANC 1.07. u/a positive. CT negative  Pancytopenia in the setting of MDS, AML now in remission, recurrent UTI on suppression augmentin and recently cipro as well now rigors/fever, nausea, flank pain, ?pyelo vs hematologic etiology. Pt still febrile, WBC 1.12, ANC 1 but had 35% bandemia, blood and urine cx negative, could be due to antibiotic use prior to admission vs non infectious etiology Thoracic and LS spine MRI did not show any discitis/osteo just degenerative changes  now pt afebrile and doing well, has a new erythematous silvery patch on her abd which she has a history of      Important Medication Changes and Reason:    Active or Pending Issues Requiring Follow-up:    Advanced Directives:   [ ] Full code  [ ] DNR  [ ] Hospice    Discharge Diagnoses:         HPI:  89 year old female with PMH of myelodysplastic syndrome not on active chemo, right corneal transplant, HTN, HLD presents with fever. She has had right flank pain for the last couple of weeks that is improved with icyhot cream, otherwise was in normal state of health until this AM when she felt weak and was noted to be febrile to 100.4 F. She has chronic constipation, has not had a BM in 3 days but this is not unusual for her. She is on prophylactic augmentin/cipro for recurrent UTIs. No vomiting/diarrhea, (27 Aug 2024 15:33)    Hospital Course:  89 f with HTN, HLD, MDS, AML in remission, right corneal transplant, recurrent UTI (on Augmentin for ppx) and was started on cipro 2 weeks ago as well, p/w chills, nausea and R flank pain. here with rigors and  fever to 102, WBC 1.33 but ANC 1.07. u/a positive. CT negative  Pancytopenia in the setting of MDS, AML now in remission, recurrent UTI on suppression augmentin and recently cipro as well now rigors/fever, nausea, flank pain, ?pyelo vs hematologic etiology. Pt still febrile, WBC 1.12, ANC 1 but had 35% bandemia, blood and urine cx negative, could be due to antibiotic use prior to admission vs non infectious etiology Thoracic and LS spine MRI did not show any discitis/osteo just degenerative changes. Received 1u prbc iso symptomatic anemia per heme onc.  Pt admitted for fever likely iso pyelonephritis. Completed course of zosyn per ID. For Back pain MRI neg for OM. 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    Important Medication Changes and Reason: clobetasol 0.05% ointment BID to affected areas (never to the face/groin/skin folds) for up to 2 weeks followed by 1 week break    Active or Pending Issues Requiring Follow-up: f/u PCP, heme onc    Advanced Directives:   [x] Full code  [ ] DNR  [ ] Hospice    Discharge Diagnoses: pyelonephritis, back pain, AML, ezcema         HPI:  89 year old female with PMH of myelodysplastic syndrome not on active chemo, right corneal transplant, HTN, HLD presents with fever. She has had right flank pain for the last couple of weeks that is improved with icyhot cream, otherwise was in normal state of health until this AM when she felt weak and was noted to be febrile to 100.4 F. She has chronic constipation, has not had a BM in 3 days but this is not unusual for her. She is on prophylactic augmentin/cipro for recurrent UTIs. No vomiting/diarrhea, (27 Aug 2024 15:33)    Hospital Course:  89 f with HTN, HLD, MDS, AML in remission, right corneal transplant, recurrent UTI (on Augmentin for ppx) and was started on cipro 2 weeks ago as well, p/w chills, nausea and R flank pain. here with rigors and  fever to 102, WBC 1.33 but ANC 1.07. u/a positive. CT negative  Pancytopenia in the setting of MDS, AML now in remission, recurrent UTI on suppression augmentin and recently cipro as well now rigors/fever, nausea, flank pain, ?pyelo vs hematologic etiology. Pt still febrile, WBC 1.12, ANC 1 but had 35% bandemia, blood and urine cx negative, could be due to antibiotic use prior to admission vs non infectious etiology Thoracic and LS spine MRI did not show any discitis/osteo just degenerative changes. Received 1u prbc iso symptomatic anemia per heme onc.  Pt admitted for fever likely iso pyelonephritis. Completed course of zosyn per ID. For Back pain MRI neg for OM. 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 Had hyponatremia on admission requiring salt tabs, followed by renal, sodium now improved.    Important Medication Changes and Reason: clobetasol 0.05% ointment BID to affected areas (never to the face/groin/skin folds) for up to 2 weeks followed by 1 week break    Active or Pending Issues Requiring Follow-up: f/u PCP, heme onc    Advanced Directives:   [x] Full code  [ ] DNR  [ ] Hospice    Discharge Diagnoses: pyelonephritis, back pain, AML, eczema         HPI:  89 year old female with PMH of myelodysplastic syndrome not on active chemo, right corneal transplant, HTN, HLD presents with fever. She has had right flank pain for the last couple of weeks that is improved with icyhot cream, otherwise was in normal state of health until this AM when she felt weak and was noted to be febrile to 100.4 F. She has chronic constipation, has not had a BM in 3 days but this is not unusual for her. She is on prophylactic augmentin/cipro for recurrent UTIs. No vomiting/diarrhea, (27 Aug 2024 15:33)    Hospital Course:  89 f with HTN, HLD, MDS, AML in remission, right corneal transplant, recurrent UTI (on Augmentin for ppx) and was started on cipro 2 weeks ago as well, p/w chills, nausea and R flank pain. here with rigors and  fever to 102, WBC 1.33 but ANC 1.07. u/a positive. CT negative  Pancytopenia in the setting of MDS, AML now in remission, recurrent UTI on suppression augmentin and recently cipro as well now rigors/fever, nausea, flank pain, ?pyelo vs hematologic etiology. Pt still febrile, WBC 1.12, ANC 1 but had 35% bandemia, blood and urine cx negative, could be due to antibiotic use prior to admission vs non infectious etiology Thoracic and LS spine MRI did not show any discitis/osteo just degenerative changes. Received 1u prbc iso symptomatic anemia per heme onc.  Pt admitted for fever likely iso pyelonephritis. Completed course of zosyn per ID. For Back pain MRI neg for OM. 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 Had hyponatremia on admission requiring salt tabs, followed by renal, sodium now improved.    Important Medication Changes and Reason: clobetasol 0.05% ointment BID to affected areas (never to the face/groin/skin folds) for up to 2 weeks followed by 1 week break    Active or Pending Issues Requiring Follow-up: f/u PCP, heme onc    Advanced Directives:   [x] Full code  [ ] DNR  [ ] Hospice    Discharge Diagnoses: pyelonephritis, back pain, MDS, eczema

## 2024-09-06 NOTE — PROGRESS NOTE ADULT - ASSESSMENT
89F with MDS to AML in remission, right corneal transplant, HTN, HLD admitted with fever     #AML, prior MDS  - in remission  - on aza/venetoclax, last 7/19/24  - with persistent pancytopenia stable  - will FU with Dr. Huston as outpatient    #pancytopenia  - ANC has been over 1000; plt stable and improved  - s/p PRBC   - Hg stable    #neutropenic fever  - Abx per ID  - on antifungal and antiviral prophylaxis    DC planning, d/w Dr. Henderson  Pt will FU with Dr. Huston as outpatient

## 2024-09-06 NOTE — DISCHARGE NOTE PROVIDER - PROVIDER TOKENS
PROVIDER:[TOKEN:[[id number]],FOLLOWUP:[2 weeks],ESTABLISHEDPATIENT:[T]],FREE:[LAST:[Ricky],FIRST:[Nandini],PHONE:[phone]],FAX:[(   )    -],FOLLOWUP:[1 week]]

## 2024-09-23 NOTE — ASU DISCHARGE PLAN (ADULT/PEDIATRIC) - ASU DISCHARGE DATE/TIME
Health Maintenance       Shingles Vaccine (1 of 2)  Never done    Hepatitis B Vaccine (For Physician/APC Discussion) (3 of 3 - Hep B Twinrix 3-dose series)  Overdue since 5/7/2018    Pneumococcal Vaccine 65+ (2 of 2 - PCV)  Overdue since 11/6/2018    Diabetes Eye Exam (Yearly)  Overdue since 8/6/2022    Diabetes Foot Exam (Yearly)  Overdue since 9/1/2023    COVID-19 Vaccine (3 - 2023-24 season)  Overdue since 9/1/2024    Colorectal Cancer Screen (Fecal Occult Blood - Yearly)  Ordered on 9/11/2024    Traditional Medicare- Medicare Wellness Visit (Yearly)  Due since 9/1/2024    Influenza Vaccine (1)  Due since 9/1/2024           Following review of the above:  Patient wishes to discuss with clinician: Colorectal Cancer Screening, Diabetes Eye Exam, COVID-19, Hep B, Influenza, Pneumococcal, and Shingles    Note: Refer to final orders and clinician documentation.        09-May-2019

## 2025-02-18 PROBLEM — C92.00 ACUTE MYELOBLASTIC LEUKEMIA, NOT HAVING ACHIEVED REMISSION: Chronic | Status: ACTIVE | Noted: 2024-08-27

## 2025-06-04 ENCOUNTER — NON-APPOINTMENT (OUTPATIENT)
Age: 89
End: 2025-06-04

## 2025-06-06 ENCOUNTER — APPOINTMENT (OUTPATIENT)
Dept: PULMONOLOGY | Facility: CLINIC | Age: 89
End: 2025-06-06
Payer: MEDICARE

## 2025-06-06 VITALS — DIASTOLIC BLOOD PRESSURE: 61 MMHG | SYSTOLIC BLOOD PRESSURE: 110 MMHG | HEART RATE: 104 BPM | OXYGEN SATURATION: 98 %

## 2025-06-06 LAB — POCT - HEMOGLOBIN (HGB), QUANTITATIVE, TRANSCUTANEOUS: 8.1

## 2025-06-06 PROCEDURE — 88738 HGB QUANT TRANSCUTANEOUS: CPT

## 2025-06-06 PROCEDURE — 99214 OFFICE O/P EST MOD 30 MIN: CPT | Mod: 25

## 2025-06-06 PROCEDURE — 94010 BREATHING CAPACITY TEST: CPT

## 2025-06-06 PROCEDURE — 94618 PULMONARY STRESS TESTING: CPT

## 2025-06-06 PROCEDURE — 94727 GAS DIL/WSHOT DETER LNG VOL: CPT

## 2025-06-06 PROCEDURE — ZZZZZ: CPT

## 2025-06-06 PROCEDURE — 94729 DIFFUSING CAPACITY: CPT

## 2025-06-18 ENCOUNTER — NON-APPOINTMENT (OUTPATIENT)
Age: 89
End: 2025-06-18

## 2025-06-18 ENCOUNTER — APPOINTMENT (OUTPATIENT)
Dept: OPHTHALMOLOGY | Facility: CLINIC | Age: 89
End: 2025-06-18
Payer: MEDICARE

## 2025-06-18 PROCEDURE — 92025 CPTRIZED CORNEAL TOPOGRAPHY: CPT

## 2025-06-18 PROCEDURE — 92014 COMPRE OPH EXAM EST PT 1/>: CPT

## 2025-06-28 ENCOUNTER — INPATIENT (INPATIENT)
Facility: HOSPITAL | Age: 89
LOS: 3 days | Discharge: HOME | DRG: 864 | End: 2025-07-02
Attending: INTERNAL MEDICINE | Admitting: INTERNAL MEDICINE
Payer: MEDICARE

## 2025-06-28 VITALS
SYSTOLIC BLOOD PRESSURE: 139 MMHG | RESPIRATION RATE: 17 BRPM | OXYGEN SATURATION: 96 % | HEIGHT: 62 IN | WEIGHT: 134.04 LBS | TEMPERATURE: 101 F | DIASTOLIC BLOOD PRESSURE: 77 MMHG | HEART RATE: 93 BPM

## 2025-06-28 DIAGNOSIS — R50.9 FEVER, UNSPECIFIED: ICD-10-CM

## 2025-06-28 DIAGNOSIS — Z98.49 CATARACT EXTRACTION STATUS, UNSPECIFIED EYE: Chronic | ICD-10-CM

## 2025-06-28 LAB
ALBUMIN SERPL ELPH-MCNC: 3.8 G/DL — SIGNIFICANT CHANGE UP (ref 3.3–5)
ALP SERPL-CCNC: 83 U/L — SIGNIFICANT CHANGE UP (ref 40–120)
ALT FLD-CCNC: 91 U/L — HIGH (ref 10–45)
ANION GAP SERPL CALC-SCNC: 13 MMOL/L — SIGNIFICANT CHANGE UP (ref 5–17)
ANISOCYTOSIS BLD QL: SLIGHT — SIGNIFICANT CHANGE UP
APPEARANCE UR: CLEAR — SIGNIFICANT CHANGE UP
APTT BLD: 28.2 SEC — SIGNIFICANT CHANGE UP (ref 26.1–36.8)
AST SERPL-CCNC: 48 U/L — HIGH (ref 10–40)
BACTERIA # UR AUTO: NEGATIVE /HPF — SIGNIFICANT CHANGE UP
BASOPHILS # BLD AUTO: 0.01 K/UL — SIGNIFICANT CHANGE UP (ref 0–0.2)
BASOPHILS # BLD MANUAL: 0 K/UL — SIGNIFICANT CHANGE UP (ref 0–0.2)
BASOPHILS NFR BLD AUTO: 0.6 % — SIGNIFICANT CHANGE UP (ref 0–2)
BASOPHILS NFR BLD MANUAL: 0 % — SIGNIFICANT CHANGE UP (ref 0–2)
BILIRUB SERPL-MCNC: 0.5 MG/DL — SIGNIFICANT CHANGE UP (ref 0.2–1.2)
BILIRUB UR-MCNC: NEGATIVE — SIGNIFICANT CHANGE UP
BUN SERPL-MCNC: 12 MG/DL — SIGNIFICANT CHANGE UP (ref 7–23)
CALCIUM SERPL-MCNC: 8.9 MG/DL — SIGNIFICANT CHANGE UP (ref 8.4–10.5)
CAST: 0 /LPF — SIGNIFICANT CHANGE UP (ref 0–4)
CHLORIDE SERPL-SCNC: 93 MMOL/L — LOW (ref 96–108)
CO2 SERPL-SCNC: 20 MMOL/L — LOW (ref 22–31)
COLOR SPEC: YELLOW — SIGNIFICANT CHANGE UP
CREAT SERPL-MCNC: 0.55 MG/DL — SIGNIFICANT CHANGE UP (ref 0.5–1.3)
DIFF PNL FLD: ABNORMAL
EGFR: 85 ML/MIN/1.73M2 — SIGNIFICANT CHANGE UP
EGFR: 85 ML/MIN/1.73M2 — SIGNIFICANT CHANGE UP
EOSINOPHIL # BLD AUTO: 0 K/UL — SIGNIFICANT CHANGE UP (ref 0–0.5)
EOSINOPHIL # BLD MANUAL: 0 K/UL — SIGNIFICANT CHANGE UP (ref 0–0.5)
EOSINOPHIL NFR BLD AUTO: 0 % — SIGNIFICANT CHANGE UP (ref 0–6)
EOSINOPHIL NFR BLD MANUAL: 0 % — SIGNIFICANT CHANGE UP (ref 0–6)
FLUAV AG NPH QL: SIGNIFICANT CHANGE UP
FLUBV AG NPH QL: SIGNIFICANT CHANGE UP
GAS PNL BLDV: SIGNIFICANT CHANGE UP
GLUCOSE SERPL-MCNC: 116 MG/DL — HIGH (ref 70–99)
GLUCOSE UR QL: NEGATIVE MG/DL — SIGNIFICANT CHANGE UP
HCT VFR BLD CALC: 24.3 % — LOW (ref 34.5–45)
HGB BLD-MCNC: 8.2 G/DL — LOW (ref 11.5–15.5)
IMM GRANULOCYTES # BLD AUTO: 0.03 K/UL — SIGNIFICANT CHANGE UP (ref 0–0.07)
IMM GRANULOCYTES NFR BLD AUTO: 1.7 % — HIGH (ref 0–0.9)
IMMATURE PLATELET FRACTION #: 6.9 K/UL — SIGNIFICANT CHANGE UP (ref 4.7–11.1)
IMMATURE PLATELET FRACTION %: 12.1 % — HIGH (ref 1.6–4.9)
INR BLD: 1.12 RATIO — SIGNIFICANT CHANGE UP (ref 0.85–1.16)
KETONES UR QL: NEGATIVE MG/DL — SIGNIFICANT CHANGE UP
LEUKOCYTE ESTERASE UR-ACNC: NEGATIVE — SIGNIFICANT CHANGE UP
LYMPHOCYTES # BLD AUTO: 0.44 K/UL — LOW (ref 1–3.3)
LYMPHOCYTES # BLD MANUAL: 0.38 K/UL — LOW (ref 1–3.3)
LYMPHOCYTES NFR BLD AUTO: 25.1 % — SIGNIFICANT CHANGE UP (ref 13–44)
LYMPHOCYTES NFR BLD MANUAL: 21.7 % — SIGNIFICANT CHANGE UP (ref 13–44)
MACROCYTES BLD QL: SLIGHT — SIGNIFICANT CHANGE UP
MANUAL NEUTROPHIL BANDS #: 0.02 K/UL — SIGNIFICANT CHANGE UP (ref 0–0.84)
MCHC RBC-ENTMCNC: 31.5 PG — SIGNIFICANT CHANGE UP (ref 27–34)
MCHC RBC-ENTMCNC: 33.7 G/DL — SIGNIFICANT CHANGE UP (ref 32–36)
MCV RBC AUTO: 93.5 FL — SIGNIFICANT CHANGE UP (ref 80–100)
MONOCYTES # BLD AUTO: 0.22 K/UL — SIGNIFICANT CHANGE UP (ref 0–0.9)
MONOCYTES # BLD MANUAL: 0.09 K/UL — SIGNIFICANT CHANGE UP (ref 0–0.9)
MONOCYTES NFR BLD AUTO: 12.6 % — SIGNIFICANT CHANGE UP (ref 2–14)
MONOCYTES NFR BLD MANUAL: 5.2 % — SIGNIFICANT CHANGE UP (ref 2–14)
NEUTROPHILS # BLD AUTO: 1.05 K/UL — LOW (ref 1.8–7.4)
NEUTROPHILS # BLD MANUAL: 1.26 K/UL — LOW (ref 1.8–7.4)
NEUTROPHILS NFR BLD AUTO: 60 % — SIGNIFICANT CHANGE UP (ref 43–77)
NEUTROPHILS NFR BLD MANUAL: 72.2 % — SIGNIFICANT CHANGE UP (ref 43–77)
NEUTS BAND # BLD: 0.9 % — SIGNIFICANT CHANGE UP (ref 0–8)
NEUTS BAND NFR BLD: 0.9 % — SIGNIFICANT CHANGE UP (ref 0–8)
NITRITE UR-MCNC: NEGATIVE — SIGNIFICANT CHANGE UP
NRBC # BLD AUTO: 0 K/UL — SIGNIFICANT CHANGE UP (ref 0–0)
NRBC # FLD: 0 K/UL — SIGNIFICANT CHANGE UP (ref 0–0)
NRBC BLD AUTO-RTO: 0 /100 WBCS — SIGNIFICANT CHANGE UP (ref 0–0)
PH UR: 7.5 — SIGNIFICANT CHANGE UP (ref 5–8)
PLAT MORPH BLD: NORMAL — SIGNIFICANT CHANGE UP
PLATELET # BLD AUTO: 57 K/UL — LOW (ref 150–400)
PMV BLD: 12 FL — SIGNIFICANT CHANGE UP (ref 7–13)
POTASSIUM SERPL-MCNC: 4.5 MMOL/L — SIGNIFICANT CHANGE UP (ref 3.5–5.3)
POTASSIUM SERPL-SCNC: 4.5 MMOL/L — SIGNIFICANT CHANGE UP (ref 3.5–5.3)
PROT SERPL-MCNC: 7 G/DL — SIGNIFICANT CHANGE UP (ref 6–8.3)
PROT UR-MCNC: NEGATIVE MG/DL — SIGNIFICANT CHANGE UP
PROTHROM AB SERPL-ACNC: 12.7 SEC — SIGNIFICANT CHANGE UP (ref 9.9–13.4)
RBC # BLD: 2.6 M/UL — LOW (ref 3.8–5.2)
RBC # FLD: 19.9 % — HIGH (ref 10.3–14.5)
RBC BLD AUTO: ABNORMAL
RBC CASTS # UR COMP ASSIST: 7 /HPF — HIGH (ref 0–4)
RSV RNA NPH QL NAA+NON-PROBE: SIGNIFICANT CHANGE UP
SARS-COV-2 RNA SPEC QL NAA+PROBE: SIGNIFICANT CHANGE UP
SODIUM SERPL-SCNC: 126 MMOL/L — LOW (ref 135–145)
SOURCE RESPIRATORY: SIGNIFICANT CHANGE UP
SP GR SPEC: 1.01 — SIGNIFICANT CHANGE UP (ref 1–1.03)
SQUAMOUS # UR AUTO: 1 /HPF — SIGNIFICANT CHANGE UP (ref 0–5)
UROBILINOGEN FLD QL: 1 MG/DL — SIGNIFICANT CHANGE UP (ref 0.2–1)
WBC # BLD: 1.75 K/UL — LOW (ref 3.8–10.5)
WBC # FLD AUTO: 1.75 K/UL — LOW (ref 3.8–10.5)
WBC UR QL: 0 /HPF — SIGNIFICANT CHANGE UP (ref 0–5)

## 2025-06-28 PROCEDURE — 85730 THROMBOPLASTIN TIME PARTIAL: CPT

## 2025-06-28 PROCEDURE — 0241U: CPT

## 2025-06-28 PROCEDURE — 93010 ELECTROCARDIOGRAM REPORT: CPT

## 2025-06-28 PROCEDURE — 81001 URINALYSIS AUTO W/SCOPE: CPT

## 2025-06-28 PROCEDURE — 85014 HEMATOCRIT: CPT

## 2025-06-28 PROCEDURE — 99285 EMERGENCY DEPT VISIT HI MDM: CPT | Mod: FS

## 2025-06-28 PROCEDURE — 85610 PROTHROMBIN TIME: CPT

## 2025-06-28 PROCEDURE — 82330 ASSAY OF CALCIUM: CPT

## 2025-06-28 PROCEDURE — 71045 X-RAY EXAM CHEST 1 VIEW: CPT

## 2025-06-28 PROCEDURE — 84132 ASSAY OF SERUM POTASSIUM: CPT

## 2025-06-28 PROCEDURE — 71045 X-RAY EXAM CHEST 1 VIEW: CPT | Mod: 26

## 2025-06-28 PROCEDURE — 82803 BLOOD GASES ANY COMBINATION: CPT

## 2025-06-28 PROCEDURE — 80053 COMPREHEN METABOLIC PANEL: CPT

## 2025-06-28 PROCEDURE — 83605 ASSAY OF LACTIC ACID: CPT

## 2025-06-28 PROCEDURE — 85018 HEMOGLOBIN: CPT

## 2025-06-28 PROCEDURE — 82435 ASSAY OF BLOOD CHLORIDE: CPT

## 2025-06-28 PROCEDURE — 84295 ASSAY OF SERUM SODIUM: CPT

## 2025-06-28 PROCEDURE — 85025 COMPLETE CBC W/AUTO DIFF WBC: CPT

## 2025-06-28 PROCEDURE — 82947 ASSAY GLUCOSE BLOOD QUANT: CPT

## 2025-06-28 RX ORDER — LANOLIN/MINERAL OIL/PETROLATUM
1 OINTMENT (GRAM) OPHTHALMIC (EYE)
Refills: 0 | Status: DISCONTINUED | OUTPATIENT
Start: 2025-06-28 | End: 2025-07-02

## 2025-06-28 RX ORDER — PIPERACILLIN-TAZO-DEXTROSE,ISO 3.375G/5
3.38 IV SOLUTION, PIGGYBACK PREMIX FROZEN(ML) INTRAVENOUS ONCE
Refills: 0 | Status: COMPLETED | OUTPATIENT
Start: 2025-06-29 | End: 2025-06-29

## 2025-06-28 RX ORDER — TIMOLOL MALEATE 6.8 MG/ML
1 SOLUTION OPHTHALMIC DAILY
Refills: 0 | Status: DISCONTINUED | OUTPATIENT
Start: 2025-06-28 | End: 2025-07-02

## 2025-06-28 RX ORDER — TIMOLOL MALEATE 6.8 MG/ML
1 SOLUTION OPHTHALMIC
Refills: 0 | DISCHARGE

## 2025-06-28 RX ORDER — ACETAMINOPHEN 500 MG/5ML
650 LIQUID (ML) ORAL EVERY 6 HOURS
Refills: 0 | Status: DISCONTINUED | OUTPATIENT
Start: 2025-06-28 | End: 2025-07-02

## 2025-06-28 RX ORDER — VANCOMYCIN HCL IN 5 % DEXTROSE 1.5G/250ML
1000 PLASTIC BAG, INJECTION (ML) INTRAVENOUS ONCE
Refills: 0 | Status: COMPLETED | OUTPATIENT
Start: 2025-06-28 | End: 2025-06-28

## 2025-06-28 RX ORDER — PIPERACILLIN-TAZO-DEXTROSE,ISO 3.375G/5
3.38 IV SOLUTION, PIGGYBACK PREMIX FROZEN(ML) INTRAVENOUS ONCE
Refills: 0 | Status: COMPLETED | OUTPATIENT
Start: 2025-06-28 | End: 2025-06-28

## 2025-06-28 RX ORDER — LANOLIN/MINERAL OIL/PETROLATUM
1 OINTMENT (GRAM) OPHTHALMIC (EYE)
Refills: 0 | DISCHARGE

## 2025-06-28 RX ORDER — ROSUVASTATIN CALCIUM 5 MG/1
10 TABLET, FILM COATED ORAL AT BEDTIME
Refills: 0 | Status: DISCONTINUED | OUTPATIENT
Start: 2025-06-28 | End: 2025-07-02

## 2025-06-28 RX ORDER — PIPERACILLIN-TAZO-DEXTROSE,ISO 3.375G/5
3.38 IV SOLUTION, PIGGYBACK PREMIX FROZEN(ML) INTRAVENOUS EVERY 8 HOURS
Refills: 0 | Status: DISCONTINUED | OUTPATIENT
Start: 2025-06-29 | End: 2025-07-02

## 2025-06-28 RX ORDER — METOPROLOL SUCCINATE 50 MG/1
50 TABLET, EXTENDED RELEASE ORAL DAILY
Refills: 0 | Status: DISCONTINUED | OUTPATIENT
Start: 2025-06-28 | End: 2025-07-02

## 2025-06-28 RX ORDER — MIRTAZAPINE 30 MG/1
7.5 TABLET, FILM COATED ORAL AT BEDTIME
Refills: 0 | Status: DISCONTINUED | OUTPATIENT
Start: 2025-06-28 | End: 2025-07-02

## 2025-06-28 RX ADMIN — Medication 500 MILLILITER(S): at 18:42

## 2025-06-28 RX ADMIN — Medication 200 GRAM(S): at 19:02

## 2025-06-28 RX ADMIN — Medication 650 MILLIGRAM(S): at 23:49

## 2025-06-28 RX ADMIN — ROSUVASTATIN CALCIUM 10 MILLIGRAM(S): 5 TABLET, FILM COATED ORAL at 22:23

## 2025-06-28 RX ADMIN — Medication 250 MILLIGRAM(S): at 20:10

## 2025-06-28 RX ADMIN — Medication 200 GRAM(S): at 22:23

## 2025-06-28 RX ADMIN — Medication 50 MILLILITER(S): at 22:23

## 2025-06-28 NOTE — ED PROVIDER NOTE - CLINICAL SUMMARY MEDICAL DECISION MAKING FREE TEXT BOX
Medical Decision Making / Differential Diagnosis:  HPI concerning for early sepsis in a patient with MDS on chemo.  Although patient does not formally meet sepsis criteria, she is immunosuppressed, white blood cell count is at her baseline.  Will administer IV antibiotics, IV fluid.  Admit to inpatient medicine.  Patient has previously been admitted to [facility], who will admit the patient    ECG directly visualized by me and shows normal sinus rhythm, rate 90, , QRS 72, QTc 430, no KEYON/Depr, no TWA

## 2025-06-28 NOTE — ED PROVIDER NOTE - ATTENDING APP SHARED VISIT CONTRIBUTION OF CARE
Emergency Medicine Attending MD Uribe:  patient seen and evaluated with the PA.  I was present for key portions of the History and Physical, and I agree with the Impression and Plan.      Patient is a 89-year-old female, brought in by EMS for evaluation of fever, fatigue.  Medical history significant for MDS, on chemo; last chemo 2 weeks ago.  Has had a dry cough for 2 days.  Patient endorses temperature 101 at home; 100.2 here in the ED.  Patient and family state that she get frequent UTIs.    VS: Heart rate 94, respirations 18, blood pressure 145/67, O2 sat 100.2.,  Heart rate 98   Gen: Elderly female, fatigued appearing  Head: NC/AT  Neck: trachea midline  Resp:  No distress  CV: Borderline tachycardic, no RMG  Abd: nondistended  Ext: no deformities, no edema.  Neuro:  A&Ox4 appears non focal  Skin:  Warm and dry as visualized  Psych: appropriate    Medical Decision Making / Differential Diagnosis:  HPI concerning for early sepsis in a patient with MDS on chemo.  Although patient does not formally meet sepsis criteria, she is immunosuppressed, white blood cell count is at her baseline.  Will administer IV antibiotics, IV fluid.  Admit to inpatient medicine.  Patient has previously been admitted to [facility], who will admit the patient    ECG directly visualized by me and shows normal sinus rhythm, rate 90, , QRS 72, QTc 430, no KEYON/Depr, no TWA

## 2025-06-28 NOTE — PATIENT PROFILE ADULT - FALL HARM RISK - HARM RISK INTERVENTIONS

## 2025-06-28 NOTE — H&P ADULT - NSHPLABSRESULTS_GEN_ALL_CORE
8.2    1.75  )-----------( 57       ( 2025 18:44 )             24.3           126[L]  |  93[L]  |  12  ----------------------------<  116[H]  4.5   |  20[L]  |  0.55    Ca    8.9      2025 18:44    TPro  7.0  /  Alb  3.8  /  TBili  0.5  /  DBili  x   /  AST  48[H]  /  ALT  91[H]  /  AlkPhos  83                Urinalysis Basic - ( 2025 21:12 )    Color: Yellow / Appearance: Clear / S.011 / pH: x  Gluc: x / Ketone: x  / Bili: Negative / Urobili: 1.0 mg/dL   Blood: x / Protein: Negative mg/dL / Nitrite: Negative   Leuk Esterase: Negative / RBC: 7 /HPF / WBC 0 /HPF   Sq Epi: x / Non Sq Epi: 1 /HPF / Bacteria: Negative /HPF        PT/INR - ( 2025 18:44 )   PT: 12.7 sec;   INR: 1.12 ratio         PTT - ( 2025 18:44 )  PTT:28.2 sec    < from: Xray Chest 1 View- PORTABLE-Urgent (25 @ 19:17) >    IMPRESSION:  No focal consolidation.    < end of copied text >    EKG SR

## 2025-06-28 NOTE — H&P ADULT - ASSESSMENT
89 f with    Neutropenia Fever  - panculture  - empiric antibiotic  - procalcitonin  - LED  - ID evaluation called   - follow CBC  - protective isolation    AML  - s/p chemo  - Hematology evaluation      Dehydration  - gentle IVF    Hyponatremia  - follow    Corneal transplant  - continue gtt    GERD  - stable    Anxiety control    Pessary  - Gyn evaluation as OTP    DVT prophylaxis  - PAS    Further action as per clinical course     d/w patient, family    Timi Henderson MD phone [phone]

## 2025-06-28 NOTE — ED ADULT NURSE NOTE - OBJECTIVE STATEMENT
89 y/o female A&Ox4, ambulatory with a cane/walker, brought in by ems from [facility] c/o fevers and headache that started yesterday. as per pt aid at bedside pt has been having sob and a dry cough. pt PMH HTN, high cholesterol, AML, and gets chemo every 2 weeks. pt has allergy to bactrim. pt denies any urinary symptoms, N/V/D. pt lungs clear bilaterally. pt changed into hospital gown, bed at lowest position, call bell in reach.

## 2025-06-28 NOTE — ED PROVIDER NOTE - PHYSICAL EXAMINATION
CONSTITUTIONAL: Well appearing and in no apparent distress. Non toxic appearing.   ENT: Airway patent, moist mucous membranes.   EYES: Pupils equal, round and reactive to light. EOMI. Conjunctiva normal appearing.   CARDIAC: Normal rate, regular rhythm.  Heart sounds S1, S2.    RESPIRATORY: Breath sounds clear and equal bilaterally.   GASTROINTESTINAL: Abdomen soft, non-tender, not distended.  MUSCULOSKELETAL: Spine appears normal.  NEUROLOGICAL: Alert and oriented x3, no focal deficits.

## 2025-06-28 NOTE — H&P ADULT - NSHPREVIEWOFSYSTEMS_GEN_ALL_CORE
REVIEW OF SYSTEMS:    CONSTITUTIONAL: + weakness, + fevers + chills  EYES/ENT: No visual changes;  No vertigo or throat pain   NECK: No pain or stiffness  RESPIRATORY: + cough, no wheezing, hemoptysis; No shortness of breath  CARDIOVASCULAR: No chest pain or palpitations  GASTROINTESTINAL: No abdominal or epigastric pain. No nausea, vomiting, or hematemesis; No diarrhea or constipation. No melena or hematochezia.  GENITOURINARY: No dysuria, frequency or hematuria  NEUROLOGICAL: No numbness or weakness  SKIN: No itching, burning, rashes, or lesions   All other review of systems is negative unless indicated above.

## 2025-06-28 NOTE — ED PROVIDER NOTE - HIV OFFER
Nephrology consult was previously ordered, appears to have been authorized in Epic. Notify patient and ask her to reach out to schedule if someone has not already contacted her to book an appointment.   Opt out

## 2025-06-28 NOTE — ED ADULT TRIAGE NOTE - NS ED NURSE BANDS TYPE
11/02/21 3410   Provider Notification   Provider Name/Title Dr. Gordon Pino   Method of Notification Electronic Page;Phone   Notification Reason Patient Arrived;Status Update;Uterine Activity     Dr Susan Pino informed of patient arrival and assessment including the following:    Reason for maternal/fetal assessment decels @mfm clinic w/baby A. Pt reports having gradual increase in swelling in bilat hands and lower extremeties. Fetal status normal baseline, moderate variability, accelerations present and no decelerations. Plan per provider/orders received for ok for discharge, reactive strip with both baby A/B, pt also has clinic visit scheduled for [facility]RI with ob and mfm.    Name band;

## 2025-06-28 NOTE — H&P ADULT - NSHPPHYSICALEXAM_GEN_ALL_CORE
PHYSICAL EXAMINATION:  Vital Signs Last 24 Hrs  T(C): 36.8 (28 Jun 2025 21:30), Max: 38.1 (28 Jun 2025 17:54)  T(F): 98.3 (28 Jun 2025 21:30), Max: 100.5 (28 Jun 2025 17:54)  HR: 91 (28 Jun 2025 21:30) (88 - 94)  BP: 129/61 (28 Jun 2025 21:30) (122/60 - 145/67)  BP(mean): 97 (28 Jun 2025 18:51) (97 - 97)  RR: 20 (28 Jun 2025 21:30) (17 - 20)  SpO2: 98% (28 Jun 2025 21:30) (96% - 98%)    Parameters below as of 28 Jun 2025 21:30  Patient On (Oxygen Delivery Method): room air      CAPILLARY BLOOD GLUCOSE          GENERAL: NAD, well-groomed, well-developed  HEAD:  atraumatic, normocephalic  EYES: sclera anicteric  ENMT: mucous membranes moist  NECK: supple, No JVD  CHEST/LUNG: clear to auscultation bilaterally; no rales, rhonchi, or wheezing b/l  HEART: normal S1, S2  ABDOMEN: BS+, soft, ND, NT   EXTREMITIES:  pulses palpable; no clubbing, cyanosis, or edema b/l LEs  NEURO: awake, alert, interactive; moves all extremities  SKIN: no rashes or lesions

## 2025-06-28 NOTE — H&P ADULT - NSHPSOCIALHISTORY_GEN_ALL_CORE
Social History:    Marital Status:  (   )    (   ) Single    (   )    (x  )   Occupation: retired  Lives with: (  x) alone  (  ) children   (  ) spouse   (  ) parents  (  ) other    Substance Use (street drugs): ( x ) never used  (  ) other:  Tobacco Usage:  ( x  ) never smoked   (   ) former smoker   (   ) current smoker  (     ) pack years  (        ) last cigarette date  Alcohol Usage: no    (     ) Advanced Directives: (     ) None    (      ) DNR    (     ) DNI    (     ) Health Care Proxy:

## 2025-06-28 NOTE — PATIENT PROFILE ADULT - LIVING ENVIRONMENT
eMERGENCY dEPARTMENT eNCOUnter      Pt Name: Nelly Cui  MRN: [id number]  [place] 1945  Date of evaluation: 4/13/2019  Provider: Terrance Isaacs MD     [address] [place] [address]       Chief Complaint   Patient presents with    Shortness of Breath     x2 days    Fatigue         HISTORY OF PRESENT ILLNESS   (Location/Symptom, Timing/Onset,Context/Setting, Quality, Duration, Modifying Factors, Severity) Note limiting factors. HPI    Nelly Cui is a 68 y.o. male who presents to the emergency department via EMS with increasing shortness of breath and lethargy. Has history of COPD. Patient was last admitted in January for pneumonia. Patient states he's been sick for about 1 week. In short of breath and increased fatigue. On arrival patient is a temp of 102.1. Side. Denies any chest pain. Patient just appears very tired and lethargic. She denies any neck stiffness. There has been no headache. She was given a DuoNeb treatment via EMS during transport. Patient has initial pulse ox about 91%. Vitals otherwise stable except for some tachycardia. Nursing Notes were reviewed. REVIEW OFSYSTEMS    (2+ for level 4; 10+ for level 5)   Review of Systems    General: Positive fevers, chills or night sweats, No weight loss    Head:  No Sore throat,  No Ear Pain    Chest:  Nontender. No Cough, positive SOB, denies  Chest Pain    GI: No abdominal pain or vomiting    : No dysuria or hematuria    Musculoskeletal: No unrelenting pain or night pain    Neurologic: No bowel or bladder incontinence, No saddle anesthesia, No leg weakness    All other systems reviewed and are negative.         PAST MEDICAL HISTORY     Past Medical History:   Diagnosis Date    CAD (coronary artery disease)     CABG in 2009    Chronic back pain     Chronic systolic CHF (congestive heart failure) (HCC)     COPD (chronic obstructive pulmonary disease) (HCC)     Diabetes mellitus ([facility] [address]     Essential hypertension     Paroxysmal atrial fibrillation ([facility] [address] [facility]     On Coumadin       SURGICAL HISTORY       Past Surgical History:   Procedure Laterality Date    BACK SURGERY  1995    BACK SURGERY      CARDIAC SURGERY  2007    CORONARY ARTERY BYPASS GRAFT      FRACTURE SURGERY Left 1988    Shoulder       CURRENT MEDICATIONS       Current Discharge Medication List      CONTINUE these medications which have NOT CHANGED    Details   predniSONE (DELTASONE) 5 MG tablet Take 2 tablets by mouth daily Tapering dose      !! oxyCODONE (OXYCONTIN) 20 MG extended release tablet Take 20 mg by mouth every 8 hours. .      simvastatin (ZOCOR) 80 MG tablet Take 40 mg by mouth nightly      furosemide (LASIX) 40 MG tablet Take 40 mg by mouth daily      !! oxyCODONE (OXYCONTIN) 40 MG extended release tablet Take 40 mg by mouth every 8 hours. .      docusate sodium (COLACE) 100 MG capsule Take 100 mg by mouth 2 times daily as needed for Constipation       lisinopril (PRINIVIL;ZESTRIL) 10 MG tablet Take 10 mg by mouth daily       Insulin NPH Isophane & Regular (NOVOLIN 70/30 PENFILL SC) Inject 30 Units into the skin 2 times daily       flunisolide (NASALIDE) 25 MCG/ACT (0.025%) SOLN Inhale 2 sprays into the lungs every 12 hours      vitamin D (CHOLECALCIFEROL) 1000 UNITS TABS tablet Take 1,000 Units by mouth daily      gabapentin (NEURONTIN) 800 MG tablet Take 800 mg by mouth 4 times daily      aspirin 81 MG tablet Take 81 mg by mouth daily      metFORMIN (GLUCOPHAGE) 1000 MG tablet Take 1,000 mg by mouth 2 times daily (with meals)      budesonide-formoterol (SYMBICORT) 160-4.5 MCG/ACT AERO Inhale 2 puffs into the lungs 2 times daily      warfarin (COUMADIN) 5 MG tablet Take 7.5 mg every evening EXCEPT 10 mg on Monday, Wednesday, and Friday.       carvedilol (COREG) 25 MG tablet Take 12.5 mg by mouth 2 times daily (with meals)       albuterol sulfate HFA (PROVENTIL HFA) 108 (90 BASE) MCG/ACT inhaler Inhale 2 puffs into the lungs every 4 hours as needed for Wheezing or Shortness of Breath (cough; use with spacing device)  Qty: 1 Inhaler, Refills: 1       !! - Potential duplicate medications found. Please discuss with provider.           ALLERGIES     No known allergies    FAMILY HISTORY       Family History   Problem Relation Age of Onset    Brain Cancer Mother     Heart Attack Father     Heart Attack Brother         SOCIAL HISTORY       Social History     Socioeconomic History    Marital status:      Spouse name: None    Number of children: None    Years of education: None    Highest education level: None   Occupational History    None   Social Needs    Financial resource strain: None    Food insecurity:     Worry: None     Inability: None    Transportation needs:     Medical: None     Non-medical: None   Tobacco Use    Smoking status: Former Smoker     Packs/day: 1.50     Years: 12.00     Pack years: 18.00     Last attempt to quit: 3/1/1996     Years since quittin.1    Smokeless tobacco: Never Used   Substance and Sexual Activity    Alcohol use: No    Drug use: No    Sexual activity: Not Currently   Lifestyle    Physical activity:     Days per week: None     Minutes per session: None    Stress: None   Relationships    Social connections:     Talks on phone: None     Gets together: None     Attends Protestant service: None     Active member of club or organization: None     Attends meetings of clubs or organizations: None     Relationship status: None    Intimate partner violence:     Fear of current or ex partner: None     Emotionally abused: None     Physically abused: None     Forced sexual activity: None   Other Topics Concern    None   Social History Narrative    None       SCREENINGS           PHYSICAL EXAM    (up to 7 for level 4, 8 or more for level 5)     ED Triage Vitals [19 0709]   BP Temp Temp Source Pulse Resp SpO2 Height Weight   (!) 127/43 102.1 °F (38.9 °C) Oral 122 29 91 % -- 236 lb 12.4 oz (107.4 kg)       Physical Exam    General: Alert and awake ×3. But febrile with toxic appearance. Well-developed well-nourished release 49-year-old white male. He was somewhat short of breath. Patient has diffuse wheezing. HEENT: Normocephalic atraumatic. Neck is supple. Airway intact. No adenopathy  Cardiac: Rapid rate and with rhythm with no murmurs rubs or gallops  Pulmonary: Lungs are clear in all lung fields. Diffuse expiratory wheezing. No Rales. Abdomen: Soft and nontender. Negative hepatosplenomegaly. Bowel sounds are active  Extremities: Moving all extremities. No calf tenderness. Peripheral pulses all intact  Skin: No skin lesions. No rashes  Neurologic: Cranial nerves II through XII was grossly intact. Nonfocal neurological exam  Psychiatric: Patient is pleasant. Mood is appropriate. DIAGNOSTIC RESULTS     EKG (Per Emergency Physician):   Normal sinus tachycardia with a ventricular rate of 120. Nonspecific T wave M [place] sleeping in the inferior leads. Otherwise normal EKG    RADIOLOGY (Per Emergency Physician): Interpretation per the Radiologist below, if available at the time of this note:  Xr Chest Portable    Result Date: 4/13/2019  EXAMINATION: [address] [place] [address] 4/13/2019 7:22 am COMPARISON: 01/07/2019 HISTORY: ORDERING SYSTEM PROVIDED HISTORY: SOB, fever TECHNOLOGIST PROVIDED HISTORY: Reason for exam:->SOB, fever Ordering Physician Provided Reason for Exam: sob Acuity: Acute Type of Exam: Initial FINDINGS: Status post median sternotomy. Mild heterogeneous pulmonary opacity is seen diffusely bilaterally, left greater than right. No pneumothorax. Cardiac and mediastinal silhouettes are unchanged. Findings are suggestive of pulmonary edema. Pneumonia is not excluded in the appropriate clinical setting.        ED BEDSIDE ULTRASOUND:   Performed by ED Physician - none    LABS:  Labs Reviewed   CBC WITH AUTO DIFFERENTIAL - Abnormal; Notable for the following components: Result Value    WBC 11.1 (*)     Neutrophils # 9.1 (*)     All other components within normal limits    Narrative:     Performed at:  [facility]  [address], [place]   Phone [phone]   COMPREHENSIVE METABOLIC PANEL W/ REFLEX TO MG FOR LOW K - Abnormal; Notable for the following components:    Chloride 91 (*)     Anion Gap 17 (*)     Glucose 361 (*)     BUN 31 (*)     CREATININE 1.4 (*)     GFR Non- 50 (*)     GFR  60 (*)     AST 12 (*)     All other components within normal limits    Narrative:     Performed at:  [facility]  [address], [place] [employer] [address]   Phone [phone]   LACTIC ACID, PLASMA - Abnormal; Notable for the following components:    Lactic Acid 2.8 (*)     All other components within normal limits    Narrative:     Performed at:  [facility]  [address], [place] [employer] [address]   Phone [phone]   BLOOD GAS, VENOUS - Abnormal; Notable for the following components:    pCO2, Fede 54.6 (*)     HCO3, Venous 31 (*)     All other components within normal limits    Narrative:     Performed at:  [facility]  [address], [place] [employer] [address]   Phone [phone]   URINALYSIS - Abnormal; Notable for the following components:    Glucose, Ur >=1000 (*)     Blood, Urine TRACE (*)     All other components within normal limits    Narrative:     Performed at:  [facility]  [address], [place] [employer] [address]   Phone [phone]   PROCALCITONIN - Abnormal; Notable for the following components:    Procalcitonin 0.18 (*)     All other components within normal limits    Narrative:     Performed at:  [facility]  [address], [place] [employer] [address]   Phone [phone]   LACTIC ACID, PLASMA - Abnormal; Notable for the following components:    Lactic Acid 2.2 (*)     All other components within normal limits    Narrative:     Performed at:  [address] [address]   Phone [phone]   LACTIC ACID, PLASMA - Abnormal; Notable for the following components:    Lactic Acid 3.3 (*)     All other components within normal limits    Narrative:     Performed at:  [address] [address]   Phone [phone]   LACTIC ACID, PLASMA - Abnormal; Notable for the following components:    Lactic Acid 2.5 (*)     All other components within normal limits    Narrative:     q6 per orders last drawn at 2200  Performed at:  [address] [address]   Phone [phone]   PROTIME-INR - Abnormal; Notable for the following components:    Protime 87.2 (*)     INR 7.65 (*)     All other components within normal limits    Narrative:     Cesilia Gonzalez  [id number] tel. [phone],  Coag results called to and read back by Mukesh Paulino [sp] rn, 04/13/2019 15:12,  by DEBBIE  Performed at:  [address] [address]   Phone [phone]   BLOOD GAS, ARTERIAL - Abnormal; Notable for the following components:    pO2, Arterial 69.9 (*)     Hemoglobin, Art, Extended 13.0 (*)     Carboxyhgb, Arterial 1.6 (*)     All other components within normal limits    Narrative:     Performed at:  [address] [address]   Phone [phone]   COMPREHENSIVE METABOLIC PANEL W/ REFLEX TO MG FOR LOW K - Abnormal; Notable for the following components:    Sodium 134 (*)     Chloride 96 (*)     Glucose 376 (*)     AST 12 (*)     All other components within normal limits    Narrative:     Performed at:  [facility]  [address] [place] [address], [place] [facility] [employer] [address]   Phone [phone]   CBC WITH AUTO DIFFERENTIAL - Abnormal; Notable for the following components:    WBC 13.7 (*)     Hemoglobin 13.0 (*)     Hematocrit 39.4 (*)     Neutrophils # 11.4 (*)     All other components within normal limits    Narrative:     Performed at:  [address] [employer] [address]   Phone [phone]   PROTIME-INR - Abnormal; Notable for the following components:    Protime 57.9 (*)     INR 5.08 (*)     All other components within normal limits    Narrative:     Eric Keene  [id number] tel. [phone],  Coag results called to and read back by Dori Gitelman, 04/14/2019 06:50, by  Yousif Schuster  Performed at:  [address] [employer]   Phone [phone]   POCT GLUCOSE - Abnormal; Notable for the following components:    POC Glucose 298 (*)     All other components within normal limits    Narrative:     Performed at:  [address] [employer] [address]   Phone [phone]   POCT GLUCOSE - Abnormal; Notable for the following components:    POC Glucose 344 (*)     All other components within normal limits    Narrative:     Performed at:  [address] [employer]   Phone [phone]   POCT GLUCOSE - Abnormal; Notable for the following components:    POC Glucose >600 (*)     All other components within normal limits    Narrative:     Performed at:  [address] [employer]   Phone [phone]   POCT GLUCOSE - Abnormal; Notable for the following components:    POC Glucose 537 (*)     All other components within normal limits    Narrative:     Performed at:  [address] [employer]   Phone [phone]   RAPID INFLUENZA A/B ANTIGENS    Narrative:     Performed at:  [facility]  [address] [facility] [address]   Phone [phone]   CULTURE BLOOD #1   CULTURE BLOOD #2   RESPIRATORY CULTURE   MRSA DNA PROBE, NASAL   TROPONIN    Narrative:     Performed at:  [facility]  [address] [place], [place] [address]   Phone [phone]   MICROSCOPIC URINALYSIS    Narrative:     Performed at:  [facility]  [address] [place], [place] [address]   Phone [phone]   LACTIC ACID, PLASMA    Narrative:     Performed at:  [facility]  [address] [place], [place] [address]   Phone [phone]   MAGNESIUM    Narrative:     Performed at:  [facility]  [address] [place], [place] [address]   Phone [phone]   HEMOGLOBIN A1C   LACTIC ACID, PLASMA   VANCOMYCIN, RANDOM   LACTIC ACID, PLASMA   POCT GLUCOSE   POCT GLUCOSE   POCT GLUCOSE   POCT GLUCOSE        All other labs were within normal range or not returned as of this dictation.       Procedures      EMERGENCY DEPARTMENT COURSE and DIFFERENTIAL DIAGNOSIS/MDM:   Vitals:    Vitals:    04/14/19 0024 04/14/19 0359 04/14/19 0419 04/14/19 0424   BP:    (!) 152/80   Pulse:    91   Resp: 18 18  24   Temp:    97.7 °F (36.5 °C)   TempSrc:    Oral   SpO2: 94% 94%  90%   Weight:   253 lb 8.5 oz (115 kg)    Height:           Medications   albuterol sulfate  (90 Base) MCG/ACT inhaler 2 puff (has no administration in time range)   aspirin chewable tablet 81 mg (has no administration in time range)   mometasone-formoterol (DULERA) 100-5 MCG/ACT inhaler 2 puff (2 puffs Inhalation Given 4/13/19 2114)   carvedilol (COREG) tablet 12.5 mg (12.5 mg Oral Given 4/13/19 1810)   docusate sodium (COLACE) capsule 100 mg (has no administration in time range)   sodium chloride flush 0.9 % injection 10 mL (10 mLs Intravenous Not Given 4/13/19 2237)   sodium chloride flush 0.9 % injection 10 mL (has no administration in time range)   magnesium hydroxide (MILK OF MAGNESIA) 400 MG/5ML suspension 30 mL (has no administration in time range)   ondansetron (ZOFRAN) injection 4 mg (has no administration in time range)   glucose (GLUTOSE) 40 % oral gel 15 g (has no administration in time range)   dextrose 50 % solution 12.5 g (has no administration in time range)   glucagon (rDNA) injection 1 mg (has no administration in time range)   dextrose 5 % solution (has no administration in time range)   piperacillin-tazobactam (ZOSYN) 3.375 g in dextrose 5 % 50 mL IVPB (mini-bag) (0 g Intravenous Stopped 4/14/19 0640)   predniSONE (DELTASONE) tablet 40 mg (40 mg Oral Given 4/13/19 1307)   ipratropium-albuterol (DUONEB) nebulizer solution 1 ampule (1 ampule Inhalation Given 4/13/19 2114)   vancomycin 1000 mg IVPB in 250 mL D5W addavial (0 mg Intravenous Stopped 4/14/19 0510)   vancomycin (VANCOCIN) intermittent dosing (placeholder) ( Other Given 4/13/19 1500)   acetaminophen (TYLENOL) tablet 650 mg (650 mg Oral Given 4/13/19 1536)   warfarin (COUMADIN) daily dosing (placeholder) ( Other Held 4/13/19 1615)   insulin lispro (HUMALOG) injection vial 0-12 Units (has no administration in time range)   insulin lispro (HUMALOG) injection vial 0-6 Units (6 Units Subcutaneous Given 4/13/19 2245)   gabapentin (NEURONTIN) capsule 800 mg (800 mg Oral Given 4/14/19 0348)   acetaminophen (TYLENOL) tablet 650 mg (650 mg Oral Given 4/13/19 0722)   0.9 % sodium chloride bolus (0 mLs Intravenous Stopped 4/13/19 1115)   ipratropium-albuterol (DUONEB) nebulizer solution 1 ampule (1 ampule Inhalation Given 4/13/19 0731)   piperacillin-tazobactam (ZOSYN) 4.5 g in dextrose 5 % 100 mL IVPB (mini-bag) (0 g Intravenous Stopped 4/13/19 1115)   vancomycin 1000 mg IVPB in 250 mL D5W addavial (0 mg Intravenous Stopped 4/13/19 1032)   oxyCODONE (OXYCONTIN) extended release tablet 20 mg (20 mg Oral Given 4/14/19 0544)       [facility]. This is a 80-year-old white male with history of COPD presents with shortness of breath and lethargy for about 1 week. He has a fever 102. Patient has a nonproductive cough and has not felt well. His initial pulse ox was 91%. He was treated with DuoNeb treatments and was placed on BiPAP machine in the ED. His chest x-ray confirms bilateral infiltrates consistent with pneumonia versus pulmonary edema. Based on his soft fever left G in shortness of breath is more consistent with infectious etiology. Patient had a lactic acid of 2.8. He was given a liter of normal saline fluid. Antibiotics will be started. I highly recommend admission. Discussed with patient and I will consult hospitalist.    Sang Burleson:         CRITICAL CARE TIME   Total CriticalCare time was 0 minutes, excluding separately reportable procedures. There was a high probability of clinically significant/life threatening deterioration in the patient's condition which required my urgent intervention. CONSULTS:  PHARMACY TO DOSE VANCOMYCIN  PHARMACY TO DOSE WARFARIN    PROCEDURES:  Unless otherwise noted below, none     [unfilled]    FINAL IMPRESSION      1. Pneumonia due to organism    2. Dyspnea, unspecified type    3. COPD exacerbation [facility])          DISPOSITION/PLAN   DISPOSITION        PATIENT REFERRED TO:  Randy Shin  [address] [facility]  [phone]            DISCHARGE MEDICATIONS:  Current Discharge Medication List             (Please note:  Portions of this note were completed with a voice recognition program.Efforts were made to edit the dictations but occasionally words and phrases are mis-transcribed.)  Form v2016. J.5-cn    Gerhard HAYES MD (electronically signed)  Emergency Medicine Provider        Ren Herrera MD  04/14/19 5297 no

## 2025-06-28 NOTE — ED PROVIDER NOTE - OBJECTIVE STATEMENT
89 yo F with a PMH of myelodysplastic syndrome last chemo tx 06/2025, right corneal transplant, HTN, HLD, hx of recurrent UTIs on Augmentin ppx presents with fever and malaise x days. Pt reports feeling generally weak and has had decreased appetite. Is usually able to discern when her sxs are 2/2 UTI but does not think she is presenting that way. Has been experiencing a non prod cough over the last few days. Denies nausea, vomiting, chills, chest pain, abd pain, diarrhea, urinary complaints, headache, dizziness. No sick contacts.

## 2025-06-29 LAB
ALBUMIN SERPL ELPH-MCNC: 3.4 G/DL — SIGNIFICANT CHANGE UP (ref 3.3–5)
ALP SERPL-CCNC: 77 U/L — SIGNIFICANT CHANGE UP (ref 40–120)
ALT FLD-CCNC: 82 U/L — HIGH (ref 10–45)
ANION GAP SERPL CALC-SCNC: 14 MMOL/L — SIGNIFICANT CHANGE UP (ref 5–17)
AST SERPL-CCNC: 44 U/L — HIGH (ref 10–40)
BASOPHILS # BLD AUTO: 0 K/UL — SIGNIFICANT CHANGE UP (ref 0–0.2)
BASOPHILS # BLD AUTO: 0 K/UL — SIGNIFICANT CHANGE UP (ref 0–0.2)
BASOPHILS NFR BLD AUTO: 0 % — SIGNIFICANT CHANGE UP (ref 0–2)
BASOPHILS NFR BLD AUTO: 0 % — SIGNIFICANT CHANGE UP (ref 0–2)
BILIRUB SERPL-MCNC: 0.6 MG/DL — SIGNIFICANT CHANGE UP (ref 0.2–1.2)
BUN SERPL-MCNC: 9 MG/DL — SIGNIFICANT CHANGE UP (ref 7–23)
CALCIUM SERPL-MCNC: 8.7 MG/DL — SIGNIFICANT CHANGE UP (ref 8.4–10.5)
CHLORIDE SERPL-SCNC: 93 MMOL/L — LOW (ref 96–108)
CO2 SERPL-SCNC: 19 MMOL/L — LOW (ref 22–31)
CREAT SERPL-MCNC: 0.6 MG/DL — SIGNIFICANT CHANGE UP (ref 0.5–1.3)
EGFR: 84 ML/MIN/1.73M2 — SIGNIFICANT CHANGE UP
EGFR: 84 ML/MIN/1.73M2 — SIGNIFICANT CHANGE UP
EOSINOPHIL # BLD AUTO: 0 K/UL — SIGNIFICANT CHANGE UP (ref 0–0.5)
EOSINOPHIL # BLD AUTO: 0 K/UL — SIGNIFICANT CHANGE UP (ref 0–0.5)
EOSINOPHIL NFR BLD AUTO: 0 % — SIGNIFICANT CHANGE UP (ref 0–6)
EOSINOPHIL NFR BLD AUTO: 0 % — SIGNIFICANT CHANGE UP (ref 0–6)
GLUCOSE SERPL-MCNC: 113 MG/DL — HIGH (ref 70–99)
HCT VFR BLD CALC: 27.2 % — LOW (ref 34.5–45)
HCT VFR BLD CALC: 27.8 % — LOW (ref 34.5–45)
HGB BLD-MCNC: 8.9 G/DL — LOW (ref 11.5–15.5)
HGB BLD-MCNC: 8.9 G/DL — LOW (ref 11.5–15.5)
IMM GRANULOCYTES # BLD AUTO: 0.01 K/UL — SIGNIFICANT CHANGE UP (ref 0–0.07)
IMM GRANULOCYTES # BLD AUTO: 0.01 K/UL — SIGNIFICANT CHANGE UP (ref 0–0.07)
IMM GRANULOCYTES NFR BLD AUTO: 0.8 % — SIGNIFICANT CHANGE UP (ref 0–0.9)
IMM GRANULOCYTES NFR BLD AUTO: 0.8 % — SIGNIFICANT CHANGE UP (ref 0–0.9)
IMMATURE PLATELET FRACTION #: 5.9 K/UL — SIGNIFICANT CHANGE UP (ref 4.7–11.1)
IMMATURE PLATELET FRACTION #: 5.9 K/UL — SIGNIFICANT CHANGE UP (ref 4.7–11.1)
IMMATURE PLATELET FRACTION %: 11.8 % — HIGH (ref 1.6–4.9)
IMMATURE PLATELET FRACTION %: 11.9 % — HIGH (ref 1.6–4.9)
LYMPHOCYTES # BLD AUTO: 0.18 K/UL — LOW (ref 1–3.3)
LYMPHOCYTES # BLD AUTO: 0.21 K/UL — LOW (ref 1–3.3)
LYMPHOCYTES NFR BLD AUTO: 13.7 % — SIGNIFICANT CHANGE UP (ref 13–44)
LYMPHOCYTES NFR BLD AUTO: 17.5 % — SIGNIFICANT CHANGE UP (ref 13–44)
MCHC RBC-ENTMCNC: 31 PG — SIGNIFICANT CHANGE UP (ref 27–34)
MCHC RBC-ENTMCNC: 31.3 PG — SIGNIFICANT CHANGE UP (ref 27–34)
MCHC RBC-ENTMCNC: 32 G/DL — SIGNIFICANT CHANGE UP (ref 32–36)
MCHC RBC-ENTMCNC: 32.7 G/DL — SIGNIFICANT CHANGE UP (ref 32–36)
MCV RBC AUTO: 95.8 FL — SIGNIFICANT CHANGE UP (ref 80–100)
MCV RBC AUTO: 96.9 FL — SIGNIFICANT CHANGE UP (ref 80–100)
MONOCYTES # BLD AUTO: 0.14 K/UL — SIGNIFICANT CHANGE UP (ref 0–0.9)
MONOCYTES # BLD AUTO: 0.2 K/UL — SIGNIFICANT CHANGE UP (ref 0–0.9)
MONOCYTES NFR BLD AUTO: 11.7 % — SIGNIFICANT CHANGE UP (ref 2–14)
MONOCYTES NFR BLD AUTO: 15.3 % — HIGH (ref 2–14)
NEUTROPHILS # BLD AUTO: 0.84 K/UL — LOW (ref 1.8–7.4)
NEUTROPHILS # BLD AUTO: 0.92 K/UL — LOW (ref 1.8–7.4)
NEUTROPHILS NFR BLD AUTO: 70 % — SIGNIFICANT CHANGE UP (ref 43–77)
NEUTROPHILS NFR BLD AUTO: 70.2 % — SIGNIFICANT CHANGE UP (ref 43–77)
NRBC # BLD AUTO: 0 K/UL — SIGNIFICANT CHANGE UP (ref 0–0)
NRBC # BLD AUTO: 0 K/UL — SIGNIFICANT CHANGE UP (ref 0–0)
NRBC # FLD: 0 K/UL — SIGNIFICANT CHANGE UP (ref 0–0)
NRBC # FLD: 0 K/UL — SIGNIFICANT CHANGE UP (ref 0–0)
NRBC BLD AUTO-RTO: 0 /100 WBCS — SIGNIFICANT CHANGE UP (ref 0–0)
NRBC BLD AUTO-RTO: 0 /100 WBCS — SIGNIFICANT CHANGE UP (ref 0–0)
PLATELET # BLD AUTO: 50 K/UL — LOW (ref 150–400)
PLATELET # BLD AUTO: 50 K/UL — LOW (ref 150–400)
PMV BLD: 12.1 FL — SIGNIFICANT CHANGE UP (ref 7–13)
PMV BLD: 13.1 FL — HIGH (ref 7–13)
POTASSIUM SERPL-MCNC: 4 MMOL/L — SIGNIFICANT CHANGE UP (ref 3.5–5.3)
POTASSIUM SERPL-SCNC: 4 MMOL/L — SIGNIFICANT CHANGE UP (ref 3.5–5.3)
PROCALCITONIN SERPL-MCNC: 0.14 NG/ML — HIGH (ref 0.02–0.1)
PROT SERPL-MCNC: 6.5 G/DL — SIGNIFICANT CHANGE UP (ref 6–8.3)
RBC # BLD: 2.84 M/UL — LOW (ref 3.8–5.2)
RBC # BLD: 2.87 M/UL — LOW (ref 3.8–5.2)
RBC # FLD: 20.3 % — HIGH (ref 10.3–14.5)
RBC # FLD: 20.8 % — HIGH (ref 10.3–14.5)
SODIUM SERPL-SCNC: 126 MMOL/L — LOW (ref 135–145)
WBC # BLD: 1.2 K/UL — LOW (ref 3.8–10.5)
WBC # BLD: 1.31 K/UL — LOW (ref 3.8–10.5)
WBC # FLD AUTO: 1.2 K/UL — LOW (ref 3.8–10.5)
WBC # FLD AUTO: 1.31 K/UL — LOW (ref 3.8–10.5)

## 2025-06-29 PROCEDURE — 93970 EXTREMITY STUDY: CPT | Mod: 26

## 2025-06-29 RX ORDER — SODIUM CHLORIDE 9 G/1000ML
1000 INJECTION, SOLUTION INTRAVENOUS
Refills: 0 | Status: DISCONTINUED | OUTPATIENT
Start: 2025-06-29 | End: 2025-07-02

## 2025-06-29 RX ADMIN — Medication 25 GRAM(S): at 21:40

## 2025-06-29 RX ADMIN — Medication 25 GRAM(S): at 02:55

## 2025-06-29 RX ADMIN — Medication 25 GRAM(S): at 09:15

## 2025-06-29 RX ADMIN — Medication 200 MILLIGRAM(S): at 11:47

## 2025-06-29 RX ADMIN — ROSUVASTATIN CALCIUM 10 MILLIGRAM(S): 5 TABLET, FILM COATED ORAL at 21:40

## 2025-06-29 RX ADMIN — SODIUM CHLORIDE 50 MILLILITER(S): 9 INJECTION, SOLUTION INTRAVENOUS at 13:48

## 2025-06-29 RX ADMIN — Medication 25 GRAM(S): at 13:48

## 2025-06-29 RX ADMIN — TIMOLOL MALEATE 1 DROP(S): 6.8 SOLUTION OPHTHALMIC at 13:47

## 2025-06-29 RX ADMIN — METOPROLOL SUCCINATE 50 MILLIGRAM(S): 50 TABLET, EXTENDED RELEASE ORAL at 07:03

## 2025-06-29 RX ADMIN — Medication 650 MILLIGRAM(S): at 00:05

## 2025-06-29 RX ADMIN — Medication 10 MILLIGRAM(S): at 11:47

## 2025-06-29 NOTE — PHYSICAL THERAPY INITIAL EVALUATION ADULT - NSPTDISCHREC_GEN_A_CORE
return to home with 24/7 aide.  Keep on PT program 1x week to prevent deconditioning/No skilled PT needs

## 2025-06-29 NOTE — PHYSICAL THERAPY INITIAL EVALUATION ADULT - GENERAL OBSERVATIONS, REHAB EVAL
Pt rec'd semisupine in bed, +IV, +bed alarm, in NAD, HHA at bedside.  Pt cleared for PT session by OSCAR Pacheco.

## 2025-06-29 NOTE — PHYSICAL THERAPY INITIAL EVALUATION ADULT - PERTINENT HX OF CURRENT PROBLEM, REHAB EVAL
Pt is a 89 year old female with PMH significant for myelodysplastic syndrome last chemo tx 06/2025, right corneal transplant, HTN, HLD, hx of recurrent UTIs on Augmentin ppx.  Pt  presents with fever and malaise. Pt reports feeling generally weak and has had decreased appetite.  Pt admitted for neutropenic fever.  CXR(6/28): No focal consolidation. Pt is a 89 year old female with PMH significant for myelodysplastic syndrome last chemo tx 06/2025, right corneal transplant, HTN, HLD, hx of recurrent UTIs on Augmentin ppx.  Pt  presents with fever and malaise. Pt reports feeling generally weak and has had decreased appetite.  Pt admitted for neutropenic fever.  CXR(6/28): No focal consolidation.  VA Duplex BLE(6/29): No acute DVT of the right or left lower extremity.

## 2025-06-29 NOTE — PHYSICAL THERAPY INITIAL EVALUATION ADULT - ADDITIONAL COMMENTS
Pt resides in [facility] with 24/7 [facility]. Pt ambulates with a rolling walker at baseline and aides assist with all ADLs. Pt owns rolling walker and cane. Pt resides in [facility] with 24/7 [facility]. Pt ambulates with a rolling walker at baseline and aides assist with all ADLs. Pt owns rolling walker, WC and cane.

## 2025-06-30 DIAGNOSIS — D70.9 NEUTROPENIA, UNSPECIFIED: ICD-10-CM

## 2025-06-30 LAB
ALBUMIN SERPL ELPH-MCNC: 2.9 G/DL — LOW (ref 3.3–5)
ALP SERPL-CCNC: 64 U/L — SIGNIFICANT CHANGE UP (ref 40–120)
ALT FLD-CCNC: 63 U/L — HIGH (ref 10–45)
ANION GAP SERPL CALC-SCNC: 14 MMOL/L — SIGNIFICANT CHANGE UP (ref 5–17)
AST SERPL-CCNC: 30 U/L — SIGNIFICANT CHANGE UP (ref 10–40)
BASOPHILS # BLD AUTO: 0 K/UL — SIGNIFICANT CHANGE UP (ref 0–0.2)
BASOPHILS # BLD AUTO: 0 K/UL — SIGNIFICANT CHANGE UP (ref 0–0.2)
BASOPHILS NFR BLD AUTO: 0 % — SIGNIFICANT CHANGE UP (ref 0–2)
BASOPHILS NFR BLD AUTO: 0 % — SIGNIFICANT CHANGE UP (ref 0–2)
BILIRUB SERPL-MCNC: 0.4 MG/DL — SIGNIFICANT CHANGE UP (ref 0.2–1.2)
BLD GP AB SCN SERPL QL: NEGATIVE — SIGNIFICANT CHANGE UP
BUN SERPL-MCNC: 6 MG/DL — LOW (ref 7–23)
CALCIUM SERPL-MCNC: 8.2 MG/DL — LOW (ref 8.4–10.5)
CHLORIDE SERPL-SCNC: 97 MMOL/L — SIGNIFICANT CHANGE UP (ref 96–108)
CO2 SERPL-SCNC: 18 MMOL/L — LOW (ref 22–31)
CREAT SERPL-MCNC: 0.58 MG/DL — SIGNIFICANT CHANGE UP (ref 0.5–1.3)
EGFR: 84 ML/MIN/1.73M2 — SIGNIFICANT CHANGE UP
EGFR: 84 ML/MIN/1.73M2 — SIGNIFICANT CHANGE UP
EOSINOPHIL # BLD AUTO: 0 K/UL — SIGNIFICANT CHANGE UP (ref 0–0.5)
EOSINOPHIL # BLD AUTO: 0 K/UL — SIGNIFICANT CHANGE UP (ref 0–0.5)
EOSINOPHIL NFR BLD AUTO: 0 % — SIGNIFICANT CHANGE UP (ref 0–6)
EOSINOPHIL NFR BLD AUTO: 0 % — SIGNIFICANT CHANGE UP (ref 0–6)
GLUCOSE SERPL-MCNC: 106 MG/DL — HIGH (ref 70–99)
HCT VFR BLD CALC: 23.2 % — LOW (ref 34.5–45)
HCT VFR BLD CALC: 23.5 % — LOW (ref 34.5–45)
HGB BLD-MCNC: 7.5 G/DL — LOW (ref 11.5–15.5)
HGB BLD-MCNC: 7.7 G/DL — LOW (ref 11.5–15.5)
IMM GRANULOCYTES # BLD AUTO: 0.01 K/UL — SIGNIFICANT CHANGE UP (ref 0–0.07)
IMM GRANULOCYTES # BLD AUTO: 0.02 K/UL — SIGNIFICANT CHANGE UP (ref 0–0.07)
IMM GRANULOCYTES NFR BLD AUTO: 0.7 % — SIGNIFICANT CHANGE UP (ref 0–0.9)
IMM GRANULOCYTES NFR BLD AUTO: 1.3 % — HIGH (ref 0–0.9)
IMMATURE PLATELET FRACTION #: 5.4 K/UL — SIGNIFICANT CHANGE UP (ref 4.7–11.1)
IMMATURE PLATELET FRACTION #: 5.5 K/UL — SIGNIFICANT CHANGE UP (ref 4.7–11.1)
IMMATURE PLATELET FRACTION %: 11.6 % — HIGH (ref 1.6–4.9)
IMMATURE PLATELET FRACTION %: 11.7 % — HIGH (ref 1.6–4.9)
LYMPHOCYTES # BLD AUTO: 0.37 K/UL — LOW (ref 1–3.3)
LYMPHOCYTES # BLD AUTO: 0.39 K/UL — LOW (ref 1–3.3)
LYMPHOCYTES NFR BLD AUTO: 24.5 % — SIGNIFICANT CHANGE UP (ref 13–44)
LYMPHOCYTES NFR BLD AUTO: 24.7 % — SIGNIFICANT CHANGE UP (ref 13–44)
MCHC RBC-ENTMCNC: 30.9 PG — SIGNIFICANT CHANGE UP (ref 27–34)
MCHC RBC-ENTMCNC: 31.4 PG — SIGNIFICANT CHANGE UP (ref 27–34)
MCHC RBC-ENTMCNC: 32.3 G/DL — SIGNIFICANT CHANGE UP (ref 32–36)
MCHC RBC-ENTMCNC: 32.8 G/DL — SIGNIFICANT CHANGE UP (ref 32–36)
MCV RBC AUTO: 95.5 FL — SIGNIFICANT CHANGE UP (ref 80–100)
MCV RBC AUTO: 95.9 FL — SIGNIFICANT CHANGE UP (ref 80–100)
MONOCYTES # BLD AUTO: 0.18 K/UL — SIGNIFICANT CHANGE UP (ref 0–0.9)
MONOCYTES # BLD AUTO: 0.23 K/UL — SIGNIFICANT CHANGE UP (ref 0–0.9)
MONOCYTES NFR BLD AUTO: 11.9 % — SIGNIFICANT CHANGE UP (ref 2–14)
MONOCYTES NFR BLD AUTO: 14.6 % — HIGH (ref 2–14)
NEUTROPHILS # BLD AUTO: 0.94 K/UL — LOW (ref 1.8–7.4)
NEUTROPHILS # BLD AUTO: 0.95 K/UL — LOW (ref 1.8–7.4)
NEUTROPHILS NFR BLD AUTO: 59.4 % — SIGNIFICANT CHANGE UP (ref 43–77)
NEUTROPHILS NFR BLD AUTO: 62.9 % — SIGNIFICANT CHANGE UP (ref 43–77)
NRBC # BLD AUTO: 0 K/UL — SIGNIFICANT CHANGE UP (ref 0–0)
NRBC # BLD AUTO: 0 K/UL — SIGNIFICANT CHANGE UP (ref 0–0)
NRBC # FLD: 0 K/UL — SIGNIFICANT CHANGE UP (ref 0–0)
NRBC # FLD: 0 K/UL — SIGNIFICANT CHANGE UP (ref 0–0)
NRBC BLD AUTO-RTO: 0 /100 WBCS — SIGNIFICANT CHANGE UP (ref 0–0)
NRBC BLD AUTO-RTO: 0 /100 WBCS — SIGNIFICANT CHANGE UP (ref 0–0)
PLATELET # BLD AUTO: 46 K/UL — LOW (ref 150–400)
PLATELET # BLD AUTO: 47 K/UL — LOW (ref 150–400)
PMV BLD: 11.9 FL — SIGNIFICANT CHANGE UP (ref 7–13)
PMV BLD: 12.2 FL — SIGNIFICANT CHANGE UP (ref 7–13)
POTASSIUM SERPL-MCNC: 3.7 MMOL/L — SIGNIFICANT CHANGE UP (ref 3.5–5.3)
POTASSIUM SERPL-SCNC: 3.7 MMOL/L — SIGNIFICANT CHANGE UP (ref 3.5–5.3)
PROT SERPL-MCNC: 5.9 G/DL — LOW (ref 6–8.3)
RBC # BLD: 2.43 M/UL — LOW (ref 3.8–5.2)
RBC # BLD: 2.45 M/UL — LOW (ref 3.8–5.2)
RBC # FLD: 20.8 % — HIGH (ref 10.3–14.5)
RBC # FLD: 20.8 % — HIGH (ref 10.3–14.5)
RH IG SCN BLD-IMP: POSITIVE — SIGNIFICANT CHANGE UP
SODIUM SERPL-SCNC: 129 MMOL/L — LOW (ref 135–145)
WBC # BLD: 1.51 K/UL — LOW (ref 3.8–10.5)
WBC # BLD: 1.58 K/UL — LOW (ref 3.8–10.5)
WBC # FLD AUTO: 1.51 K/UL — LOW (ref 3.8–10.5)
WBC # FLD AUTO: 1.58 K/UL — LOW (ref 3.8–10.5)

## 2025-06-30 PROCEDURE — 97161 PT EVAL LOW COMPLEX 20 MIN: CPT

## 2025-06-30 PROCEDURE — G0545: CPT

## 2025-06-30 PROCEDURE — 36415 COLL VENOUS BLD VENIPUNCTURE: CPT

## 2025-06-30 PROCEDURE — 93005 ELECTROCARDIOGRAM TRACING: CPT

## 2025-06-30 PROCEDURE — 86901 BLOOD TYPING SEROLOGIC RH(D): CPT

## 2025-06-30 PROCEDURE — 86850 RBC ANTIBODY SCREEN: CPT

## 2025-06-30 PROCEDURE — 71045 X-RAY EXAM CHEST 1 VIEW: CPT

## 2025-06-30 PROCEDURE — 80053 COMPREHEN METABOLIC PANEL: CPT

## 2025-06-30 PROCEDURE — 86923 COMPATIBILITY TEST ELECTRIC: CPT

## 2025-06-30 PROCEDURE — 87040 BLOOD CULTURE FOR BACTERIA: CPT

## 2025-06-30 PROCEDURE — 93970 EXTREMITY STUDY: CPT

## 2025-06-30 PROCEDURE — 0241U: CPT

## 2025-06-30 PROCEDURE — 86900 BLOOD TYPING SEROLOGIC ABO: CPT

## 2025-06-30 PROCEDURE — 85730 THROMBOPLASTIN TIME PARTIAL: CPT

## 2025-06-30 PROCEDURE — 85610 PROTHROMBIN TIME: CPT

## 2025-06-30 PROCEDURE — 83605 ASSAY OF LACTIC ACID: CPT

## 2025-06-30 PROCEDURE — 84295 ASSAY OF SERUM SODIUM: CPT

## 2025-06-30 PROCEDURE — 84145 PROCALCITONIN (PCT): CPT

## 2025-06-30 PROCEDURE — 81001 URINALYSIS AUTO W/SCOPE: CPT

## 2025-06-30 PROCEDURE — 84132 ASSAY OF SERUM POTASSIUM: CPT

## 2025-06-30 PROCEDURE — 82947 ASSAY GLUCOSE BLOOD QUANT: CPT

## 2025-06-30 PROCEDURE — 82330 ASSAY OF CALCIUM: CPT

## 2025-06-30 PROCEDURE — 82803 BLOOD GASES ANY COMBINATION: CPT

## 2025-06-30 PROCEDURE — 85014 HEMATOCRIT: CPT

## 2025-06-30 PROCEDURE — 99223 1ST HOSP IP/OBS HIGH 75: CPT | Mod: GC

## 2025-06-30 PROCEDURE — 82435 ASSAY OF BLOOD CHLORIDE: CPT

## 2025-06-30 PROCEDURE — 85018 HEMOGLOBIN: CPT

## 2025-06-30 PROCEDURE — 85025 COMPLETE CBC W/AUTO DIFF WBC: CPT

## 2025-06-30 RX ORDER — LOTEPREDNOL ETABONATE 5 MG/ML
1 SUSPENSION/ DROPS OPHTHALMIC
Refills: 0 | DISCHARGE

## 2025-06-30 RX ORDER — MIRTAZAPINE 30 MG/1
1 TABLET, FILM COATED ORAL
Refills: 0 | DISCHARGE

## 2025-06-30 RX ORDER — TIMOLOL MALEATE 6.8 MG/ML
1 SOLUTION OPHTHALMIC
Refills: 0 | DISCHARGE

## 2025-06-30 RX ORDER — ROSUVASTATIN CALCIUM 5 MG/1
1 TABLET, FILM COATED ORAL
Refills: 0 | DISCHARGE

## 2025-06-30 RX ORDER — METOPROLOL SUCCINATE 50 MG/1
1 TABLET, EXTENDED RELEASE ORAL
Refills: 0 | DISCHARGE

## 2025-06-30 RX ORDER — ACETAMINOPHEN 500 MG/5ML
650 LIQUID (ML) ORAL ONCE
Refills: 0 | Status: COMPLETED | OUTPATIENT
Start: 2025-06-30 | End: 2025-06-30

## 2025-06-30 RX ADMIN — Medication 650 MILLIGRAM(S): at 17:34

## 2025-06-30 RX ADMIN — Medication 650 MILLIGRAM(S): at 18:00

## 2025-06-30 RX ADMIN — Medication 25 GRAM(S): at 22:33

## 2025-06-30 RX ADMIN — SODIUM CHLORIDE 50 MILLILITER(S): 9 INJECTION, SOLUTION INTRAVENOUS at 13:00

## 2025-06-30 RX ADMIN — Medication 650 MILLIGRAM(S): at 18:30

## 2025-06-30 RX ADMIN — TIMOLOL MALEATE 1 DROP(S): 6.8 SOLUTION OPHTHALMIC at 12:32

## 2025-06-30 RX ADMIN — SODIUM CHLORIDE 50 MILLILITER(S): 9 INJECTION, SOLUTION INTRAVENOUS at 22:34

## 2025-06-30 RX ADMIN — ROSUVASTATIN CALCIUM 10 MILLIGRAM(S): 5 TABLET, FILM COATED ORAL at 22:34

## 2025-06-30 RX ADMIN — Medication 25 GRAM(S): at 12:28

## 2025-06-30 RX ADMIN — Medication 200 MILLIGRAM(S): at 12:27

## 2025-06-30 RX ADMIN — Medication 25 GRAM(S): at 05:13

## 2025-06-30 RX ADMIN — Medication 10 MILLIGRAM(S): at 12:27

## 2025-06-30 RX ADMIN — METOPROLOL SUCCINATE 50 MILLIGRAM(S): 50 TABLET, EXTENDED RELEASE ORAL at 05:13

## 2025-06-30 RX ADMIN — Medication 650 MILLIGRAM(S): at 18:31

## 2025-06-30 NOTE — CONSULT NOTE ADULT - PROBLEM SELECTOR RECOMMENDATION 9
- Tmax to 100.5 in ED (6/28). Started on Zosyn. Unclear source of infection. UA negative, CXR clear, BCx no growth x24 hrs. ANC ***. Procalcitonin 0.14 (6/29). 6/30 improved status, afebrile, vss.   - Continue Isolation precautions   - Transition to oral augmentin + Cipro ***   - - Tmax to 100.5 in ED (6/28). Started on Zosyn. Unclear source of infection. UA negative, CXR clear, BCx no growth x24 hrs. ANC trend 1.05-> 0.84 -> 0.92 -> 0.94 -> 0.95. Procalcitonin 0.14 (6/29). 6/30 improved status, afebrile, vss.   - Continue Isolation precautions   - Transition to oral ciprofloxacin 500 mg orally every 12 hours plus amoxicillin-clavulanate 875 mg/125 mg orally every 12 hours ***  - - Tmax to 100.5 in ED (6/28). Started on Zosyn. Unclear source of infection. UA negative, CXR clear, BCx no growth x24 hrs. ANC trend 1.05-> 0.84 -> 0.92 -> 0.94 -> 0.95. Procalcitonin 0.14 (6/29). 6/30 improved status, afebrile, vss.   - Continue Isolation precautions   - Given improvement with IVF and IV zosyn ***, transition to oral ciprofloxacin 500 mg orally every 12 hours plus amoxicillin-clavulanate 875 mg/125 mg orally every 12 hours ***  - - Presented with Tmax to 100.5 in ED (6/28). Started on Zosyn. Unclear source of infection at this time. UA negative, CXR clear, BCx NGTD. ANC trend 1.05-> 0.84 -> 0.92 -> 0.94 -> 0.95. Procalcitonin 0.14 (6/29). 6/30 afebrile, vss, improved status back to her baseline.   - Continue Isolation precautions while inpatient.   - Given improvement with IVF and IV zosyn ***, transition to oral ciprofloxacin 500 mg orally every 12 hours plus amoxicillin-clavulanate 875 mg/125 mg orally every 12 hours ***  - - Mild neutropenia without clear source of infection at this time. UA negative, CXR clear, BCx NGTD. ANC trend 1.05-> 0.84 -> 0.92 -> 0.94 -> 0.95. Procalcitonin 0.14 (6/29). 6/30 afebrile, vss, improved status back to her baseline.   - Recommend CTAP w/ contrast and full respiratory panel to rule out sources of possible infection. If CT and respiratory panel negative, ok to stop abx after receiving for 72 hrs given her improved status and now afebrile.   - Continue IV zosyn for now.   - Continue Isolation precautions while inpatient.   - Consider starting antifungal ppx following discharge as a MDS/AML patient, defer to hematology.

## 2025-06-30 NOTE — DIETITIAN INITIAL EVALUATION ADULT - OTHER INFO
Dosing wt (6/28): 133.8 lbs  Per patient, -134 lbs. Appears consistent. Will continue to monitor/trend.

## 2025-06-30 NOTE — DIETITIAN INITIAL EVALUATION ADULT - ADD RECOMMEND
1. Continue Regular diet as prescribed. Defer consistency to medical team, SLP.   2. Encourage and monitor PO intake. [place] dietary preferences as expressed as able.   3. Recommend Ensure Plus High Protein 1x daily to optimize nutrient/energy intake.   4. Recommend multivitamin and vitamin C (if no medication contraindications) to aid in prevention of micronutrient deficiencies, aid in wound healing.   5. Monitor wt trends/labs/skin integrity/hydration status/bowel regularity.   6. Malnutrition sticker placed.

## 2025-06-30 NOTE — ADVANCED PRACTICE NURSE CONSULT - ASSESSMENT
Wound care consult initiated by RN to assess patient's heel/toes. Consult deferred to podiatry, patient seen on 6/30/25- please see podiatry notes for treatment recommendations

## 2025-06-30 NOTE — CONSULT NOTE ADULT - ASSESSMENT
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 Huston as outpatient    #pancytopenia  - as outpatient, received 1 unit PRBC 6/17, received platelets 6/25  - Hg now 7.5; would transfuse 1 unit PRBC prior to DC  - plt stable  - monitor CBC    #fever- no clear source, appreciate ID, to be transitioned to oral Abx; ANC has been near 1000    D/w Medicine  Pt will FU with Dr. Huston as outpatient

## 2025-06-30 NOTE — DIETITIAN INITIAL EVALUATION ADULT - REASON FOR ADMISSION
Pt is a 89 yo F with PMH: myelodysplastic syndrome last chemo treatment 6/2025, right corneal transplant, HTN, HLD. History of recurrent UTIs. Presented with fever and malaise x 2 days. ID consulted for further recommendations.

## 2025-06-30 NOTE — DIETITIAN INITIAL EVALUATION ADULT - ETIOLOGY
decreased ability to consume adequate protein-energy in setting of decreased appetite increased physiological demand of nutrient in setting of wound healing

## 2025-06-30 NOTE — CONSULT NOTE ADULT - ASSESSMENT
LOWER EXTREMITY PHYSICAL EXAM:    Vascular: DP/PT 1/4, B/L, CFT <3seconds B/L, Temperature gradient wnl, B/L.   Neuro: Epicritic sensation decreased to the level of feet, B/L.  Musculoskeletal/Ortho:No signs of infection no signs of ulcerations or deep tissue injuries both feet  Absent protective threshold patient unable to sense 5.07 semmes polo monofilament wire both feet  Patient relates idiopathic neuropathy with no diabetes or back issues  Recommend patient follow up with internist or neurologist regarding neuropathy  patient can try Zostrix or possible consider neurontin orally  no signs of ulcers or infections both feet  reconsult podiatry as needed

## 2025-06-30 NOTE — CONSULT NOTE ADULT - SUBJECTIVE AND OBJECTIVE BOX
Patient is a 89y old  Female who presents with a chief complaint of Pt is a 89 yo F with PMH: myelodysplastic syndrome last chemo treatment 6/2025, right corneal transplant, HTN, HLD. History of recurrent UTIs. Presented with fever and malaise x 2 days. ID consulted for further recommendations.  (30 Jun 2025 10:48)      HPI:  89 yo F with a PMH of myelodysplastic syndrome last chemo tx 06/2025, right corneal transplant, HTN, HLD, hx of recurrent UTIs on Augmentin ppx presents with fever and malaise x days. Pt reports feeling generally weak and has had decreased appetite. Is usually able to discern when her sxs are 2/2 UTI but does not think she is presenting that way. Has been experiencing a non prod cough over the last few days. Denies nausea, vomiting, chills, chest pain, abd pain, diarrhea, urinary complaints, headache, dizziness. No sick contacts. (28 Jun 2025 21:37)    Pt follows with Dr. Huston, on venetoclax and azacytadine, completed C11 on 6/16. received 1 unit PRBC 6/17, received platelets 6/25    Pt with decreased PO intake prior to admission; now feels improved and at baseline; no further fever, reports cough at baseline with allergies, no n/v/abd pain, no diarrhea, no dysuria, no bleeding; Aide at bedside    PAST MEDICAL & SURGICAL HISTORY:  Hyperlipidemia      Palpitations      GERD (gastroesophageal reflux disease)      Chronic UTI      Post corneal transplant      AML (acute myeloid leukemia)      S/P cataract surgery          SOCIAL HISTORY:  Smoking - Non smoker   Alcohol - no  Drugs - No drug use    FAMILY HISTORY:  No pertinent family history in first degree relatives        MEDICATIONS  (STANDING):  acyclovir   Oral Tab/Cap 200 milliGRAM(s) Oral daily  cetirizine 10 milliGRAM(s) Oral daily  dextrose 5% + sodium chloride 0.9%. 1000 milliLiter(s) (50 mL/Hr) IV Continuous <Continuous>  metoprolol succinate ER 50 milliGRAM(s) Oral daily  piperacillin/tazobactam IVPB.. 3.375 Gram(s) IV Intermittent every 8 hours  rosuvastatin 10 milliGRAM(s) Oral at bedtime  sodium chloride 0.9%. 1000 milliLiter(s) (50 mL/Hr) IV Continuous <Continuous>  timolol 0.5% Solution 1 Drop(s) Both EYES daily    MEDICATIONS  (PRN):  acetaminophen     Tablet .. 650 milliGRAM(s) Oral every 6 hours PRN Temp greater or equal to 38.5C (101.3F), Mild Pain (1 - 3)  artificial  tears Solution 1 Drop(s) Both EYES four times a day PRN Dry Eyes  mirtazapine 7.5 milliGRAM(s) Oral at bedtime PRN anxiety      Allergies    Bactrim (Unknown)    Intolerances    ROS  gen- fever resolved, appetite improving  heent- no bleeding, no ulcers/sores  cv- no chest pain  resp- no dyspnea  gi- no n/v/abd pain  gu- no hematuria  musculosk- no back pain  skin-no rash  neuro- no dizziness   ROS otherwise reviewed and negative      Vital Signs Last 24 Hrs  T(C): 36.6 (30 Jun 2025 08:11), Max: 37.4 (29 Jun 2025 16:12)  T(F): 97.9 (30 Jun 2025 08:11), Max: 99.3 (29 Jun 2025 16:12)  HR: 97 (30 Jun 2025 08:11) (88 - 103)  BP: 114/70 (30 Jun 2025 08:11) (91/52 - 114/70)  BP(mean): --  RR: 17 (30 Jun 2025 08:11) (17 - 18)  SpO2: 99% (30 Jun 2025 08:11) (95% - 99%)    Parameters below as of 30 Jun 2025 08:11  Patient On (Oxygen Delivery Method): room air        PHYSICAL EXAM  General: adult in NAD  HEENT: clear oropharynx, anicteric sclera, pink conjunctiva  Neck: supple  CV: normal S1/S2   Lungs: clear to auscultation, no wheezes, no rales  Abdomen: soft non-tender non-distended,  positive bowel sounds  Ext: no calf tenderness  Skin: no rashes and no petechiae  Lymph Nodes: No LAD in neck  Neuro: alert and oriented X 3    LABS:                          7.5    1.51  )-----------( 47       ( 30 Jun 2025 07:23 )             23.2         Mean Cell Volume : 95.5 fl  Mean Cell Hemoglobin : 30.9 pg  Mean Cell Hemoglobin Concentration : 32.3 g/dL  Auto Neutrophil # : 0.95 K/uL  Auto Lymphocyte # : 0.37 K/uL  Auto Monocyte # : 0.18 K/uL  Auto Eosinophil # : 0.00 K/uL  Auto Basophil # : 0.00 K/uL  Auto Neutrophil % : 62.9 %  Auto Lymphocyte % : 24.5 %  Auto Monocyte % : 11.9 %  Auto Eosinophil % : 0.0 %  Auto Basophil % : 0.0 %      Serial CBC's  06-30 @ 07:23  Hct-23.2 / Hgb-7.5 / Plat-47 / RBC-2.43 / WBC-1.51  Serial CBC's  06-30 @ 07:17  Hct-23.5 / Hgb-7.7 / Plat-46 / RBC-2.45 / WBC-1.58  Serial CBC's  06-29 @ 07:15  Hct-27.2 / Hgb-8.9 / Plat-50 / RBC-2.84 / WBC-1.31  Serial CBC's  06-28 @ 18:44  Hct-24.3 / Hgb-8.2 / Plat-57 / RBC-2.60 / WBC-1.75      06-30    129[L]  |  97  |  6[L]  ----------------------------<  106[H]  3.7   |  18[L]  |  0.58    Ca    8.2[L]      30 Jun 2025 07:19    TPro  5.9[L]  /  Alb  2.9[L]  /  TBili  0.4  /  DBili  x   /  AST  30  /  ALT  63[H]  /  AlkPhos  64  06-30      PT/INR - ( 28 Jun 2025 18:44 )   PT: 12.7 sec;   INR: 1.12 ratio         PTT - ( 28 Jun 2025 18:44 )  PTT:28.2 sec                  Radiology:    < from: [facility] Duplex Lower Ext Vein Scan, Bilat (06.29.25 @ 09:14) >  IMPRESSION:    No acute DVT of the right or left lower extremity.        < from: Xray Chest 1 View- PORTABLE-Urgent (06.28.25 @ 19:17) >    IMPRESSION:  No focal consolidation.

## 2025-06-30 NOTE — DIETITIAN INITIAL EVALUATION ADULT - ORAL INTAKE PTA/DIET HISTORY
-Patient reports poor appetite for about a week prior to admission in setting of general malaise and fevere; now slowly improving and states she consumed 100% of breakfast this AM.   -Typically prefers to consume two meals daily (breakfast and an early dinner, plus snacks). Denies intolerance to chewing/swallowing, denies food allergies.   -Reports taking daily multivitamin and vitamin. Drinks Ensure shakes from home and noted with six at bedside.   -History of constipation noted; reports consuming prunes/lory/kiwi and taking Miralax to aid in bowel relief.  -Patient reports poor appetite for about a week prior to admission in setting of general malaise and fever; now slowly improving and states she consumed 100% of breakfast this AM. Denies specific dietary preferences. Lives with 24/7 home health aide. Typically prefers to consume two meals daily (breakfast and an early dinner, plus snacks). Denies intolerance to chewing/swallowing, denies food allergies. Reports taking daily multivitamin and vitamin. Drinks Ensure shakes from home and noted with six at bedside.   -History of constipation noted; reports consuming prunes/lory/kiwi and taking Miralax to aid in bowel relief.

## 2025-06-30 NOTE — CONSULT NOTE ADULT - ASSESSMENT
Ms Quinteros is a 89 yo F with a PMH of myelodysplastic syndrome last chemo tx 06/2025, right corneal transplant, HTN, HLD, hx of recurrent UTIs on Augmentin and acyclovir ppx presenting with neutropenic fever. Consulted to ID for further recommendations.    Ms Quinteros is a 89 yo F with a PMH of myelodysplastic syndrome last chemo tx 06/2025, right corneal transplant, HTN, HLD, hx of recurrent UTIs on Augmentin and acyclovir ppx presenting with neutropenic fever, now improved after IV abx and fluids. Consulted to ID for further recommendations.

## 2025-06-30 NOTE — DIETITIAN INITIAL EVALUATION ADULT - NSFNSGIIOFT_GEN_A_CORE
-Last BM 6/29 x 2. Not on apparent bowel regimen at this time.   -Prescribed antibiotics as ordered.   -S/P IVF (NaCl 0.9% and D5% + NaCl 0.9%) in setting of dehydration. Hyponatremia noted.

## 2025-06-30 NOTE — CONSULT NOTE ADULT - SUBJECTIVE AND OBJECTIVE BOX
Patient is a 89y old  Female who presents with a chief complaint of   HPI:  89 yo F with a PMH of myelodysplastic syndrome last chemo tx 06/2025, right corneal transplant, HTN, HLD, hx of recurrent UTIs on Augmentin ppx presents with fever and malaise x days. Pt reports feeling generally weak and has had decreased appetite. Is usually able to discern when her sxs are 2/2 UTI but does not think she is presenting that way. Has been experiencing a non prod cough over the last few days. Denies nausea, vomiting, chills, chest pain, abd pain, diarrhea, urinary complaints, headache, dizziness. No sick contacts. (28 Jun 2025 21:37)       REVIEW OF SYSTEMS  pending full examination    prior hospital charts reviewed [V]  primary team notes reviewed [V]  other consultant notes reviewed [V]    PAST MEDICAL & SURGICAL HISTORY:  Hyperlipidemia      Palpitations      GERD (gastroesophageal reflux disease)      Chronic UTI      Post corneal transplant      AML (acute myeloid leukemia)      S/P cataract surgery          SOCIAL HISTORY:  Denied smoking/vaping/alcohol/recreational drug use    FAMILY HISTORY:  No pertinent family history in first degree relatives        Allergies  Bactrim (Unknown)        ANTIMICROBIALS:  acyclovir   Oral Tab/Cap 200 daily  piperacillin/tazobactam IVPB.. 3.375 every 8 hours      ANTIMICROBIALS (past 90 days):  MEDICATIONS  (STANDING):  acyclovir   Oral Tab/Cap   200 milliGRAM(s) Oral (06-29-25 @ 11:47)    piperacillin/tazobactam IVPB.   200 mL/Hr IV Intermittent (06-28-25 @ 22:23)    piperacillin/tazobactam IVPB.-   25 mL/Hr IV Intermittent (06-29-25 @ 02:55)    piperacillin/tazobactam IVPB.-   25 mL/Hr IV Intermittent (06-29-25 @ 09:15)    piperacillin/tazobactam IVPB..   25 mL/Hr IV Intermittent (06-30-25 @ 05:13)   25 mL/Hr IV Intermittent (06-29-25 @ 21:40)   25 mL/Hr IV Intermittent (06-29-25 @ 13:48)    piperacillin/tazobactam IVPB...   200 mL/Hr IV Intermittent (06-28-25 @ 19:02)    vancomycin  IVPB.   250 mL/Hr IV Intermittent (06-28-25 @ 20:10)        OTHER MEDS:   MEDICATIONS  (STANDING):  acetaminophen     Tablet .. 650 every 6 hours PRN  cetirizine 10 daily  metoprolol succinate ER 50 daily  mirtazapine 7.5 at bedtime PRN  rosuvastatin 10 at bedtime      VITALS:  Vital Signs Last 24 Hrs  T(F): 97.9 (06-30-25 @ 08:11), Max: 100.5 (06-28-25 @ 17:54)    Vital Signs Last 24 Hrs  HR: 97 (06-30-25 @ 08:11) (88 - 103)  BP: 114/70 (06-30-25 @ 08:11) (91/52 - 114/70)  RR: 17 (06-30-25 @ 08:11)  SpO2: 99% (06-30-25 @ 08:11) (95% - 99%)  Wt(kg): --    EXAM:  pending full examination      Labs:                        7.5    1.51  )-----------( 47       ( 30 Jun 2025 07:23 )             23.2     06-30    129[L]  |  97  |  6[L]  ----------------------------<  106[H]  3.7   |  18[L]  |  0.58    Ca    8.2[L]      30 Jun 2025 07:19    TPro  5.9[L]  /  Alb  2.9[L]  /  TBili  0.4  /  DBili  x   /  AST  30  /  ALT  63[H]  /  AlkPhos  64  06-30      WBC Trend:  WBC Count: 1.51 (06-30-25 @ 07:23)  WBC Count: 1.58 (06-30-25 @ 07:17)  WBC Count: 1.31 (06-29-25 @ 07:15)  WBC Count: 1.20 (06-29-25 @ 07:15)      Auto Neutrophil #: 0.95 K/uL (06-30-25 @ 07:23)  Auto Neutrophil #: 0.94 K/uL (06-30-25 @ 07:17)  Auto Neutrophil #: 0.92 K/uL (06-29-25 @ 07:15)  Auto Neutrophil #: 0.84 K/uL (06-29-25 @ 07:15)  Auto Neutrophil #: 1.05 K/uL (06-28-25 @ 18:44)      Creatine Trend:  Creatinine: 0.58 (06-30)  Creatinine: 0.60 (06-29)  Creatinine: 0.55 (06-28)      Liver Biochemical Testing Trend:  Alanine Aminotransferase (ALT/SGPT): 63 *H* (06-30)  Alanine Aminotransferase (ALT/SGPT): 82 *H* (06-29)  Alanine Aminotransferase (ALT/SGPT): 91 *H* (06-28)  Alanine Aminotransferase (ALT/SGPT): 75 *H* (08-30)  Alanine Aminotransferase (ALT/SGPT): 49 *H* (08-27)  Aspartate Aminotransferase (AST/SGOT): 30 (06-30-25 @ 07:19)  Aspartate Aminotransferase (AST/SGOT): 44 (06-29-25 @ 07:13)  Aspartate Aminotransferase (AST/SGOT): 48 (06-28-25 @ 18:44)  Aspartate Aminotransferase (AST/SGOT): 35 (08-30-24 @ 07:06)  Aspartate Aminotransferase (AST/SGOT): 29 (08-27-24 @ 11:00)  Bilirubin Total: 0.4 (06-30)  Bilirubin Total: 0.6 (06-29)  Bilirubin Total: 0.5 (06-28)  Bilirubin Total: 0.6 (08-30)  Bilirubin Total: 0.7 (08-27)      Trend LDH  12-06-23 @ 06:57  333[H]  12-05-23 @ 06:51  319[H]  12-04-23 @ 06:39  317[H]  12-03-23 @ 06:49  326[H]  12-02-23 @ 07:15  299[H]      Auto Eosinophil %: 0.0 % (06-30-25 @ 07:23)  Auto Eosinophil %: 0.0 % (06-30-25 @ 07:17)  Auto Eosinophil %: 0.0 % (06-29-25 @ 07:15)  Auto Eosinophil %: 0.0 % (06-29-25 @ 07:15)  Auto Eosinophil %: 0.0 % (06-28-25 @ 18:44)      MICROBIOLOGY:        Urinalysis with Rflx Culture (collected 28 Jun 2025 21:12)    Culture - Blood (collected 28 Jun 2025 18:45)  Source: Blood Blood-Peripheral  Preliminary Report:    No growth at 24 hours    Culture - Blood (collected 28 Jun 2025 18:30)  Source: Blood Blood-Peripheral  Preliminary Report:    No growth at 24 hours    Culture - Urine (collected 27 Aug 2024 12:59)  Source: Clean Catch Clean Catch (Midstream)  Final Report:    No growth    Culture - Blood (collected 27 Aug 2024 10:35)  Source: .Blood Blood-Peripheral  Final Report:    No growth at 5 days    Culture - Blood (collected 27 Aug 2024 10:25)  Source: .Blood Blood-Peripheral  Final Report:    No growth at 5 days    Culture - Blood (collected 03 Dec 2023 18:33)  Source: .Blood Blood-Peripheral  Final Report:    No growth at 5 days    Culture - Urine (collected 03 Dec 2023 18:33)  Source: Clean Catch Clean Catch (Midstream)  Final Report:    >100,000 CFU/ml Enterococcus faecium  Organism: Enterococcus faecium  Organism: Enterococcus faecium    Sensitivities:      -  Levofloxacin: S 2      -  Nitrofurantoin: S <=32 Should not be used to treat pyelonephritis.      -  Vancomycin: S 1      -  Ciprofloxacin: S <=1      -  Ampicillin: R >8 Predicts results to ampicillin/sulbactam, amoxacillin-clavulanate and  piperacillin-tazobactam.      -  Tetracycline: R >8      Method Type: LUIS    Culture - Blood (collected 03 Dec 2023 18:03)  Source: .Blood Blood-Peripheral  Final Report:    No growth at 5 days        Procalcitonin: 0.14 (06-29)      Blood Gas Venous - Lactate: 0.9 (06-28 @ 18:40)      RADIOLOGY:  imaging below personally reviewed   Patient is a 89y old  Female who presents with a chief complaint of neutropenic fever     HPI:  Ms Quinteros is a 89 yo F with a PMH of myelodysplastic syndrome last chemo tx 06/2025, right corneal transplant, HTN, HLD, hx of recurrent UTIs on Augmentin and acyclovir ppx presents with fever and malaise x 2 days.    She reports she was feeling generalized weakness for 2 days before arriving to the hospital. She last received chemo 1.5 weeks ago. In the ED, found to be febrile to 100.5 F. UA was negative for infection, CXR clear. Blood cultures were drawn and she was started on zosyn. Labs were notable for pancytopenia in the setting of chemo. ANC***    She reports she is on prophylactic Augmentin for chronic UTIs as well as acyclovir at home.     She denies nausea, vomiting, chest pain, abd pain, urinary complaints, or headache. No known sick contacts. She has 24/7 aid at home, lives by herself. No pets at home. No recent travel.        REVIEW OF SYSTEMS  pending full examination  prior hospital charts reviewed [V]  primary team notes reviewed [V]  other consultant notes reviewed [V]    PAST MEDICAL & SURGICAL HISTORY:  Hyperlipidemia  Palpitations  GERD (gastroesophageal reflux disease)  Chronic UTI  Post corneal transplant  AML (acute myeloid leukemia)  S/P cataract surgery    SOCIAL HISTORY:  Denied smoking/vaping/alcohol/recreational drug use. No recent travel. No pets at home. no known sick contacts.     FAMILY HISTORY:  No pertinent family history in first degree relatives    Allergies  Bactrim (Unknown)    ANTIMICROBIALS:  acyclovir   Oral Tab/Cap 200 daily  piperacillin/tazobactam IVPB.. 3.375 every 8 hours    ANTIMICROBIALS (past 90 days):  MEDICATIONS  (STANDING):  acyclovir   Oral Tab/Cap   200 milliGRAM(s) Oral (06-29-25 @ 11:47)    piperacillin/tazobactam IVPB.   200 mL/Hr IV Intermittent (06-28-25 @ 22:23)    piperacillin/tazobactam IVPB.-   25 mL/Hr IV Intermittent (06-29-25 @ 02:55)    piperacillin/tazobactam IVPB.-   25 mL/Hr IV Intermittent (06-29-25 @ 09:15)    piperacillin/tazobactam IVPB..   25 mL/Hr IV Intermittent (06-30-25 @ 05:13)   25 mL/Hr IV Intermittent (06-29-25 @ 21:40)   25 mL/Hr IV Intermittent (06-29-25 @ 13:48)    piperacillin/tazobactam IVPB...   200 mL/Hr IV Intermittent (06-28-25 @ 19:02)    vancomycin  IVPB.   250 mL/Hr IV Intermittent (06-28-25 @ 20:10)      OTHER MEDS:   MEDICATIONS  (STANDING):  acetaminophen     Tablet .. 650 every 6 hours PRN  cetirizine 10 daily  metoprolol succinate ER 50 daily  mirtazapine 7.5 at bedtime PRN  rosuvastatin 10 at bedtime      VITALS:  Vital Signs Last 24 Hrs  T(F): 97.9 (06-30-25 @ 08:11), Max: 100.5 (06-28-25 @ 17:54)    Vital Signs Last 24 Hrs  HR: 97 (06-30-25 @ 08:11) (88 - 103)  BP: 114/70 (06-30-25 @ 08:11) (91/52 - 114/70)  RR: 17 (06-30-25 @ 08:11)  SpO2: 99% (06-30-25 @ 08:11) (95% - 99%)  Wt(kg): --    EXAM:  pending full examination      Labs:                        7.5    1.51  )-----------( 47       ( 30 Jun 2025 07:23 )             23.2     06-30    129[L]  |  97  |  6[L]  ----------------------------<  106[H]  3.7   |  18[L]  |  0.58    Ca    8.2[L]      30 Jun 2025 07:19    TPro  5.9[L]  /  Alb  2.9[L]  /  TBili  0.4  /  DBili  x   /  AST  30  /  ALT  63[H]  /  AlkPhos  64  06-30      WBC Trend:  WBC Count: 1.51 (06-30-25 @ 07:23)  WBC Count: 1.58 (06-30-25 @ 07:17)  WBC Count: 1.31 (06-29-25 @ 07:15)  WBC Count: 1.20 (06-29-25 @ 07:15)    Auto Neutrophil #: 0.95 K/uL (06-30-25 @ 07:23)  Auto Neutrophil #: 0.94 K/uL (06-30-25 @ 07:17)  Auto Neutrophil #: 0.92 K/uL (06-29-25 @ 07:15)  Auto Neutrophil #: 0.84 K/uL (06-29-25 @ 07:15)  Auto Neutrophil #: 1.05 K/uL (06-28-25 @ 18:44)      Creatine Trend:  Creatinine: 0.58 (06-30)  Creatinine: 0.60 (06-29)  Creatinine: 0.55 (06-28)    Liver Biochemical Testing Trend:  Alanine Aminotransferase (ALT/SGPT): 63 *H* (06-30)  Alanine Aminotransferase (ALT/SGPT): 82 *H* (06-29)  Alanine Aminotransferase (ALT/SGPT): 91 *H* (06-28)  Aspartate Aminotransferase (AST/SGOT): 30 (06-30-25 @ 07:19)  Aspartate Aminotransferase (AST/SGOT): 44 (06-29-25 @ 07:13)  Aspartate Aminotransferase (AST/SGOT): 48 (06-28-25 @ 18:44)  Bilirubin Total: 0.4 (06-30)  Bilirubin Total: 0.6 (06-29)  Bilirubin Total: 0.5 (06-28)    Auto Eosinophil %: 0.0 % (06-30-25 @ 07:23)  Auto Eosinophil %: 0.0 % (06-30-25 @ 07:17)  Auto Eosinophil %: 0.0 % (06-29-25 @ 07:15)  Auto Eosinophil %: 0.0 % (06-29-25 @ 07:15)  Auto Eosinophil %: 0.0 % (06-28-25 @ 18:44)    MICROBIOLOGY:  Urinalysis with Rflx Culture (collected 28 Jun 2025 21:12)    Culture - Blood (collected 28 Jun 2025 18:45)  Source: Blood Blood-Peripheral  Preliminary Report:    No growth at 24 hours    Culture - Blood (collected 28 Jun 2025 18:30)  Source: Blood Blood-Peripheral  Preliminary Report:    No growth at 24 hours    Procalcitonin: 0.14 (06-29)    Blood Gas Venous - Lactate: 0.9 (06-28 @ 18:40)      RADIOLOGY:  imaging below personally reviewed Statement Selected Patient is a 89y old  Female who presents with a chief complaint of neutropenic fever     HPI:  Ms Quinteros is a 89 yo F with a PMH of myelodysplastic syndrome last chemo tx 06/2025, right corneal transplant, HTN, HLD, hx of recurrent UTIs on Augmentin and acyclovir ppx presents with fever and malaise x 2 days.    She reports she was feeling generalized weakness for 2 days before arriving to the hospital. She last received chemo 1.5 weeks ago. She also reports mild shortness of breath that has improved, but no other URI symptoms. She was seen by pulmonology outpatient, told her anemia was likely contributing.  In the ED, found to be febrile to 100.5 F. UA was negative for infection, CXR clear. Blood cultures were drawn and she was started on zosyn. Labs were notable for pancytopenia in the setting of chemo. ANC trend 1.05 (6/28) -> 0.84 -> 0.92 -> 0.94 -> 0.95 (6/30).     She reports she is on prophylactic Augmentin for chronic UTIs as well as acyclovir at home. Per chart review, she has been on antifungals in the past for her pancytopenia.  She denies nausea, vomiting, chest pain, abd pain, urinary complaints, or headache. No known sick contacts. She has 24/7 aid at home, otherwise lives by herself. Ambulates with walker at home, mostly independent. No pets at home. No recent travel.     REVIEW OF SYSTEMS  pending full examination  prior hospital charts reviewed [V]  primary team notes reviewed [V]  other consultant notes reviewed [V]    PAST MEDICAL & SURGICAL HISTORY:  Hyperlipidemia  Palpitations  GERD (gastroesophageal reflux disease)  Chronic UTI  Post corneal transplant  AML (acute myeloid leukemia)  S/P cataract surgery    SOCIAL HISTORY:  Denied smoking/vaping/alcohol/recreational drug use. No recent travel. No pets at home. no known sick contacts.     FAMILY HISTORY:  No pertinent family history in first degree relatives    Allergies  Bactrim (Unknown)    ANTIMICROBIALS:  acyclovir   Oral Tab/Cap 200 daily  piperacillin/tazobactam IVPB.. 3.375 every 8 hours    ANTIMICROBIALS (past 90 days):  MEDICATIONS  (STANDING):  acyclovir   Oral Tab/Cap   200 milliGRAM(s) Oral (06-29-25 @ 11:47)    piperacillin/tazobactam IVPB.   200 mL/Hr IV Intermittent (06-28-25 @ 22:23)    piperacillin/tazobactam IVPB.-   25 mL/Hr IV Intermittent (06-29-25 @ 02:55)    piperacillin/tazobactam IVPB.-   25 mL/Hr IV Intermittent (06-29-25 @ 09:15)    piperacillin/tazobactam IVPB..   25 mL/Hr IV Intermittent (06-30-25 @ 05:13)   25 mL/Hr IV Intermittent (06-29-25 @ 21:40)   25 mL/Hr IV Intermittent (06-29-25 @ 13:48)    piperacillin/tazobactam IVPB...   200 mL/Hr IV Intermittent (06-28-25 @ 19:02)    vancomycin  IVPB.   250 mL/Hr IV Intermittent (06-28-25 @ 20:10)      OTHER MEDS:   MEDICATIONS  (STANDING):  acetaminophen     Tablet .. 650 every 6 hours PRN  cetirizine 10 daily  metoprolol succinate ER 50 daily  mirtazapine 7.5 at bedtime PRN  rosuvastatin 10 at bedtime      VITALS:  Vital Signs Last 24 Hrs  T(F): 97.9 (06-30-25 @ 08:11), Max: 100.5 (06-28-25 @ 17:54)    Vital Signs Last 24 Hrs  HR: 97 (06-30-25 @ 08:11) (88 - 103)  BP: 114/70 (06-30-25 @ 08:11) (91/52 - 114/70)  RR: 17 (06-30-25 @ 08:11)  SpO2: 99% (06-30-25 @ 08:11) (95% - 99%)  Wt(kg): --    EXAM:  pending full examination      Labs:                        7.5    1.51  )-----------( 47       ( 30 Jun 2025 07:23 )             23.2     06-30    129[L]  |  97  |  6[L]  ----------------------------<  106[H]  3.7   |  18[L]  |  0.58    Ca    8.2[L]      30 Jun 2025 07:19    TPro  5.9[L]  /  Alb  2.9[L]  /  TBili  0.4  /  DBili  x   /  AST  30  /  ALT  63[H]  /  AlkPhos  64  06-30      WBC Trend:  WBC Count: 1.51 (06-30-25 @ 07:23)  WBC Count: 1.58 (06-30-25 @ 07:17)  WBC Count: 1.31 (06-29-25 @ 07:15)  WBC Count: 1.20 (06-29-25 @ 07:15)    Auto Neutrophil #: 0.95 K/uL (06-30-25 @ 07:23)  Auto Neutrophil #: 0.94 K/uL (06-30-25 @ 07:17)  Auto Neutrophil #: 0.92 K/uL (06-29-25 @ 07:15)  Auto Neutrophil #: 0.84 K/uL (06-29-25 @ 07:15)  Auto Neutrophil #: 1.05 K/uL (06-28-25 @ 18:44)      Creatine Trend:  Creatinine: 0.58 (06-30)  Creatinine: 0.60 (06-29)  Creatinine: 0.55 (06-28)    Liver Biochemical Testing Trend:  Alanine Aminotransferase (ALT/SGPT): 63 *H* (06-30)  Alanine Aminotransferase (ALT/SGPT): 82 *H* (06-29)  Alanine Aminotransferase (ALT/SGPT): 91 *H* (06-28)  Aspartate Aminotransferase (AST/SGOT): 30 (06-30-25 @ 07:19)  Aspartate Aminotransferase (AST/SGOT): 44 (06-29-25 @ 07:13)  Aspartate Aminotransferase (AST/SGOT): 48 (06-28-25 @ 18:44)  Bilirubin Total: 0.4 (06-30)  Bilirubin Total: 0.6 (06-29)  Bilirubin Total: 0.5 (06-28)    Auto Eosinophil %: 0.0 % (06-30-25 @ 07:23)  Auto Eosinophil %: 0.0 % (06-30-25 @ 07:17)  Auto Eosinophil %: 0.0 % (06-29-25 @ 07:15)  Auto Eosinophil %: 0.0 % (06-29-25 @ 07:15)  Auto Eosinophil %: 0.0 % (06-28-25 @ 18:44)    MICROBIOLOGY:  Urinalysis with Rflx Culture (collected 28 Jun 2025 21:12)    Culture - Blood (collected 28 Jun 2025 18:45)  Source: Blood Blood-Peripheral  Preliminary Report:    No growth at 24 hours    Culture - Blood (collected 28 Jun 2025 18:30)  Source: Blood Blood-Peripheral  Preliminary Report:    No growth at 24 hours    Procalcitonin: 0.14 (06-29)    Blood Gas Venous - Lactate: 0.9 (06-28 @ 18:40)      RADIOLOGY:  imaging below personally reviewed Patient is a 89y old  Female who presents with a chief complaint of neutropenic fever     HPI:  Ms Quinteros is a 89 yo F with a PMH of myelodysplastic syndrome last chemo tx 06/2025, right corneal transplant, HTN, HLD, hx of recurrent UTIs on Augmentin and acyclovir ppx presents with fever and malaise x 2 days.    She reports she was feeling generalized weakness for 2 days before arriving to the hospital. She last received chemo 1.5 weeks ago. She also reports mild shortness of breath that has improved, but no other URI symptoms. She was seen by pulmonology outpatient, told her anemia was likely contributing.  In the ED, found to be febrile to 100.5 F. UA was negative for infection, CXR clear. Blood cultures were drawn and she was started on zosyn. Labs were notable for pancytopenia in the setting of chemo. ANC trend 1.05 (6/28) -> 0.84 -> 0.92 -> 0.94 -> 0.95 (6/30).     She reports she is on prophylactic Augmentin for chronic UTIs as well as acyclovir at home. Per chart review, she has been on antifungals in the past for her pancytopenia.  She denies nausea, vomiting, chest pain, abd pain, urinary complaints, or headache. No known sick contacts. She has 24/7 aid at home, otherwise lives by herself. Ambulates with walker at home, mostly independent. No pets at home. No recent travel.     REVIEW OF SYSTEMS  pending full examination  prior hospital charts reviewed [V]  primary team notes reviewed [V]  other consultant notes reviewed [V]    PAST MEDICAL & SURGICAL HISTORY:  Hyperlipidemia  Palpitations  GERD (gastroesophageal reflux disease)  Chronic UTI  Post corneal transplant  AML (acute myeloid leukemia)  S/P cataract surgery    SOCIAL HISTORY:  Denied smoking/vaping/alcohol/recreational drug use. No recent travel. No pets at home. no known sick contacts.     FAMILY HISTORY:  No pertinent family history in first degree relatives    Allergies  Bactrim (Unknown)    ANTIMICROBIALS:  acyclovir   Oral Tab/Cap 200 daily  piperacillin/tazobactam IVPB.. 3.375 every 8 hours    ANTIMICROBIALS (past 90 days):  MEDICATIONS  (STANDING):  acyclovir   Oral Tab/Cap   200 milliGRAM(s) Oral (06-29-25 @ 11:47)    piperacillin/tazobactam IVPB.   200 mL/Hr IV Intermittent (06-28-25 @ 22:23)    piperacillin/tazobactam IVPB.-   25 mL/Hr IV Intermittent (06-29-25 @ 02:55)    piperacillin/tazobactam IVPB.-   25 mL/Hr IV Intermittent (06-29-25 @ 09:15)    piperacillin/tazobactam IVPB..   25 mL/Hr IV Intermittent (06-30-25 @ 05:13)   25 mL/Hr IV Intermittent (06-29-25 @ 21:40)   25 mL/Hr IV Intermittent (06-29-25 @ 13:48)    piperacillin/tazobactam IVPB...   200 mL/Hr IV Intermittent (06-28-25 @ 19:02)    vancomycin  IVPB.   250 mL/Hr IV Intermittent (06-28-25 @ 20:10)      OTHER MEDS:   MEDICATIONS  (STANDING):  acetaminophen     Tablet .. 650 every 6 hours PRN  cetirizine 10 daily  metoprolol succinate ER 50 daily  mirtazapine 7.5 at bedtime PRN  rosuvastatin 10 at bedtime      VITALS:  Vital Signs Last 24 Hrs  T(F): 97.9 (06-30-25 @ 08:11), Max: 100.5 (06-28-25 @ 17:54)    Vital Signs Last 24 Hrs  HR: 97 (06-30-25 @ 08:11) (88 - 103)  BP: 114/70 (06-30-25 @ 08:11) (91/52 - 114/70)  RR: 17 (06-30-25 @ 08:11)  SpO2: 99% (06-30-25 @ 08:11) (95% - 99%)  Wt(kg): --    EXAM:  LOS: 2d    VITALS:   T(C): 36.6 (06-30-25 @ 08:11), Max: 37.4 (06-29-25 @ 16:12)  HR: 97 (06-30-25 @ 08:11) (88 - 103)  BP: 114/70 (06-30-25 @ 08:11) (91/52 - 114/70)  RR: 17 (06-30-25 @ 08:11) (17 - 18)  SpO2: 99% (06-30-25 @ 08:11) (95% - 99%)    GENERAL: NAD, lying in bed comfortably  HEAD:  Atraumatic, Normocephalic  EYES: EOMI, PERRLA, conjunctiva and sclera clear  NECK: Supple, No JVD  CHEST/LUNG: Clear to auscultation bilaterally; No rales, rhonchi, wheezing, or rubs. Unlabored respirations  HEART: Regular rate and rhythm; No murmurs, rubs, or gallops  ABDOMEN: BSx4; Soft, nontender, nondistended  EXTREMITIES:  No clubbing, cyanosis, or edema  NERVOUS SYSTEM:  A&Ox3, no focal deficits   SKIN: No rashes or lesions      Labs:                        7.5    1.51  )-----------( 47       ( 30 Jun 2025 07:23 )             23.2     06-30    129[L]  |  97  |  6[L]  ----------------------------<  106[H]  3.7   |  18[L]  |  0.58    Ca    8.2[L]      30 Jun 2025 07:19    TPro  5.9[L]  /  Alb  2.9[L]  /  TBili  0.4  /  DBili  x   /  AST  30  /  ALT  63[H]  /  AlkPhos  64  06-30      WBC Trend:  WBC Count: 1.51 (06-30-25 @ 07:23)  WBC Count: 1.58 (06-30-25 @ 07:17)  WBC Count: 1.31 (06-29-25 @ 07:15)  WBC Count: 1.20 (06-29-25 @ 07:15)    Auto Neutrophil #: 0.95 K/uL (06-30-25 @ 07:23)  Auto Neutrophil #: 0.94 K/uL (06-30-25 @ 07:17)  Auto Neutrophil #: 0.92 K/uL (06-29-25 @ 07:15)  Auto Neutrophil #: 0.84 K/uL (06-29-25 @ 07:15)  Auto Neutrophil #: 1.05 K/uL (06-28-25 @ 18:44)      Creatine Trend:  Creatinine: 0.58 (06-30)  Creatinine: 0.60 (06-29)  Creatinine: 0.55 (06-28)    Liver Biochemical Testing Trend:  Alanine Aminotransferase (ALT/SGPT): 63 *H* (06-30)  Alanine Aminotransferase (ALT/SGPT): 82 *H* (06-29)  Alanine Aminotransferase (ALT/SGPT): 91 *H* (06-28)  Aspartate Aminotransferase (AST/SGOT): 30 (06-30-25 @ 07:19)  Aspartate Aminotransferase (AST/SGOT): 44 (06-29-25 @ 07:13)  Aspartate Aminotransferase (AST/SGOT): 48 (06-28-25 @ 18:44)  Bilirubin Total: 0.4 (06-30)  Bilirubin Total: 0.6 (06-29)  Bilirubin Total: 0.5 (06-28)    Auto Eosinophil %: 0.0 % (06-30-25 @ 07:23)  Auto Eosinophil %: 0.0 % (06-30-25 @ 07:17)  Auto Eosinophil %: 0.0 % (06-29-25 @ 07:15)  Auto Eosinophil %: 0.0 % (06-29-25 @ 07:15)  Auto Eosinophil %: 0.0 % (06-28-25 @ 18:44)    MICROBIOLOGY:  Urinalysis with Rflx Culture (collected 28 Jun 2025 21:12)    Culture - Blood (collected 28 Jun 2025 18:45)  Source: Blood Blood-Peripheral  Preliminary Report:    No growth at 24 hours    Culture - Blood (collected 28 Jun 2025 18:30)  Source: Blood Blood-Peripheral  Preliminary Report:    No growth at 24 hours    Procalcitonin: 0.14 (06-29)    Blood Gas Venous - Lactate: 0.9 (06-28 @ 18:40)      RADIOLOGY:  imaging below personally reviewed Statement Selected Patient is a 89y old  Female who presents with a chief complaint of neutropenic fever     HPI:  Ms Quinteros is a 89 yo F with a PMH of myelodysplastic syndrome last chemo tx 06/2025, right corneal transplant, HTN, HLD, hx of recurrent UTIs on Augmentin and acyclovir ppx presents with fever and malaise x 2 days.    She reports she was feeling generalized weakness for 2 days before arriving to the hospital. She last received chemo 1.5 weeks ago. She also reports mild shortness of breath that has improved, but no other URI symptoms. She was seen by pulmonology outpatient, told her anemia was likely contributing.  In the ED, found to be febrile to 100.5 F. UA was negative for infection, CXR clear. Blood cultures were drawn and she was started on zosyn. Labs were notable for pancytopenia in the setting of chemo. ANC trend 1.05 (6/28) -> 0.84 -> 0.92 -> 0.94 -> 0.95 (6/30).     She reports she is on prophylactic Augmentin for chronic UTIs as well as acyclovir at home. Per chart review, she has been on antifungals in the past for her pancytopenia.  She denies nausea, vomiting, chest pain, abd pain, urinary complaints, or headache. No known sick contacts. She has 24/7 aid at home, otherwise lives by herself. Ambulates with walker at home, mostly independent. No pets at home. No recent travel.       prior hospital charts reviewed [V]  primary team notes reviewed [V]  other consultant notes reviewed [V]        REVIEW OF SYSTEMS  gen- fever resolved, appetite improving  eyes: no discharge  ent- no ulcers/sores  cv- no chest pain  resp- no dyspnea  gi- no n/v/abd pain  gu- no hematuria  musculosk- no new back pain  skin-no rash  extremities: no swelling           PAST MEDICAL & SURGICAL HISTORY:  Hyperlipidemia  Palpitations  GERD (gastroesophageal reflux disease)  Chronic UTI  Post corneal transplant  AML (acute myeloid leukemia)  S/P cataract surgery        SOCIAL HISTORY:  from home, no smoking        FAMILY HISTORY:  No pertinent family history of leukemia in first degree relatives          Allergies  Bactrim (Unknown)    ANTIMICROBIALS:  acyclovir   Oral Tab/Cap 200 daily  piperacillin/tazobactam IVPB.. 3.375 every 8 hours    ANTIMICROBIALS (past 90 days):  MEDICATIONS  (STANDING):  acyclovir   Oral Tab/Cap   200 milliGRAM(s) Oral (06-29-25 @ 11:47)    piperacillin/tazobactam IVPB.   200 mL/Hr IV Intermittent (06-28-25 @ 22:23)    piperacillin/tazobactam IVPB.-   25 mL/Hr IV Intermittent (06-29-25 @ 02:55)    piperacillin/tazobactam IVPB.-   25 mL/Hr IV Intermittent (06-29-25 @ 09:15)    piperacillin/tazobactam IVPB..   25 mL/Hr IV Intermittent (06-30-25 @ 05:13)   25 mL/Hr IV Intermittent (06-29-25 @ 21:40)   25 mL/Hr IV Intermittent (06-29-25 @ 13:48)    piperacillin/tazobactam IVPB...   200 mL/Hr IV Intermittent (06-28-25 @ 19:02)    vancomycin  IVPB.   250 mL/Hr IV Intermittent (06-28-25 @ 20:10)      OTHER MEDS:   MEDICATIONS  (STANDING):  acetaminophen     Tablet .. 650 every 6 hours PRN  cetirizine 10 daily  metoprolol succinate ER 50 daily  mirtazapine 7.5 at bedtime PRN  rosuvastatin 10 at bedtime      VITALS:  Vital Signs Last 24 Hrs  T(F): 97.9 (06-30-25 @ 08:11), Max: 100.5 (06-28-25 @ 17:54)    Vital Signs Last 24 Hrs  HR: 97 (06-30-25 @ 08:11) (88 - 103)  BP: 114/70 (06-30-25 @ 08:11) (91/52 - 114/70)  RR: 17 (06-30-25 @ 08:11)  SpO2: 99% (06-30-25 @ 08:11) (95% - 99%)  Wt(kg): --        EXAM:    VITALS:   T(C): 36.6 (06-30-25 @ 08:11), Max: 37.4 (06-29-25 @ 16:12)  HR: 97 (06-30-25 @ 08:11) (88 - 103)  BP: 114/70 (06-30-25 @ 08:11) (91/52 - 114/70)  RR: 17 (06-30-25 @ 08:11) (17 - 18)  SpO2: 99% (06-30-25 @ 08:11) (95% - 99%)      GENERAL: NAD, lying in bed comfortably  HEAD:  Atraumatic, Normocephalic  EYES: conjunctiva and sclera clear  ENT: No oral ulcers  NECK: Supple,  CHEST/LUNG: Unlabored respirations, + air entry b/l  HEART: Regular rate and rhythm  ABDOMEN: Soft, nontender, nondistended  : No leo  EXTREMITIES:  No edema  SKIN: No rashes          Labs:                        7.5    1.51  )-----------( 47       ( 30 Jun 2025 07:23 )             23.2     06-30    129[L]  |  97  |  6[L]  ----------------------------<  106[H]  3.7   |  18[L]  |  0.58    Ca    8.2[L]      30 Jun 2025 07:19    TPro  5.9[L]  /  Alb  2.9[L]  /  TBili  0.4  /  DBili  x   /  AST  30  /  ALT  63[H]  /  AlkPhos  64  06-30      WBC Trend:  WBC Count: 1.51 (06-30-25 @ 07:23)  WBC Count: 1.58 (06-30-25 @ 07:17)  WBC Count: 1.31 (06-29-25 @ 07:15)  WBC Count: 1.20 (06-29-25 @ 07:15)    Auto Neutrophil #: 0.95 K/uL (06-30-25 @ 07:23)  Auto Neutrophil #: 0.94 K/uL (06-30-25 @ 07:17)  Auto Neutrophil #: 0.92 K/uL (06-29-25 @ 07:15)  Auto Neutrophil #: 0.84 K/uL (06-29-25 @ 07:15)  Auto Neutrophil #: 1.05 K/uL (06-28-25 @ 18:44)      Creatine Trend:  Creatinine: 0.58 (06-30)  Creatinine: 0.60 (06-29)  Creatinine: 0.55 (06-28)    Liver Biochemical Testing Trend:  Alanine Aminotransferase (ALT/SGPT): 63 *H* (06-30)  Alanine Aminotransferase (ALT/SGPT): 82 *H* (06-29)  Alanine Aminotransferase (ALT/SGPT): 91 *H* (06-28)  Aspartate Aminotransferase (AST/SGOT): 30 (06-30-25 @ 07:19)  Aspartate Aminotransferase (AST/SGOT): 44 (06-29-25 @ 07:13)  Aspartate Aminotransferase (AST/SGOT): 48 (06-28-25 @ 18:44)  Bilirubin Total: 0.4 (06-30)  Bilirubin Total: 0.6 (06-29)  Bilirubin Total: 0.5 (06-28)    Auto Eosinophil %: 0.0 % (06-30-25 @ 07:23)  Auto Eosinophil %: 0.0 % (06-30-25 @ 07:17)  Auto Eosinophil %: 0.0 % (06-29-25 @ 07:15)  Auto Eosinophil %: 0.0 % (06-29-25 @ 07:15)  Auto Eosinophil %: 0.0 % (06-28-25 @ 18:44)    MICROBIOLOGY:  Urinalysis with Rflx Culture (collected 28 Jun 2025 21:12)    Culture - Blood (collected 28 Jun 2025 18:45)  Source: Blood Blood-Peripheral  Preliminary Report:    No growth at 24 hours    Culture - Blood (collected 28 Jun 2025 18:30)  Source: Blood Blood-Peripheral  Preliminary Report:    No growth at 24 hours    Procalcitonin: 0.14 (06-29)    Blood Gas Venous - Lactate: 0.9 (06-28 @ 18:40)      RADIOLOGY:  imaging below personally reviewed except doppler      < from: Xray Chest 1 View- PORTABLE-Urgent (06.28.25 @ 19:17) >  IMPRESSION:  No focal consolidation.        < from: VA Duplex Lower Ext Vein Scan, Bilat (06.29.25 @ 09:14) >  IMPRESSION:    No acute DVT of the right or left lower extremity.

## 2025-06-30 NOTE — DIETITIAN INITIAL EVALUATION ADULT - PERTINENT LABORATORY DATA
06-30    129[L]  |  97  |  6[L]  ----------------------------<  106[H]  3.7   |  18[L]  |  0.58    Ca    8.2[L]      30 Jun 2025 07:19    TPro  5.9[L]  /  Alb  2.9[L]  /  TBili  0.4  /  DBili  x   /  AST  30  /  ALT  63[H]  /  AlkPhos  64  06-30

## 2025-06-30 NOTE — CONSULT NOTE ADULT - SUBJECTIVE AND OBJECTIVE BOX
Patient is a 89y old  Female who presents with a chief complaint of Pt is a 89 yo F with PMH: myelodysplastic syndrome last chemo treatment 6/2025, right corneal transplant, HTN, HLD. History of recurrent UTIs. Presented with fever and malaise x 2 days. ID consulted for further recommendations.  (30 Jun 2025 10:48)      HPI:  89 yo F with a PMH of myelodysplastic syndrome last chemo tx 06/2025, right corneal transplant, HTN, HLD, hx of recurrent UTIs on Augmentin ppx presents with fever and malaise x days. Pt reports feeling generally weak and has had decreased appetite. Is usually able to discern when her sxs are 2/2 UTI but does not think she is presenting that way. Has been experiencing a non prod cough over the last few days. Denies nausea, vomiting, chills, chest pain, abd pain, diarrhea, urinary complaints, headache, dizziness. No sick contacts. (28 Jun 2025 21:37)      PAST MEDICAL & SURGICAL HISTORY:  Hyperlipidemia      Palpitations      GERD (gastroesophageal reflux disease)      Chronic UTI      Post corneal transplant      AML (acute myeloid leukemia)      S/P cataract surgery          MEDICATIONS  (STANDING):  acyclovir   Oral Tab/Cap 200 milliGRAM(s) Oral daily  cetirizine 10 milliGRAM(s) Oral daily  dextrose 5% + sodium chloride 0.9%. 1000 milliLiter(s) (50 mL/Hr) IV Continuous <Continuous>  metoprolol succinate ER 50 milliGRAM(s) Oral daily  piperacillin/tazobactam IVPB.. 3.375 Gram(s) IV Intermittent every 8 hours  rosuvastatin 10 milliGRAM(s) Oral at bedtime  sodium chloride 0.9%. 1000 milliLiter(s) (50 mL/Hr) IV Continuous <Continuous>  timolol 0.5% Solution 1 Drop(s) Both EYES daily    MEDICATIONS  (PRN):  acetaminophen     Tablet .. 650 milliGRAM(s) Oral every 6 hours PRN Temp greater or equal to 38.5C (101.3F), Mild Pain (1 - 3)  artificial  tears Solution 1 Drop(s) Both EYES four times a day PRN Dry Eyes  mirtazapine 7.5 milliGRAM(s) Oral at bedtime PRN anxiety      Allergies    Bactrim (Unknown)    Intolerances        VITALS:    Vital Signs Last 24 Hrs  T(C): 36.6 (30 Jun 2025 08:11), Max: 37.4 (29 Jun 2025 16:12)  T(F): 97.9 (30 Jun 2025 08:11), Max: 99.3 (29 Jun 2025 16:12)  HR: 86 (30 Jun 2025 13:53) (86 - 103)  BP: 112/63 (30 Jun 2025 13:53) (91/52 - 114/70)  BP(mean): --  RR: 18 (30 Jun 2025 13:53) (17 - 18)  SpO2: 98% (30 Jun 2025 13:53) (95% - 99%)    Parameters below as of 30 Jun 2025 13:53  Patient On (Oxygen Delivery Method): room air        LABS:                          7.5    1.51  )-----------( 47       ( 30 Jun 2025 07:23 )             23.2       06-30    129[L]  |  97  |  6[L]  ----------------------------<  106[H]  3.7   |  18[L]  |  0.58    Ca    8.2[L]      30 Jun 2025 07:19    TPro  5.9[L]  /  Alb  2.9[L]  /  TBili  0.4  /  DBili  x   /  AST  30  /  ALT  63[H]  /  AlkPhos  64  06-30      CAPILLARY BLOOD GLUCOSE          PT/INR - ( 28 Jun 2025 18:44 )   PT: 12.7 sec;   INR: 1.12 ratio         PTT - ( 28 Jun 2025 18:44 )  PTT:28.2 sec    LOWER EXTREMITY PHYSICAL EXAM:    Vascular: DP/PT 1/4, B/L, CFT <3seconds B/L, Temperature gradient wnl, B/L.   Neuro: Epicritic sensation decreased to the level of feet, B/L.  Musculoskeletal/Ortho:No signs of infection no signs of ulcerations or deep tissue injuries both feet  Absent protective threshold patient unable to sense 5.07 semmes polo monofilament wire both feet

## 2025-06-30 NOTE — DIETITIAN INITIAL EVALUATION ADULT - PERTINENT MEDS FT
MEDICATIONS  (STANDING):  acyclovir   Oral Tab/Cap 200 milliGRAM(s) Oral daily  cetirizine 10 milliGRAM(s) Oral daily  dextrose 5% + sodium chloride 0.9%. 1000 milliLiter(s) (50 mL/Hr) IV Continuous <Continuous>  metoprolol succinate ER 50 milliGRAM(s) Oral daily  piperacillin/tazobactam IVPB.. 3.375 Gram(s) IV Intermittent every 8 hours  rosuvastatin 10 milliGRAM(s) Oral at bedtime  sodium chloride 0.9%. 1000 milliLiter(s) (50 mL/Hr) IV Continuous <Continuous>  timolol 0.5% Solution 1 Drop(s) Both EYES daily    MEDICATIONS  (PRN):  acetaminophen     Tablet .. 650 milliGRAM(s) Oral every 6 hours PRN Temp greater or equal to 38.5C (101.3F), Mild Pain (1 - 3)  artificial  tears Solution 1 Drop(s) Both EYES four times a day PRN Dry Eyes  mirtazapine 7.5 milliGRAM(s) Oral at bedtime PRN anxiety

## 2025-07-01 ENCOUNTER — TRANSCRIPTION ENCOUNTER (OUTPATIENT)
Age: 89
End: 2025-07-01

## 2025-07-01 LAB
ANION GAP SERPL CALC-SCNC: 12 MMOL/L — SIGNIFICANT CHANGE UP (ref 5–17)
BUN SERPL-MCNC: 5 MG/DL — LOW (ref 7–23)
CALCIUM SERPL-MCNC: 8.4 MG/DL — SIGNIFICANT CHANGE UP (ref 8.4–10.5)
CHLORIDE SERPL-SCNC: 99 MMOL/L — SIGNIFICANT CHANGE UP (ref 96–108)
CO2 SERPL-SCNC: 20 MMOL/L — LOW (ref 22–31)
CREAT SERPL-MCNC: 0.56 MG/DL — SIGNIFICANT CHANGE UP (ref 0.5–1.3)
EGFR: 85 ML/MIN/1.73M2 — SIGNIFICANT CHANGE UP
EGFR: 85 ML/MIN/1.73M2 — SIGNIFICANT CHANGE UP
FLUAV AG NPH QL: SIGNIFICANT CHANGE UP
FLUBV AG NPH QL: SIGNIFICANT CHANGE UP
GLUCOSE SERPL-MCNC: 100 MG/DL — HIGH (ref 70–99)
HCT VFR BLD CALC: 30.2 % — LOW (ref 34.5–45)
HGB BLD-MCNC: 9.8 G/DL — LOW (ref 11.5–15.5)
IMMATURE PLATELET FRACTION #: 6.2 K/UL — SIGNIFICANT CHANGE UP (ref 4.7–11.1)
IMMATURE PLATELET FRACTION %: 13.7 % — HIGH (ref 1.6–4.9)
MAGNESIUM SERPL-MCNC: 1.9 MG/DL — SIGNIFICANT CHANGE UP (ref 1.6–2.6)
MCHC RBC-ENTMCNC: 30.2 PG — SIGNIFICANT CHANGE UP (ref 27–34)
MCHC RBC-ENTMCNC: 32.5 G/DL — SIGNIFICANT CHANGE UP (ref 32–36)
MCV RBC AUTO: 93.2 FL — SIGNIFICANT CHANGE UP (ref 80–100)
NRBC # BLD AUTO: 0 K/UL — SIGNIFICANT CHANGE UP (ref 0–0)
NRBC # FLD: 0 K/UL — SIGNIFICANT CHANGE UP (ref 0–0)
NRBC BLD AUTO-RTO: 0 /100 WBCS — SIGNIFICANT CHANGE UP (ref 0–0)
PHOSPHATE SERPL-MCNC: 2.7 MG/DL — SIGNIFICANT CHANGE UP (ref 2.5–4.5)
PLATELET # BLD AUTO: 45 K/UL — LOW (ref 150–400)
PMV BLD: 12.8 FL — SIGNIFICANT CHANGE UP (ref 7–13)
POTASSIUM SERPL-MCNC: 3.8 MMOL/L — SIGNIFICANT CHANGE UP (ref 3.5–5.3)
POTASSIUM SERPL-SCNC: 3.8 MMOL/L — SIGNIFICANT CHANGE UP (ref 3.5–5.3)
RBC # BLD: 3.24 M/UL — LOW (ref 3.8–5.2)
RBC # FLD: 19.3 % — HIGH (ref 10.3–14.5)
RSV RNA NPH QL NAA+NON-PROBE: SIGNIFICANT CHANGE UP
SARS-COV-2 RNA SPEC QL NAA+PROBE: SIGNIFICANT CHANGE UP
SODIUM SERPL-SCNC: 131 MMOL/L — LOW (ref 135–145)
SOURCE RESPIRATORY: SIGNIFICANT CHANGE UP
WBC # BLD: 1.75 K/UL — LOW (ref 3.8–10.5)
WBC # FLD AUTO: 1.75 K/UL — LOW (ref 3.8–10.5)

## 2025-07-01 PROCEDURE — 74177 CT ABD & PELVIS W/CONTRAST: CPT | Mod: 26

## 2025-07-01 PROCEDURE — 71260 CT THORAX DX C+: CPT | Mod: 26

## 2025-07-01 RX ADMIN — Medication 25 GRAM(S): at 13:39

## 2025-07-01 RX ADMIN — ROSUVASTATIN CALCIUM 10 MILLIGRAM(S): 5 TABLET, FILM COATED ORAL at 21:37

## 2025-07-01 RX ADMIN — Medication 25 GRAM(S): at 21:37

## 2025-07-01 RX ADMIN — TIMOLOL MALEATE 1 DROP(S): 6.8 SOLUTION OPHTHALMIC at 12:47

## 2025-07-01 RX ADMIN — Medication 200 MILLIGRAM(S): at 12:48

## 2025-07-01 RX ADMIN — Medication 25 GRAM(S): at 05:30

## 2025-07-01 RX ADMIN — METOPROLOL SUCCINATE 50 MILLIGRAM(S): 50 TABLET, EXTENDED RELEASE ORAL at 05:29

## 2025-07-01 RX ADMIN — Medication 10 MILLIGRAM(S): at 12:48

## 2025-07-01 NOTE — DISCHARGE NOTE NURSING/CASE MANAGEMENT/SOCIAL WORK - NSDCPNINST_GEN_ALL_CORE
call for  follow  up  appointments     any severe pain nausea  vomiting fevers  dizzyness bleeding  any  problems call md call 911 [facility]  EMERGENCY ROOM

## 2025-07-01 NOTE — DISCHARGE NOTE NURSING/CASE MANAGEMENT/SOCIAL WORK - NSDCPEFALRISK_GEN_ALL_CORE
For information on Fall & Injury Prevention, visit: [email or web address] OR  [email or web address] OR  https://www.cdc.gov/steadi/patient.html

## 2025-07-01 NOTE — PROGRESS NOTE ADULT - NUTRITIONAL ASSESSMENT
This patient has been assessed with a concern for Malnutrition and has been determined to have a diagnosis/diagnoses of Moderate protein-calorie malnutrition.    This patient is being managed with:   Diet Regular-  Entered: Jun 28 2025  9:51PM

## 2025-07-01 NOTE — DISCHARGE NOTE NURSING/CASE MANAGEMENT/SOCIAL WORK - PATIENT PORTAL LINK FT
You can access the FollowMyHealth Patient Portal offered by [facility] by registering at the following website: [email or web address]. By joining [facility]’s FollowMyHealth portal, you will also be able to view your health information using other applications (apps) compatible with our system.

## 2025-07-01 NOTE — DISCHARGE NOTE NURSING/CASE MANAGEMENT/SOCIAL WORK - FINANCIAL ASSISTANCE
[facility] provides services at a reduced cost to those who are determined to be eligible through [facility]’s financial assistance program. Information regarding [facility]’s financial assistance program can be found by going to [email or web address] or by calling [phone].

## 2025-07-02 ENCOUNTER — TRANSCRIPTION ENCOUNTER (OUTPATIENT)
Age: 89
End: 2025-07-02

## 2025-07-02 VITALS
TEMPERATURE: 98 F | DIASTOLIC BLOOD PRESSURE: 68 MMHG | SYSTOLIC BLOOD PRESSURE: 132 MMHG | RESPIRATION RATE: 18 BRPM | HEART RATE: 84 BPM | OXYGEN SATURATION: 95 %

## 2025-07-02 PROCEDURE — 82803 BLOOD GASES ANY COMBINATION: CPT

## 2025-07-02 PROCEDURE — 36415 COLL VENOUS BLD VENIPUNCTURE: CPT

## 2025-07-02 PROCEDURE — 86900 BLOOD TYPING SEROLOGIC ABO: CPT

## 2025-07-02 PROCEDURE — 99222 1ST HOSP IP/OBS MODERATE 55: CPT | Mod: GC

## 2025-07-02 PROCEDURE — 36430 TRANSFUSION BLD/BLD COMPNT: CPT

## 2025-07-02 PROCEDURE — 80053 COMPREHEN METABOLIC PANEL: CPT

## 2025-07-02 PROCEDURE — 85610 PROTHROMBIN TIME: CPT

## 2025-07-02 PROCEDURE — 93005 ELECTROCARDIOGRAM TRACING: CPT

## 2025-07-02 PROCEDURE — 71260 CT THORAX DX C+: CPT

## 2025-07-02 PROCEDURE — 71045 X-RAY EXAM CHEST 1 VIEW: CPT

## 2025-07-02 PROCEDURE — 84295 ASSAY OF SERUM SODIUM: CPT

## 2025-07-02 PROCEDURE — 84145 PROCALCITONIN (PCT): CPT

## 2025-07-02 PROCEDURE — 82330 ASSAY OF CALCIUM: CPT

## 2025-07-02 PROCEDURE — 82947 ASSAY GLUCOSE BLOOD QUANT: CPT

## 2025-07-02 PROCEDURE — 86901 BLOOD TYPING SEROLOGIC RH(D): CPT

## 2025-07-02 PROCEDURE — 85018 HEMOGLOBIN: CPT

## 2025-07-02 PROCEDURE — 96375 TX/PRO/DX INJ NEW DRUG ADDON: CPT

## 2025-07-02 PROCEDURE — 85027 COMPLETE CBC AUTOMATED: CPT

## 2025-07-02 PROCEDURE — 0241U: CPT

## 2025-07-02 PROCEDURE — 82435 ASSAY OF BLOOD CHLORIDE: CPT

## 2025-07-02 PROCEDURE — 85730 THROMBOPLASTIN TIME PARTIAL: CPT

## 2025-07-02 PROCEDURE — 85014 HEMATOCRIT: CPT

## 2025-07-02 PROCEDURE — 84132 ASSAY OF SERUM POTASSIUM: CPT

## 2025-07-02 PROCEDURE — 83735 ASSAY OF MAGNESIUM: CPT

## 2025-07-02 PROCEDURE — 99285 EMERGENCY DEPT VISIT HI MDM: CPT | Mod: 25

## 2025-07-02 PROCEDURE — 97161 PT EVAL LOW COMPLEX 20 MIN: CPT

## 2025-07-02 PROCEDURE — 81001 URINALYSIS AUTO W/SCOPE: CPT

## 2025-07-02 PROCEDURE — 87040 BLOOD CULTURE FOR BACTERIA: CPT

## 2025-07-02 PROCEDURE — 93970 EXTREMITY STUDY: CPT

## 2025-07-02 PROCEDURE — 87637 SARSCOV2&INF A&B&RSV AMP PRB: CPT

## 2025-07-02 PROCEDURE — 80048 BASIC METABOLIC PNL TOTAL CA: CPT

## 2025-07-02 PROCEDURE — 96374 THER/PROPH/DIAG INJ IV PUSH: CPT

## 2025-07-02 PROCEDURE — 84100 ASSAY OF PHOSPHORUS: CPT

## 2025-07-02 PROCEDURE — 74177 CT ABD & PELVIS W/CONTRAST: CPT

## 2025-07-02 PROCEDURE — 86923 COMPATIBILITY TEST ELECTRIC: CPT

## 2025-07-02 PROCEDURE — 83605 ASSAY OF LACTIC ACID: CPT

## 2025-07-02 PROCEDURE — 86850 RBC ANTIBODY SCREEN: CPT

## 2025-07-02 PROCEDURE — P9040: CPT

## 2025-07-02 PROCEDURE — 85025 COMPLETE CBC W/AUTO DIFF WBC: CPT

## 2025-07-02 RX ORDER — AMOXICILLIN AND CLAVULANATE POTASSIUM 500; 125 MG/1; MG/1
1 TABLET, FILM COATED ORAL
Refills: 0 | DISCHARGE

## 2025-07-02 RX ADMIN — METOPROLOL SUCCINATE 50 MILLIGRAM(S): 50 TABLET, EXTENDED RELEASE ORAL at 05:55

## 2025-07-02 RX ADMIN — Medication 25 GRAM(S): at 05:56

## 2025-07-02 NOTE — DISCHARGE NOTE PROVIDER - NSDCMRMEDTOKEN_GEN_ALL_CORE_FT
acyclovir 200 mg oral capsule: 1 cap(s) orally once a day  Allegra: 1 tablet orally as needed for allergy symptoms  Crestor 10 mg oral tablet: 1 tab(s) orally once a day  loteprednol 0.5% ophthalmic suspension: 1 drop(s) in each eye once a day  Metoprolol Succinate ER 50 mg oral tablet, extended release: 1 tab(s) orally once a day  mirtazapine 7.5 mg oral tablet: 1 tab(s) orally once a day (at bedtime) as needed  Probiotic Tablet: 1 tablet orally once a day  Systane ophthalmic solution: 1 drop(s) in each eye once a day  timolol (as maleate) 0.5% ophthalmic solution: 1 drop(s) in each eye once a day

## 2025-07-02 NOTE — CONSULT NOTE ADULT - SUBJECTIVE AND OBJECTIVE BOX
Chief Complaint:  Patient is a 89y old  Female who presents with a chief complaint of Pt is a 89 yo F with PMH: myelodysplastic syndrome last chemo treatment 6/2025, right corneal transplant, HTN, HLD. History of recurrent UTIs. Presented with fever and malaise x 2 days. ID consulted for further recommendations.  (30 Jun 2025 10:48)      HPI:  89 yo F with a PMH of myelodysplastic syndrome last chemo tx 06/2025, right corneal transplant, HTN, HLD, hx of recurrent UTIs on Augmentin ppx presents with fever and malaise for two days days. Pt reports feeling generally weak and has had decreased appetite. Is usually able to discern when her sxs are 2/2 UTI but does not think she is presenting that way. Has been experiencing a non prod cough over the last few days. Denies nausea, vomiting, chills, chest pain, abd pain, diarrhea, urinary complaints, headache, dizziness. No sick contacts.  GI team consulted due to CT showing mildly thickened distal small bowel. Patient denies any diarrhea, nausea, vomiting, abdominal pain, melena, hematochezia.       Allergies:  Bactrim (Unknown)      Home Medications:    Hospital Medications:  acetaminophen     Tablet .. 650 milliGRAM(s) Oral every 6 hours PRN  acyclovir   Oral Tab/Cap 200 milliGRAM(s) Oral daily  artificial  tears Solution 1 Drop(s) Both EYES four times a day PRN  cetirizine 10 milliGRAM(s) Oral daily  dextrose 5% + sodium chloride 0.9%. 1000 milliLiter(s) IV Continuous <Continuous>  metoprolol succinate ER 50 milliGRAM(s) Oral daily  mirtazapine 7.5 milliGRAM(s) Oral at bedtime PRN  piperacillin/tazobactam IVPB.. 3.375 Gram(s) IV Intermittent every 8 hours  rosuvastatin 10 milliGRAM(s) Oral at bedtime  sodium chloride 0.9%. 1000 milliLiter(s) IV Continuous <Continuous>  timolol 0.5% Solution 1 Drop(s) Both EYES daily      PMHX/PSHX:  Hypertension    Hyperlipidemia    Palpitations    GERD (gastroesophageal reflux disease)    Chronic UTI    Post corneal transplant    AML (acute myeloid leukemia)    No significant past surgical history    S/P cataract surgery        Family history:  No pertinent family history in first degree relatives        Denies family history of colon cancer/polyps, stomach cancer/polyps, pancreatic cancer/masses, liver cancer/disease, ovarian cancer and endometrial cancer.    Social History:   Tob: Denies  EtOH: Denies  Illicit Drugs: Denies    ROS:     General:  No wt loss, fevers, chills, night sweats, fatigue  Eyes:  Good vision, no reported pain  ENT:  No sore throat, pain, runny nose, dysphagia  CV:  No pain, palpitations, hypo/hypertension  Pulm:  No dyspnea, cough, tachypnea, wheezing  GI:  see HPI  :  No pain, bleeding, incontinence, nocturia  Muscle:  No pain, weakness  Neuro:  No weakness, tingling, memory problems  Psych:  No fatigue, insomnia, mood problems, depression  Endocrine:  No polyuria, polydipsia, cold/heat intolerance  Heme:  No petechiae, ecchymosis, easy bruisability  Skin:  No rash, tattoos, scars, edema    PHYSICAL EXAM:     GENERAL:  No acute distress  HEENT:  NCAT, no scleral icterus   CHEST:  no respiratory distress  HEART:  Regular rate and rhythm  ABDOMEN:  Soft, non-tender, non-distended, normoactive bowel sounds  EXTREMITIES: No edema  SKIN:  No rash/erythema/ecchymoses/petechiae/wounds/abscess/warm/dry  NEURO:  Alert and oriented x 3, no asterixis    Vital Signs:  Vital Signs Last 24 Hrs  T(C): 36.9 (02 Jul 2025 04:54), Max: 37.4 (01 Jul 2025 20:07)  T(F): 98.5 (02 Jul 2025 04:54), Max: 99.3 (01 Jul 2025 20:07)  HR: 82 (02 Jul 2025 04:54) (82 - 100)  BP: 145/71 (02 Jul 2025 04:54) (131/72 - 145/71)  BP(mean): --  RR: 18 (02 Jul 2025 04:54) (17 - 18)  SpO2: 94% (02 Jul 2025 04:54) (94% - 97%)    Parameters below as of 02 Jul 2025 04:54  Patient On (Oxygen Delivery Method): room air      Daily     Daily     LABS:                        9.8    1.75  )-----------( 45       ( 01 Jul 2025 06:58 )             30.2       07-01    131[L]  |  99  |  5[L]  ----------------------------<  100[H]  3.8   |  20[L]  |  0.56    Ca    8.4      01 Jul 2025 06:56  Phos  2.7     07-01  Mg     1.9     07-01          Urinalysis Basic - ( 01 Jul 2025 06:56 )    Color: x / Appearance: x / SG: x / pH: x  Gluc: 100 mg/dL / Ketone: x  / Bili: x / Urobili: x   Blood: x / Protein: x / Nitrite: x   Leuk Esterase: x / RBC: x / WBC x   Sq Epi: x / Non Sq Epi: x / Bacteria: x                              9.8    1.75  )-----------( 45       ( 01 Jul 2025 06:58 )             30.2                         7.5    1.51  )-----------( 47       ( 30 Jun 2025 07:23 )             23.2                         7.7    1.58  )-----------( 46       ( 30 Jun 2025 07:17 )             23.5       Imaging:    < from: CT Abdomen and Pelvis w/ IV Cont (07.01.25 @ 14:00) >    IMPRESSION:    Mild thickening of the distal small bowel possibly representing enteritis.    Small right upper lobe pulmonary nodules.        --- End of Report ---    < end of copied text >

## 2025-07-02 NOTE — CONSULT NOTE ADULT - ATTENDING COMMENTS
Agree w above. 89 year old female with a PMH of myelodysplastic syndrome, HTN, HLD, recurrent UTIs on Augmentin and acyclovir presents with fever and malaise x 2 days. GI team consulted for concerns for enteritis on imaging. Patient denies any GI symptoms of abdominal pain, N/V, diarrhea. Supportive care. If develops diarrhea, please send off stool GI PCR. Please reconsult GI as needed.
Ms Quinteros is a 89 yo F with a PMH of myelodysplastic syndrome last chemo tx 06/2025, right corneal transplant, HTN, HLD, hx of recurrent UTIs on Augmentin and acyclovir ppx presenting with neutropenic fever, now improved after IV abx and fluids. Consulted to ID for further recommendations.      Overall Neutropenia, fever, immunocompromised state       - Mild neutropenia without clear source of infection at this time.   UA negative, CXR clear, BCx NGTD.   6/30 afebrile, vss, improved status back to her baseline.         - Recommend CT chest, abdomen/pelvis w/ contrast   -check full respiratory panel to rule out sources of possible infection.   -Continue IV zosyn for now.    -on acyclovir for ppx.   - trend cbc for neutropenia.       Plan discussed with consulting team.     Meredith Snider  Please contact through MS Teams   If no response or past 5 pm/weekend call [phone].

## 2025-07-02 NOTE — DISCHARGE NOTE PROVIDER - CARE PROVIDER_API CALL
Betsy Estevez  Hematology  [address], [address]  [place] [address]  Phone: [phone]  Fax: [phone]  Established Patient  Follow Up Time: 1-3 days

## 2025-07-02 NOTE — DISCHARGE NOTE PROVIDER - NSDCFUADDAPPT_GEN_ALL_CORE_FT
APPTS ARE READY TO BE MADE: [X ] YES    Best Family or Patient Contact (if needed):    Additional Information about above appointments (if needed):    1:   2:   3:     Other comments or requests:    APPTS ARE READY TO BE MADE: [X ] YES    Best Family or Patient Contact (if needed):    Additional Information about above appointments (if needed):    1:   2:   3:     Other comments or requests:   Patient was outreached but did not answer. A voicemail was left for the patient to return our call. APPTS ARE READY TO BE MADE: [X ] YES    Best Family or Patient Contact (if needed):    Additional Information about above appointments (if needed):    1:   2:   3:     Other comments or requests:   Patient advised they did not want to proceed with scheduling appointments with the providers on their referrals. They will coordinate care on their own.

## 2025-07-02 NOTE — PROGRESS NOTE ADULT - ASSESSMENT
89 f with    Neutropenia Fever  - empiric antibiotic  - procalcitonin  - LED  - ID evaluation recommendations pending   - follow CBC  - protective isolation    AML  - s/p chemo  - Hematology evaluation  called    Diarrhea  - stool c/s     Dehydration  - gentle IVF    Hyponatremia  - follow    Corneal transplant  - continue gtt    GERD  - stable    Anxiety control    Pessary  - Gyn evaluation as OTP    DVT prophylaxis  - PAS    d/w patient, HHA, ACP      Timi Henderson MD phone [phone]   
89F with MDS to AML in remission, right corneal transplant, HTN, HLD admitted with fever, weakness    Her AML, prior MDS is in remission  She is  on aza/venetoclax, completed C11 on 6/16  Mild neutropenia, ANC near 1000 from disease and chemo  She will FU with Dr. Huston as outpatient  She receives PRBC and platelets as needed. Had PRBC in hospital and hb improved, plt stable  Monitor CBC  ID following for fever- no clear source, appreciate ID, to be transitioned to oral Abx; ANC has been near 1000      
89 f with    Neutropenia Fever  - panculture  - empiric antibiotic  - procalcitonin  - LED  - ID evaluation called   - follow CBC  - protective isolation    AML  - s/p chemo  - Hematology evaluation  called    Diarrhea  - stool c/s     Dehydration  - gentle IVF    Hyponatremia  - follow    Corneal transplant  - continue gtt    GERD  - stable    Anxiety control    Pessary  - Gyn evaluation as OTP    DVT prophylaxis  - PAS    d/w patient, HHA, ACP  family    Timi Henderson MD phone [phone]   
89 f with    Neutropenia Fever  - s/p empiric antibiotic  - procalcitonin  - LED no DVT  - ID evaluation noted  - follow CBC  - protective isolation    AML  - s/p chemo  - Hematology evaluation  noted    Anemia  - s/p transfusion 1 PRBC    Diarrhea  - stool c/s     Enteritis  - continue antibiotic  - Gastroenterology evaluation noted    Dehydration  - s/p gentle IVF    Hyponatremia  - follow    Corneal transplant  - continue gtt    GERD  - stable    Anxiety control    Pessary  - Gyn evaluation as OTP    DVT prophylaxis  - PAS    DC home. Follow with PMD/ Hematology in 3-4 days .     d/w patient, HHA, ACP      Timi Henderson MD phone [phone]   
89 f with    Neutropenia Fever  - empiric antibiotic  - procalcitonin  - LED no DVT  - ID evaluation recommendations pending   - follow CBC  - protective isolation    AML  - s/p chemo  - Hematology evaluation  noted    Anemia  - s/p transfusion 1 PRBC    Diarrhea  - stool c/s     Enteritis  - continue antibiotic  - Gastroenterology evaluation    Dehydration  - s/p gentle IVF    Hyponatremia  - follow    Corneal transplant  - continue gtt    GERD  - stable    Anxiety control    Pessary  - Gyn evaluation as OTP    DVT prophylaxis  - PAS    d/w patient, HHA, ACP      Timi Henderson MD phone [phone]

## 2025-07-02 NOTE — PROGRESS NOTE ADULT - SUBJECTIVE AND OBJECTIVE BOX
Patient is a 89y old  Female who presents with a chief complaint of     SUBJECTIVE / OVERNIGHT EVENTS: c/o weakness. Had diarrhea.   Review of Systems  chest pain no  palpitations no  sob no  nausea no  headache no    MEDICATIONS  (STANDING):  acyclovir   Oral Tab/Cap 200 milliGRAM(s) Oral daily  cetirizine 10 milliGRAM(s) Oral daily  metoprolol succinate ER 50 milliGRAM(s) Oral daily  piperacillin/tazobactam IVPB.. 3.375 Gram(s) IV Intermittent every 8 hours  rosuvastatin 10 milliGRAM(s) Oral at bedtime  sodium chloride 0.9%. 1000 milliLiter(s) (50 mL/Hr) IV Continuous <Continuous>  timolol 0.5% Solution 1 Drop(s) Both EYES daily    MEDICATIONS  (PRN):  acetaminophen     Tablet .. 650 milliGRAM(s) Oral every 6 hours PRN Temp greater or equal to 38.5C (101.3F), Mild Pain (1 - 3)  artificial  tears Solution 1 Drop(s) Both EYES four times a day PRN Dry Eyes  mirtazapine 7.5 milliGRAM(s) Oral at bedtime PRN anxiety      Vital Signs Last 24 Hrs  T(C): 36.7 (29 Jun 2025 08:51), Max: 38.1 (28 Jun 2025 17:54)  T(F): 98.1 (29 Jun 2025 08:51), Max: 100.5 (28 Jun 2025 17:54)  HR: 84 (29 Jun 2025 08:51) (80 - 94)  BP: 114/68 (29 Jun 2025 08:51) (112/68 - 145/67)  BP(mean): 97 (28 Jun 2025 18:51) (97 - 97)  RR: 18 (29 Jun 2025 08:51) (17 - 20)  SpO2: 96% (29 Jun 2025 08:51) (96% - 98%)    Parameters below as of 29 Jun 2025 08:51  Patient On (Oxygen Delivery Method): room air        PHYSICAL EXAM:  GENERAL: NAD  HEAD:  Atraumatic, Normocephalic  EYES: EOMI, PERRLA, conjunctiva and sclera clear  NECK: Supple, No JVD  CHEST/LUNG: Clear to auscultation bilaterally; No wheeze  HEART: Regular rate and rhythm; No murmurs, rubs, or gallops  ABDOMEN: Soft, Nontender, Nondistended; Bowel sounds present  EXTREMITIES:  2+ Peripheral Pulses, No clubbing, cyanosis, or edema  PSYCH: AAOx3  NEUROLOGY: non-focal  SKIN: No rashes or lesions    LABS:                        8.9    1.31  )-----------( 50       ( 29 Jun 2025 07:15 )             27.2     06-29    126[L]  |  93[L]  |  9   ----------------------------<  113[H]  4.0   |  19[L]  |  0.60    Ca    8.7      29 Jun 2025 07:13    TPro  6.5  /  Alb  3.4  /  TBili  0.6  /  DBili  x   /  AST  44[H]  /  ALT  82[H]  /  AlkPhos  77  06-29    PT/INR - ( 28 Jun 2025 18:44 )   PT: 12.7 sec;   INR: 1.12 ratio         PTT - ( 28 Jun 2025 18:44 )  PTT:28.2 sec      Urinalysis Basic - ( 29 Jun 2025 07:13 )    Color: x / Appearance: x / SG: x / pH: x  Gluc: 113 mg/dL / Ketone: x  / Bili: x / Urobili: x   Blood: x / Protein: x / Nitrite: x   Leuk Esterase: x / RBC: x / WBC x   Sq Epi: x / Non Sq Epi: x / Bacteria: x        Urinalysis with Rflx Culture (collected 28 Jun 2025 21:12)        RADIOLOGY & ADDITIONAL TESTS:    Imaging Personally Reviewed:    Consultant(s) Notes Reviewed:      Care Discussed with Consultants/Other Providers:  
Patient is a 89y old  Female who presents with a chief complaint of Neutropenic Fever (30 Jun 2025 09:12)      SUBJECTIVE / OVERNIGHT EVENTS: Comfortable without new complaints. Feels better.   Review of Systems  chest pain no  palpitations no  sob no  nausea no  headache no    MEDICATIONS  (STANDING):  acyclovir   Oral Tab/Cap 200 milliGRAM(s) Oral daily  cetirizine 10 milliGRAM(s) Oral daily  dextrose 5% + sodium chloride 0.9%. 1000 milliLiter(s) (50 mL/Hr) IV Continuous <Continuous>  metoprolol succinate ER 50 milliGRAM(s) Oral daily  piperacillin/tazobactam IVPB.. 3.375 Gram(s) IV Intermittent every 8 hours  rosuvastatin 10 milliGRAM(s) Oral at bedtime  sodium chloride 0.9%. 1000 milliLiter(s) (50 mL/Hr) IV Continuous <Continuous>  timolol 0.5% Solution 1 Drop(s) Both EYES daily    MEDICATIONS  (PRN):  acetaminophen     Tablet .. 650 milliGRAM(s) Oral every 6 hours PRN Temp greater or equal to 38.5C (101.3F), Mild Pain (1 - 3)  artificial  tears Solution 1 Drop(s) Both EYES four times a day PRN Dry Eyes  mirtazapine 7.5 milliGRAM(s) Oral at bedtime PRN anxiety      Vital Signs Last 24 Hrs  T(C): 36.6 (30 Jun 2025 08:11), Max: 37.4 (29 Jun 2025 16:12)  T(F): 97.9 (30 Jun 2025 08:11), Max: 99.3 (29 Jun 2025 16:12)  HR: 97 (30 Jun 2025 08:11) (88 - 103)  BP: 114/70 (30 Jun 2025 08:11) (91/52 - 114/70)  BP(mean): --  RR: 17 (30 Jun 2025 08:11) (17 - 18)  SpO2: 99% (30 Jun 2025 08:11) (95% - 99%)    Parameters below as of 30 Jun 2025 08:11  Patient On (Oxygen Delivery Method): room air        PHYSICAL EXAM:  GENERAL: NAD   HEAD:  Atraumatic, Normocephalic  EYES: EOMI, PERRLA, conjunctiva and sclera clear  NECK: Supple, No JVD  CHEST/LUNG: Clear to auscultation bilaterally; No wheeze  HEART: Regular rate and rhythm; No murmurs, rubs, or gallops  ABDOMEN: Soft, Nontender, Nondistended; Bowel sounds present  EXTREMITIES:  2+ Peripheral Pulses, No clubbing, cyanosis, or edema  PSYCH: AAOx3  NEUROLOGY: non-focal  SKIN: No rashes or lesions    LABS:                        7.5    1.51  )-----------( 47       ( 30 Jun 2025 07:23 )             23.2     06-30    129[L]  |  97  |  6[L]  ----------------------------<  106[H]  3.7   |  18[L]  |  0.58    Ca    8.2[L]      30 Jun 2025 07:19    TPro  5.9[L]  /  Alb  2.9[L]  /  TBili  0.4  /  DBili  x   /  AST  30  /  ALT  63[H]  /  AlkPhos  64  06-30    PT/INR - ( 28 Jun 2025 18:44 )   PT: 12.7 sec;   INR: 1.12 ratio         PTT - ( 28 Jun 2025 18:44 )  PTT:28.2 sec      Urinalysis Basic - ( 30 Jun 2025 07:19 )    Color: x / Appearance: x / SG: x / pH: x  Gluc: 106 mg/dL / Ketone: x  / Bili: x / Urobili: x   Blood: x / Protein: x / Nitrite: x   Leuk Esterase: x / RBC: x / WBC x   Sq Epi: x / Non Sq Epi: x / Bacteria: x        Urinalysis with Rflx Culture (collected 28 Jun 2025 21:12)    Culture - Blood (collected 28 Jun 2025 18:45)  Source: Blood Blood-Peripheral  Preliminary Report (30 Jun 2025 01:02):    No growth at 24 hours    Culture - Blood (collected 28 Jun 2025 18:30)  Source: Blood Blood-Peripheral  Preliminary Report (30 Jun 2025 01:02):    No growth at 24 hours        RADIOLOGY & ADDITIONAL TESTS:    Imaging Personally Reviewed:    Consultant(s) Notes Reviewed:      Care Discussed with Consultants/Other Providers:  
Patient is a 89y old  Female who presents with a chief complaint of Pt is a 89 yo F with PMH: myelodysplastic syndrome last chemo treatment 6/2025, right corneal transplant, HTN, HLD. History of recurrent UTIs. Presented with fever and malaise x 2 days. ID consulted for further recommendations.  (30 Jun 2025 10:48)      SUBJECTIVE / OVERNIGHT EVENTS: feels better.  Review of Systems  chest pain no  palpitations no  sob no  nausea no  headache no    MEDICATIONS  (STANDING):  acyclovir   Oral Tab/Cap 200 milliGRAM(s) Oral daily  cetirizine 10 milliGRAM(s) Oral daily  dextrose 5% + sodium chloride 0.9%. 1000 milliLiter(s) (50 mL/Hr) IV Continuous <Continuous>  metoprolol succinate ER 50 milliGRAM(s) Oral daily  piperacillin/tazobactam IVPB.. 3.375 Gram(s) IV Intermittent every 8 hours  rosuvastatin 10 milliGRAM(s) Oral at bedtime  sodium chloride 0.9%. 1000 milliLiter(s) (50 mL/Hr) IV Continuous <Continuous>  timolol 0.5% Solution 1 Drop(s) Both EYES daily    MEDICATIONS  (PRN):  acetaminophen     Tablet .. 650 milliGRAM(s) Oral every 6 hours PRN Temp greater or equal to 38.5C (101.3F), Mild Pain (1 - 3)  artificial  tears Solution 1 Drop(s) Both EYES four times a day PRN Dry Eyes  mirtazapine 7.5 milliGRAM(s) Oral at bedtime PRN anxiety      Vital Signs Last 24 Hrs  T(C): 36.8 (01 Jul 2025 14:14), Max: 37.2 (30 Jun 2025 17:35)  T(F): 98.3 (01 Jul 2025 14:14), Max: 99 (30 Jun 2025 17:35)  HR: 100 (01 Jul 2025 14:14) (80 - 100)  BP: 138/75 (01 Jul 2025 14:14) (107/57 - 150/69)  BP(mean): --  RR: 17 (01 Jul 2025 14:14) (17 - 18)  SpO2: 97% (01 Jul 2025 14:14) (96% - 99%)    Parameters below as of 01 Jul 2025 04:31  Patient On (Oxygen Delivery Method): room air        PHYSICAL EXAM:  GENERAL: NAD  HEAD:  Atraumatic, Normocephalic  EYES: EOMI, PERRLA, conjunctiva and sclera clear  NECK: Supple, No JVD  CHEST/LUNG: Clear to auscultation bilaterally; No wheeze  HEART: Regular rate and rhythm; No murmurs, rubs, or gallops  ABDOMEN: Soft, Nontender, Nondistended; Bowel sounds present  EXTREMITIES:  2+ Peripheral Pulses, No clubbing, cyanosis, or edema  PSYCH: AAOx3  NEUROLOGY: non-focal  SKIN: No rashes or lesions    LABS:                        9.8    1.75  )-----------( 45       ( 01 Jul 2025 06:58 )             30.2     07-01    131[L]  |  99  |  5[L]  ----------------------------<  100[H]  3.8   |  20[L]  |  0.56    Ca    8.4      01 Jul 2025 06:56  Phos  2.7     07-01  Mg     1.9     07-01    TPro  5.9[L]  /  Alb  2.9[L]  /  TBili  0.4  /  DBili  x   /  AST  30  /  ALT  63[H]  /  AlkPhos  64  06-30          Urinalysis Basic - ( 01 Jul 2025 06:56 )    Color: x / Appearance: x / SG: x / pH: x  Gluc: 100 mg/dL / Ketone: x  / Bili: x / Urobili: x   Blood: x / Protein: x / Nitrite: x   Leuk Esterase: x / RBC: x / WBC x   Sq Epi: x / Non Sq Epi: x / Bacteria: x        Urinalysis with Rflx Culture (collected 28 Jun 2025 21:12)    Culture - Blood (collected 28 Jun 2025 18:45)  Source: Blood Blood-Peripheral  Preliminary Report (01 Jul 2025 01:02):    No growth at 48 Hours    Culture - Blood (collected 28 Jun 2025 18:30)  Source: Blood Blood-Peripheral  Preliminary Report (01 Jul 2025 01:02):    No growth at 48 Hours        RADIOLOGY & ADDITIONAL TESTS:    Imaging Personally Reviewed:  < from: CT Chest w/ IV Cont (07.01.25 @ 14:00) >  IMPRESSION:    Mild thickening of the distal small bowel possibly representing enteritis.    Small right upper lobe pulmonary nodules.    < end of copied text >    Consultant(s) Notes Reviewed:      Care Discussed with Consultants/Other Providers:  
HPI:  89 yo F with a PMH of myelodysplastic syndrome last chemo tx 06/2025, right corneal transplant, HTN, HLD, hx of recurrent UTIs on Augmentin ppx presented 6/28/25 with fever and malaise.   Pt follows with Dr. Huston, on venetoclax and azacytadine, completed C11 on 6/16. received 1 unit PRBC 6/17, received platelets 6/25  PAST MEDICAL & SURGICAL HISTORY:  Hyperlipidemia      Palpitations      GERD (gastroesophageal reflux disease)      Chronic UTI      Post corneal transplant      AML (acute myeloid leukemia)      S/P cataract surgery        Allergies    Bactrim (Unknown)    Intolerances      Social History:  Social History:    Marital Status:  (   )    (   ) Single    (   )    (x  )   Occupation: retired  Lives with: (  x) alone  (  ) children   (  ) spouse   (  ) parents  (  ) other    Substance Use (street drugs): ( x ) never used  (  ) other:  Tobacco Usage:  ( x  ) never smoked   (   ) former smoker   (   ) current smoker  (     ) pack years  (        ) last cigarette date  Alcohol Usage: no    (     ) Advanced Directives: (     ) None    (      ) DNR    (     ) DNI    (     ) Health Care Proxy: (28 Jun 2025 21:37)    Medications:  acetaminophen     Tablet .. 650 milliGRAM(s) Oral every 6 hours PRN Temp greater or equal to 38.5C (101.3F), Mild Pain (1 - 3)  acyclovir   Oral Tab/Cap 200 milliGRAM(s) Oral daily  artificial  tears Solution 1 Drop(s) Both EYES four times a day PRN Dry Eyes  cetirizine 10 milliGRAM(s) Oral daily  dextrose 5% + sodium chloride 0.9%. 1000 milliLiter(s) IV Continuous <Continuous>  metoprolol succinate ER 50 milliGRAM(s) Oral daily  mirtazapine 7.5 milliGRAM(s) Oral at bedtime PRN anxiety  piperacillin/tazobactam IVPB.. 3.375 Gram(s) IV Intermittent every 8 hours  rosuvastatin 10 milliGRAM(s) Oral at bedtime  sodium chloride 0.9%. 1000 milliLiter(s) IV Continuous <Continuous>  timolol 0.5% Solution 1 Drop(s) Both EYES daily    Labs:  CBC Full  -  ( 01 Jul 2025 06:58 )  WBC Count : 1.75 K/uL  RBC Count : 3.24 M/uL  Hemoglobin : 9.8 g/dL  Hematocrit : 30.2 %  Platelet Count - Automated : 45 K/uL  Mean Cell Volume : 93.2 fl  Mean Cell Hemoglobin : 30.2 pg  Mean Cell Hemoglobin Concentration : 32.5 g/dL  Auto Neutrophil # : x  Auto Lymphocyte # : x  Auto Monocyte # : x  Auto Eosinophil # : x  Auto Basophil # : x  Auto Neutrophil % : x  Auto Lymphocyte % : x  Auto Monocyte % : x  Auto Eosinophil % : x  Auto Basophil % : x    07-01    131[L]  |  99  |  5[L]  ----------------------------<  100[H]  3.8   |  20[L]  |  0.56    Ca    8.4      01 Jul 2025 06:56  Phos  2.7     07-01  Mg     1.9     07-01    TPro  5.9[L]  /  Alb  2.9[L]  /  TBili  0.4  /  DBili  x   /  AST  30  /  ALT  63[H]  /  AlkPhos  64  06-30      Radiology:             ROS:  Patient comfortable without distress  No SOB or chest pain  No palpitation  No abdominal pain, diarrhaea or constipation  No weakness of extremities  No skin changes or swelling of legs  Rest of the comprehensive ROS was negative  Vital Signs Last 24 Hrs  T(C): 36.8 (01 Jul 2025 14:14), Max: 37.2 (30 Jun 2025 17:35)  T(F): 98.3 (01 Jul 2025 14:14), Max: 99 (30 Jun 2025 17:35)  HR: 100 (01 Jul 2025 14:14) (80 - 100)  BP: 138/75 (01 Jul 2025 14:14) (107/57 - 150/69)  BP(mean): --  RR: 17 (01 Jul 2025 14:14) (17 - 18)  SpO2: 97% (01 Jul 2025 14:14) (96% - 99%)    Parameters below as of 01 Jul 2025 04:31  Patient On (Oxygen Delivery Method): room air        Physical exam:  PHYSICAL EXAM  General: adult in NAD  HEENT: clear oropharynx, anicteric sclera, pink conjunctiva  Neck: supple  CV: normal S1/S2   Lungs: clear to auscultation, no wheezes, no rales  Abdomen: soft non-tender non-distended,  positive bowel sounds  Ext: no calf tenderness  Skin: no rashes and no petechiae  Lymph Nodes: No LAD in neck  Neuro: alert and oriented X 3      Assessment and Plan:
Patient is a 89y old  Female who presents with a chief complaint of Pt is a 89 yo F with PMH: myelodysplastic syndrome last chemo treatment 6/2025, right corneal transplant, HTN, HLD. History of recurrent UTIs. Presented with fever and malaise x 2 days. ID consulted for further recommendations.  (30 Jun 2025 10:48)      SUBJECTIVE / OVERNIGHT EVENTS: Comfortable without new complaints. HHA at bedside.   Review of Systems  chest pain no  palpitations no  sob no  nausea no  headache no    MEDICATIONS  (STANDING):  acyclovir   Oral Tab/Cap 200 milliGRAM(s) Oral daily  cetirizine 10 milliGRAM(s) Oral daily  dextrose 5% + sodium chloride 0.9%. 1000 milliLiter(s) (50 mL/Hr) IV Continuous <Continuous>  metoprolol succinate ER 50 milliGRAM(s) Oral daily  piperacillin/tazobactam IVPB.. 3.375 Gram(s) IV Intermittent every 8 hours  rosuvastatin 10 milliGRAM(s) Oral at bedtime  sodium chloride 0.9%. 1000 milliLiter(s) (50 mL/Hr) IV Continuous <Continuous>  timolol 0.5% Solution 1 Drop(s) Both EYES daily    MEDICATIONS  (PRN):  acetaminophen     Tablet .. 650 milliGRAM(s) Oral every 6 hours PRN Temp greater or equal to 38.5C (101.3F), Mild Pain (1 - 3)  artificial  tears Solution 1 Drop(s) Both EYES four times a day PRN Dry Eyes  mirtazapine 7.5 milliGRAM(s) Oral at bedtime PRN anxiety      Vital Signs Last 24 Hrs  T(C): 36.9 (02 Jul 2025 09:17), Max: 37.4 (01 Jul 2025 20:07)  T(F): 98.5 (02 Jul 2025 09:17), Max: 99.3 (01 Jul 2025 20:07)  HR: 84 (02 Jul 2025 09:17) (82 - 87)  BP: 132/68 (02 Jul 2025 09:17) (131/72 - 145/71)  BP(mean): --  RR: 18 (02 Jul 2025 09:17) (18 - 18)  SpO2: 95% (02 Jul 2025 09:17) (94% - 97%)    Parameters below as of 02 Jul 2025 09:17  Patient On (Oxygen Delivery Method): room air        PHYSICAL EXAM:  GENERAL: NAD, well-developed  HEAD:  Atraumatic, Normocephalic  EYES: EOMI, PERRLA, conjunctiva and sclera clear  NECK: Supple, No JVD  CHEST/LUNG: Clear to auscultation bilaterally; No wheeze  HEART: Regular rate and rhythm; No murmurs, rubs, or gallops  ABDOMEN: Soft, Nontender, Nondistended; Bowel sounds present  EXTREMITIES:  2+ Peripheral Pulses, No clubbing, cyanosis, or edema  PSYCH: AAOx3  NEUROLOGY: non-focal  SKIN: No rashes or lesions    LABS:                        9.8    1.75  )-----------( 45       ( 01 Jul 2025 06:58 )             30.2     07-01    131[L]  |  99  |  5[L]  ----------------------------<  100[H]  3.8   |  20[L]  |  0.56    Ca    8.4      01 Jul 2025 06:56  Phos  2.7     07-01  Mg     1.9     07-01            Urinalysis Basic - ( 01 Jul 2025 06:56 )    Color: x / Appearance: x / SG: x / pH: x  Gluc: 100 mg/dL / Ketone: x  / Bili: x / Urobili: x   Blood: x / Protein: x / Nitrite: x   Leuk Esterase: x / RBC: x / WBC x   Sq Epi: x / Non Sq Epi: x / Bacteria: x          RADIOLOGY & ADDITIONAL TESTS:    Imaging Personally Reviewed:  < from: CT Chest w/ IV Cont (07.01.25 @ 14:00) >  IMPRESSION:    Mild thickening of the distal small bowel possibly representing enteritis.    Small right upper lobe pulmonary nodules.    < end of copied text >    Consultant(s) Notes Reviewed:      Care Discussed with Consultants/Other Providers:

## 2025-07-02 NOTE — DISCHARGE NOTE PROVIDER - HOSPITAL COURSE
HPI:  89 yo F with a PMH of myelodysplastic syndrome last chemo tx 06/2025, right corneal transplant, HTN, HLD, hx of recurrent UTIs on Augmentin ppx presents with fever and malaise x days. Pt reports feeling generally weak and has had decreased appetite. Is usually able to discern when her sxs are 2/2 UTI but does not think she is presenting that way. Has been experiencing a non prod cough over the last few days. Denies nausea, vomiting, chills, chest pain, abd pain, diarrhea, urinary complaints, headache, dizziness. No sick contacts. (28 Jun 2025 21:37)    Hospital Course:      Important Medication Changes and Reason:    Active or Pending Issues Requiring Follow-up:    Advanced Directives:   [ ] Full code  [ ] DNR  [ ] Hospice    Discharge Diagnoses:         HPI:  89 yo F with a PMH of myelodysplastic syndrome last chemo tx 06/2025, right corneal transplant, HTN, HLD, hx of recurrent UTIs on Augmentin ppx presents with fever and malaise x days. Pt reports feeling generally weak and has had decreased appetite. Is usually able to discern when her sxs are 2/2 UTI but does not think she is presenting that way. Has been experiencing a non prod cough over the last few days. Denies nausea, vomiting, chills, chest pain, abd pain, diarrhea, urinary complaints, headache, dizziness. No sick contacts. (28 Jun 2025 21:37)    Hospital Course:  Pt admitted for neutropenic fever. ID was consulted; pt was started on empiric Zosyn. Infectious work-up including UA, BCx, CXR neg. CTAP noted w enteritis for which GI was consulted - rec no treatment at this time given no clinical symptoms. ANC trend improving. Pt received 1u PRBC this admission, Hgb now improved to baseline. Pt is now medically stable and cleared for discharge home.  Discharge and medication reconciliation reviewed with attending.    Important Medication Changes and Reason:    Active or Pending Issues Requiring Follow-up:  - f/u with Heme/Onc Dr. Huston    Advanced Directives:   [x ] Full code  [ ] DNR  [ ] Hospice    Discharge Diagnoses:  Neutropenic Fever  Enteritis

## 2025-07-02 NOTE — DISCHARGE NOTE PROVIDER - NSDCCPCAREPLAN_GEN_ALL_CORE_FT
PRINCIPAL DISCHARGE DIAGNOSIS  Diagnosis: Neutropenic fever  Assessment and Plan of Treatment: You were treated empirically with antibiotics, now resolved.   Follow up with your PCP and Heme/Onc physician.      SECONDARY DISCHARGE DIAGNOSES  Diagnosis: AML (acute myeloid leukemia)  Assessment and Plan of Treatment: Follow up with your Heme/Onc physician upon discharge.

## 2025-07-02 NOTE — CONSULT NOTE ADULT - ASSESSMENT
Ms Quinteros is a 89 yo F with a PMH of myelodysplastic syndrome last chemo tx 06/2025, right corneal transplant, HTN, HLD, hx of recurrent UTIs on Augmentin and acyclovir ppx presents with fever and malaise x 2 days.  GI team consulted for concerns for enteritis on imaging. However, patient without any clinical signs- denying any diarrhea, nausea, abdominal pain.     #MDS  #Recurrent UTIs on Augmentin     Recommendations:   -would not treat enteritis given no clinical symptoms  -antibiotics for neutropenic fever per ID  -remainder of care per primary team    GI will sign off at this time. Please feel free to reconsult as needed.     Note incomplete until finalized by attending signature/attestation.    Lakeshia Tatum  GI/Hepatology Fellow    MONDAY-FRIDAY 8AM-5PM:  Pager# [phone] ([facility]) or [phone] ([facility])    NON-URGENT CONSULTS:  Please email [email or web address]  AT NIGHT AND ON WEEKENDS:  Contact on-call GI fellow from 5pm-8am and on weekends/holidays   Ms Quinteros is a 89 yo F with a PMH of myelodysplastic syndrome last chemo tx 06/2025, right corneal transplant, HTN, HLD, hx of recurrent UTIs on Augmentin and acyclovir ppx presents with fever and malaise x 2 days.  GI team consulted for concerns for enteritis on imaging. However, patient without any clinical signs- denying any diarrhea, nausea, abdominal pain.     #MDS  #Recurrent UTIs on Augmentin     Recommendations:   -would not treat enteritis given no clinical symptoms  -Check GI PCR and stool culture if patient develops diarrhea   -antibiotics for neutropenic fever per ID  -remainder of care per primary team    GI will sign off at this time. Please feel free to reconsult as needed.     Note incomplete until finalized by attending signature/attestation.    Lakeshia Tatum  GI/Hepatology Fellow    MONDAY-FRIDAY 8AM-5PM:  Pager# [phone] ([facility]) or [phone] ([facility])    NON-URGENT CONSULTS:  Please email [email or web address]  AT NIGHT AND ON WEEKENDS:  Contact on-call GI fellow from 5pm-8am and on weekends/holidays

## 2025-07-04 LAB
CULTURE RESULTS: SIGNIFICANT CHANGE UP
CULTURE RESULTS: SIGNIFICANT CHANGE UP
SPECIMEN SOURCE: SIGNIFICANT CHANGE UP
SPECIMEN SOURCE: SIGNIFICANT CHANGE UP

## 2025-07-21 ENCOUNTER — EMERGENCY (EMERGENCY)
Facility: HOSPITAL | Age: 89
LOS: 1 days | End: 2025-07-21
Attending: STUDENT IN AN ORGANIZED HEALTH CARE EDUCATION/TRAINING PROGRAM | Admitting: STUDENT IN AN ORGANIZED HEALTH CARE EDUCATION/TRAINING PROGRAM
Payer: MEDICARE

## 2025-07-21 VITALS
DIASTOLIC BLOOD PRESSURE: 68 MMHG | RESPIRATION RATE: 17 BRPM | TEMPERATURE: 98 F | SYSTOLIC BLOOD PRESSURE: 149 MMHG | OXYGEN SATURATION: 99 % | HEART RATE: 85 BPM

## 2025-07-21 VITALS
OXYGEN SATURATION: 97 % | HEART RATE: 88 BPM | TEMPERATURE: 98 F | DIASTOLIC BLOOD PRESSURE: 83 MMHG | HEIGHT: 62 IN | SYSTOLIC BLOOD PRESSURE: 151 MMHG | WEIGHT: 130.95 LBS | RESPIRATION RATE: 18 BRPM

## 2025-07-21 DIAGNOSIS — Z98.49 CATARACT EXTRACTION STATUS, UNSPECIFIED EYE: Chronic | ICD-10-CM

## 2025-07-21 LAB
ALBUMIN SERPL ELPH-MCNC: 3.6 G/DL — SIGNIFICANT CHANGE UP (ref 3.3–5)
ALP SERPL-CCNC: 80 U/L — SIGNIFICANT CHANGE UP (ref 40–120)
ALT FLD-CCNC: 108 U/L — HIGH (ref 4–33)
ANION GAP SERPL CALC-SCNC: 9 MMOL/L — SIGNIFICANT CHANGE UP (ref 7–14)
ANISOCYTOSIS BLD QL: SLIGHT — SIGNIFICANT CHANGE UP
APTT BLD: 29.9 SEC — SIGNIFICANT CHANGE UP (ref 26.1–36.8)
AST SERPL-CCNC: 46 U/L — HIGH (ref 4–32)
BASOPHILS # BLD AUTO: 0.01 K/UL — SIGNIFICANT CHANGE UP (ref 0–0.2)
BASOPHILS # BLD MANUAL: 0 K/UL — SIGNIFICANT CHANGE UP (ref 0–0.2)
BASOPHILS NFR BLD AUTO: 0.5 % — SIGNIFICANT CHANGE UP (ref 0–2)
BASOPHILS NFR BLD MANUAL: 0 % — SIGNIFICANT CHANGE UP (ref 0–2)
BILIRUB SERPL-MCNC: 0.6 MG/DL — SIGNIFICANT CHANGE UP (ref 0.2–1.2)
BUN SERPL-MCNC: 11 MG/DL — SIGNIFICANT CHANGE UP (ref 7–23)
CALCIUM SERPL-MCNC: 9.1 MG/DL — SIGNIFICANT CHANGE UP (ref 8.4–10.5)
CHLORIDE SERPL-SCNC: 93 MMOL/L — LOW (ref 98–107)
CO2 SERPL-SCNC: 24 MMOL/L — SIGNIFICANT CHANGE UP (ref 22–31)
CREAT SERPL-MCNC: 0.47 MG/DL — LOW (ref 0.5–1.3)
EGFR: 89 ML/MIN/1.73M2 — SIGNIFICANT CHANGE UP
EGFR: 89 ML/MIN/1.73M2 — SIGNIFICANT CHANGE UP
EOSINOPHIL # BLD AUTO: 0 K/UL — SIGNIFICANT CHANGE UP (ref 0–0.5)
EOSINOPHIL # BLD MANUAL: 0 K/UL — SIGNIFICANT CHANGE UP (ref 0–0.5)
EOSINOPHIL NFR BLD AUTO: 0 % — SIGNIFICANT CHANGE UP (ref 0–6)
EOSINOPHIL NFR BLD MANUAL: 0 % — SIGNIFICANT CHANGE UP (ref 0–6)
GLUCOSE SERPL-MCNC: 102 MG/DL — HIGH (ref 70–99)
HCT VFR BLD CALC: 23.2 % — LOW (ref 34.5–45)
HGB BLD-MCNC: 7.7 G/DL — LOW (ref 11.5–15.5)
IMM GRANULOCYTES # BLD AUTO: 0.02 K/UL — SIGNIFICANT CHANGE UP (ref 0–0.07)
IMM GRANULOCYTES NFR BLD AUTO: 1 % — HIGH (ref 0–0.9)
INR BLD: 0.99 RATIO — SIGNIFICANT CHANGE UP (ref 0.85–1.16)
LYMPHOCYTES # BLD AUTO: 0.36 K/UL — LOW (ref 1–3.3)
LYMPHOCYTES # BLD MANUAL: 0.28 K/UL — LOW (ref 1–3.3)
LYMPHOCYTES NFR BLD AUTO: 18 % — SIGNIFICANT CHANGE UP (ref 13–44)
LYMPHOCYTES NFR BLD MANUAL: 13.9 % — SIGNIFICANT CHANGE UP (ref 13–44)
MANUAL MYELOCYTE #: 0.02 K/UL — HIGH (ref 0–0)
MANUAL REACTIVE LYMPHOCYTES #: 0.02 K/UL — SIGNIFICANT CHANGE UP (ref 0–0.63)
MCHC RBC-ENTMCNC: 30.8 PG — SIGNIFICANT CHANGE UP (ref 27–34)
MCHC RBC-ENTMCNC: 33.2 G/DL — SIGNIFICANT CHANGE UP (ref 32–36)
MCV RBC AUTO: 92.8 FL — SIGNIFICANT CHANGE UP (ref 80–100)
MONOCYTES # BLD AUTO: 0.21 K/UL — SIGNIFICANT CHANGE UP (ref 0–0.9)
MONOCYTES # BLD MANUAL: 0.12 K/UL — SIGNIFICANT CHANGE UP (ref 0–0.9)
MONOCYTES NFR BLD AUTO: 10.5 % — SIGNIFICANT CHANGE UP (ref 2–14)
MONOCYTES NFR BLD MANUAL: 6.1 % — SIGNIFICANT CHANGE UP (ref 2–14)
MYELOCYTES NFR BLD: 0.9 % — HIGH (ref 0–0)
NEUTROPHILS # BLD AUTO: 1.4 K/UL — LOW (ref 1.8–7.4)
NEUTROPHILS # BLD MANUAL: 1.56 K/UL — LOW (ref 1.8–7.4)
NEUTROPHILS NFR BLD AUTO: 70 % — SIGNIFICANT CHANGE UP (ref 43–77)
NEUTROPHILS NFR BLD MANUAL: 78.2 % — HIGH (ref 43–77)
NRBC # BLD AUTO: 0 K/UL — SIGNIFICANT CHANGE UP (ref 0–0)
NRBC # FLD: 0 K/UL — SIGNIFICANT CHANGE UP (ref 0–0)
NRBC BLD AUTO-RTO: 0 /100 WBCS — SIGNIFICANT CHANGE UP (ref 0–0)
NT-PROBNP SERPL-SCNC: 136 PG/ML — SIGNIFICANT CHANGE UP
OVALOCYTES BLD QL SMEAR: SLIGHT — SIGNIFICANT CHANGE UP
PLAT MORPH BLD: ABNORMAL
PLATELET # BLD AUTO: 38 K/UL — LOW (ref 150–400)
PLATELET COUNT - ESTIMATE: ABNORMAL
PMV BLD: SIGNIFICANT CHANGE UP FL (ref 7–13)
POIKILOCYTOSIS BLD QL AUTO: SLIGHT — SIGNIFICANT CHANGE UP
POLYCHROMASIA BLD QL SMEAR: SLIGHT — SIGNIFICANT CHANGE UP
POTASSIUM SERPL-MCNC: 4.5 MMOL/L — SIGNIFICANT CHANGE UP (ref 3.5–5.3)
POTASSIUM SERPL-SCNC: 4.5 MMOL/L — SIGNIFICANT CHANGE UP (ref 3.5–5.3)
PROT SERPL-MCNC: 7.2 G/DL — SIGNIFICANT CHANGE UP (ref 6–8.3)
PROTHROM AB SERPL-ACNC: 11.8 SEC — SIGNIFICANT CHANGE UP (ref 9.9–13.4)
RBC # BLD: 2.5 M/UL — LOW (ref 3.8–5.2)
RBC # FLD: 20.2 % — HIGH (ref 10.3–14.5)
RBC BLD AUTO: ABNORMAL
SODIUM SERPL-SCNC: 126 MMOL/L — LOW (ref 135–145)
TROPONIN T, HIGH SENSITIVITY RESULT: 11 NG/L — SIGNIFICANT CHANGE UP
TSH SERPL-MCNC: 1.18 UIU/ML — SIGNIFICANT CHANGE UP (ref 0.27–4.2)
VARIANT LYMPHS # BLD: 0.9 % — SIGNIFICANT CHANGE UP (ref 0–6)
VARIANT LYMPHS NFR BLD MANUAL: 0.9 % — SIGNIFICANT CHANGE UP (ref 0–6)
WBC # BLD: 2 K/UL — LOW (ref 3.8–10.5)
WBC # FLD AUTO: 2 K/UL — LOW (ref 3.8–10.5)

## 2025-07-21 PROCEDURE — 93010 ELECTROCARDIOGRAM REPORT: CPT

## 2025-07-21 PROCEDURE — 71046 X-RAY EXAM CHEST 2 VIEWS: CPT | Mod: 26

## 2025-07-21 PROCEDURE — 99285 EMERGENCY DEPT VISIT HI MDM: CPT | Mod: GC

## 2025-07-21 RX ADMIN — Medication 1000 MILLILITER(S): at 15:55

## 2025-07-21 NOTE — ED ADULT TRIAGE NOTE - HEIGHT IN CM
Called pt to let know of delay and have gotten expedited will be in touch when we have results.  
[phone]

## 2025-07-21 NOTE — ED PROVIDER NOTE - NSFOLLOWUPINSTRUCTIONS_ED_ALL_ED_FT
You were seen in the emergency department today for palpitations. Your labs results were normal for you.\    Please follow up with your primary care physician and cardiologist regarding today's visit.     PLEASE RETURN TO THE EMERGENCY DEPARTMENT if you experience worsening of your symptoms or any of the following: fever, uncontrollable headache, loss of consciousness, chest pain, difficulty breathing, uncontrollable nausea/vomiting, weakness/numbness/tingling.    We hope you feel better! Thank you for trusting us with your care!
I have seen and examined this patient and fully participated in the care of this patient as the teaching attending.  The service was shared with the DAMASO.  I reviewed and verified the documentation and independently performed the documented:

## 2025-07-21 NOTE — ED ADULT TRIAGE NOTE - CHIEF COMPLAINT QUOTE
Pt. c/o palpitations, chest pressure and SOB starting this AM while sitting in her recliner. Noted to be hypertensive at home. Pt. feels better at this time. hx of HTN, HLD

## 2025-07-21 NOTE — ED PROVIDER NOTE - OBJECTIVE STATEMENT
Saint Amandeep, DO (PGY3): 89-year-old female, with a history of MDS last chemo 2 weeks ago, right corneal transplant, hypertension, hyperlipidemia, recurrent UTIs, brought in by EMS for episode of palpitation associated with chest pressure and shortness of breath.  Reports that her symptoms lasted about an hour before dissipating.  Her son is a cardiologist, he was able to evaluate her.  Patient was found to have elevated blood pressure and heart rate of 107.  Patient reports that by the time EMS got to her, she felt better.  Currently asymptomatic including no chest pain, SOB, lightheadedness, dizziness, back pain, or lower extremity edema. Patient normally ambulates with cane/walker and has been able to ambulate with no issues.

## 2025-07-21 NOTE — ED PROVIDER NOTE - PHYSICAL EXAMINATION
GENERAL: no acute distress, non-toxic appearing  HEAD: normocephalic, atraumatic  HEENT: EOMI, normal conjunctiva, oral mucosa moist, full ROM of neck,  no neck swelling, no midline tenderness  CARDIAC: regular rate and rhythm  PULM: clear to ascultation bilaterally  GI: abdomen nondistended, soft, nontender  NEURO: alert and oriented x 3, normal speech, no focal motor or sensory deficits, no gross neurologic deficit  MSK: no visible deformities, no peripheral edema, calf tenderness/redness/swelling  SKIN: no visible rashes, dry, well-perfused  PSYCH: appropriate mood and affect

## 2025-07-21 NOTE — ED PROVIDER NOTE - ATTENDING CONTRIBUTION TO CARE
I have discussed the patient's case presentation with resident. I have also personally performed a face-to-face evaluation of the patient. I agree with the resident's assessment and plan. 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 Pt states sx lasted about one hour and had already resolved by the time she arrived here in ED. Currently she states she is asymptomatic. The EKG shows no ST segment elevations or depressions, no hyperacute T waves, or any other signs of acute ischemia. There is no malignant dysrhythmia. HD stable, not hypoxic. Differential diagnoses include but not limited to  ACS vs renal failure vs critical anemia vs electrolyte abnml vs UTI vs PTX vs PNA. Labs, CXR, UA. Dispo pending.

## 2025-07-21 NOTE — ED ADULT NURSE REASSESSMENT NOTE - NS ED NURSE REASSESS COMMENT FT1
Break RN: Pt received in stretcher outside room 15. Pt offered no complains at present. Denies cp, sob, dizziness, and palpitation. Respiration even and non-labored. in NAD. Pending dispo

## 2025-07-21 NOTE — ED PROVIDER NOTE - PROGRESS NOTE DETAILS
Saint Amandeep, DO (PGY3): labs at patient's baseline. Patient feels well. Family and patient updated. They feel comfortable going home. Will dc. Return precautions communicated. All questions answered

## 2025-07-21 NOTE — ED PROVIDER NOTE - PATIENT PORTAL LINK FT
Problem: Skin/Tissue Integrity  Goal: Skin integrity remains intact  Description: 1.  Monitor for areas of redness and/or skin breakdown2.  Assess vascular access sites hourly3.  Every 4-6 hours minimum:  Change oxygen saturation probe site4.  Every 4-6 hours:  If on nasal continuous positive airway pressure, respiratory therapy assess nares and determine need for appliance change or resting period  Outcome: Progressing     Problem: Discharge Planning  Goal: Discharge to home or other facility with appropriate resources  6/6/2025 0258 by Mira Mcnulyt LPN  Outcome: Progressing  6/6/2025 0257 by Mira Mcnulty LPN  Outcome: Progressing     Problem: Skin/Tissue Integrity - Adult  Goal: Skin integrity remains intact  Description: 1.  Monitor for areas of redness and/or skin breakdown2.  Assess vascular access sites hourly3.  Every 4-6 hours minimum:  Change oxygen saturation probe site4.  Every 4-6 hours:  If on nasal continuous positive airway pressure, respiratory therapy assess nares and determine need for appliance change or resting period  Outcome: Progressing      You can access the FollowMyHealth Patient Portal offered by [facility] by registering at the following website: [email or web address]. By joining [employer]’s FollowMyHealth portal, you will also be able to view your health information using other applications (apps) compatible with our system.

## 2025-07-21 NOTE — ED PROVIDER NOTE - CLINICAL SUMMARY MEDICAL DECISION MAKING FREE TEXT BOX
Saint Amandeep, DO (PGY3): Well-appearing 89-year-old female, , with a history of MDS last chemo 2 weeks ago, right corneal transplant, hypertension, hyperlipidemia, recurrent UTIs, brought in by EMS for episode of palpitation associated with chest pressure and shortness of breath.  Vitals stable.  EKG NSR, nonischemic. Physical exam as above.  Differential diagnosis includes but is not limited to dehydration, electrolyte derangements, anemia, ACS, thyroid issues.  Low concerns for PE given patient not hypoxic, tachypneic, or tachycardic at this time.  Plan for labs, chest x-ray.  Will give IV fluids.  If workup negative, will discharge.  Dr. Gaxiola spoke with patient's son who is a cardiologist, was in agreement with this plan.  Patient also in agreement with plan.

## 2025-08-08 ENCOUNTER — EMERGENCY (EMERGENCY)
Facility: HOSPITAL | Age: 89
LOS: 1 days | End: 2025-08-08
Attending: EMERGENCY MEDICINE
Payer: MEDICARE

## 2025-08-08 VITALS
RESPIRATION RATE: 16 BRPM | TEMPERATURE: 98 F | OXYGEN SATURATION: 98 % | HEART RATE: 97 BPM | HEIGHT: 62 IN | DIASTOLIC BLOOD PRESSURE: 66 MMHG | SYSTOLIC BLOOD PRESSURE: 140 MMHG | WEIGHT: 119.93 LBS

## 2025-08-08 VITALS
HEART RATE: 101 BPM | SYSTOLIC BLOOD PRESSURE: 119 MMHG | DIASTOLIC BLOOD PRESSURE: 59 MMHG | OXYGEN SATURATION: 97 % | RESPIRATION RATE: 15 BRPM

## 2025-08-08 DIAGNOSIS — Z98.49 CATARACT EXTRACTION STATUS, UNSPECIFIED EYE: Chronic | ICD-10-CM

## 2025-08-08 LAB
ALBUMIN SERPL ELPH-MCNC: 3.6 G/DL — SIGNIFICANT CHANGE UP (ref 3.3–5)
ALP SERPL-CCNC: 92 U/L — SIGNIFICANT CHANGE UP (ref 40–120)
ALT FLD-CCNC: 64 U/L — HIGH (ref 10–45)
ANION GAP SERPL CALC-SCNC: 12 MMOL/L — SIGNIFICANT CHANGE UP (ref 5–17)
APPEARANCE UR: CLEAR — SIGNIFICANT CHANGE UP
AST SERPL-CCNC: 37 U/L — SIGNIFICANT CHANGE UP (ref 10–40)
BASOPHILS # BLD AUTO: 0.01 K/UL — SIGNIFICANT CHANGE UP (ref 0–0.2)
BASOPHILS # BLD MANUAL: 0 K/UL — SIGNIFICANT CHANGE UP (ref 0–0.2)
BASOPHILS NFR BLD AUTO: 0.2 % — SIGNIFICANT CHANGE UP (ref 0–2)
BASOPHILS NFR BLD MANUAL: 0 % — SIGNIFICANT CHANGE UP (ref 0–2)
BILIRUB SERPL-MCNC: 0.7 MG/DL — SIGNIFICANT CHANGE UP (ref 0.2–1.2)
BILIRUB UR-MCNC: NEGATIVE — SIGNIFICANT CHANGE UP
BLD GP AB SCN SERPL QL: NEGATIVE — SIGNIFICANT CHANGE UP
BUN SERPL-MCNC: 13 MG/DL — SIGNIFICANT CHANGE UP (ref 7–23)
CALCIUM SERPL-MCNC: 9.4 MG/DL — SIGNIFICANT CHANGE UP (ref 8.4–10.5)
CHLORIDE SERPL-SCNC: 93 MMOL/L — LOW (ref 96–108)
CO2 SERPL-SCNC: 22 MMOL/L — SIGNIFICANT CHANGE UP (ref 22–31)
COLOR SPEC: YELLOW — SIGNIFICANT CHANGE UP
CREAT SERPL-MCNC: 0.72 MG/DL — SIGNIFICANT CHANGE UP (ref 0.5–1.3)
DIFF PNL FLD: NEGATIVE — SIGNIFICANT CHANGE UP
EGFR: 78 ML/MIN/1.73M2 — SIGNIFICANT CHANGE UP
EGFR: 78 ML/MIN/1.73M2 — SIGNIFICANT CHANGE UP
EOSINOPHIL # BLD AUTO: 0 K/UL — SIGNIFICANT CHANGE UP (ref 0–0.5)
EOSINOPHIL # BLD MANUAL: 0 K/UL — SIGNIFICANT CHANGE UP (ref 0–0.5)
EOSINOPHIL NFR BLD AUTO: 0 % — SIGNIFICANT CHANGE UP (ref 0–6)
EOSINOPHIL NFR BLD MANUAL: 0 % — SIGNIFICANT CHANGE UP (ref 0–6)
GAS PNL BLDV: SIGNIFICANT CHANGE UP
GLUCOSE SERPL-MCNC: 140 MG/DL — HIGH (ref 70–99)
GLUCOSE UR QL: NEGATIVE MG/DL — SIGNIFICANT CHANGE UP
HCT VFR BLD CALC: 26.8 % — LOW (ref 34.5–45)
HGB BLD-MCNC: 8.6 G/DL — LOW (ref 11.5–15.5)
IMM GRANULOCYTES # BLD AUTO: 0.05 K/UL — SIGNIFICANT CHANGE UP (ref 0–0.07)
IMM GRANULOCYTES NFR BLD AUTO: 1.2 % — HIGH (ref 0–0.9)
IMMATURE PLATELET FRACTION #: 6.5 K/UL — SIGNIFICANT CHANGE UP (ref 4.7–11.1)
IMMATURE PLATELET FRACTION %: 19.1 % — HIGH (ref 1.6–4.9)
KETONES UR QL: NEGATIVE MG/DL — SIGNIFICANT CHANGE UP
LEUKOCYTE ESTERASE UR-ACNC: NEGATIVE — SIGNIFICANT CHANGE UP
LYMPHOCYTES # BLD AUTO: 0.23 K/UL — LOW (ref 1–3.3)
LYMPHOCYTES # BLD MANUAL: 0.17 K/UL — LOW (ref 1–3.3)
LYMPHOCYTES NFR BLD AUTO: 5.7 % — LOW (ref 13–44)
LYMPHOCYTES NFR BLD MANUAL: 4.3 % — LOW (ref 13–44)
MCHC RBC-ENTMCNC: 31 PG — SIGNIFICANT CHANGE UP (ref 27–34)
MCHC RBC-ENTMCNC: 32.1 G/DL — SIGNIFICANT CHANGE UP (ref 32–36)
MCV RBC AUTO: 96.8 FL — SIGNIFICANT CHANGE UP (ref 80–100)
MONOCYTES # BLD AUTO: 0.52 K/UL — SIGNIFICANT CHANGE UP (ref 0–0.9)
MONOCYTES # BLD MANUAL: 0.17 K/UL — SIGNIFICANT CHANGE UP (ref 0–0.9)
MONOCYTES NFR BLD AUTO: 12.9 % — SIGNIFICANT CHANGE UP (ref 2–14)
MONOCYTES NFR BLD MANUAL: 4.3 % — SIGNIFICANT CHANGE UP (ref 2–14)
NEUTROPHILS # BLD AUTO: 3.21 K/UL — SIGNIFICANT CHANGE UP (ref 1.8–7.4)
NEUTROPHILS # BLD MANUAL: 3.67 K/UL — SIGNIFICANT CHANGE UP (ref 1.8–7.4)
NEUTROPHILS NFR BLD AUTO: 80 % — HIGH (ref 43–77)
NEUTROPHILS NFR BLD MANUAL: 91.4 % — HIGH (ref 43–77)
NITRITE UR-MCNC: NEGATIVE — SIGNIFICANT CHANGE UP
NRBC # BLD AUTO: 0 K/UL — SIGNIFICANT CHANGE UP (ref 0–0)
NRBC # FLD: 0 K/UL — SIGNIFICANT CHANGE UP (ref 0–0)
NRBC BLD AUTO-RTO: 0 /100 WBCS — SIGNIFICANT CHANGE UP (ref 0–0)
PH UR: 7 — SIGNIFICANT CHANGE UP (ref 5–8)
PLAT MORPH BLD: NORMAL — SIGNIFICANT CHANGE UP
PLATELET # BLD AUTO: 34 K/UL — LOW (ref 150–400)
PMV BLD: 11.2 FL — SIGNIFICANT CHANGE UP (ref 7–13)
POTASSIUM SERPL-MCNC: 4.5 MMOL/L — SIGNIFICANT CHANGE UP (ref 3.5–5.3)
POTASSIUM SERPL-SCNC: 4.5 MMOL/L — SIGNIFICANT CHANGE UP (ref 3.5–5.3)
PROT SERPL-MCNC: 7.4 G/DL — SIGNIFICANT CHANGE UP (ref 6–8.3)
PROT UR-MCNC: SIGNIFICANT CHANGE UP MG/DL
RBC # BLD: 2.77 M/UL — LOW (ref 3.8–5.2)
RBC # FLD: 20 % — HIGH (ref 10.3–14.5)
RBC BLD AUTO: SIGNIFICANT CHANGE UP
RH IG SCN BLD-IMP: POSITIVE — SIGNIFICANT CHANGE UP
SODIUM SERPL-SCNC: 127 MMOL/L — LOW (ref 135–145)
SP GR SPEC: 1.01 — SIGNIFICANT CHANGE UP (ref 1–1.03)
TROPONIN T, HIGH SENSITIVITY RESULT: 14 NG/L — SIGNIFICANT CHANGE UP (ref 0–51)
UROBILINOGEN FLD QL: 1 MG/DL — SIGNIFICANT CHANGE UP (ref 0.2–1)
WBC # BLD: 4.02 K/UL — SIGNIFICANT CHANGE UP (ref 3.8–10.5)
WBC # FLD AUTO: 4.02 K/UL — SIGNIFICANT CHANGE UP (ref 3.8–10.5)

## 2025-08-08 PROCEDURE — 82947 ASSAY GLUCOSE BLOOD QUANT: CPT

## 2025-08-08 PROCEDURE — 82803 BLOOD GASES ANY COMBINATION: CPT

## 2025-08-08 PROCEDURE — 86900 BLOOD TYPING SEROLOGIC ABO: CPT

## 2025-08-08 PROCEDURE — 85018 HEMOGLOBIN: CPT

## 2025-08-08 PROCEDURE — 85014 HEMATOCRIT: CPT

## 2025-08-08 PROCEDURE — 84295 ASSAY OF SERUM SODIUM: CPT

## 2025-08-08 PROCEDURE — 36415 COLL VENOUS BLD VENIPUNCTURE: CPT

## 2025-08-08 PROCEDURE — 82330 ASSAY OF CALCIUM: CPT

## 2025-08-08 PROCEDURE — 83605 ASSAY OF LACTIC ACID: CPT

## 2025-08-08 PROCEDURE — 99285 EMERGENCY DEPT VISIT HI MDM: CPT | Mod: GC

## 2025-08-08 PROCEDURE — 86901 BLOOD TYPING SEROLOGIC RH(D): CPT

## 2025-08-08 PROCEDURE — 84132 ASSAY OF SERUM POTASSIUM: CPT

## 2025-08-08 PROCEDURE — 82435 ASSAY OF BLOOD CHLORIDE: CPT

## 2025-08-08 PROCEDURE — 81003 URINALYSIS AUTO W/O SCOPE: CPT

## 2025-08-08 PROCEDURE — 84484 ASSAY OF TROPONIN QUANT: CPT

## 2025-08-08 PROCEDURE — 71045 X-RAY EXAM CHEST 1 VIEW: CPT | Mod: 26

## 2025-08-08 PROCEDURE — 93005 ELECTROCARDIOGRAM TRACING: CPT

## 2025-08-08 PROCEDURE — 93010 ELECTROCARDIOGRAM REPORT: CPT

## 2025-08-08 PROCEDURE — 85025 COMPLETE CBC W/AUTO DIFF WBC: CPT

## 2025-08-08 PROCEDURE — 80053 COMPREHEN METABOLIC PANEL: CPT

## 2025-08-08 PROCEDURE — 71045 X-RAY EXAM CHEST 1 VIEW: CPT

## 2025-08-08 PROCEDURE — 86850 RBC ANTIBODY SCREEN: CPT

## 2025-08-08 PROCEDURE — 99285 EMERGENCY DEPT VISIT HI MDM: CPT | Mod: 25

## 2025-08-08 RX ADMIN — Medication 1000 MILLILITER(S): at 13:35
